# Patient Record
Sex: MALE | Race: BLACK OR AFRICAN AMERICAN | Employment: OTHER | URBAN - METROPOLITAN AREA
[De-identification: names, ages, dates, MRNs, and addresses within clinical notes are randomized per-mention and may not be internally consistent; named-entity substitution may affect disease eponyms.]

---

## 2021-12-02 RX ORDER — GLUCOSAMINE HCL/CHONDROITIN SU 500-400 MG
1 CAPSULE ORAL
COMMUNITY

## 2021-12-02 RX ORDER — ATORVASTATIN CALCIUM 40 MG/1
40 TABLET, FILM COATED ORAL DAILY
COMMUNITY

## 2021-12-02 RX ORDER — ASPIRIN 81 MG/1
81 TABLET ORAL DAILY
COMMUNITY

## 2021-12-02 RX ORDER — AMLODIPINE BESYLATE 10 MG/1
10 TABLET ORAL DAILY
COMMUNITY

## 2021-12-02 RX ORDER — METHADONE HYDROCHLORIDE 10 MG/1
10 TABLET ORAL EVERY 4 HOURS PRN
COMMUNITY

## 2021-12-02 RX ORDER — TROSPIUM CHLORIDE 20 MG/1
20 TABLET, FILM COATED ORAL 2 TIMES DAILY
COMMUNITY
End: 2022-02-16

## 2021-12-02 RX ORDER — TAMSULOSIN HYDROCHLORIDE 0.4 MG/1
0.4 CAPSULE ORAL DAILY
COMMUNITY
End: 2022-02-16

## 2021-12-15 RX ORDER — CALCITRIOL 0.25 UG/1
0.25 CAPSULE, LIQUID FILLED ORAL DAILY
COMMUNITY

## 2021-12-16 ENCOUNTER — OFFICE VISIT (OUTPATIENT)
Dept: SURGERY | Age: 71
End: 2021-12-16
Payer: OTHER GOVERNMENT

## 2021-12-16 VITALS — DIASTOLIC BLOOD PRESSURE: 82 MMHG | SYSTOLIC BLOOD PRESSURE: 138 MMHG | WEIGHT: 221.1 LBS

## 2021-12-16 DIAGNOSIS — N18.6 END STAGE RENAL DISEASE (HCC): Primary | ICD-10-CM

## 2021-12-16 PROCEDURE — 99204 OFFICE O/P NEW MOD 45 MIN: CPT | Performed by: STUDENT IN AN ORGANIZED HEALTH CARE EDUCATION/TRAINING PROGRAM

## 2021-12-16 RX ORDER — PANTOPRAZOLE SODIUM 20 MG/1
TABLET, DELAYED RELEASE ORAL
COMMUNITY
Start: 2021-11-21 | End: 2022-02-16

## 2021-12-16 RX ORDER — NALOXONE HYDROCHLORIDE 4 MG/.1ML
SPRAY NASAL
COMMUNITY
Start: 2021-06-04

## 2021-12-16 ASSESSMENT — ENCOUNTER SYMPTOMS: SHORTNESS OF BREATH: 0

## 2021-12-16 NOTE — PROGRESS NOTES
Mikayla Gupta (:  1950) is a 80 y.o. male,New patient, here for evaluation of the following chief complaint(s):  New Patient (Discuss hemodialysis access, pt referred by [address] [place] [address])         ASSESSMENT/PLAN:  1. End stage renal disease [facility])       This is a 71-year-old male patient presents the office today for evaluation for possible hemodialysis access. His paperwork demonstrates that the patient does not have usable cephalic vein conduit in both upper extremities. He is right-hand dominant. Would recommend creation of a left lower extremity AV fistula. He does stay active. He says he works out quite a bit. For the most part he has nonusable venous conduit in both lower extremities. However his basilic vein at least based on these measurements with the [address] [place] [address] suggested to not only her vein at the antecubital space and a 3.6 mm vein in the mid upper arm. Therefore would recommend for stage creation of a brachiobasilic fistula at the elbow. With this would allow for maturation and eventually transposition and a second staged fashion. Unless the vein was of good diameter at the time of the operation. Patient asked about hemodialysis versus peritoneal dialysis. I do not perform peritoneal dialysis. Offered to have the patient see a new nephrologist here but he is concerned that the [address] [place] [address] will not cover this. No chest pain or shortness of breath with ambulation. He is able to walk up steps without shortness of breath. This suggests greater than 4 METS. Would recommend left upper extremity brachiobasilic for stage procedure. All other questions answered. Patient would like to confer with his wife before deciding. Subjective   SUBJECTIVE/OBJECTIVE:  This is a 71-year-old male patient presents the office today for evaluation for possible hemodialysis access. The patient does not want to have his care done at the [address] [place] [address]. He has a history of IV drug abuse. He is on methadone for this.   He denies any chest pain or shortness of breath. Denies any upper extremity symptoms such as weakness or numbness. He is right-hand dominant. He denies any history of pacemakers or central lines. He is not currently on dialysis. He is stage 5 chronic kidney disease. He states that he had a falling out with his nephrologist at the [facility]. He is asking about the difference during hemodialysis and peritoneal dialysis. He is notably nervous about the process. Etiology for renal failure is diabetes and hypertension. Review of Systems   Constitutional: Negative for chills and fever. Respiratory: Negative for shortness of breath. Cardiovascular: Negative for chest pain. Musculoskeletal: Negative for myalgias. Skin: Negative for wound. Neurological: Negative for weakness and numbness. Hematological: Does not bruise/bleed easily. Psychiatric/Behavioral: Negative for agitation. Objective   Physical Exam  Constitutional:       Appearance: Normal appearance. Cardiovascular:      Rate and Rhythm: Normal rate and regular rhythm. Pulses: Normal pulses. Pulmonary:      Effort: Pulmonary effort is normal. No respiratory distress. Musculoskeletal:         General: Normal range of motion. Skin:     General: Skin is warm and dry. Capillary Refill: Capillary refill takes less than 2 seconds. Comments: Tattoos over both extremities. No visible veins of both extremities. Neurological:      General: No focal deficit present. Mental Status: He is alert. Psychiatric:         Mood and Affect: Mood normal.         Behavior: Behavior normal.         Thought Content:  Thought content normal.         Judgment: Judgment normal.            On this date 12/16/2021 I have spent 45 minutes reviewing previous notes, test results and face to face with the patient discussing the diagnosis and importance of compliance with the treatment plan as well as documenting on the day of the visit. Dionne Montague DO, [facility] Vascular and Endovascular Surgery    This document was dictated using voice recognition software.

## 2021-12-23 ENCOUNTER — TELEPHONE (OUTPATIENT)
Dept: SURGERY | Age: 71
End: 2021-12-23

## 2021-12-23 NOTE — TELEPHONE ENCOUNTER
Pt is wanting 1/18/2022 for sx, he states he will get H&P and COVID test done at the [place] on 1/12.

## 2022-01-12 ENCOUNTER — TELEPHONE (OUTPATIENT)
Dept: SURGERY | Age: 72
End: 2022-01-12

## 2022-01-13 ENCOUNTER — TELEPHONE (OUTPATIENT)
Dept: VASCULAR SURGERY | Age: 72
End: 2022-01-13

## 2022-01-13 NOTE — TELEPHONE ENCOUNTER
Nolvia Julio RN at [facility] called and faxed results of Mr. Shanika Schmidt covid test as it was positive as of 1/12/2022. He will be rescheduled for surgery with Dr. Lonnie Saucedo on 02/15/2022. I've already sent the surgery letter, and faxed the cancellation.

## 2022-02-01 ENCOUNTER — TELEPHONE (OUTPATIENT)
Dept: SURGERY | Age: 72
End: 2022-02-01

## 2022-02-02 ENCOUNTER — TELEPHONE (OUTPATIENT)
Dept: SURGERY | Age: 72
End: 2022-02-02

## 2022-02-07 ENCOUNTER — TELEPHONE (OUTPATIENT)
Dept: SURGERY | Age: 72
End: 2022-02-07

## 2022-02-08 ENCOUNTER — TELEPHONE (OUTPATIENT)
Dept: SURGERY | Age: 72
End: 2022-02-08

## 2022-02-16 NOTE — PROGRESS NOTES
physical and/or concerning medications. Bring any test results/reports from your physicians office. If you are under the care of a heart doctor or specialist doctor, please be aware that you may be asked to them for clearance    You may be asked to stop blood thinners such as Coumadin, Plavix, Fragmin, Lovenox, etc., or any anti-inflammatories such as:  Aspirin, Ibuprofen, Advil, Naproxen prior to your surgery. We also ask that you stop any OTC medications such as fish oil, vitamin E, glucosamine, garlic, Multivitamins, COQ 10, etc.    We ask that you do not smoke 24 hours prior to surgery  We ask that you do not  drink any alcoholic beverages 24 hours prior to surgery     You must make arrangements for a responsible adult to take you home after your surgery. For your safety you will not be allowed to leave alone or drive yourself home. Your surgery will be cancelled if you do not have a ride home. Also for your safety, it is strongly suggested that someone stay with you the first 24 hours after your surgery. A parent or legal guardian must accompany a child scheduled for surgery and plan to stay at the hospital until the child is discharged. Please do not bring other children with you. For your comfort, please wear simple loose fitting clothing to the hospital.  Please do not bring valuables. Do not wear any make-up or nail polish on your fingers or toes      For your safety, please do not wear any jewelry or body piercing's on the day of surgery. All jewelry must be removed. If you have dentures, they will be removed before going to operating room. For your convenience, we will provide you with a container. If you wear contact lenses or glasses, they will be removed, please bring a case for them. If you have a living will and a durable power of  for healthcare, please bring in a copy.      As part of our patient safety program to minimize surgical site infections, we ask you to do the following:    · Please notify your surgeon if you develop any illness between         now and the  day of your surgery. · This includes a cough, cold, fever, sore throat, nausea,         or vomiting, and diarrhea, etc.  ·  Please notify your surgeon if you experience dizziness, shortness         of breath or blurred vision between now and the time of your surgery. Do not shave your operative site 96 hours prior to surgery. For face and neck surgery, men may use an electric razor 48 hours   prior to surgery. You may shower the night before surgery or the morning of   your surgery with an antibacterial soap. You will need to bring a photo ID and insurance card    [facility] has an onsite pharmacy, would you like to utilize our pharmacy     If you will be staying overnight and use a C-pap machine, please bring   your C-pap to hospital     Our goal is to provide you with excellent care, therefore, visitors will be limited to two(2) in the room at a time so that we may focus on providing this care for you. Please contact pre-admission testing if you have any further questions. [facility] phone number:  [phone] PAT fax number:  [phone]  Please note these are generalized instructions for all surgical cases, you may be provided with more specific instructions according to your surgery.     Unfortunately due the rise in covid cases, staffing crisis and hospital capacity, your procedure may need to be rescheduled--if this were to happen your Surgeons office will notify you ASAP

## 2022-02-21 ENCOUNTER — TELEPHONE (OUTPATIENT)
Dept: VASCULAR SURGERY | Age: 72
End: 2022-02-21

## 2022-02-23 ENCOUNTER — TELEPHONE (OUTPATIENT)
Dept: VASCULAR SURGERY | Age: 72
End: 2022-02-23

## 2022-02-23 NOTE — TELEPHONE ENCOUNTER
Spoke with patient regarding scheduled surgery on 03- with Dr. Mark Kraft. Patient is asked to arrive by 6:00 AM @ [facility] after midnight. May take any Heart or Blood Pressure medication with a small sip of water to wash them down. Hold your Aspirin the morning of surgery. Take a 1/2 dose of Insulin the night before surgery. No diabetic medication on the morning of surgery. Patient states he is going to take his Methadone around 3 A. M. Asked that he tell the Nurse what medication he took, as well as the dosage and time he takes it. You will need someone to drive you home following this procedure. Please bring a Photo ID & Insurance card with you, and check-in at the Surgery Desk down the right-hand hallway on the first floor. Patient saw his Psychiatrist 2/18/2022 @ [facility]. Has not gone to see PCP there. Bing Cranker advised and will alert Dr. Mark Kraft. Surgery is scheduled to start at approx. 7:30 AM and should take approx. 180 minutes. If the hospital needs any further information, someone will give you a call. Patient expressed a verbal understanding of these instructions and had no further questions at this time. Call ended.

## 2022-02-28 ENCOUNTER — ANESTHESIA EVENT (OUTPATIENT)
Dept: OPERATING ROOM | Age: 72
End: 2022-02-28
Payer: OTHER GOVERNMENT

## 2022-02-28 ENCOUNTER — TELEPHONE (OUTPATIENT)
Dept: VASCULAR SURGERY | Age: 72
End: 2022-02-28

## 2022-03-01 ENCOUNTER — HOSPITAL ENCOUNTER (OUTPATIENT)
Age: 72
Setting detail: OUTPATIENT SURGERY
Discharge: HOME | End: 2022-03-01
Attending: STUDENT IN AN ORGANIZED HEALTH CARE EDUCATION/TRAINING PROGRAM | Admitting: STUDENT IN AN ORGANIZED HEALTH CARE EDUCATION/TRAINING PROGRAM
Payer: OTHER GOVERNMENT

## 2022-03-01 ENCOUNTER — ANESTHESIA (OUTPATIENT)
Dept: OPERATING ROOM | Age: 72
End: 2022-03-01
Payer: OTHER GOVERNMENT

## 2022-03-01 VITALS
SYSTOLIC BLOOD PRESSURE: 124 MMHG | OXYGEN SATURATION: 93 % | TEMPERATURE: 97.5 F | DIASTOLIC BLOOD PRESSURE: 58 MMHG | RESPIRATION RATE: 8 BRPM

## 2022-03-01 VITALS
BODY MASS INDEX: 27.23 KG/M2 | HEART RATE: 64 BPM | TEMPERATURE: 97.2 F | HEIGHT: 74 IN | SYSTOLIC BLOOD PRESSURE: 140 MMHG | WEIGHT: 212.19 LBS | OXYGEN SATURATION: 97 % | RESPIRATION RATE: 16 BRPM | DIASTOLIC BLOOD PRESSURE: 68 MMHG

## 2022-03-01 DIAGNOSIS — G89.18 POST-OP PAIN: Primary | ICD-10-CM

## 2022-03-01 LAB
ANION GAP SERPL CALCULATED.3IONS-SCNC: 17 MMOL/L (ref 3–16)
BUN BLDV-MCNC: 70 MG/DL (ref 7–20)
CALCIUM SERPL-MCNC: 9 MG/DL (ref 8.3–10.6)
CHLORIDE BLD-SCNC: 100 MMOL/L (ref 99–110)
CO2: 20 MMOL/L (ref 21–32)
CREAT SERPL-MCNC: 5.6 MG/DL (ref 0.8–1.3)
GFR AFRICAN AMERICAN: 12
GFR NON-AFRICAN AMERICAN: 10
GLUCOSE BLD-MCNC: 135 MG/DL (ref 70–99)
GLUCOSE BLD-MCNC: 136 MG/DL (ref 70–99)
GLUCOSE BLD-MCNC: 161 MG/DL (ref 70–99)
HCT VFR BLD CALC: 31.5 % (ref 40.5–52.5)
HEMOGLOBIN: 10.5 G/DL (ref 13.5–17.5)
MCH RBC QN AUTO: 29 PG (ref 26–34)
MCHC RBC AUTO-ENTMCNC: 33.5 G/DL (ref 31–36)
MCV RBC AUTO: 86.5 FL (ref 80–100)
PDW BLD-RTO: 14.6 % (ref 12.4–15.4)
PERFORMED ON: ABNORMAL
PERFORMED ON: ABNORMAL
PLATELET # BLD: 201 K/UL (ref 135–450)
PMV BLD AUTO: 8.4 FL (ref 5–10.5)
POTASSIUM REFLEX MAGNESIUM: 4.8 MMOL/L (ref 3.5–5.1)
POTASSIUM, WHOLE BLOOD: 4.8 MMOL/L (ref 3.5–5.1)
RBC # BLD: 3.64 M/UL (ref 4.2–5.9)
SODIUM BLD-SCNC: 137 MMOL/L (ref 136–145)
WBC # BLD: 6.3 K/UL (ref 4–11)

## 2022-03-01 PROCEDURE — 6360000002 HC RX W HCPCS: Performed by: STUDENT IN AN ORGANIZED HEALTH CARE EDUCATION/TRAINING PROGRAM

## 2022-03-01 PROCEDURE — 6370000000 HC RX 637 (ALT 250 FOR IP): Performed by: ANESTHESIOLOGY

## 2022-03-01 PROCEDURE — 36819 AV FUSE UPPR ARM BASILIC: CPT | Performed by: STUDENT IN AN ORGANIZED HEALTH CARE EDUCATION/TRAINING PROGRAM

## 2022-03-01 PROCEDURE — 7100000010 HC PHASE II RECOVERY - FIRST 15 MIN: Performed by: STUDENT IN AN ORGANIZED HEALTH CARE EDUCATION/TRAINING PROGRAM

## 2022-03-01 PROCEDURE — 80048 BASIC METABOLIC PNL TOTAL CA: CPT

## 2022-03-01 PROCEDURE — 2709999900 HC NON-CHARGEABLE SUPPLY: Performed by: STUDENT IN AN ORGANIZED HEALTH CARE EDUCATION/TRAINING PROGRAM

## 2022-03-01 PROCEDURE — 85027 COMPLETE CBC AUTOMATED: CPT

## 2022-03-01 PROCEDURE — 3700000000 HC ANESTHESIA ATTENDED CARE: Performed by: STUDENT IN AN ORGANIZED HEALTH CARE EDUCATION/TRAINING PROGRAM

## 2022-03-01 PROCEDURE — 64415 NJX AA&/STRD BRCH PLXS IMG: CPT | Performed by: ANESTHESIOLOGY

## 2022-03-01 PROCEDURE — 6360000002 HC RX W HCPCS: Performed by: NURSE ANESTHETIST, CERTIFIED REGISTERED

## 2022-03-01 PROCEDURE — 6360000002 HC RX W HCPCS: Performed by: ANESTHESIOLOGY

## 2022-03-01 PROCEDURE — 2500000003 HC RX 250 WO HCPCS: Performed by: NURSE ANESTHETIST, CERTIFIED REGISTERED

## 2022-03-01 PROCEDURE — 7100000011 HC PHASE II RECOVERY - ADDTL 15 MIN: Performed by: STUDENT IN AN ORGANIZED HEALTH CARE EDUCATION/TRAINING PROGRAM

## 2022-03-01 PROCEDURE — 7100000001 HC PACU RECOVERY - ADDTL 15 MIN: Performed by: STUDENT IN AN ORGANIZED HEALTH CARE EDUCATION/TRAINING PROGRAM

## 2022-03-01 PROCEDURE — 3600000014 HC SURGERY LEVEL 4 ADDTL 15MIN: Performed by: STUDENT IN AN ORGANIZED HEALTH CARE EDUCATION/TRAINING PROGRAM

## 2022-03-01 PROCEDURE — 84132 ASSAY OF SERUM POTASSIUM: CPT

## 2022-03-01 PROCEDURE — A4217 STERILE WATER/SALINE, 500 ML: HCPCS | Performed by: STUDENT IN AN ORGANIZED HEALTH CARE EDUCATION/TRAINING PROGRAM

## 2022-03-01 PROCEDURE — 7100000000 HC PACU RECOVERY - FIRST 15 MIN: Performed by: STUDENT IN AN ORGANIZED HEALTH CARE EDUCATION/TRAINING PROGRAM

## 2022-03-01 PROCEDURE — 2580000003 HC RX 258: Performed by: STUDENT IN AN ORGANIZED HEALTH CARE EDUCATION/TRAINING PROGRAM

## 2022-03-01 PROCEDURE — 3600000004 HC SURGERY LEVEL 4 BASE: Performed by: STUDENT IN AN ORGANIZED HEALTH CARE EDUCATION/TRAINING PROGRAM

## 2022-03-01 PROCEDURE — 3700000001 HC ADD 15 MINUTES (ANESTHESIA): Performed by: STUDENT IN AN ORGANIZED HEALTH CARE EDUCATION/TRAINING PROGRAM

## 2022-03-01 PROCEDURE — 2580000003 HC RX 258: Performed by: ANESTHESIOLOGY

## 2022-03-01 RX ORDER — SODIUM CHLORIDE 9 MG/ML
25 INJECTION, SOLUTION INTRAVENOUS PRN
Status: DISCONTINUED | OUTPATIENT
Start: 2022-03-01 | End: 2022-03-01 | Stop reason: SDUPTHER

## 2022-03-01 RX ORDER — HEPARIN SODIUM 1000 [USP'U]/ML
INJECTION, SOLUTION INTRAVENOUS; SUBCUTANEOUS PRN
Status: DISCONTINUED | OUTPATIENT
Start: 2022-03-01 | End: 2022-03-01 | Stop reason: SDUPTHER

## 2022-03-01 RX ORDER — MAGNESIUM HYDROXIDE 1200 MG/15ML
LIQUID ORAL CONTINUOUS PRN
Status: COMPLETED | OUTPATIENT
Start: 2022-03-01 | End: 2022-03-01

## 2022-03-01 RX ORDER — SODIUM CHLORIDE 0.9 % (FLUSH) 0.9 %
5-40 SYRINGE (ML) INJECTION EVERY 12 HOURS SCHEDULED
Status: DISCONTINUED | OUTPATIENT
Start: 2022-03-01 | End: 2022-03-01 | Stop reason: HOSPADM

## 2022-03-01 RX ORDER — OXYCODONE HYDROCHLORIDE 5 MG/1
5 TABLET ORAL EVERY 4 HOURS PRN
Qty: 28 TABLET | Refills: 0 | Status: SHIPPED | OUTPATIENT
Start: 2022-03-01 | End: 2022-03-08

## 2022-03-01 RX ORDER — SODIUM CHLORIDE 9 MG/ML
INJECTION, SOLUTION INTRAVENOUS CONTINUOUS
Status: DISCONTINUED | OUTPATIENT
Start: 2022-03-01 | End: 2022-03-01 | Stop reason: HOSPADM

## 2022-03-01 RX ORDER — DIPHENHYDRAMINE HYDROCHLORIDE 50 MG/ML
12.5 INJECTION INTRAMUSCULAR; INTRAVENOUS
Status: DISCONTINUED | OUTPATIENT
Start: 2022-03-01 | End: 2022-03-01 | Stop reason: HOSPADM

## 2022-03-01 RX ORDER — ROPIVACAINE HYDROCHLORIDE 5 MG/ML
INJECTION, SOLUTION EPIDURAL; INFILTRATION; PERINEURAL
Status: COMPLETED | OUTPATIENT
Start: 2022-03-01 | End: 2022-03-01

## 2022-03-01 RX ORDER — SODIUM CHLORIDE 0.9 % (FLUSH) 0.9 %
5-40 SYRINGE (ML) INJECTION PRN
Status: DISCONTINUED | OUTPATIENT
Start: 2022-03-01 | End: 2022-03-01 | Stop reason: HOSPADM

## 2022-03-01 RX ORDER — FENTANYL CITRATE 50 UG/ML
INJECTION, SOLUTION INTRAMUSCULAR; INTRAVENOUS PRN
Status: DISCONTINUED | OUTPATIENT
Start: 2022-03-01 | End: 2022-03-01 | Stop reason: SDUPTHER

## 2022-03-01 RX ORDER — SODIUM CHLORIDE 9 MG/ML
25 INJECTION, SOLUTION INTRAVENOUS PRN
Status: DISCONTINUED | OUTPATIENT
Start: 2022-03-01 | End: 2022-03-01 | Stop reason: HOSPADM

## 2022-03-01 RX ORDER — MEPERIDINE HYDROCHLORIDE 25 MG/ML
12.5 INJECTION INTRAMUSCULAR; INTRAVENOUS; SUBCUTANEOUS EVERY 5 MIN PRN
Status: DISCONTINUED | OUTPATIENT
Start: 2022-03-01 | End: 2022-03-01 | Stop reason: HOSPADM

## 2022-03-01 RX ORDER — PROPOFOL 10 MG/ML
INJECTION, EMULSION INTRAVENOUS PRN
Status: DISCONTINUED | OUTPATIENT
Start: 2022-03-01 | End: 2022-03-01 | Stop reason: SDUPTHER

## 2022-03-01 RX ORDER — PROTAMINE SULFATE 10 MG/ML
INJECTION, SOLUTION INTRAVENOUS PRN
Status: DISCONTINUED | OUTPATIENT
Start: 2022-03-01 | End: 2022-03-01 | Stop reason: SDUPTHER

## 2022-03-01 RX ORDER — EPHEDRINE SULFATE/0.9% NACL/PF 50 MG/5 ML
SYRINGE (ML) INTRAVENOUS PRN
Status: DISCONTINUED | OUTPATIENT
Start: 2022-03-01 | End: 2022-03-01 | Stop reason: SDUPTHER

## 2022-03-01 RX ORDER — DEXAMETHASONE SODIUM PHOSPHATE 4 MG/ML
INJECTION, SOLUTION INTRA-ARTICULAR; INTRALESIONAL; INTRAMUSCULAR; INTRAVENOUS; SOFT TISSUE PRN
Status: DISCONTINUED | OUTPATIENT
Start: 2022-03-01 | End: 2022-03-01 | Stop reason: SDUPTHER

## 2022-03-01 RX ORDER — FENTANYL CITRATE 50 UG/ML
25 INJECTION, SOLUTION INTRAMUSCULAR; INTRAVENOUS EVERY 5 MIN PRN
Status: DISCONTINUED | OUTPATIENT
Start: 2022-03-01 | End: 2022-03-01 | Stop reason: HOSPADM

## 2022-03-01 RX ORDER — MIDAZOLAM HYDROCHLORIDE 1 MG/ML
INJECTION INTRAMUSCULAR; INTRAVENOUS PRN
Status: DISCONTINUED | OUTPATIENT
Start: 2022-03-01 | End: 2022-03-01 | Stop reason: SDUPTHER

## 2022-03-01 RX ORDER — LIDOCAINE HYDROCHLORIDE 20 MG/ML
INJECTION, SOLUTION EPIDURAL; INFILTRATION; INTRACAUDAL; PERINEURAL PRN
Status: DISCONTINUED | OUTPATIENT
Start: 2022-03-01 | End: 2022-03-01 | Stop reason: SDUPTHER

## 2022-03-01 RX ORDER — OXYCODONE HYDROCHLORIDE 5 MG/1
5 TABLET ORAL PRN
Status: COMPLETED | OUTPATIENT
Start: 2022-03-01 | End: 2022-03-01

## 2022-03-01 RX ORDER — ONDANSETRON 2 MG/ML
4 INJECTION INTRAMUSCULAR; INTRAVENOUS
Status: DISCONTINUED | OUTPATIENT
Start: 2022-03-01 | End: 2022-03-01 | Stop reason: HOSPADM

## 2022-03-01 RX ORDER — ROCURONIUM BROMIDE 10 MG/ML
INJECTION, SOLUTION INTRAVENOUS PRN
Status: DISCONTINUED | OUTPATIENT
Start: 2022-03-01 | End: 2022-03-01 | Stop reason: SDUPTHER

## 2022-03-01 RX ORDER — SODIUM CHLORIDE 0.9 % (FLUSH) 0.9 %
5-40 SYRINGE (ML) INJECTION EVERY 12 HOURS SCHEDULED
Status: DISCONTINUED | OUTPATIENT
Start: 2022-03-01 | End: 2022-03-01 | Stop reason: SDUPTHER

## 2022-03-01 RX ORDER — SODIUM CHLORIDE 0.9 % (FLUSH) 0.9 %
5-40 SYRINGE (ML) INJECTION PRN
Status: DISCONTINUED | OUTPATIENT
Start: 2022-03-01 | End: 2022-03-01 | Stop reason: SDUPTHER

## 2022-03-01 RX ORDER — OXYCODONE HYDROCHLORIDE 10 MG/1
10 TABLET ORAL PRN
Status: COMPLETED | OUTPATIENT
Start: 2022-03-01 | End: 2022-03-01

## 2022-03-01 RX ORDER — ONDANSETRON 2 MG/ML
INJECTION INTRAMUSCULAR; INTRAVENOUS PRN
Status: DISCONTINUED | OUTPATIENT
Start: 2022-03-01 | End: 2022-03-01 | Stop reason: SDUPTHER

## 2022-03-01 RX ADMIN — SODIUM CHLORIDE: 9 INJECTION, SOLUTION INTRAVENOUS at 07:44

## 2022-03-01 RX ADMIN — LIDOCAINE HYDROCHLORIDE 100 MG: 20 INJECTION, SOLUTION EPIDURAL; INFILTRATION; INTRACAUDAL; PERINEURAL at 07:48

## 2022-03-01 RX ADMIN — HEPARIN SODIUM 3000 UNITS: 1000 INJECTION INTRAVENOUS; SUBCUTANEOUS at 08:30

## 2022-03-01 RX ADMIN — DEXAMETHASONE SODIUM PHOSPHATE 8 MG: 4 INJECTION, SOLUTION INTRAMUSCULAR; INTRAVENOUS at 08:17

## 2022-03-01 RX ADMIN — OXYCODONE HYDROCHLORIDE 10 MG: 10 TABLET ORAL at 10:54

## 2022-03-01 RX ADMIN — PROPOFOL 200 MG: 10 INJECTION, EMULSION INTRAVENOUS at 07:48

## 2022-03-01 RX ADMIN — PROTAMINE SULFATE 10 MG: 10 INJECTION, SOLUTION INTRAVENOUS at 09:03

## 2022-03-01 RX ADMIN — FENTANYL CITRATE 100 MCG: 50 INJECTION INTRAMUSCULAR; INTRAVENOUS at 07:37

## 2022-03-01 RX ADMIN — Medication 10 MG: at 07:58

## 2022-03-01 RX ADMIN — ROCURONIUM BROMIDE 40 MG: 10 INJECTION INTRAVENOUS at 07:48

## 2022-03-01 RX ADMIN — SUGAMMADEX 200 MG: 100 INJECTION, SOLUTION INTRAVENOUS at 09:29

## 2022-03-01 RX ADMIN — MIDAZOLAM 2 MG: 1 INJECTION INTRAMUSCULAR; INTRAVENOUS at 07:37

## 2022-03-01 RX ADMIN — CEFAZOLIN 2000 MG: 10 INJECTION, POWDER, FOR SOLUTION INTRAVENOUS at 07:44

## 2022-03-01 RX ADMIN — ONDANSETRON 4 MG: 2 INJECTION INTRAMUSCULAR; INTRAVENOUS at 09:04

## 2022-03-01 RX ADMIN — Medication 10 MG: at 08:16

## 2022-03-01 RX ADMIN — ROPIVACAINE HYDROCHLORIDE 30 ML: 5 INJECTION, SOLUTION EPIDURAL; INFILTRATION; PERINEURAL at 08:15

## 2022-03-01 ASSESSMENT — PULMONARY FUNCTION TESTS
PIF_VALUE: 15
PIF_VALUE: 14
PIF_VALUE: 13
PIF_VALUE: 1
PIF_VALUE: 15
PIF_VALUE: 15
PIF_VALUE: 14
PIF_VALUE: 15
PIF_VALUE: 14
PIF_VALUE: 13
PIF_VALUE: 14
PIF_VALUE: 13
PIF_VALUE: 14
PIF_VALUE: 2
PIF_VALUE: 14
PIF_VALUE: 4
PIF_VALUE: 9
PIF_VALUE: 14
PIF_VALUE: 12
PIF_VALUE: 14
PIF_VALUE: 1
PIF_VALUE: 13
PIF_VALUE: 13
PIF_VALUE: 15
PIF_VALUE: 11
PIF_VALUE: 15
PIF_VALUE: 14
PIF_VALUE: 1
PIF_VALUE: 1
PIF_VALUE: 14
PIF_VALUE: 14
PIF_VALUE: 13
PIF_VALUE: 14
PIF_VALUE: 0
PIF_VALUE: 12
PIF_VALUE: 10
PIF_VALUE: 15
PIF_VALUE: 0
PIF_VALUE: 4
PIF_VALUE: 15
PIF_VALUE: 14
PIF_VALUE: 14
PIF_VALUE: 18
PIF_VALUE: 14
PIF_VALUE: 14
PIF_VALUE: 19
PIF_VALUE: 19
PIF_VALUE: 14
PIF_VALUE: 15
PIF_VALUE: 13
PIF_VALUE: 15
PIF_VALUE: 14
PIF_VALUE: 1
PIF_VALUE: 14
PIF_VALUE: 15
PIF_VALUE: 4
PIF_VALUE: 14
PIF_VALUE: 14
PIF_VALUE: 1
PIF_VALUE: 4
PIF_VALUE: 15
PIF_VALUE: 14
PIF_VALUE: 15
PIF_VALUE: 14
PIF_VALUE: 12
PIF_VALUE: 4
PIF_VALUE: 14
PIF_VALUE: 11
PIF_VALUE: 14
PIF_VALUE: 14
PIF_VALUE: 1
PIF_VALUE: 11
PIF_VALUE: 14
PIF_VALUE: 12
PIF_VALUE: 9
PIF_VALUE: 15
PIF_VALUE: 14
PIF_VALUE: 4
PIF_VALUE: 0
PIF_VALUE: 15
PIF_VALUE: 14
PIF_VALUE: 15
PIF_VALUE: 14
PIF_VALUE: 13
PIF_VALUE: 14
PIF_VALUE: 14
PIF_VALUE: 13
PIF_VALUE: 15
PIF_VALUE: 15
PIF_VALUE: 14
PIF_VALUE: 14
PIF_VALUE: 4
PIF_VALUE: 13
PIF_VALUE: 1
PIF_VALUE: 15

## 2022-03-01 ASSESSMENT — PAIN DESCRIPTION - FREQUENCY
FREQUENCY: CONTINUOUS
FREQUENCY: INTERMITTENT

## 2022-03-01 ASSESSMENT — PAIN DESCRIPTION - DESCRIPTORS
DESCRIPTORS: DISCOMFORT
DESCRIPTORS: DISCOMFORT;NUMBNESS

## 2022-03-01 ASSESSMENT — PAIN - FUNCTIONAL ASSESSMENT
PAIN_FUNCTIONAL_ASSESSMENT: ACTIVITIES ARE NOT PREVENTED
PAIN_FUNCTIONAL_ASSESSMENT: 0-10

## 2022-03-01 ASSESSMENT — PAIN DESCRIPTION - PROGRESSION
CLINICAL_PROGRESSION: NOT CHANGED
CLINICAL_PROGRESSION: GRADUALLY WORSENING
CLINICAL_PROGRESSION: NOT CHANGED
CLINICAL_PROGRESSION: GRADUALLY IMPROVING

## 2022-03-01 ASSESSMENT — PAIN SCALES - GENERAL
PAINLEVEL_OUTOF10: 6
PAINLEVEL_OUTOF10: 4
PAINLEVEL_OUTOF10: 6
PAINLEVEL_OUTOF10: 5

## 2022-03-01 ASSESSMENT — PAIN DESCRIPTION - ONSET
ONSET: ON-GOING
ONSET: ON-GOING
ONSET: PROGRESSIVE
ONSET: ON-GOING

## 2022-03-01 ASSESSMENT — PAIN DESCRIPTION - LOCATION
LOCATION: ARM

## 2022-03-01 ASSESSMENT — ENCOUNTER SYMPTOMS: SHORTNESS OF BREATH: 0

## 2022-03-01 ASSESSMENT — PAIN DESCRIPTION - PAIN TYPE
TYPE: SURGICAL PAIN

## 2022-03-01 ASSESSMENT — PAIN DESCRIPTION - ORIENTATION
ORIENTATION: LEFT

## 2022-03-01 ASSESSMENT — PAIN DESCRIPTION - DIRECTION: RADIATING_TOWARDS: TO ELBOW

## 2022-03-01 NOTE — PROGRESS NOTES
Vss. Dressing is clean dry intact. Fingers are warm, move well, divya well. Bruit heard. Tolerated sitting up and po fluids and crackers well. Wife at bedside. Verbalized understanding of discharge instructions. C/o of 6/10 surgical pain. analgesic given.

## 2022-03-01 NOTE — ANESTHESIA PROCEDURE NOTES
Peripheral Block    Patient location during procedure: pre-op  Start time: 3/1/2022 7:37 AM  End time: 3/1/2022 7:18 AM  Staffing  Performed: anesthesiologist   Anesthesiologist: Lila Howard MD  Preanesthetic Checklist  Completed: patient identified, IV checked, site marked, risks and benefits discussed, surgical consent, monitors and equipment checked, pre-op evaluation, timeout performed, anesthesia consent given, oxygen available and patient being monitored  Peripheral Block  Patient position: supine  Prep: ChloraPrep  Patient monitoring: continuous pulse ox  Block type: Brachial plexus  Laterality: left  Injection technique: single-shot  Guidance: ultrasound guided  Needle  Needle type: combined needle/nerve stimulator   Needle gauge: 22 G  Needle length: 5 cm  Needle localization: ultrasound guidance  Assessment  Injection assessment: negative aspiration for heme, no paresthesia on injection and local visualized surrounding nerve on ultrasound  Slow fractionated injection: yes  Hemodynamics: stable  Medications Administered  Ropivacaine (NAROPIN) injection 0.5%, 30 mL  Reason for block: procedure for pain, post-op pain management and at surgeon's request

## 2022-03-01 NOTE — ANESTHESIA PRE PROCEDURE
blood glucose monitor strips 1 strip by Other route Use as directed    Historical Provider, MD   Insulin Pen Needle 31G X 8 MM MISC 1 each by Does not apply route daily    Historical Provider, MD       Current medications:    Current Facility-Administered Medications   Medication Dose Route Frequency Provider Last Rate Last Admin    0.9 % sodium chloride infusion   IntraVENous Continuous Candice Botello MD        sodium chloride flush 0.9 % injection 5-40 mL  5-40 mL IntraVENous 2 times per day Candice Botello MD        sodium chloride flush 0.9 % injection 5-40 mL  5-40 mL IntraVENous PRN Candice Botello MD        0.9 % sodium chloride infusion  25 mL IntraVENous PRN Candice Botello MD        ceFAZolin (ANCEF) 2000 mg in dextrose 5 % 100 mL IVPB  2,000 mg IntraVENous On Call to [facility],            Allergies: Allergies   Allergen Reactions    Lisinopril Hives and Shortness Of Breath    Alogliptin      Not sure of reaction    Rosiglitazone      Not sure of reaction       Problem List:  There is no problem list on file for this patient.       Past Medical History:        Diagnosis Date    Alcohol dependence in remission [facility])     information from [address] [place] [address])     over 10 years ago     Chronic back pain     Chronic kidney disease     COVID 01/12/2022    Erectile dysfunction     Full dentures     Hepatitis C     information from [facility]    Hx of non-Hodgkin's lymphoma     Information from [address] [place] [address] Hyperlipidemia     Hypertension     Kidney stone     Lumbago     information from [address] [place] [address] Opioid dependence ([address]     information from [address] [place] [address] Thrombocytopenia ([address]     Tobacco dependency     Type 2 diabetes mellitus without complication ([address]        Past Surgical History:        Procedure Laterality Date    BACK SURGERY      COLONOSCOPY      CYSTOSCOPY      with stone removal    PORT SURGERY         Social History: Social History     Tobacco Use    Smoking status: Former Smoker     Packs/day: 0.50     Years: 40.00     Pack years: 20.00     Types: Cigarettes     Quit date: 2018     Years since quittin.1    Smokeless tobacco: Never Used   Substance Use Topics    Alcohol use: No                                Counseling given: Not Answered      Vital Signs (Current):   Vitals:    22 1213   Weight: 225 lb (102.1 kg)   Height: 6' 2\" (1.88 m)                                              BP Readings from Last 3 Encounters:   21 138/82       NPO Status: Time of last liquid consumption:                         Time of last solid consumption:                         Date of last liquid consumption: 22                        Date of last solid food consumption: 22    BMI:   Wt Readings from Last 3 Encounters:   22 225 lb (102.1 kg)   21 221 lb 1.6 oz (100.3 kg)     Body mass index is 28.89 kg/m². CBC: No results found for: WBC, RBC, HGB, HCT, MCV, RDW, PLT    CMP: No results found for: NA, K, CL, CO2, BUN, CREATININE, GFRAA, AGRATIO, LABGLOM, GLUCOSE, GLU, PROT, CALCIUM, BILITOT, ALKPHOS, AST, ALT    POC Tests: No results for input(s): POCGLU, POCNA, POCK, POCCL, POCBUN, POCHEMO, POCHCT in the last 72 hours.     Coags: No results found for: PROTIME, INR, APTT    HCG (If Applicable): No results found for: PREGTESTUR, PREGSERUM, HCG, HCGQUANT     ABGs: No results found for: PHART, PO2ART, WGS3KVF, TRV2BDD, BEART, L9BFPJXV     Type & Screen (If Applicable):  No results found for: LABABO, LABRH    Drug/Infectious Status (If Applicable):  No results found for: HIV, HEPCAB    COVID-19 Screening (If Applicable): No results found for: COVID19        Anesthesia Evaluation  Patient summary reviewed  Airway: Mallampati: II  TM distance: >3 FB   Neck ROM: full  Mouth opening: > = 3 FB Dental: normal exam   (+) upper dentures      Pulmonary:normal exam  breath sounds clear to auscultation                             Cardiovascular:    (+) hypertension:, hyperlipidemia        Rhythm: regular  Rate: normal                    Neuro/Psych:               GI/Hepatic/Renal:   (+) hepatitis: C,           Endo/Other:    (+) Diabetes, blood dyscrasia: thrombocytopenia:., malignancy/cancer. Abdominal:             Vascular: Other Findings:             Anesthesia Plan      general     ASA 3       Induction: intravenous. MIPS: Postoperative opioids intended and Prophylactic antiemetics administered. Anesthetic plan and risks discussed with patient. Plan discussed with CRNA.     Attending anesthesiologist reviewed and agrees with Hue Tineo MD   3/1/2022

## 2022-03-01 NOTE — H&P
H&P     Chief Complaint / Reason for Consultation  Need for dialysis access    History of Present Illness  Patient is a 80 y.o. male presenting with need for dialysis access. Patient of the [place]. Denies any arm pain. Denies chest pain or shortness of breath. Vein mapping prior to office visit suggested that cephalic vein not usable and basilic vein might be usable conduit. Review of Systems  Review of Systems   Constitutional: Negative for chills and fever. Respiratory: Negative for shortness of breath. Cardiovascular: Negative for chest pain. Musculoskeletal: Negative for gait problem and myalgias. Skin: Negative for wound. Neurological: Negative for weakness and numbness. Hematological: Does not bruise/bleed easily. Psychiatric/Behavioral: Negative for agitation.         Past Medical History:   Diagnosis Date    Alcohol dependence in remission [facility])     information from [address] [place] [address])     over 10 years ago     Chronic back pain     Chronic kidney disease     COVID 01/12/2022    Erectile dysfunction     Full dentures     Hepatitis C     information from [facility]    Hx of non-Hodgkin's lymphoma     Information from [address] [place] [address] Hyperlipidemia     Hypertension     Kidney stone     Lumbago     information from [address] [place] [address] Opioid dependence ([address]     information from [address] [place] [address] Thrombocytopenia ([facility]     Tobacco dependency     Type 2 diabetes mellitus without complication ([facility]        Past Surgical History:   Procedure Laterality Date    BACK SURGERY      COLONOSCOPY      CYSTOSCOPY      with stone removal    PORT SURGERY         Allergies   Allergen Reactions    Lisinopril Hives and Shortness Of Breath    Alogliptin      Not sure of reaction    Rosiglitazone      Not sure of reaction       Social History     Socioeconomic History    Marital status: Single     Spouse name: Not on file    Number of children: Not on file    Years of education: Not on file    Highest education level: Not on file   Occupational History    Not on file   Tobacco Use    Smoking status: Former Smoker     Packs/day: 0.50     Years: 40.00     Pack years: 20.00     Types: Cigarettes     Quit date: 2018     Years since quittin.1    Smokeless tobacco: Never Used   Vaping Use    Vaping Use: Never used   Substance and Sexual Activity    Alcohol use: No    Drug use: Yes     Comment: [facility]- medical card    Sexual activity: Not on file   Other Topics Concern    Not on file   Social History Narrative    Not on file     Social Determinants of Health     Financial Resource Strain:     Difficulty of Paying Living Expenses: Not on file   Food Insecurity:     Worried About Running Out of Food in the Last Year: Not on file    [place] of Food in the Last Year: Not on file   Transportation Needs:     Lack of Transportation (Medical): Not on file    Lack of Transportation (Non-Medical): Not on file   Physical Activity:     Days of Exercise per Week: Not on file    Minutes of Exercise per Session: Not on file   Stress:     Feeling of Stress : Not on file   Social Connections:     Frequency of Communication with Friends and Family: Not on file    Frequency of Social Gatherings with Friends and Family: Not on file    Attends Yazidi Services: Not on file    Active Member of [address] [place] [address] [employer] or Organizations: Not on file    Attends Club or Organization Meetings: Not on file    Marital Status: Not on file   Intimate Partner Violence:     Fear of Current or Ex-Partner: Not on file    Emotionally Abused: Not on file    Physically Abused: Not on file    Sexually Abused: Not on file   Housing Stability:     Unable to Pay for Housing in the Last Year: Not on file    Number of [place] in the Last Year: Not on file    Unstable Housing in the Last Year: Not on file       History reviewed.  No pertinent family history.  - No history of bleeding or clotting disorders    Vital Signs  Vitals:    22 1213 22 0628   BP:  (!) 163/78   Pulse:  69   Resp:  14   Temp:  97.6 °F (36.4 °C)   TempSrc:  Temporal   SpO2:  98%   Weight: 225 lb (102.1 kg) 212 lb 3.1 oz (96.2 kg)   Height: 6' 2\" (1.88 m) 6' 2\" (1.88 m)       Physical Examination  General: No acute distress  Psychiatric: affect appropriate  Head/Eyes/Ears/Nose/Throat:  Atraumatic, vision and hearing intact, face symmetric  Neck:  supple  Chest/Lungs: no tachypnea, retractions or cyanosis noted  Cardiac:  Regular rate and rhythm  Abdomen: soft, nontender  Extremities: warm and well perfused  - bilateral upper extremity motorsensory intact  - bilateral lower extremity motorsensory intact  Vascular exam:  - Radial: palp bilaterally  Skin: no wounds    Labs  Lab Results   Component Value Date    WBC 6.3 2022    HGB 10.5 2022    HCT 31.5 2022    MCV 86.5 2022     2022     Lab Results   Component Value Date    K 4.8 2022      No results found for: GLU      Plan:  Patient is a 80 y.o. male presenting with need for dialysis access. Proceed with left BBF primary versus complete creation today with transposition. All risks/benefits reviewed. Patient understands and willing to proceed. Patricia Villagomez DO, [facility] Vascular and Endovascular Surgery  3/1/2022  7:38 AM      I have reviewed the history and physical and examined the patient and find no relevant changes. I have reviewed with the patient and/or family the risks, benefits, and alternatives to the procedure. I HAVE PRESENTED REASONABLE ALTERNATIVES TO THE PATIENT'S PROPOSED CARE, TREATMENT, AND SERVICES. THE DISCUSSION I HAVE DONE ENCOMPASSED RISKS, BENEFITS, AND SIDE EFFECTS RELATED TO THE ALTERNATIVES AND THE RISKS RELATED TO NOT RECEIVING THE PROPOSED CARE, TREATMENT, SERVICES.      Patient:  Goldie Avitia   :   1950    Intended Procedure: left BBF 1 vs 2 stage    Vitals:    22 0628   BP: (!) 163/78   Pulse: 69   Resp: 14   Temp: 97.6 °F (36.4 °C)   SpO2: 98%       Nursing notes reviewed and agreed. Medications reviewed  Allergies:    Allergies   Allergen Reactions    Lisinopril Hives and Shortness Of Breath    Alogliptin      Not sure of reaction    Rosiglitazone      Not sure of reaction         Post Procedure plan: home    Jaswinder Haque DO, [address] [facility] Vascular and Endovascular Surgery

## 2022-03-01 NOTE — ANESTHESIA POSTPROCEDURE EVALUATION
Department of Anesthesiology  Postprocedure Note    Patient: Juan A Frias  MRN: [id number]  YOB: 1950  Date of evaluation: 3/1/2022  Time:  9:45 AM     Procedure Summary     Date: 03/01/22 Room / Location: [address] [place] [address] / [facility]    Anesthesia Start: 4341 Anesthesia Stop: 0940    Procedure: LEFT ARM STAGED CREATION OF A BRACHIO-BASILIC FISTULA AT [address] (Left Arm Upper) Diagnosis:       End stage renal disease ([address]      (END STAGE RENAL DISEASE)    Surgeons: Kimberly Marquez DO Responsible Provider: Gonsalo Nichole MD    Anesthesia Type: general ASA Status: 3          Anesthesia Type: general    Dawood Phase I: Dawood Score: 3    Dawood Phase II:      Last vitals: Reviewed and per EMR flowsheets.        Anesthesia Post Evaluation    Patient location during evaluation: PACU  Patient participation: complete - patient participated  Level of consciousness: awake  Airway patency: patent  Nausea & Vomiting: no nausea  Complications: no  Cardiovascular status: hemodynamically stable  Respiratory status: acceptable  Hydration status: euvolemic  Multimodal analgesia pain management approach

## 2022-03-01 NOTE — OP NOTE
[facility]           [address] [place] [address]                                OPERATIVE REPORT    PATIENT NAME: Toña Valdez                  :        1950  MED REC NO:   [id number]                          ROOM:  ACCOUNT NO:   [de-identified]                           ADMIT DATE: 2022  PROVIDER:     Itzel Hampton DO    DATE OF PROCEDURE:  2022    PREOPERATIVE DIAGNOSIS:  Chronic kidney disease stage 5, nearing dialysis. POSTOPERATIVE DIAGNOSIS:  Chronic kidney disease stage 5, nearing dialysis. OPERATION PERFORMED:  Left upper extremity basilic vein transposition. SURGEON:  Itzel Hampton DO    ASSISTANT:  Caprice Angel    ANESTHESIA:  Regional with general.    ESTIMATED BLOOD LOSS:  Less than 50 mL. COMPLICATIONS:  None apparent. INDICATIONS:  This is a 81-year-old male patient who presented to my  office for dialysis access planning. He has chronic kidney disease,  stage V. He is unfortunately planned to undergo dialysis within next 6 to 12  months. He underwent preoperative vein mapping. This demonstrated he  had no usable cephalic vein. His basilic vein was of moderate size. I  discussed with him all the risks, benefits, and alternatives to  proceeding with autogenous versus graft replacement. We agreed upon  autogenous creation. We agreed upon a basilic vein transposition. I  told him this would lead to a significant scar of his arm. However,  this should hopefully lead to a reasonably usable fistula. All the  risks, benefits, and alternatives were once again clearly reviewed of  the surgery. They include pain, infection, bleeding, MI, stroke, death,  respiratory failure. The patient gave written informed consent. OPERATIVE PROCEDURE:  The patient was brought into the operating room  and placed supine on the table. He did receive preoperative axillary  block.   The left upper extremity was prepped and draped in the usual  sterile fashion. A time-out was called for indication, patient, and  laterality. Preoperatively, I did manish out under ultrasound the basilic  vein. The cephalic vein was not usable. The basilic vein was soft,  compressible, and between 2.5 and 3.5 mm, dependent on the location. It  became small just at the elbow area. I made a longitudinal incision overlying the basilic vein. I dissected  it out underlying the fascia. I freely mobilized the nerve associated  with it. I ligated all intervening branches using a combination of silk  ties and Hemoclips. I then proceeded to dissect it all the way up into  the axillary space. Once this was completely dissected free, I then  dissected out the brachial artery. I chose a segment in the mid upper  arm. I dissected out the bicipital aponeurosis and freed it up and  freed the fascia. I made special care not to injure the median nerve. I identified around 3 cm of healthy segment of the brachial artery. After this was completed, I then proceeded to give the patient 3000  units of heparin. I then transected the vein distally. I then  proceeded to distend it with heparinized saline. It distended quite  well. However, once again, we lost a little bit of length because the  vein got small just at the elbow. I marked out a placement on the skin  for superficial tunnel. I then proceeded to tunnel the vein graft. I  made sure that it was in appropriate anatomic position and was not  twisted. After this, I clamped the brachial artery and proceeded to perform an  arteriotomy. I then proceeded to spatulate the vein accordingly. I  then performed a running anastomosis using running 7-0 Prolene suture. After this was completed, I then proceeded to appropriately de-air the  graft before tying it down. After this was done, I then proceeded to  tie it down with good hemostasis.   The patient had a good thrill in the  vein graft.  There was a good thrill both proximally and distally. I  then proceeded to irrigate the wound and reapproximate the tissue using  interrupted Vicryl sutures as well as nylons for the skin. The arm was  wrapped in light compression bandage to mitigate the risk of hematoma. The patient was extubated in the operating room and taken to the  recovery area in stable condition. All sponge and needle counts were  correct. I was present and performed all critical aspects of this  procedure.         Radha Montero DO    D: 03/01/2022 9:32:46       T: 03/01/2022 11:19:59     [email or web address]  Job#: [id number]     Doc#: [id number]    CC:

## 2022-03-01 NOTE — PROGRESS NOTES
From PACU. Alert and oriented. C/o 6/10 surgical left arm pain. Dressing is clean dry intact. Fingers are warm, move well, divya well. Bruit heard at surgery site.

## 2022-03-01 NOTE — PROGRESS NOTES
Pt unresponsive on arrival to PACU. No sign of pain. Pt opened eyes at 0950 and OPA removed. Pt moves all extremities. Denies pain at present. Flushed with difficulty right AC IV. Pt complain of sore lip. Noted small blood blister to right side of upper lip. Pt coughed small amount of sanguinous drainage.

## 2022-03-08 ENCOUNTER — TELEPHONE (OUTPATIENT)
Dept: SURGERY | Age: 72
End: 2022-03-08

## 2022-03-08 NOTE — TELEPHONE ENCOUNTER
Other    Fabián Matt, DO  You 34 minutes ago (12:12 PM)     SM    Tomorrow at 7 days post op    Message text

## 2022-03-28 ENCOUNTER — OFFICE VISIT (OUTPATIENT)
Dept: VASCULAR SURGERY | Age: 72
End: 2022-03-28

## 2022-03-28 VITALS
SYSTOLIC BLOOD PRESSURE: 150 MMHG | WEIGHT: 215 LBS | BODY MASS INDEX: 28.49 KG/M2 | DIASTOLIC BLOOD PRESSURE: 80 MMHG | HEIGHT: 73 IN

## 2022-03-28 DIAGNOSIS — Z09 POSTOP CHECK: Primary | ICD-10-CM

## 2022-03-28 PROCEDURE — 99024 POSTOP FOLLOW-UP VISIT: CPT | Performed by: STUDENT IN AN ORGANIZED HEALTH CARE EDUCATION/TRAINING PROGRAM

## 2022-03-28 ASSESSMENT — ENCOUNTER SYMPTOMS
SHORTNESS OF BREATH: 0
COLOR CHANGE: 0

## 2022-03-28 NOTE — PROGRESS NOTES
Goldie Avitia (:  1950) is a 80 y.o. male,Established patient, here for evaluation of the following chief complaint(s):  Post-Op Check         ASSESSMENT/PLAN:  1. Postop check       This is a 73-year-old male patient who presents to the office today for postop evaluation. His fistula is clinically patent with a good strong thrill. He does have a femoral swelling tickly in the medial aspect of his elbow. The swelling he states does get better with elevation. Therefore anticipate this is just a consequence of the fistula placement. I would plan to see him again in 4 weeks to make sure the fistula still is maturing accordingly. He is not on dialysis as of yet. We still time to make sure to maintain patency of his fistula. He was encouraged to feel for the thrill and if should that change into a more pulse that needs to be seen more urgently. All questions answered. We will see in 4 weeks. Subjective   SUBJECTIVE/OBJECTIVE:  This is a 73-year-old male patient presents the office today to discuss his recent left basilic vein transposition. He is doing reasonably well. He does have some intermittent swelling. He says he goes up and down depending on the dependency of his limb. He is not currently on dialysis. He is chronic kidney disease stage V. He denies any fever chills. He is here for suture removal.  He denies any chest pain or shortness of breath. Otherwise he has had no significant ill effects. He did have one episode of what he describes as electric shock on postop day 1. That has since subsided. Review of Systems   Constitutional: Negative for chills and fever. Respiratory: Negative for shortness of breath. Cardiovascular:        Left arm swelling   Musculoskeletal: Negative for myalgias. Skin: Negative for color change. Neurological: Negative for weakness and numbness. Hematological: Does not bruise/bleed easily.           Objective   Physical Exam  Cardiovascular:      Rate and Rhythm: Normal rate and regular rhythm. Pulses: Normal pulses. Comments: Palpable thrill in left basilic vein. Pulmonary:      Effort: Pulmonary effort is normal. No respiratory distress. Musculoskeletal:         General: Normal range of motion. Skin:     General: Skin is warm and dry. Capillary Refill: Capillary refill takes less than 2 seconds. Comments: Incision well-healed. Sutures removed. Patient does have 1+ edema extending into his forearm. Some dilated collaterals over his anterior left shoulder. No dilated collaterals over his chest wall. Good thrill palpable. Neurological:      General: No focal deficit present. Mental Status: He is alert. Psychiatric:         Mood and Affect: Mood normal.         Behavior: Behavior normal.         Thought Content: Thought content normal.         Judgment: Judgment normal.            On this date 3/28/2022 I have spent 15 minutes reviewing previous notes, test results and face to face with the patient discussing the diagnosis and importance of compliance with the treatment plan as well as documenting on the day of the visit. Alyson Hinson DO, [address] [facility]    This document was dictated using voice recognition software.

## 2022-04-25 ENCOUNTER — OFFICE VISIT (OUTPATIENT)
Dept: VASCULAR SURGERY | Age: 72
End: 2022-04-25

## 2022-04-25 VITALS
DIASTOLIC BLOOD PRESSURE: 82 MMHG | SYSTOLIC BLOOD PRESSURE: 122 MMHG | HEIGHT: 73 IN | BODY MASS INDEX: 27.57 KG/M2 | WEIGHT: 208 LBS

## 2022-04-25 DIAGNOSIS — Z98.890 S/P ARTERIOVENOUS (AV) FISTULA CREATION: Primary | ICD-10-CM

## 2022-04-25 PROCEDURE — 99024 POSTOP FOLLOW-UP VISIT: CPT | Performed by: STUDENT IN AN ORGANIZED HEALTH CARE EDUCATION/TRAINING PROGRAM

## 2022-04-25 ASSESSMENT — ENCOUNTER SYMPTOMS: SHORTNESS OF BREATH: 0

## 2022-04-25 NOTE — PROGRESS NOTES
Yamil Murcia (:  1950) is a 80 y.o. male,Established patient, here for evaluation of the following chief complaint(s):  Post-Op Check         ASSESSMENT/PLAN:  1. S/P arteriovenous (AV) fistula creation       This is a 80 year male patient who presents for evaluation of his AV fistula. He is not yet on dialysis. Given the pulsatility of the mid portion of the fistula and his dilated chest wall and upper arm collaterals, he would benefit from fistulagram. However he is not yet on dialysis and this would likely cause him to be. He is not ready to start dialysis. Will therefore at least duplex the fistula to ascertain the flow rates and how blood is moving through the fistula before making final decision. All questions answered. Subjective   SUBJECTIVE/OBJECTIVE:  This is a 80year old male patient who presents for evaluation after left BVT. This was done nearly 7-8 weeks ago. His arm is fine with occasional numbness. He walks on the treadmill with 8 pound weights. He denies any discomfort after these events. No other complaints at this time. Not yet on dialysis. Timeline for this is not well defined as he still makes urine. Review of Systems   Constitutional: Negative for chills and fever. Respiratory: Negative for shortness of breath. Cardiovascular: Negative for chest pain and leg swelling. Musculoskeletal: Negative for myalgias. Neurological: Positive for numbness. Hematological: Does not bruise/bleed easily. Psychiatric/Behavioral: Negative for agitation. Objective   Physical Exam  Constitutional:       Appearance: Normal appearance. Cardiovascular:      Rate and Rhythm: Normal rate and regular rhythm. Comments: Palp thrill distal to fistula, fistula has some pulsatility with thrill palpable with compression  Skin:     General: Skin is warm and dry. Capillary Refill: Capillary refill takes less than 2 seconds.       Comments: Incision well healed Neurological:      General: No focal deficit present. Mental Status: He is alert. Psychiatric:         Mood and Affect: Mood normal.         Behavior: Behavior normal.         Thought Content: Thought content normal.         Judgment: Judgment normal.            On this date 4/25/2022 I have spent 20 minutes reviewing previous notes, test results and face to face with the patient discussing the diagnosis and importance of compliance with the treatment plan as well as documenting on the day of the visit.     Kat Gupta DO, FSVS, [address] [facility]

## 2022-04-28 DIAGNOSIS — N18.5 CHRONIC KIDNEY DISEASE, STAGE 5 (HCC): ICD-10-CM

## 2022-04-28 DIAGNOSIS — Z98.890 S/P ARTERIOVENOUS (AV) FISTULA CREATION: Primary | ICD-10-CM

## 2022-05-19 ENCOUNTER — PROCEDURE VISIT (OUTPATIENT)
Dept: SURGERY | Age: 72
End: 2022-05-19
Payer: OTHER GOVERNMENT

## 2022-05-19 DIAGNOSIS — Z98.890 S/P ARTERIOVENOUS (AV) FISTULA CREATION: ICD-10-CM

## 2022-05-19 DIAGNOSIS — N18.5 CHRONIC KIDNEY DISEASE, STAGE 5 (HCC): ICD-10-CM

## 2022-05-19 PROCEDURE — 93990 DOPPLER FLOW TESTING: CPT | Performed by: SURGERY

## 2022-06-06 ENCOUNTER — OFFICE VISIT (OUTPATIENT)
Dept: VASCULAR SURGERY | Age: 72
End: 2022-06-06
Payer: OTHER GOVERNMENT

## 2022-06-06 VITALS — BODY MASS INDEX: 27.44 KG/M2 | HEIGHT: 73 IN

## 2022-06-06 DIAGNOSIS — Z98.890 S/P ARTERIOVENOUS (AV) FISTULA CREATION: Primary | ICD-10-CM

## 2022-06-06 PROCEDURE — 1123F ACP DISCUSS/DSCN MKR DOCD: CPT | Performed by: STUDENT IN AN ORGANIZED HEALTH CARE EDUCATION/TRAINING PROGRAM

## 2022-06-06 PROCEDURE — 99212 OFFICE O/P EST SF 10 MIN: CPT | Performed by: STUDENT IN AN ORGANIZED HEALTH CARE EDUCATION/TRAINING PROGRAM

## 2022-06-06 ASSESSMENT — ENCOUNTER SYMPTOMS: SHORTNESS OF BREATH: 0

## 2022-06-06 NOTE — PROGRESS NOTES
Lex Cordova (:  1950) is a 80 y.o. male,Established patient, here for evaluation of the following chief complaint(s):  Follow-up         ASSESSMENT/PLAN:  1. S/P arteriovenous (AV) fistula creation       This is a 80year old male patient who presents to discuss his left arm AV fistula. The fistula is patent and maturing but I do have concerns about some pulsatility, dilated collaterals suggesting a venous stenosis distal to the vein itself. Normally would recommend fistulagram however the patient has an issue with not being on dialysis and wants to avoid. While we could consider small dilute contrast cannot guarantee this would not push him into dialysis. He understands the possible benefit of treating this prophylactically to allow for successful maturation and dialysis and by not treating there is a risk of thrombosis. He would prefer not to risk injury to the fistula. Will follow clinically and if he does go on dialysis in the near future anticipate the need for fistulagram sooner rather than later. All questions answered. Subjective   SUBJECTIVE/OBJECTIVE:  This is a 80year old male patient who presents to discuss his left arm AV fistula. He underwent a duplex which demonstrates patency but some increased velocities distally along the graft where it turns down into the native system. He is not yet on dialysis. He is trying to avoid dialysis as long as possible. He denies any arm or hand swelling. No other complaints. Review of Systems   Constitutional: Negative for chills and fever. Respiratory: Negative for shortness of breath. Cardiovascular: Negative for chest pain. Musculoskeletal: Negative for myalgias. Skin: Negative for wound. Neurological: Negative for weakness and numbness. Objective   Physical Exam  Cardiovascular:      Rate and Rhythm: Normal rate and regular rhythm.       Comments: Palp thrill with some pulsatility in left AVF  Pulmonary:      Effort: Pulmonary effort is normal. No respiratory distress. Musculoskeletal:         General: Normal range of motion. Skin:     General: Skin is warm and dry. Capillary Refill: Capillary refill takes less than 2 seconds. Comments: Left chest wall and lateral arm with some small dilated collaterals   Neurological:      General: No focal deficit present. Mental Status: He is alert. Motor: No weakness. On this date 6/6/2022 I have spent 15 minutes reviewing previous notes, test results and face to face with the patient discussing the diagnosis and importance of compliance with the treatment plan as well as documenting on the day of the visit.     Julio C Mcdermott DO, FSVS, [address] [facility]

## 2024-10-02 ENCOUNTER — APPOINTMENT (OUTPATIENT)
Dept: ULTRASOUND IMAGING | Age: 74
DRG: 323 | End: 2024-10-02
Payer: OTHER GOVERNMENT

## 2024-10-02 ENCOUNTER — APPOINTMENT (OUTPATIENT)
Dept: VASCULAR LAB | Age: 74
DRG: 323 | End: 2024-10-02
Attending: SURGERY
Payer: OTHER GOVERNMENT

## 2024-10-02 ENCOUNTER — APPOINTMENT (OUTPATIENT)
Dept: GENERAL RADIOLOGY | Age: 74
DRG: 323 | End: 2024-10-02
Payer: OTHER GOVERNMENT

## 2024-10-02 ENCOUNTER — HOSPITAL ENCOUNTER (INPATIENT)
Age: 74
LOS: 5 days | Discharge: HOME | DRG: 323 | End: 2024-10-08
Attending: STUDENT IN AN ORGANIZED HEALTH CARE EDUCATION/TRAINING PROGRAM | Admitting: STUDENT IN AN ORGANIZED HEALTH CARE EDUCATION/TRAINING PROGRAM
Payer: OTHER GOVERNMENT

## 2024-10-02 ENCOUNTER — APPOINTMENT (OUTPATIENT)
Age: 74
DRG: 323 | End: 2024-10-02
Attending: STUDENT IN AN ORGANIZED HEALTH CARE EDUCATION/TRAINING PROGRAM
Payer: OTHER GOVERNMENT

## 2024-10-02 DIAGNOSIS — F11.93 METHADONE WITHDRAWAL (HCC): ICD-10-CM

## 2024-10-02 DIAGNOSIS — I21.4 NSTEMI (NON-ST ELEVATED MYOCARDIAL INFARCTION) (HCC): ICD-10-CM

## 2024-10-02 DIAGNOSIS — I48.0 PAROXYSMAL ATRIAL FIBRILLATION (HCC): ICD-10-CM

## 2024-10-02 DIAGNOSIS — N18.6 ESRD (END STAGE RENAL DISEASE) (HCC): ICD-10-CM

## 2024-10-02 DIAGNOSIS — I20.9 ANGINA PECTORIS (HCC): ICD-10-CM

## 2024-10-02 DIAGNOSIS — R07.9 CHEST PAIN, UNSPECIFIED TYPE: Primary | ICD-10-CM

## 2024-10-02 LAB
ALBUMIN SERPL-MCNC: 3.9 G/DL (ref 3.4–5)
ALBUMIN SERPL-MCNC: 4.3 G/DL (ref 3.4–5)
ALBUMIN/GLOB SERPL: 1.5 {RATIO} (ref 1.1–2.2)
ALBUMIN/GLOB SERPL: 1.6 {RATIO} (ref 1.1–2.2)
ALP SERPL-CCNC: 60 U/L (ref 40–129)
ALP SERPL-CCNC: 63 U/L (ref 40–129)
ALT SERPL-CCNC: 11 U/L (ref 10–40)
ALT SERPL-CCNC: 9 U/L (ref 10–40)
AMPHETAMINES UR QL SCN>1000 NG/ML: ABNORMAL
ANION GAP SERPL CALCULATED.3IONS-SCNC: 12 MMOL/L (ref 3–16)
ANION GAP SERPL CALCULATED.3IONS-SCNC: 18 MMOL/L (ref 3–16)
APTT BLD: 22.6 SEC (ref 22.1–36.4)
AST SERPL-CCNC: 16 U/L (ref 15–37)
AST SERPL-CCNC: 30 U/L (ref 15–37)
BACTERIA URNS QL MICRO: NORMAL /HPF
BARBITURATES UR QL SCN>200 NG/ML: ABNORMAL
BASOPHILS # BLD: 0 K/UL (ref 0–0.2)
BASOPHILS # BLD: 0 K/UL (ref 0–0.2)
BASOPHILS NFR BLD: 0.2 %
BASOPHILS NFR BLD: 0.5 %
BENZODIAZ UR QL SCN>200 NG/ML: ABNORMAL
BILIRUB SERPL-MCNC: 0.3 MG/DL (ref 0–1)
BILIRUB SERPL-MCNC: 0.3 MG/DL (ref 0–1)
BILIRUB UR QL STRIP.AUTO: NEGATIVE
BUN SERPL-MCNC: 66 MG/DL (ref 7–20)
BUN SERPL-MCNC: 69 MG/DL (ref 7–20)
CALCIUM SERPL-MCNC: 9 MG/DL (ref 8.3–10.6)
CALCIUM SERPL-MCNC: 9.2 MG/DL (ref 8.3–10.6)
CANNABINOIDS UR QL SCN>50 NG/ML: ABNORMAL
CHLORIDE SERPL-SCNC: 102 MMOL/L (ref 99–110)
CHLORIDE SERPL-SCNC: 106 MMOL/L (ref 99–110)
CLARITY UR: CLEAR
CO2 SERPL-SCNC: 17 MMOL/L (ref 21–32)
CO2 SERPL-SCNC: 20 MMOL/L (ref 21–32)
COCAINE UR QL SCN: ABNORMAL
COLOR UR: YELLOW
CREAT SERPL-MCNC: 7.6 MG/DL (ref 0.8–1.3)
CREAT SERPL-MCNC: 7.8 MG/DL (ref 0.8–1.3)
DEPRECATED RDW RBC AUTO: 14 % (ref 12.4–15.4)
DEPRECATED RDW RBC AUTO: 14 % (ref 12.4–15.4)
DRUG SCREEN COMMENT UR-IMP: ABNORMAL
ECHO AO ROOT DIAM: 3.6 CM
ECHO AO ROOT INDEX: 1.78 CM/M2
ECHO AV AREA PEAK VELOCITY: 3.2 CM2
ECHO AV AREA VTI: 3.2 CM2
ECHO AV AREA/BSA PEAK VELOCITY: 1.6 CM2/M2
ECHO AV AREA/BSA VTI: 1.6 CM2/M2
ECHO AV CUSP MM: 1.9 CM
ECHO AV MEAN GRADIENT: 6 MMHG
ECHO AV MEAN VELOCITY: 1.1 M/S
ECHO AV PEAK GRADIENT: 11 MMHG
ECHO AV PEAK VELOCITY: 1.6 M/S
ECHO AV VELOCITY RATIO: 0.75
ECHO AV VTI: 36.1 CM
ECHO BSA: 2 M2
ECHO BSA: 2 M2
ECHO EST RA PRESSURE: 3 MMHG
ECHO LA AREA 4C: 20.6 CM2
ECHO LA DIAMETER INDEX: 1.78 CM/M2
ECHO LA DIAMETER: 3.6 CM
ECHO LA MAJOR AXIS: 6.5 CM
ECHO LA TO AORTIC ROOT RATIO: 1
ECHO LA VOL MOD A4C: 57 ML (ref 18–58)
ECHO LA VOLUME INDEX MOD A4C: 28 ML/M2 (ref 16–34)
ECHO LV E' LATERAL VELOCITY: 12.8 CM/S
ECHO LV E' SEPTAL VELOCITY: 10.7 CM/S
ECHO LV EDV 3D: 216 ML
ECHO LV EDV A4C: 123 ML
ECHO LV EDV INDEX 3D: 107 ML/M2
ECHO LV EDV INDEX A4C: 61 ML/M2
ECHO LV EF PHYSICIAN: 59 %
ECHO LV EJECTION FRACTION 3D: 59 %
ECHO LV EJECTION FRACTION A4C: 60 %
ECHO LV ESV 3D: 88 ML
ECHO LV ESV A4C: 50 ML
ECHO LV ESV INDEX 3D: 44 ML/M2
ECHO LV ESV INDEX A4C: 25 ML/M2
ECHO LV FRACTIONAL SHORTENING: 21 % (ref 28–44)
ECHO LV GLOBAL LONGITUDINAL STRAIN (GLS): -21.3 %
ECHO LV INTERNAL DIMENSION DIASTOLE INDEX: 2.57 CM/M2
ECHO LV INTERNAL DIMENSION DIASTOLIC: 5.2 CM (ref 4.2–5.9)
ECHO LV INTERNAL DIMENSION SYSTOLIC INDEX: 2.03 CM/M2
ECHO LV INTERNAL DIMENSION SYSTOLIC: 4.1 CM
ECHO LV IVSD: 0.9 CM (ref 0.6–1)
ECHO LV MASS 2D: 181.4 G (ref 88–224)
ECHO LV MASS 3D INDEX: 112.4 G/M2
ECHO LV MASS 3D: 227 G
ECHO LV MASS INDEX 2D: 89.8 G/M2 (ref 49–115)
ECHO LV POSTERIOR WALL DIASTOLIC: 1 CM (ref 0.6–1)
ECHO LV RELATIVE WALL THICKNESS RATIO: 0.38
ECHO LVOT AREA: 4.2 CM2
ECHO LVOT AV VTI INDEX: 0.75
ECHO LVOT DIAM: 2.3 CM
ECHO LVOT MEAN GRADIENT: 3 MMHG
ECHO LVOT PEAK GRADIENT: 6 MMHG
ECHO LVOT PEAK VELOCITY: 1.2 M/S
ECHO LVOT STROKE VOLUME INDEX: 55.7 ML/M2
ECHO LVOT SV: 112.5 ML
ECHO LVOT VTI: 27.1 CM
ECHO MV A VELOCITY: 0.95 M/S
ECHO MV AREA VTI: 2.7 CM2
ECHO MV E DECELERATION TIME (DT): 275 MS
ECHO MV E VELOCITY: 0.97 M/S
ECHO MV E/A RATIO: 1.02
ECHO MV E/E' LATERAL: 7.58
ECHO MV E/E' RATIO (AVERAGED): 8.32
ECHO MV E/E' SEPTAL: 9.07
ECHO MV LVOT VTI INDEX: 1.53
ECHO MV MAX VELOCITY: 1 M/S
ECHO MV MEAN GRADIENT: 3 MMHG
ECHO MV MEAN VELOCITY: 0.7 M/S
ECHO MV PEAK GRADIENT: 4 MMHG
ECHO MV REGURGITANT PEAK GRADIENT: 81 MMHG
ECHO MV REGURGITANT PEAK VELOCITY: 4.5 M/S
ECHO MV VTI: 41.5 CM
ECHO PV ACCELERATION TIME (AT): 106 MS
ECHO PV MAX VELOCITY: 1.3 M/S
ECHO PV MEAN GRADIENT: 2 MMHG
ECHO PV MEAN VELOCITY: 0.6 M/S
ECHO PV PEAK GRADIENT: 7 MMHG
ECHO PV VTI: 20.8 CM
ECHO RA AREA 4C: 16.3 CM2
ECHO RA END SYSTOLIC VOLUME APICAL 4 CHAMBER INDEX BSA: 20 ML/M2
ECHO RA VOLUME: 41 ML
ECHO RIGHT VENTRICULAR SYSTOLIC PRESSURE (RVSP): 34 MMHG
ECHO RV FREE WALL PEAK S': 16.8 CM/S
ECHO RV INTERNAL DIMENSION: 3.5 CM
ECHO RV TAPSE: 3.1 CM (ref 1.7–?)
ECHO TV E WAVE: 0.6 M/S
ECHO TV PEAK GRADIENT: 2 MMHG
ECHO TV REGURGITANT MAX VELOCITY: 2.8 M/S
ECHO TV REGURGITANT PEAK GRADIENT: 31 MMHG
EKG ATRIAL RATE: 91 BPM
EKG DIAGNOSIS: NORMAL
EKG DIAGNOSIS: NORMAL
EKG P AXIS: 76 DEGREES
EKG P-R INTERVAL: 228 MS
EKG Q-T INTERVAL: 376 MS
EKG Q-T INTERVAL: 470 MS
EKG QRS DURATION: 136 MS
EKG QRS DURATION: 144 MS
EKG QTC CALCULATION (BAZETT): 415 MS
EKG QTC CALCULATION (BAZETT): 462 MS
EKG R AXIS: -1 DEGREES
EKG R AXIS: -33 DEGREES
EKG T AXIS: 30 DEGREES
EKG T AXIS: 48 DEGREES
EKG VENTRICULAR RATE: 47 BPM
EKG VENTRICULAR RATE: 91 BPM
EOSINOPHIL # BLD: 0.1 K/UL (ref 0–0.6)
EOSINOPHIL # BLD: 0.1 K/UL (ref 0–0.6)
EOSINOPHIL NFR BLD: 0.9 %
EOSINOPHIL NFR BLD: 1.7 %
EPI CELLS #/AREA URNS AUTO: 0 /HPF (ref 0–5)
FENTANYL SCREEN, URINE: ABNORMAL
GFR SERPLBLD CREATININE-BSD FMLA CKD-EPI: 7 ML/MIN/{1.73_M2}
GFR SERPLBLD CREATININE-BSD FMLA CKD-EPI: 7 ML/MIN/{1.73_M2}
GLUCOSE BLD-MCNC: 163 MG/DL (ref 70–99)
GLUCOSE BLD-MCNC: 170 MG/DL (ref 70–99)
GLUCOSE SERPL-MCNC: 159 MG/DL (ref 70–99)
GLUCOSE SERPL-MCNC: 251 MG/DL (ref 70–99)
GLUCOSE UR STRIP.AUTO-MCNC: 100 MG/DL
HCT VFR BLD AUTO: 24.5 % (ref 40.5–52.5)
HCT VFR BLD AUTO: 26 % (ref 40.5–52.5)
HGB BLD-MCNC: 8.2 G/DL (ref 13.5–17.5)
HGB BLD-MCNC: 8.7 G/DL (ref 13.5–17.5)
HGB UR QL STRIP.AUTO: NEGATIVE
HYALINE CASTS #/AREA URNS AUTO: 0 /LPF (ref 0–8)
INR PPP: 0.99 (ref 0.85–1.15)
IRON SATN MFR SERPL: 24 % (ref 20–50)
IRON SERPL-MCNC: 57 UG/DL (ref 59–158)
KETONES UR STRIP.AUTO-MCNC: NEGATIVE MG/DL
LEUKOCYTE ESTERASE UR QL STRIP.AUTO: NEGATIVE
LIPASE SERPL-CCNC: 30 U/L (ref 13–60)
LYMPHOCYTES # BLD: 0.8 K/UL (ref 1–5.1)
LYMPHOCYTES # BLD: 1.3 K/UL (ref 1–5.1)
LYMPHOCYTES NFR BLD: 10.6 %
LYMPHOCYTES NFR BLD: 23.7 %
MAGNESIUM SERPL-MCNC: 1.66 MG/DL (ref 1.8–2.4)
MCH RBC QN AUTO: 30.4 PG (ref 26–34)
MCH RBC QN AUTO: 30.9 PG (ref 26–34)
MCHC RBC AUTO-ENTMCNC: 33.2 G/DL (ref 31–36)
MCHC RBC AUTO-ENTMCNC: 33.3 G/DL (ref 31–36)
MCV RBC AUTO: 91.4 FL (ref 80–100)
MCV RBC AUTO: 92.7 FL (ref 80–100)
METHADONE UR QL SCN>300 NG/ML: POSITIVE
MONOCYTES # BLD: 0.3 K/UL (ref 0–1.3)
MONOCYTES # BLD: 0.4 K/UL (ref 0–1.3)
MONOCYTES NFR BLD: 3.8 %
MONOCYTES NFR BLD: 6.8 %
NEUTROPHILS # BLD: 3.8 K/UL (ref 1.7–7.7)
NEUTROPHILS # BLD: 6.5 K/UL (ref 1.7–7.7)
NEUTROPHILS NFR BLD: 67.3 %
NEUTROPHILS NFR BLD: 84.5 %
NITRITE UR QL STRIP.AUTO: NEGATIVE
OPIATES UR QL SCN>300 NG/ML: ABNORMAL
OXYCODONE UR QL SCN: ABNORMAL
PCP UR QL SCN>25 NG/ML: ABNORMAL
PERFORMED ON: ABNORMAL
PERFORMED ON: ABNORMAL
PH UR STRIP.AUTO: 7 [PH] (ref 5–8)
PH UR STRIP: 6 [PH]
PLATELET # BLD AUTO: 148 K/UL (ref 135–450)
PLATELET # BLD AUTO: 158 K/UL (ref 135–450)
PMV BLD AUTO: 8.6 FL (ref 5–10.5)
PMV BLD AUTO: 9 FL (ref 5–10.5)
POTASSIUM SERPL-SCNC: 4.7 MMOL/L (ref 3.5–5.1)
POTASSIUM SERPL-SCNC: 5.1 MMOL/L (ref 3.5–5.1)
PROT SERPL-MCNC: 6.4 G/DL (ref 6.4–8.2)
PROT SERPL-MCNC: 7.2 G/DL (ref 6.4–8.2)
PROT UR STRIP.AUTO-MCNC: 100 MG/DL
PROT UR-MCNC: 0.09 G/DL
PROT UR-MCNC: 85.5 MG/DL
PROTHROMBIN TIME: 13.3 SEC (ref 11.9–14.9)
RBC # BLD AUTO: 2.68 M/UL (ref 4.2–5.9)
RBC # BLD AUTO: 2.8 M/UL (ref 4.2–5.9)
RBC CLUMPS #/AREA URNS AUTO: 0 /HPF (ref 0–4)
SODIUM SERPL-SCNC: 137 MMOL/L (ref 136–145)
SODIUM SERPL-SCNC: 138 MMOL/L (ref 136–145)
SP GR UR STRIP.AUTO: 1.01 (ref 1–1.03)
TIBC SERPL-MCNC: 242 UG/DL (ref 260–445)
TROPONIN, HIGH SENSITIVITY: 469 NG/L (ref 0–22)
TROPONIN, HIGH SENSITIVITY: 480 NG/L (ref 0–22)
TROPONIN, HIGH SENSITIVITY: 76 NG/L (ref 0–22)
TROPONIN, HIGH SENSITIVITY: 93 NG/L (ref 0–22)
UA DIPSTICK W REFLEX MICRO PNL UR: YES
URN SPEC COLLECT METH UR: ABNORMAL
UROBILINOGEN UR STRIP-ACNC: 0.2 E.U./DL
VAS LEFT ARTERIAL PROX ANASTOMOSIS AVF EDV: 307 CM/S
VAS LEFT ARTERIAL PROX ANASTOMOSIS AVF PSV: 568 CM/S
VAS LEFT AVF AVG DIST ANAST VOL FLOW: 311 ML/MIN
VAS LEFT AVF AVG DIST OUTFLOW VOL FLOW: 204 ML/MIN
VAS LEFT AVF AVG GRAFT NAME: NORMAL
VAS LEFT AVF AVG INFLOW VOL FLOW: 154 ML/MIN
VAS LEFT AVF AVG MID OUTFLOW VOL FLOW: 1065 ML/MIN
VAS LEFT AVF AVG OUTFLOW VESSEL NAME: NORMAL
VAS LEFT AVF AVG PROX ANAST VOL FLOW: 348 ML/MIN
VAS LEFT AVF AVG PROX OUTFLOW VOL FLOW: 1686 ML/MIN
VAS LEFT DIST OUTFLOW AVF EDV: 495 CM/S
VAS LEFT DIST OUTFLOW AVF PSV: 870 CM/S
VAS LEFT INFLOW ARTERY AVF EDV: 0 CM/S
VAS LEFT INFLOW ARTERY AVF PSV: 107 CM/S
VAS LEFT MID OUTFLOW AVF EDV: 502 CM/S
VAS LEFT MID OUTFLOW AVF PSV: 776 CM/S
VAS LEFT OUTFLOW VESSEL AVF EDV: 30 CM/S
VAS LEFT OUTFLOW VESSEL AVF PSV: 120 CM/S
VAS LEFT PROX OUTFLOW AVF EDV: 114 CM/S
VAS LEFT PROX OUTFLOW AVF PSV: 267 CM/S
VAS LEFT VENOUS DIST ANASTOMOSIS AVF EDV: 379 CM/S
VAS LEFT VENOUS DIST ANASTOMOSIS AVF PSV: 870 CM/S
WBC # BLD AUTO: 5.6 K/UL (ref 4–11)
WBC # BLD AUTO: 7.6 K/UL (ref 4–11)
WBC #/AREA URNS AUTO: 3 /HPF (ref 0–5)

## 2024-10-02 PROCEDURE — 83550 IRON BINDING TEST: CPT

## 2024-10-02 PROCEDURE — G0378 HOSPITAL OBSERVATION PER HR: HCPCS

## 2024-10-02 PROCEDURE — 71045 X-RAY EXAM CHEST 1 VIEW: CPT

## 2024-10-02 PROCEDURE — 81001 URINALYSIS AUTO W/SCOPE: CPT

## 2024-10-02 PROCEDURE — 2500000003 HC RX 250 WO HCPCS: Performed by: STUDENT IN AN ORGANIZED HEALTH CARE EDUCATION/TRAINING PROGRAM

## 2024-10-02 PROCEDURE — 94760 N-INVAS EAR/PLS OXIMETRY 1: CPT

## 2024-10-02 PROCEDURE — 84166 PROTEIN E-PHORESIS/URINE/CSF: CPT

## 2024-10-02 PROCEDURE — 93356 MYOCRD STRAIN IMG SPCKL TRCK: CPT | Performed by: INTERNAL MEDICINE

## 2024-10-02 PROCEDURE — 85610 PROTHROMBIN TIME: CPT

## 2024-10-02 PROCEDURE — 84156 ASSAY OF PROTEIN URINE: CPT

## 2024-10-02 PROCEDURE — 93005 ELECTROCARDIOGRAM TRACING: CPT | Performed by: STUDENT IN AN ORGANIZED HEALTH CARE EDUCATION/TRAINING PROGRAM

## 2024-10-02 PROCEDURE — 6370000000 HC RX 637 (ALT 250 FOR IP): Performed by: INTERNAL MEDICINE

## 2024-10-02 PROCEDURE — 85025 COMPLETE CBC W/AUTO DIFF WBC: CPT

## 2024-10-02 PROCEDURE — 83540 ASSAY OF IRON: CPT

## 2024-10-02 PROCEDURE — 93990 DOPPLER FLOW TESTING: CPT | Performed by: SURGERY

## 2024-10-02 PROCEDURE — 6370000000 HC RX 637 (ALT 250 FOR IP): Performed by: FAMILY MEDICINE

## 2024-10-02 PROCEDURE — 6360000002 HC RX W HCPCS: Performed by: FAMILY MEDICINE

## 2024-10-02 PROCEDURE — 51798 US URINE CAPACITY MEASURE: CPT

## 2024-10-02 PROCEDURE — 99285 EMERGENCY DEPT VISIT HI MDM: CPT

## 2024-10-02 PROCEDURE — 83690 ASSAY OF LIPASE: CPT

## 2024-10-02 PROCEDURE — 83735 ASSAY OF MAGNESIUM: CPT

## 2024-10-02 PROCEDURE — 2000000000 HC ICU R&B

## 2024-10-02 PROCEDURE — 76770 US EXAM ABDO BACK WALL COMP: CPT

## 2024-10-02 PROCEDURE — 85730 THROMBOPLASTIN TIME PARTIAL: CPT

## 2024-10-02 PROCEDURE — 6370000000 HC RX 637 (ALT 250 FOR IP): Performed by: STUDENT IN AN ORGANIZED HEALTH CARE EDUCATION/TRAINING PROGRAM

## 2024-10-02 PROCEDURE — 2500000003 HC RX 250 WO HCPCS: Performed by: FAMILY MEDICINE

## 2024-10-02 PROCEDURE — 80307 DRUG TEST PRSMV CHEM ANLYZR: CPT

## 2024-10-02 PROCEDURE — 93306 TTE W/DOPPLER COMPLETE: CPT | Performed by: INTERNAL MEDICINE

## 2024-10-02 PROCEDURE — 36415 COLL VENOUS BLD VENIPUNCTURE: CPT

## 2024-10-02 PROCEDURE — 93990 DOPPLER FLOW TESTING: CPT

## 2024-10-02 PROCEDURE — 80053 COMPREHEN METABOLIC PANEL: CPT

## 2024-10-02 PROCEDURE — 36410 VNPNXR 3YR/> PHY/QHP DX/THER: CPT

## 2024-10-02 PROCEDURE — 76376 3D RENDER W/INTRP POSTPROCES: CPT | Performed by: INTERNAL MEDICINE

## 2024-10-02 PROCEDURE — 83036 HEMOGLOBIN GLYCOSYLATED A1C: CPT

## 2024-10-02 PROCEDURE — 84484 ASSAY OF TROPONIN QUANT: CPT

## 2024-10-02 PROCEDURE — 93306 TTE W/DOPPLER COMPLETE: CPT

## 2024-10-02 PROCEDURE — 86335 IMMUNFIX E-PHORSIS/URINE/CSF: CPT

## 2024-10-02 RX ORDER — ASPIRIN 81 MG/1
81 TABLET ORAL DAILY
Status: DISCONTINUED | OUTPATIENT
Start: 2024-10-02 | End: 2024-10-08 | Stop reason: HOSPADM

## 2024-10-02 RX ORDER — NITROGLYCERIN 20 MG/100ML
5-200 INJECTION INTRAVENOUS CONTINUOUS
Status: DISCONTINUED | OUTPATIENT
Start: 2024-10-02 | End: 2024-10-08 | Stop reason: HOSPADM

## 2024-10-02 RX ORDER — GLUCAGON 1 MG/ML
1 KIT INJECTION PRN
Status: DISCONTINUED | OUTPATIENT
Start: 2024-10-02 | End: 2024-10-08 | Stop reason: HOSPADM

## 2024-10-02 RX ORDER — METHADONE HYDROCHLORIDE 10 MG/1
90 TABLET ORAL DAILY
Status: DISCONTINUED | OUTPATIENT
Start: 2024-10-03 | End: 2024-10-08 | Stop reason: HOSPADM

## 2024-10-02 RX ORDER — AMLODIPINE BESYLATE 10 MG/1
10 TABLET ORAL DAILY
Status: DISCONTINUED | OUTPATIENT
Start: 2024-10-02 | End: 2024-10-08 | Stop reason: HOSPADM

## 2024-10-02 RX ORDER — FERROUS SULFATE 325(65) MG
325 TABLET ORAL
Status: DISCONTINUED | OUTPATIENT
Start: 2024-10-03 | End: 2024-10-03

## 2024-10-02 RX ORDER — SODIUM CHLORIDE 0.9 % (FLUSH) 0.9 %
5-40 SYRINGE (ML) INJECTION PRN
Status: DISCONTINUED | OUTPATIENT
Start: 2024-10-02 | End: 2024-10-02 | Stop reason: SDUPTHER

## 2024-10-02 RX ORDER — DEXTROSE MONOHYDRATE 100 MG/ML
INJECTION, SOLUTION INTRAVENOUS CONTINUOUS PRN
Status: DISCONTINUED | OUTPATIENT
Start: 2024-10-02 | End: 2024-10-08 | Stop reason: HOSPADM

## 2024-10-02 RX ORDER — INSULIN LISPRO 100 [IU]/ML
0-4 INJECTION, SOLUTION INTRAVENOUS; SUBCUTANEOUS
Status: DISCONTINUED | OUTPATIENT
Start: 2024-10-02 | End: 2024-10-08 | Stop reason: HOSPADM

## 2024-10-02 RX ORDER — LIDOCAINE 4 G/G
1 PATCH TOPICAL DAILY
Status: DISCONTINUED | OUTPATIENT
Start: 2024-10-02 | End: 2024-10-03 | Stop reason: SDUPTHER

## 2024-10-02 RX ORDER — ONDANSETRON 2 MG/ML
4 INJECTION INTRAMUSCULAR; INTRAVENOUS
Status: DISCONTINUED | OUTPATIENT
Start: 2024-10-02 | End: 2024-10-02 | Stop reason: HOSPADM

## 2024-10-02 RX ORDER — ATORVASTATIN CALCIUM 20 MG/1
20 TABLET, FILM COATED ORAL DAILY
Status: DISCONTINUED | OUTPATIENT
Start: 2024-10-02 | End: 2024-10-03

## 2024-10-02 RX ORDER — ERGOCALCIFEROL 1.25 MG/1
50000 CAPSULE ORAL WEEKLY
COMMUNITY

## 2024-10-02 RX ORDER — PANTOPRAZOLE SODIUM 20 MG/1
20 TABLET, DELAYED RELEASE ORAL
Status: DISCONTINUED | OUTPATIENT
Start: 2024-10-03 | End: 2024-10-03

## 2024-10-02 RX ORDER — SODIUM CHLORIDE 0.9 % (FLUSH) 0.9 %
5-40 SYRINGE (ML) INJECTION EVERY 12 HOURS SCHEDULED
Status: DISCONTINUED | OUTPATIENT
Start: 2024-10-02 | End: 2024-10-08 | Stop reason: HOSPADM

## 2024-10-02 RX ORDER — SODIUM CHLORIDE 0.9 % (FLUSH) 0.9 %
5-40 SYRINGE (ML) INJECTION EVERY 12 HOURS SCHEDULED
Status: DISCONTINUED | OUTPATIENT
Start: 2024-10-02 | End: 2024-10-02 | Stop reason: SDUPTHER

## 2024-10-02 RX ORDER — SODIUM CHLORIDE 0.9 % (FLUSH) 0.9 %
5-40 SYRINGE (ML) INJECTION PRN
Status: DISCONTINUED | OUTPATIENT
Start: 2024-10-02 | End: 2024-10-08 | Stop reason: HOSPADM

## 2024-10-02 RX ORDER — REGADENOSON 0.08 MG/ML
0.4 INJECTION, SOLUTION INTRAVENOUS
Status: DISCONTINUED | OUTPATIENT
Start: 2024-10-02 | End: 2024-10-03

## 2024-10-02 RX ORDER — HYDROXYZINE HYDROCHLORIDE 25 MG/1
25 TABLET, FILM COATED ORAL
Status: COMPLETED | OUTPATIENT
Start: 2024-10-02 | End: 2024-10-02

## 2024-10-02 RX ORDER — CALCIUM ACETATE 667 MG/1
1 TABLET ORAL
COMMUNITY

## 2024-10-02 RX ORDER — METHADONE HYDROCHLORIDE 10 MG/ML
90 CONCENTRATE ORAL DAILY
COMMUNITY

## 2024-10-02 RX ORDER — CALCIUM ACETATE 667 MG/1
667 CAPSULE ORAL
Status: DISCONTINUED | OUTPATIENT
Start: 2024-10-02 | End: 2024-10-08 | Stop reason: HOSPADM

## 2024-10-02 RX ORDER — PANTOPRAZOLE SODIUM 20 MG/1
20 TABLET, DELAYED RELEASE ORAL DAILY
COMMUNITY

## 2024-10-02 RX ORDER — POLYETHYLENE GLYCOL 3350 17 G/17G
17 POWDER, FOR SOLUTION ORAL DAILY PRN
Status: DISCONTINUED | OUTPATIENT
Start: 2024-10-02 | End: 2024-10-08 | Stop reason: HOSPADM

## 2024-10-02 RX ORDER — ONDANSETRON 4 MG/1
4 TABLET, ORALLY DISINTEGRATING ORAL EVERY 8 HOURS PRN
Status: DISCONTINUED | OUTPATIENT
Start: 2024-10-02 | End: 2024-10-08 | Stop reason: HOSPADM

## 2024-10-02 RX ORDER — SODIUM CHLORIDE 9 MG/ML
INJECTION, SOLUTION INTRAVENOUS PRN
Status: DISCONTINUED | OUTPATIENT
Start: 2024-10-02 | End: 2024-10-02 | Stop reason: SDUPTHER

## 2024-10-02 RX ORDER — SEVELAMER CARBONATE 800 MG/1
800 TABLET, FILM COATED ORAL
COMMUNITY

## 2024-10-02 RX ORDER — SODIUM BICARBONATE 650 MG/1
650 TABLET ORAL 3 TIMES DAILY
Status: DISCONTINUED | OUTPATIENT
Start: 2024-10-02 | End: 2024-10-04

## 2024-10-02 RX ORDER — MAGNESIUM SULFATE IN WATER 40 MG/ML
2000 INJECTION, SOLUTION INTRAVENOUS PRN
Status: DISCONTINUED | OUTPATIENT
Start: 2024-10-02 | End: 2024-10-02 | Stop reason: CLARIF

## 2024-10-02 RX ORDER — LIDOCAINE HYDROCHLORIDE 10 MG/ML
50 INJECTION, SOLUTION EPIDURAL; INFILTRATION; INTRACAUDAL; PERINEURAL ONCE
Status: DISCONTINUED | OUTPATIENT
Start: 2024-10-02 | End: 2024-10-08 | Stop reason: HOSPADM

## 2024-10-02 RX ORDER — HEPARIN SODIUM 5000 [USP'U]/ML
5000 INJECTION, SOLUTION INTRAVENOUS; SUBCUTANEOUS EVERY 8 HOURS SCHEDULED
Status: DISCONTINUED | OUTPATIENT
Start: 2024-10-02 | End: 2024-10-02

## 2024-10-02 RX ORDER — SEVELAMER CARBONATE 800 MG/1
800 TABLET, FILM COATED ORAL
Status: DISCONTINUED | OUTPATIENT
Start: 2024-10-02 | End: 2024-10-06

## 2024-10-02 RX ORDER — HEPARIN SODIUM 10000 [USP'U]/100ML
0-4000 INJECTION, SOLUTION INTRAVENOUS CONTINUOUS
Status: DISCONTINUED | OUTPATIENT
Start: 2024-10-02 | End: 2024-10-03

## 2024-10-02 RX ORDER — METHADONE HYDROCHLORIDE 10 MG/1
60 TABLET ORAL ONCE
Status: COMPLETED | OUTPATIENT
Start: 2024-10-02 | End: 2024-10-02

## 2024-10-02 RX ORDER — NICOTINE 21 MG/24HR
1 PATCH, TRANSDERMAL 24 HOURS TRANSDERMAL EVERY 24 HOURS
COMMUNITY
Start: 2023-11-08

## 2024-10-02 RX ORDER — METHADONE HYDROCHLORIDE 10 MG/1
20 TABLET ORAL ONCE
Status: DISCONTINUED | OUTPATIENT
Start: 2024-10-02 | End: 2024-10-02

## 2024-10-02 RX ORDER — ACETAMINOPHEN 650 MG/1
650 SUPPOSITORY RECTAL EVERY 6 HOURS PRN
Status: DISCONTINUED | OUTPATIENT
Start: 2024-10-02 | End: 2024-10-03 | Stop reason: ALTCHOICE

## 2024-10-02 RX ORDER — ACETAMINOPHEN 325 MG/1
650 TABLET ORAL EVERY 6 HOURS PRN
Status: DISCONTINUED | OUTPATIENT
Start: 2024-10-02 | End: 2024-10-03 | Stop reason: ALTCHOICE

## 2024-10-02 RX ORDER — ACETAMINOPHEN 500 MG
500 TABLET ORAL
Status: COMPLETED | OUTPATIENT
Start: 2024-10-02 | End: 2024-10-02

## 2024-10-02 RX ORDER — LIDOCAINE 4 G/G
1 PATCH TOPICAL EVERY EVENING
Status: DISCONTINUED | OUTPATIENT
Start: 2024-10-02 | End: 2024-10-08 | Stop reason: HOSPADM

## 2024-10-02 RX ORDER — FERROUS SULFATE 324(65)MG
325 TABLET, DELAYED RELEASE (ENTERIC COATED) ORAL DAILY
COMMUNITY
Start: 2023-11-08

## 2024-10-02 RX ORDER — ONDANSETRON 2 MG/ML
4 INJECTION INTRAMUSCULAR; INTRAVENOUS EVERY 6 HOURS PRN
Status: DISCONTINUED | OUTPATIENT
Start: 2024-10-02 | End: 2024-10-08 | Stop reason: HOSPADM

## 2024-10-02 RX ORDER — CLONIDINE HYDROCHLORIDE 0.1 MG/1
0.1 TABLET ORAL
Status: COMPLETED | OUTPATIENT
Start: 2024-10-02 | End: 2024-10-02

## 2024-10-02 RX ORDER — INSULIN LISPRO 100 [IU]/ML
0-4 INJECTION, SOLUTION INTRAVENOUS; SUBCUTANEOUS NIGHTLY
Status: DISCONTINUED | OUTPATIENT
Start: 2024-10-02 | End: 2024-10-08 | Stop reason: HOSPADM

## 2024-10-02 RX ORDER — LIDOCAINE 50 MG/G
1 PATCH TOPICAL DAILY PRN
COMMUNITY

## 2024-10-02 RX ORDER — ASPIRIN 81 MG/1
324 TABLET, CHEWABLE ORAL ONCE
Status: COMPLETED | OUTPATIENT
Start: 2024-10-02 | End: 2024-10-02

## 2024-10-02 RX ORDER — SODIUM CHLORIDE 9 MG/ML
INJECTION, SOLUTION INTRAVENOUS PRN
Status: DISCONTINUED | OUTPATIENT
Start: 2024-10-02 | End: 2024-10-08 | Stop reason: HOSPADM

## 2024-10-02 RX ORDER — HEPARIN SODIUM 1000 [USP'U]/ML
4000 INJECTION, SOLUTION INTRAVENOUS; SUBCUTANEOUS ONCE
Status: COMPLETED | OUTPATIENT
Start: 2024-10-02 | End: 2024-10-02

## 2024-10-02 RX ADMIN — SODIUM BICARBONATE 650 MG: 650 TABLET ORAL at 17:23

## 2024-10-02 RX ADMIN — ACETAMINOPHEN 500 MG: 500 TABLET ORAL at 03:51

## 2024-10-02 RX ADMIN — ASPIRIN 81 MG: 81 TABLET, COATED ORAL at 17:19

## 2024-10-02 RX ADMIN — ATORVASTATIN CALCIUM 20 MG: 20 TABLET, FILM COATED ORAL at 17:18

## 2024-10-02 RX ADMIN — HYDROXYZINE HYDROCHLORIDE 25 MG: 25 TABLET ORAL at 03:51

## 2024-10-02 RX ADMIN — CALCIUM ACETATE 667 MG: 667 CAPSULE ORAL at 17:18

## 2024-10-02 RX ADMIN — METHADONE HYDROCHLORIDE 60 MG: 10 TABLET ORAL at 20:32

## 2024-10-02 RX ADMIN — ASPIRIN 324 MG: 81 TABLET, CHEWABLE ORAL at 03:52

## 2024-10-02 RX ADMIN — HEPARIN SODIUM 940 UNITS/HR: 10000 INJECTION, SOLUTION INTRAVENOUS at 18:46

## 2024-10-02 RX ADMIN — NITROGLYCERIN 0.5 INCH: 20 OINTMENT TOPICAL at 18:38

## 2024-10-02 RX ADMIN — NITROGLYCERIN 5 MCG/MIN: 20 INJECTION INTRAVENOUS at 19:10

## 2024-10-02 RX ADMIN — AMLODIPINE BESYLATE 10 MG: 10 TABLET ORAL at 17:18

## 2024-10-02 RX ADMIN — CLONIDINE HYDROCHLORIDE 0.1 MG: 0.1 TABLET ORAL at 03:50

## 2024-10-02 RX ADMIN — SODIUM BICARBONATE 650 MG: 650 TABLET ORAL at 20:32

## 2024-10-02 RX ADMIN — HEPARIN SODIUM 4000 UNITS: 1000 INJECTION INTRAVENOUS; SUBCUTANEOUS at 18:47

## 2024-10-02 ASSESSMENT — PAIN DESCRIPTION - LOCATION
LOCATION: GENERALIZED
LOCATION: BACK

## 2024-10-02 ASSESSMENT — HEART SCORE: ECG: NORMAL

## 2024-10-02 ASSESSMENT — LIFESTYLE VARIABLES
HOW OFTEN DO YOU HAVE A DRINK CONTAINING ALCOHOL: NEVER
HOW MANY STANDARD DRINKS CONTAINING ALCOHOL DO YOU HAVE ON A TYPICAL DAY: PATIENT DOES NOT DRINK
HOW OFTEN DO YOU HAVE A DRINK CONTAINING ALCOHOL: NEVER

## 2024-10-02 ASSESSMENT — PAIN DESCRIPTION - DESCRIPTORS: DESCRIPTORS: ACHING

## 2024-10-02 ASSESSMENT — PAIN SCALES - GENERAL
PAINLEVEL_OUTOF10: 6
PAINLEVEL_OUTOF10: 4

## 2024-10-02 ASSESSMENT — PAIN DESCRIPTION - ORIENTATION: ORIENTATION: LOWER

## 2024-10-02 NOTE — ED PROVIDER NOTES
[facility] EMERGENCY DEPARTMENT      EMERGENCY MEDICINE     Pt Name: Colby Ovalle  MRN: [id number]  Birthdate 1950  Date of evaluation: 10/2/2024  Provider: Kurt Pires MD    CHIEF COMPLAINT       Chief Complaint   Patient presents with    Heartburn     Comes from home sts has he burning across his chest for three to four days believes its from methadone withdrawl.     HISTORY OF PRESENT ILLNESS   Colby Ovalle is a 74 y.o. male who presents to the emergency department for burning-like sensation across his chest with last 2 to 4 days.  He is also been waiting for his methadone dose to [facility].  He has had tremors he has had diarrhea he has some nausea feels extremely anxious      PASTMEDICAL HISTORY     Past Medical History:   Diagnosis Date    Alcohol dependence in remission (HCC)     information from [facility]    Cancer ([facility])     over 10 years ago     Chronic back pain     Chronic kidney disease     COVID 01/12/2022    Erectile dysfunction     Full dentures     Hepatitis C     information from [facility]    Hx of non-Hodgkin's lymphoma     Information from [facility]    Hyperlipidemia     Hypertension     Kidney stone     Lumbago     information from [facility]    Opioid dependence ([facility])     information from [facility]    Thrombocytopenia ([facility])     Tobacco dependency     Type 2 diabetes mellitus without complication ([facility])        Patient Active Problem List   Diagnosis Code    Post-op pain G89.18     SURGICAL HISTORY       Past Surgical History:   Procedure Laterality Date    BACK SURGERY      COLONOSCOPY      CYSTOSCOPY      with stone removal    DIALYSIS FISTULA CREATION Left 3/1/2022    LEFT ARM STAGED CREATION OF A BRACHIO-BASILIC FISTULA AT THE ELBOW performed by Fabián Matt DO at [facility] OR    PORT SURGERY         CURRENT MEDICATIONS       Previous Medications    AMLODIPINE (NORVASC) 10

## 2024-10-02 NOTE — CODE DOCUMENTATION
Patient takes methadone at home. Order verified by pharmacy to match his home dose. RN grabbing dose. Patient to also be placed on nitro gtt in CVU

## 2024-10-02 NOTE — H&P
History:   Diagnosis Date    Alcohol dependence in remission (HCC)     information from [facility]    Cancer (HCC)     over 10 years ago     Chronic back pain     Chronic kidney disease     COVID 2022    Erectile dysfunction     Full dentures     Hepatitis C     information from [facility]    Hx of non-Hodgkin's lymphoma     Information from [facility]    Hyperlipidemia     Hypertension     Kidney stone     Lumbago     information from [facility]    Opioid dependence (HCC)     information from [facility]    Thrombocytopenia (HCC)     Tobacco dependency     Type 2 diabetes mellitus without complication (HCC)      PSHX:  has a past surgical history that includes Port Surgery; Cystoscopy; back surgery; Colonoscopy; and Dialysis fistula creation (Left, 3/1/2022).  Allergies:   Allergies   Allergen Reactions    Lisinopril Hives and Shortness Of Breath    Alogliptin      Not sure of reaction    Rosiglitazone      Not sure of reaction     Fam HX:  family history is not on file.  Soc HX:   Social History     Socioeconomic History    Marital status: Single     Spouse name: None    Number of children: None    Years of education: None    Highest education level: None   Tobacco Use    Smoking status: Former     Current packs/day: 0.00     Average packs/day: 0.5 packs/day for 40.0 years (20.0 ttl pk-yrs)     Types: Cigarettes     Start date:      Quit date: 2018     Years since quittin.7    Smokeless tobacco: Never   Vaping Use    Vaping status: Never Used   Substance and Sexual Activity    Alcohol use: No    Drug use: Yes     Types: Marijuana (Weed)     Comment: THC- medical card    Sexual activity: Not Currently       Medications:   Medications:    amLODIPine  10 mg Oral Daily    aspirin  81 mg Oral Daily    atorvastatin  20 mg Oral Daily    calcium acetate  667 mg Oral TID WC    [START ON 10/3/2024] ferrous sulfate  325 mg Oral Daily with breakfast    lidocaine  1 patch TransDERmal Daily    [START ON

## 2024-10-02 NOTE — CARE COORDINATION
Stress test will be done tomorrow, 10/3/2024     NPO after midnight tonight    No caffeine, decaf, tea, or chocolate after 9pm tonight

## 2024-10-02 NOTE — ED TRIAGE NOTES
Pt brought in by [facility] with complaint of burning in chest. Pt sts it has been going on for about three of four days and feels like real bad heartburn. Pt believes he is in withdrawal from methadone. Sts he has been trying not to take it.

## 2024-10-02 NOTE — CODE DOCUMENTATION
Hep gtt started. 1307 is new room assignment. Transferred.     Electronically signed by Kelsey George RN on 10/2/2024 at 6:46 PM

## 2024-10-02 NOTE — SIGNIFICANT EVENT
Rapid response called for patient with elevated troponin and burning chest pain.  States he may think this is a symptom of withdrawal from methadone.  Patient per [facility] had bradycardia in the 30's but patient put on defibrillator monitor and HR normal.  ECG performed shows sinus rhythm with 1st block.  Also showed widening of QRS complex in V1 and T wave inversions in V3-V5 but this is similar to ECG from this morning.    Troponin raised from 93 to 469.  Blood pressure not raised to 233/89 but is checked on the ankle due to fistula and Midline in upper extremities.  Nitropaste ordered.  Full dose ASA given this morning.  Heparin drip ordered.  Discussed with oncall cardiologist -- no STEMI present.  Recommends transfer to cardiac ICU for heparin and nitroglycerin drips.    Patient and finacee updated at bedside.

## 2024-10-02 NOTE — CODE DOCUMENTATION
Nitro on, waiting for call back from cardiologist for further interventions. Strips sent from MD de [facility]re to oncall cardiologist. Orders now for transfer to CVU and cont. Hep gtt.

## 2024-10-02 NOTE — CODE DOCUMENTATION
Code stemi canceled, will transfer and monitor in CVU.     Electronically signed by Kelsey George RN on 10/2/2024 at 6:48 PM

## 2024-10-02 NOTE — CONSULTS
[facility]  Consultation Note    Chief Complaint / Reason for Consultation  New ESRD, assess fistula for use    History of Present Illness  Patient is a 74 y.o. male presenting with with new onset ESRD.  Patient had left brachial vein transposition completed by Dr. Matt approximately 2 years ago.  Patient has been doing well and has not needed dialysis since then.  His fistula was initially slow to mature.  He was evaluated at the access center and did have a balloon angioplasty.  Since then fistula has matured.  He is now in need of dialysis.  I was requested to evaluate the patient to assess the fistula for readiness for use.    Review of Systems  Review of Systems   All other systems reviewed and are negative.       Past Medical History:   Diagnosis Date    Alcohol dependence in remission (HCC)     information from [facility]    Cancer (HCC)     over 10 years ago     Chronic back pain     Chronic kidney disease     COVID 01/12/2022    Erectile dysfunction     Full dentures     Hepatitis C     information from [facility]    Hx of non-Hodgkin's lymphoma     Information from [facility]    Hyperlipidemia     Hypertension     Kidney stone     Lumbago     information from [facility]    Opioid dependence (HCC)     information from [facility]    Thrombocytopenia ([facility])     Tobacco dependency     Type 2 diabetes mellitus without complication ([facility])        Past Surgical History:   Procedure Laterality Date    BACK SURGERY      COLONOSCOPY      CYSTOSCOPY      with stone removal    DIALYSIS FISTULA CREATION Left 3/1/2022    LEFT ARM STAGED CREATION OF A BRACHIO-BASILIC FISTULA AT THE ELBOW performed by Fabián Matt DO at [facility] OR    PORT SURGERY         Allergies   Allergen Reactions    Lisinopril Hives and Shortness Of Breath    Alogliptin      Not sure of reaction    Rosiglitazone      Not sure of reaction       Social History     Socioeconomic History    Marital status: Single

## 2024-10-03 PROBLEM — N18.6 ESRD (END STAGE RENAL DISEASE) (HCC): Status: ACTIVE | Noted: 2024-10-03

## 2024-10-03 PROBLEM — I21.4 NSTEMI (NON-ST ELEVATED MYOCARDIAL INFARCTION) (HCC): Status: ACTIVE | Noted: 2024-10-02

## 2024-10-03 PROBLEM — I24.9 ACS (ACUTE CORONARY SYNDROME) (HCC): Status: ACTIVE | Noted: 2024-10-03

## 2024-10-03 LAB
ALBUMIN SERPL-MCNC: 3.6 G/DL (ref 3.4–5)
ALP SERPL-CCNC: 49 U/L (ref 40–129)
ALT SERPL-CCNC: 9 U/L (ref 10–40)
ANION GAP SERPL CALCULATED.3IONS-SCNC: 11 MMOL/L (ref 3–16)
ANTI-XA UNFRAC HEPARIN: 0.2 IU/ML (ref 0.3–0.7)
AST SERPL-CCNC: 24 U/L (ref 15–37)
BASOPHILS # BLD: 0 K/UL (ref 0–0.2)
BASOPHILS NFR BLD: 0.3 %
BILIRUB DIRECT SERPL-MCNC: <0.1 MG/DL (ref 0–0.3)
BILIRUB INDIRECT SERPL-MCNC: ABNORMAL MG/DL (ref 0–1)
BILIRUB SERPL-MCNC: <0.2 MG/DL (ref 0–1)
BUN SERPL-MCNC: 69 MG/DL (ref 7–20)
CALCIUM SERPL-MCNC: 8.4 MG/DL (ref 8.3–10.6)
CHLORIDE SERPL-SCNC: 107 MMOL/L (ref 99–110)
CHOLEST SERPL-MCNC: 89 MG/DL (ref 0–199)
CO2 SERPL-SCNC: 21 MMOL/L (ref 21–32)
CREAT SERPL-MCNC: 8.1 MG/DL (ref 0.8–1.3)
DEPRECATED RDW RBC AUTO: 14.3 % (ref 12.4–15.4)
ECHO BSA: 2 M2
EKG ATRIAL RATE: 69 BPM
EKG ATRIAL RATE: 71 BPM
EKG DIAGNOSIS: NORMAL
EKG DIAGNOSIS: NORMAL
EKG P AXIS: 63 DEGREES
EKG P AXIS: 72 DEGREES
EKG P-R INTERVAL: 216 MS
EKG P-R INTERVAL: 264 MS
EKG Q-T INTERVAL: 388 MS
EKG Q-T INTERVAL: 420 MS
EKG QRS DURATION: 132 MS
EKG QRS DURATION: 136 MS
EKG QTC CALCULATION (BAZETT): 415 MS
EKG QTC CALCULATION (BAZETT): 456 MS
EKG R AXIS: -22 DEGREES
EKG R AXIS: -35 DEGREES
EKG T AXIS: 49 DEGREES
EKG T AXIS: 61 DEGREES
EKG VENTRICULAR RATE: 69 BPM
EKG VENTRICULAR RATE: 71 BPM
EOSINOPHIL # BLD: 0.1 K/UL (ref 0–0.6)
EOSINOPHIL NFR BLD: 2.8 %
EST. AVERAGE GLUCOSE BLD GHB EST-MCNC: 111.2 MG/DL
FERRITIN SERPL IA-MCNC: 95.5 NG/ML (ref 30–400)
GFR SERPLBLD CREATININE-BSD FMLA CKD-EPI: 6 ML/MIN/{1.73_M2}
GLUCOSE BLD-MCNC: 150 MG/DL (ref 70–99)
GLUCOSE BLD-MCNC: 151 MG/DL (ref 70–99)
GLUCOSE BLD-MCNC: 173 MG/DL (ref 70–99)
GLUCOSE BLD-MCNC: 182 MG/DL (ref 70–99)
GLUCOSE BLD-MCNC: 204 MG/DL (ref 70–99)
GLUCOSE BLD-MCNC: 98 MG/DL (ref 70–99)
GLUCOSE SERPL-MCNC: 120 MG/DL (ref 70–99)
HBA1C MFR BLD: 5.5 %
HBV SURFACE AB SERPL IA-ACNC: 17.6 MIU/ML
HBV SURFACE AG SERPL QL IA: NORMAL
HCT VFR BLD AUTO: 21.3 % (ref 40.5–52.5)
HCT VFR BLD AUTO: 24.8 % (ref 40.5–52.5)
HDLC SERPL-MCNC: 42 MG/DL (ref 40–60)
HGB BLD-MCNC: 7.1 G/DL (ref 13.5–17.5)
HGB BLD-MCNC: 8.5 G/DL (ref 13.5–17.5)
LDLC SERPL CALC-MCNC: 40 MG/DL
LYMPHOCYTES # BLD: 1.3 K/UL (ref 1–5.1)
LYMPHOCYTES NFR BLD: 27.4 %
MAGNESIUM SERPL-MCNC: 1.66 MG/DL (ref 1.8–2.4)
MCH RBC QN AUTO: 30.1 PG (ref 26–34)
MCHC RBC AUTO-ENTMCNC: 33.3 G/DL (ref 31–36)
MCV RBC AUTO: 90.5 FL (ref 80–100)
MONOCYTES # BLD: 0.3 K/UL (ref 0–1.3)
MONOCYTES NFR BLD: 7.3 %
NEUTROPHILS # BLD: 2.9 K/UL (ref 1.7–7.7)
NEUTROPHILS NFR BLD: 62.2 %
PERFORMED ON: ABNORMAL
PERFORMED ON: NORMAL
PHOSPHATE SERPL-MCNC: 5.2 MG/DL (ref 2.5–4.9)
PLATELET # BLD AUTO: 148 K/UL (ref 135–450)
PMV BLD AUTO: 9 FL (ref 5–10.5)
POC ACT LR: 232 SEC
POC ACT LR: 301 SEC
POC ACT LR: 379 SEC
POTASSIUM SERPL-SCNC: 4.6 MMOL/L (ref 3.5–5.1)
PROT SERPL-MCNC: 5.8 G/DL (ref 6.4–8.2)
PTH-INTACT SERPL-MCNC: 823 PG/ML (ref 14–72)
RBC # BLD AUTO: 2.35 M/UL (ref 4.2–5.9)
SODIUM SERPL-SCNC: 139 MMOL/L (ref 136–145)
TRIGL SERPL-MCNC: 35 MG/DL (ref 0–150)
VLDLC SERPL CALC-MCNC: 7 MG/DL
WBC # BLD AUTO: 4.6 K/UL (ref 4–11)

## 2024-10-03 PROCEDURE — 2500000003 HC RX 250 WO HCPCS: Performed by: STUDENT IN AN ORGANIZED HEALTH CARE EDUCATION/TRAINING PROGRAM

## 2024-10-03 PROCEDURE — C1769 GUIDE WIRE: HCPCS | Performed by: INTERNAL MEDICINE

## 2024-10-03 PROCEDURE — 80076 HEPATIC FUNCTION PANEL: CPT

## 2024-10-03 PROCEDURE — 99152 MOD SED SAME PHYS/QHP 5/>YRS: CPT | Performed by: INTERNAL MEDICINE

## 2024-10-03 PROCEDURE — 85520 HEPARIN ASSAY: CPT

## 2024-10-03 PROCEDURE — 4A023N7 MEASUREMENT OF CARDIAC SAMPLING AND PRESSURE, LEFT HEART, PERCUTANEOUS APPROACH: ICD-10-PCS | Performed by: INTERNAL MEDICINE

## 2024-10-03 PROCEDURE — 2500000003 HC RX 250 WO HCPCS: Performed by: INTERNAL MEDICINE

## 2024-10-03 PROCEDURE — 2580000003 HC RX 258: Performed by: INTERNAL MEDICINE

## 2024-10-03 PROCEDURE — 02C03ZZ EXTIRPATION OF MATTER FROM CORONARY ARTERY, ONE ARTERY, PERCUTANEOUS APPROACH: ICD-10-PCS | Performed by: INTERNAL MEDICINE

## 2024-10-03 PROCEDURE — B2151ZZ FLUOROSCOPY OF LEFT HEART USING LOW OSMOLAR CONTRAST: ICD-10-PCS | Performed by: INTERNAL MEDICINE

## 2024-10-03 PROCEDURE — 83516 IMMUNOASSAY NONANTIBODY: CPT

## 2024-10-03 PROCEDURE — C1760 CLOSURE DEV, VASC: HCPCS | Performed by: INTERNAL MEDICINE

## 2024-10-03 PROCEDURE — 92933 PRQ TRLML C ATHRC ST ANGIOP1: CPT | Performed by: INTERNAL MEDICINE

## 2024-10-03 PROCEDURE — 85014 HEMATOCRIT: CPT

## 2024-10-03 PROCEDURE — 92978 ENDOLUMINL IVUS OCT C 1ST: CPT | Performed by: INTERNAL MEDICINE

## 2024-10-03 PROCEDURE — 6360000002 HC RX W HCPCS: Performed by: INTERNAL MEDICINE

## 2024-10-03 PROCEDURE — 2580000003 HC RX 258: Performed by: STUDENT IN AN ORGANIZED HEALTH CARE EDUCATION/TRAINING PROGRAM

## 2024-10-03 PROCEDURE — 6370000000 HC RX 637 (ALT 250 FOR IP): Performed by: INTERNAL MEDICINE

## 2024-10-03 PROCEDURE — C1874 STENT, COATED/COV W/DEL SYS: HCPCS | Performed by: INTERNAL MEDICINE

## 2024-10-03 PROCEDURE — C1724 CATH, TRANS ATHEREC,ROTATION: HCPCS | Performed by: INTERNAL MEDICINE

## 2024-10-03 PROCEDURE — 92972 PERQ TRLUML CORONRY LITHOTRP: CPT | Performed by: INTERNAL MEDICINE

## 2024-10-03 PROCEDURE — 82728 ASSAY OF FERRITIN: CPT

## 2024-10-03 PROCEDURE — 86706 HEP B SURFACE ANTIBODY: CPT

## 2024-10-03 PROCEDURE — 80069 RENAL FUNCTION PANEL: CPT

## 2024-10-03 PROCEDURE — 85018 HEMOGLOBIN: CPT

## 2024-10-03 PROCEDURE — 94760 N-INVAS EAR/PLS OXIMETRY 1: CPT

## 2024-10-03 PROCEDURE — C1725 CATH, TRANSLUMIN NON-LASER: HCPCS | Performed by: INTERNAL MEDICINE

## 2024-10-03 PROCEDURE — 2709999900 HC NON-CHARGEABLE SUPPLY: Performed by: INTERNAL MEDICINE

## 2024-10-03 PROCEDURE — 027035Z DILATION OF CORONARY ARTERY, ONE ARTERY WITH TWO DRUG-ELUTING INTRALUMINAL DEVICES, PERCUTANEOUS APPROACH: ICD-10-PCS | Performed by: INTERNAL MEDICINE

## 2024-10-03 PROCEDURE — 87340 HEPATITIS B SURFACE AG IA: CPT

## 2024-10-03 PROCEDURE — 99153 MOD SED SAME PHYS/QHP EA: CPT | Performed by: INTERNAL MEDICINE

## 2024-10-03 PROCEDURE — 93005 ELECTROCARDIOGRAM TRACING: CPT | Performed by: INTERNAL MEDICINE

## 2024-10-03 PROCEDURE — 90935 HEMODIALYSIS ONE EVALUATION: CPT

## 2024-10-03 PROCEDURE — 85347 COAGULATION TIME ACTIVATED: CPT

## 2024-10-03 PROCEDURE — 80061 LIPID PANEL: CPT

## 2024-10-03 PROCEDURE — 6360000002 HC RX W HCPCS: Performed by: STUDENT IN AN ORGANIZED HEALTH CARE EDUCATION/TRAINING PROGRAM

## 2024-10-03 PROCEDURE — 6370000000 HC RX 637 (ALT 250 FOR IP): Performed by: STUDENT IN AN ORGANIZED HEALTH CARE EDUCATION/TRAINING PROGRAM

## 2024-10-03 PROCEDURE — C1887 CATHETER, GUIDING: HCPCS | Performed by: INTERNAL MEDICINE

## 2024-10-03 PROCEDURE — 85025 COMPLETE CBC W/AUTO DIFF WBC: CPT

## 2024-10-03 PROCEDURE — 6360000004 HC RX CONTRAST MEDICATION: Performed by: INTERNAL MEDICINE

## 2024-10-03 PROCEDURE — 36415 COLL VENOUS BLD VENIPUNCTURE: CPT

## 2024-10-03 PROCEDURE — 6360000002 HC RX W HCPCS: Performed by: FAMILY MEDICINE

## 2024-10-03 PROCEDURE — 2000000000 HC ICU R&B

## 2024-10-03 PROCEDURE — 02F03ZZ FRAGMENTATION IN CORONARY ARTERY, ONE ARTERY, PERCUTANEOUS APPROACH: ICD-10-PCS | Performed by: INTERNAL MEDICINE

## 2024-10-03 PROCEDURE — 93458 L HRT ARTERY/VENTRICLE ANGIO: CPT | Performed by: INTERNAL MEDICINE

## 2024-10-03 PROCEDURE — C1761 HC CATH TRANSLUM INTRAVASCULAR LITHOTRIPSY CORONARY: HCPCS | Performed by: INTERNAL MEDICINE

## 2024-10-03 PROCEDURE — 30233N1 TRANSFUSION OF NONAUTOLOGOUS RED BLOOD CELLS INTO PERIPHERAL VEIN, PERCUTANEOUS APPROACH: ICD-10-PCS | Performed by: INTERNAL MEDICINE

## 2024-10-03 PROCEDURE — 83970 ASSAY OF PARATHORMONE: CPT

## 2024-10-03 PROCEDURE — 83735 ASSAY OF MAGNESIUM: CPT

## 2024-10-03 PROCEDURE — C1753 CATH, INTRAVAS ULTRASOUND: HCPCS | Performed by: INTERNAL MEDICINE

## 2024-10-03 PROCEDURE — B2111ZZ FLUOROSCOPY OF MULTIPLE CORONARY ARTERIES USING LOW OSMOLAR CONTRAST: ICD-10-PCS | Performed by: INTERNAL MEDICINE

## 2024-10-03 PROCEDURE — C1894 INTRO/SHEATH, NON-LASER: HCPCS | Performed by: INTERNAL MEDICINE

## 2024-10-03 DEVICE — EVEROLIMUS-ELUTING PLATINUM CHROMIUM CORONARY STENT SYSTEM
Type: IMPLANTABLE DEVICE | Status: FUNCTIONAL
Brand: SYNERGY™ XD

## 2024-10-03 DEVICE — XIENCE SIERRA™ EVEROLIMUS ELUTING CORONARY STENT SYSTEM 4.00 MM X 15 MM / RAPID-EXCHANGE
Type: IMPLANTABLE DEVICE | Status: FUNCTIONAL
Brand: XIENCE SIERRA™

## 2024-10-03 RX ORDER — HEPARIN SODIUM 1000 [USP'U]/ML
2000 INJECTION, SOLUTION INTRAVENOUS; SUBCUTANEOUS ONCE
Status: COMPLETED | OUTPATIENT
Start: 2024-10-03 | End: 2024-10-03

## 2024-10-03 RX ORDER — LORAZEPAM 0.5 MG/1
0.5 TABLET ORAL EVERY 6 HOURS PRN
Status: DISCONTINUED | OUTPATIENT
Start: 2024-10-03 | End: 2024-10-08 | Stop reason: HOSPADM

## 2024-10-03 RX ORDER — CLOPIDOGREL BISULFATE 75 MG/1
75 TABLET ORAL DAILY
Status: DISCONTINUED | OUTPATIENT
Start: 2024-10-04 | End: 2024-10-08 | Stop reason: HOSPADM

## 2024-10-03 RX ORDER — SODIUM CHLORIDE 9 MG/ML
INJECTION, SOLUTION INTRAVENOUS PRN
Status: DISCONTINUED | OUTPATIENT
Start: 2024-10-03 | End: 2024-10-08 | Stop reason: HOSPADM

## 2024-10-03 RX ORDER — SODIUM CHLORIDE 0.9 % (FLUSH) 0.9 %
5-40 SYRINGE (ML) INJECTION PRN
Status: DISCONTINUED | OUTPATIENT
Start: 2024-10-03 | End: 2024-10-03 | Stop reason: HOSPADM

## 2024-10-03 RX ORDER — SODIUM CHLORIDE 0.9 % (FLUSH) 0.9 %
5-40 SYRINGE (ML) INJECTION EVERY 12 HOURS SCHEDULED
Status: DISCONTINUED | OUTPATIENT
Start: 2024-10-03 | End: 2024-10-08 | Stop reason: HOSPADM

## 2024-10-03 RX ORDER — ACETAMINOPHEN 325 MG/1
650 TABLET ORAL EVERY 4 HOURS PRN
Status: DISCONTINUED | OUTPATIENT
Start: 2024-10-03 | End: 2024-10-08 | Stop reason: HOSPADM

## 2024-10-03 RX ORDER — PANTOPRAZOLE SODIUM 40 MG/1
40 TABLET, DELAYED RELEASE ORAL
Status: DISCONTINUED | OUTPATIENT
Start: 2024-10-04 | End: 2024-10-08 | Stop reason: HOSPADM

## 2024-10-03 RX ORDER — FENTANYL CITRATE 50 UG/ML
INJECTION, SOLUTION INTRAMUSCULAR; INTRAVENOUS PRN
Status: DISCONTINUED | OUTPATIENT
Start: 2024-10-03 | End: 2024-10-03 | Stop reason: HOSPADM

## 2024-10-03 RX ORDER — SODIUM CHLORIDE 9 MG/ML
INJECTION, SOLUTION INTRAVENOUS PRN
Status: DISCONTINUED | OUTPATIENT
Start: 2024-10-03 | End: 2024-10-03 | Stop reason: HOSPADM

## 2024-10-03 RX ORDER — PHENYLEPHRINE HYDROCHLORIDE 10 MG/ML
INJECTION INTRAVENOUS PRN
Status: DISCONTINUED | OUTPATIENT
Start: 2024-10-03 | End: 2024-10-03 | Stop reason: HOSPADM

## 2024-10-03 RX ORDER — MIDAZOLAM HYDROCHLORIDE 1 MG/ML
INJECTION INTRAMUSCULAR; INTRAVENOUS PRN
Status: DISCONTINUED | OUTPATIENT
Start: 2024-10-03 | End: 2024-10-03 | Stop reason: HOSPADM

## 2024-10-03 RX ORDER — LIDOCAINE HYDROCHLORIDE 10 MG/ML
INJECTION, SOLUTION INFILTRATION; PERINEURAL PRN
Status: DISCONTINUED | OUTPATIENT
Start: 2024-10-03 | End: 2024-10-03 | Stop reason: HOSPADM

## 2024-10-03 RX ORDER — NITROGLYCERIN 20 MG/100ML
INJECTION INTRAVENOUS CONTINUOUS PRN
Status: COMPLETED | OUTPATIENT
Start: 2024-10-03 | End: 2024-10-03

## 2024-10-03 RX ORDER — SODIUM CHLORIDE 0.9 % (FLUSH) 0.9 %
5-40 SYRINGE (ML) INJECTION PRN
Status: DISCONTINUED | OUTPATIENT
Start: 2024-10-03 | End: 2024-10-08 | Stop reason: HOSPADM

## 2024-10-03 RX ORDER — ATORVASTATIN CALCIUM 40 MG/1
40 TABLET, FILM COATED ORAL DAILY
Status: DISCONTINUED | OUTPATIENT
Start: 2024-10-04 | End: 2024-10-08 | Stop reason: HOSPADM

## 2024-10-03 RX ORDER — CLOPIDOGREL BISULFATE 75 MG/1
TABLET ORAL PRN
Status: DISCONTINUED | OUTPATIENT
Start: 2024-10-03 | End: 2024-10-03 | Stop reason: HOSPADM

## 2024-10-03 RX ORDER — OXYCODONE HYDROCHLORIDE 5 MG/1
5 TABLET ORAL EVERY 4 HOURS PRN
Status: DISCONTINUED | OUTPATIENT
Start: 2024-10-03 | End: 2024-10-08 | Stop reason: HOSPADM

## 2024-10-03 RX ORDER — MAGNESIUM SULFATE IN WATER 40 MG/ML
2000 INJECTION, SOLUTION INTRAVENOUS ONCE
Status: COMPLETED | OUTPATIENT
Start: 2024-10-03 | End: 2024-10-03

## 2024-10-03 RX ORDER — EPTIFIBATIDE 2 MG/ML
INJECTION, SOLUTION INTRAVENOUS PRN
Status: DISCONTINUED | OUTPATIENT
Start: 2024-10-03 | End: 2024-10-03 | Stop reason: HOSPADM

## 2024-10-03 RX ORDER — SODIUM CHLORIDE 9 MG/ML
INJECTION, SOLUTION INTRAVENOUS CONTINUOUS PRN
Status: COMPLETED | OUTPATIENT
Start: 2024-10-03 | End: 2024-10-03

## 2024-10-03 RX ORDER — HEPARIN SODIUM 1000 [USP'U]/ML
INJECTION, SOLUTION INTRAVENOUS; SUBCUTANEOUS PRN
Status: DISCONTINUED | OUTPATIENT
Start: 2024-10-03 | End: 2024-10-03 | Stop reason: HOSPADM

## 2024-10-03 RX ORDER — IOPAMIDOL 755 MG/ML
INJECTION, SOLUTION INTRAVASCULAR PRN
Status: DISCONTINUED | OUTPATIENT
Start: 2024-10-03 | End: 2024-10-03 | Stop reason: HOSPADM

## 2024-10-03 RX ORDER — SODIUM CHLORIDE 0.9 % (FLUSH) 0.9 %
5-40 SYRINGE (ML) INJECTION EVERY 12 HOURS SCHEDULED
Status: DISCONTINUED | OUTPATIENT
Start: 2024-10-03 | End: 2024-10-03 | Stop reason: HOSPADM

## 2024-10-03 RX ADMIN — ATORVASTATIN CALCIUM 20 MG: 20 TABLET, FILM COATED ORAL at 08:30

## 2024-10-03 RX ADMIN — NITROGLYCERIN 40 MCG/MIN: 20 INJECTION INTRAVENOUS at 16:58

## 2024-10-03 RX ADMIN — FERROUS SULFATE TAB 325 MG (65 MG ELEMENTAL FE) 325 MG: 325 (65 FE) TAB at 08:30

## 2024-10-03 RX ADMIN — HEPARIN SODIUM 2000 UNITS: 1000 INJECTION INTRAVENOUS; SUBCUTANEOUS at 04:20

## 2024-10-03 RX ADMIN — AMLODIPINE BESYLATE 10 MG: 10 TABLET ORAL at 08:30

## 2024-10-03 RX ADMIN — SODIUM CHLORIDE, PRESERVATIVE FREE 10 ML: 5 INJECTION INTRAVENOUS at 20:28

## 2024-10-03 RX ADMIN — SODIUM CHLORIDE, PRESERVATIVE FREE 10 ML: 5 INJECTION INTRAVENOUS at 20:29

## 2024-10-03 RX ADMIN — SODIUM CHLORIDE, PRESERVATIVE FREE 10 ML: 5 INJECTION INTRAVENOUS at 08:31

## 2024-10-03 RX ADMIN — SODIUM BICARBONATE 650 MG: 650 TABLET ORAL at 08:30

## 2024-10-03 RX ADMIN — ASPIRIN 81 MG: 81 TABLET, COATED ORAL at 08:30

## 2024-10-03 RX ADMIN — Medication 16 G: at 09:24

## 2024-10-03 RX ADMIN — PANTOPRAZOLE SODIUM 20 MG: 20 TABLET, DELAYED RELEASE ORAL at 06:24

## 2024-10-03 RX ADMIN — METHADONE HYDROCHLORIDE 90 MG: 10 TABLET ORAL at 08:33

## 2024-10-03 RX ADMIN — CALCIUM ACETATE 667 MG: 667 CAPSULE ORAL at 13:55

## 2024-10-03 RX ADMIN — OXYCODONE 5 MG: 5 TABLET ORAL at 13:55

## 2024-10-03 RX ADMIN — EPOETIN ALFA-EPBX 10000 UNITS: 10000 INJECTION, SOLUTION INTRAVENOUS; SUBCUTANEOUS at 17:17

## 2024-10-03 RX ADMIN — LORAZEPAM 0.5 MG: 0.5 TABLET ORAL at 17:22

## 2024-10-03 RX ADMIN — SODIUM BICARBONATE 650 MG: 650 TABLET ORAL at 20:31

## 2024-10-03 RX ADMIN — IRON SUCROSE 200 MG: 20 INJECTION, SOLUTION INTRAVENOUS at 17:18

## 2024-10-03 RX ADMIN — SODIUM BICARBONATE 650 MG: 650 TABLET ORAL at 13:55

## 2024-10-03 RX ADMIN — CALCIUM ACETATE 667 MG: 667 CAPSULE ORAL at 18:52

## 2024-10-03 RX ADMIN — MAGNESIUM SULFATE HEPTAHYDRATE 2000 MG: 40 INJECTION, SOLUTION INTRAVENOUS at 08:58

## 2024-10-03 ASSESSMENT — PAIN DESCRIPTION - FREQUENCY: FREQUENCY: INTERMITTENT

## 2024-10-03 ASSESSMENT — PAIN DESCRIPTION - ONSET: ONSET: GRADUAL

## 2024-10-03 ASSESSMENT — PAIN SCALES - GENERAL
PAINLEVEL_OUTOF10: 6
PAINLEVEL_OUTOF10: 3
PAINLEVEL_OUTOF10: 0
PAINLEVEL_OUTOF10: 7
PAINLEVEL_OUTOF10: 0

## 2024-10-03 ASSESSMENT — PAIN DESCRIPTION - DESCRIPTORS
DESCRIPTORS: ACHING;DISCOMFORT
DESCRIPTORS: ACHING

## 2024-10-03 ASSESSMENT — PAIN DESCRIPTION - ORIENTATION
ORIENTATION: RIGHT;MID
ORIENTATION: RIGHT;MID

## 2024-10-03 ASSESSMENT — PAIN DESCRIPTION - LOCATION
LOCATION: BACK;GROIN
LOCATION: CHEST;GROIN

## 2024-10-03 ASSESSMENT — PAIN - FUNCTIONAL ASSESSMENT
PAIN_FUNCTIONAL_ASSESSMENT: PREVENTS OR INTERFERES SOME ACTIVE ACTIVITIES AND ADLS
PAIN_FUNCTIONAL_ASSESSMENT: PREVENTS OR INTERFERES SOME ACTIVE ACTIVITIES AND ADLS

## 2024-10-03 ASSESSMENT — PAIN DESCRIPTION - PAIN TYPE: TYPE: ACUTE PAIN

## 2024-10-03 NOTE — CONSULTS
[facility]          [address] [place] [address]                              CONSULTATION      PATIENT NAME: EMILY DRAKE            : 1950  MED REC NO: [id number]                      ROOM: 017  ACCOUNT NO: [id number]                       ADMIT DATE: 10/02/2024  PROVIDER: Kristian Sharpe MD      CONSULT DATE: 10/02/2024    REASON FOR CONSULTATION:  Acute on chronic kidney disease.    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  Routine labs show worsening of the kidney function.  The patient is scheduled to follow up with [facility] for possible initiation of renal replacement therapy.  He had a fistula placed a couple of years ago by [facility] Surgery at [facility].    At the time of consultation, feeling weak, tired, decreased level of energy, chest pain, shortness of breath.  The dyspnea on exertion resolved.  Admits good appetite.    PAST SURGICAL HISTORY:    1. Status post AV fistula placement.  2. Status post colonoscopy.  3. Status post cystoscopy.  4. Status post back surgery.    MEDICATIONS:  Include naloxone, Renvela, Nephrocaps, Norvasc, aspirin, Lipitor, Rocaltrol, insulin.    SOCIAL HISTORY:  Denies any alcohol abuse.  Smokes half a pack a day.  Smokes weed.    FAMILY HISTORY:  Significant for diabetes and hypertension.    ALLERGIES:  LISINOPRIL, ROSIGLITAZONE.      REVIEW OF SYSTEMS:  Denies any headache.  Feeling weak, tired, decreased level of energy.  Denies any nausea, vomiting, diarrhea.  Had some chest pain with some shortness of breath and dyspnea on

## 2024-10-03 NOTE — CONSULTS
Cardiovascular Consultation     Attending Physician: Juan Blanchard MD    PATIENT: Colby Ovalle  : 1950  MRN: [id number]    Reason for Consultation:   Chief Complaint   Patient presents with    Heartburn     Comes from home sts has he burning across his chest for three to four days believes its from methadone withdrawl.     History of present illness:   Mr. Colby Ovalle is a 74 y.o. male patient with no known cardiac history who presented to ER yesterday with worsening chest burning. Mr. Ovalle's care has largely been at [facility].  Medical abstract as below but detailed records are unavailable at the time of initial consultation.  He does corroborate diagnoses of hypertension, hyperlipidemia, non-insulin-dependent diabetes mellitus, chronic kidney disease with fistula placed 2 years ago in anticipation of eventual dialysis, opioid dependence on methadone under the care of pain management.  He reports compliance with medications and follow-ups.  He had been concerned about his current methadone dose and possible withdrawal symptoms, requesting to go up on the dose.  He had initially felt his anxiousness and pain contributed to elevated blood pressures and chest symptoms.  He reports that chest burning had started to appear with his treadmill routine several weeks ago.  It had been consistently reproducible on the treadmill and he has been \"unable to exercise because of it\".  Denies shortness of breath.  No palpitations lightheadedness syncope.  No edema.     Medical History:      Diagnosis Date    Alcohol dependence in remission (HCC)     information from [facility]    Cancer (HCC)     over 10 years ago     Chronic back pain     Chronic kidney disease     COVID 2022    Erectile dysfunction     Full dentures     Hepatitis C     information from [facility]    Hx of non-Hodgkin's lymphoma     Information from [facility]    Hyperlipidemia     Hypertension

## 2024-10-03 NOTE — CARE COORDINATION
Initiated New  spot for pedro at [address]  [id number] [phone] fax    JESSICA Osorio     Case Management   [phone]    10/3/2024  4:35 PM

## 2024-10-03 NOTE — CARE COORDINATION
[facility] Does NOT have a TTS opening for HD.  JESSICA Osorio     Case Management   [phone]    10/3/2024  5:05 PM

## 2024-10-03 NOTE — CARE COORDINATION
JESSICA rec'd order for New HD with [facility].    Met with patient and wife to assess.  [facility].  Referral placed in [facility].    Case Management Assessment  Initial Evaluation    Date/Time of Evaluation: 10/3/2024 4:38 PM  Assessment Completed by: JESSICA Diaz    If patient is discharged prior to next notation, then this note serves as note for discharge by case management.    Patient Name: Colby Ovalle                   YOB: 1950  Diagnosis: Paroxysmal atrial fibrillation (HCC) [I48.0]  Angina pectoris (HCC) [I20.9]  Chest pain [R07.9]  Methadone withdrawal (HCC) [F11.93]  Chest pain, unspecified type [R07.9]                   Date / Time: 10/2/2024  2:58 AM    Patient Admission Status: Observation   Readmission Risk (Low < 19, Mod (19-27), High > 27): Readmission Risk Score: 16.2    Current PCP: No primary care provider on file.  PCP verified by CM? Yes ([facility]es [facility])    Chart Reviewed: Yes      History Provided by: Patient, Spouse  Patient Orientation: Alert and Oriented    Patient Cognition: Alert    Hospitalization in the last 30 days (Readmission):  No    If yes, Readmission Assessment in CM Navigator will be completed.    Advance Directives:      Code Status: Full Code   Patient's Primary Decision Maker is: Legal Next of Kin      Discharge Planning:    Patient lives with: Spouse/Significant Other Type of Home: House  Primary Care Giver: Self  Patient Support Systems include: Spouse/Significant Other   Current Financial resources: [place] ([facility])  Current community resources: Other (Comment) (New HD Center)  Current services prior to admission: None            Current DME:              Type of Home Care services:  None    ADLS  Prior functional level: Independent in ADLs/IADLs  Current functional level: Independent in ADLs/IADLs    PT AM-PAC:   /24  OT AM-PAC:   /24    Family can provide assistance at DC: Yes  Would you like Case

## 2024-10-03 NOTE — CARE COORDINATION
10/03/24 1636   Service Assessment   Patient Orientation Alert and Oriented   Cognition Alert   History Provided By Patient;Spouse   Primary Caregiver Self   Accompanied By/Relationship Wife   Support Systems Spouse/Significant Other   Patient's Healthcare Decision Maker is: Legal Next of Kin   PCP Verified by CM Yes  ([facility]es [facility] multiple [facility])   Last Visit to PCP Within last 6 months   Prior Functional Level Independent in ADLs/IADLs   Current Functional Level Independent in ADLs/IADLs   Can patient return to prior living arrangement Yes   Ability to make needs known: Good   Family able to assist with home care needs: Yes   Would you like for me to discuss the discharge plan with any other family members/significant others, and if so, who? Yes  (Sign other)   Financial Resources [place] ([facility])   Community Resources Other (Comment)  ([facility])   CM/SW Referral Other (see comment)  (New HD)   Discharge Planning   Type of Residence House   Living Arrangements Spouse/Significant Other   Current Services Prior To Admission None   Potential Assistance Needed N/A   DME Ordered? No   Potential Assistance Purchasing Medications No   Type of Home Care Services None   Patient expects to be discharged to: House   One/Two Story Residence One story   History of falls? 0   Services At/After Discharge   Transition of Care Consult (CM Consult) Other  (New HD)   Services At/After Discharge Community Resources  ([facility])   [place] Resource Information Provided? No   Mode of Transport at Discharge Other (see comment)  (family)   Confirm Follow Up Transport Family

## 2024-10-04 LAB
GLUCOSE BLD-MCNC: 119 MG/DL (ref 70–99)
GLUCOSE BLD-MCNC: 175 MG/DL (ref 70–99)
GLUCOSE BLD-MCNC: 210 MG/DL (ref 70–99)
GLUCOSE BLD-MCNC: 233 MG/DL (ref 70–99)
HBV CORE IGM SERPL QL IA: NORMAL
PERFORMED ON: ABNORMAL

## 2024-10-04 PROCEDURE — 2580000003 HC RX 258: Performed by: STUDENT IN AN ORGANIZED HEALTH CARE EDUCATION/TRAINING PROGRAM

## 2024-10-04 PROCEDURE — 2500000003 HC RX 250 WO HCPCS: Performed by: STUDENT IN AN ORGANIZED HEALTH CARE EDUCATION/TRAINING PROGRAM

## 2024-10-04 PROCEDURE — 6370000000 HC RX 637 (ALT 250 FOR IP): Performed by: INTERNAL MEDICINE

## 2024-10-04 PROCEDURE — 90935 HEMODIALYSIS ONE EVALUATION: CPT

## 2024-10-04 PROCEDURE — 94760 N-INVAS EAR/PLS OXIMETRY 1: CPT

## 2024-10-04 PROCEDURE — 2580000003 HC RX 258: Performed by: INTERNAL MEDICINE

## 2024-10-04 PROCEDURE — 6360000002 HC RX W HCPCS: Performed by: INTERNAL MEDICINE

## 2024-10-04 PROCEDURE — 6370000000 HC RX 637 (ALT 250 FOR IP): Performed by: STUDENT IN AN ORGANIZED HEALTH CARE EDUCATION/TRAINING PROGRAM

## 2024-10-04 PROCEDURE — 86705 HEP B CORE ANTIBODY IGM: CPT

## 2024-10-04 PROCEDURE — 2000000000 HC ICU R&B

## 2024-10-04 PROCEDURE — 5A1D70Z PERFORMANCE OF URINARY FILTRATION, INTERMITTENT, LESS THAN 6 HOURS PER DAY: ICD-10-PCS | Performed by: INTERNAL MEDICINE

## 2024-10-04 RX ORDER — ASPIRIN 81 MG/1
81 TABLET ORAL DAILY
Qty: 30 TABLET | Refills: 3 | Status: SHIPPED | OUTPATIENT
Start: 2024-10-05 | End: 2024-10-08

## 2024-10-04 RX ORDER — CARVEDILOL 6.25 MG/1
6.25 TABLET ORAL 2 TIMES DAILY WITH MEALS
Status: DISCONTINUED | OUTPATIENT
Start: 2024-10-04 | End: 2024-10-08 | Stop reason: HOSPADM

## 2024-10-04 RX ORDER — CARVEDILOL 6.25 MG/1
6.25 TABLET ORAL 2 TIMES DAILY WITH MEALS
Qty: 60 TABLET | Refills: 3 | Status: SHIPPED | OUTPATIENT
Start: 2024-10-04 | End: 2024-10-08

## 2024-10-04 RX ORDER — ATORVASTATIN CALCIUM 40 MG/1
40 TABLET, FILM COATED ORAL DAILY
Qty: 30 TABLET | Refills: 3 | Status: SHIPPED | OUTPATIENT
Start: 2024-10-04

## 2024-10-04 RX ORDER — CLOPIDOGREL BISULFATE 75 MG/1
75 TABLET ORAL DAILY
Qty: 30 TABLET | Refills: 3 | Status: SHIPPED | OUTPATIENT
Start: 2024-10-05 | End: 2024-10-08

## 2024-10-04 RX ADMIN — SODIUM CHLORIDE, PRESERVATIVE FREE 10 ML: 5 INJECTION INTRAVENOUS at 21:06

## 2024-10-04 RX ADMIN — SODIUM BICARBONATE 650 MG: 650 TABLET ORAL at 08:07

## 2024-10-04 RX ADMIN — CARVEDILOL 6.25 MG: 6.25 TABLET, FILM COATED ORAL at 16:25

## 2024-10-04 RX ADMIN — OXYCODONE 5 MG: 5 TABLET ORAL at 16:30

## 2024-10-04 RX ADMIN — SODIUM CHLORIDE, PRESERVATIVE FREE 10 ML: 5 INJECTION INTRAVENOUS at 08:07

## 2024-10-04 RX ADMIN — PANTOPRAZOLE SODIUM 40 MG: 40 TABLET, DELAYED RELEASE ORAL at 08:07

## 2024-10-04 RX ADMIN — EPOETIN ALFA-EPBX 10000 UNITS: 10000 INJECTION, SOLUTION INTRAVENOUS; SUBCUTANEOUS at 10:05

## 2024-10-04 RX ADMIN — ATORVASTATIN CALCIUM 40 MG: 40 TABLET, FILM COATED ORAL at 08:06

## 2024-10-04 RX ADMIN — CARVEDILOL 6.25 MG: 6.25 TABLET, FILM COATED ORAL at 08:05

## 2024-10-04 RX ADMIN — OXYCODONE 5 MG: 5 TABLET ORAL at 03:42

## 2024-10-04 RX ADMIN — CLOPIDOGREL BISULFATE 75 MG: 75 TABLET ORAL at 08:07

## 2024-10-04 RX ADMIN — SODIUM CHLORIDE, PRESERVATIVE FREE 10 ML: 5 INJECTION INTRAVENOUS at 08:20

## 2024-10-04 RX ADMIN — CALCIUM ACETATE 667 MG: 667 CAPSULE ORAL at 16:25

## 2024-10-04 RX ADMIN — INSULIN LISPRO 1 UNITS: 100 INJECTION, SOLUTION INTRAVENOUS; SUBCUTANEOUS at 08:04

## 2024-10-04 RX ADMIN — ASPIRIN 81 MG: 81 TABLET, COATED ORAL at 08:05

## 2024-10-04 RX ADMIN — IRON SUCROSE 100 MG: 20 INJECTION, SOLUTION INTRAVENOUS at 13:32

## 2024-10-04 RX ADMIN — OXYCODONE 5 MG: 5 TABLET ORAL at 08:05

## 2024-10-04 RX ADMIN — METHADONE HYDROCHLORIDE 90 MG: 10 TABLET ORAL at 08:10

## 2024-10-04 RX ADMIN — CALCIUM ACETATE 667 MG: 667 CAPSULE ORAL at 13:32

## 2024-10-04 RX ADMIN — CALCIUM ACETATE 667 MG: 667 CAPSULE ORAL at 08:05

## 2024-10-04 ASSESSMENT — PAIN DESCRIPTION - LOCATION
LOCATION: BACK;CHEST
LOCATION: BACK
LOCATION: BACK

## 2024-10-04 ASSESSMENT — PAIN SCALES - GENERAL
PAINLEVEL_OUTOF10: 7
PAINLEVEL_OUTOF10: 0
PAINLEVEL_OUTOF10: 0
PAINLEVEL_OUTOF10: 5
PAINLEVEL_OUTOF10: 0
PAINLEVEL_OUTOF10: 6

## 2024-10-04 ASSESSMENT — PAIN DESCRIPTION - DESCRIPTORS
DESCRIPTORS: ACHING;GNAWING
DESCRIPTORS: ACHING;DULL

## 2024-10-04 ASSESSMENT — PAIN DESCRIPTION - ORIENTATION: ORIENTATION: MID;LOWER

## 2024-10-04 NOTE — CARE COORDINATION
Working with [facility] for NEw HD spot.    The requested Center, [facility], is looking to see if they have a MWF sessions available.  They do not have TTS openings.    Tito is requesting a Hep B Antibody.  JESSICA Osorio     Case Management   [phone]    10/4/2024  9:59 AM

## 2024-10-04 NOTE — DISCHARGE SUMMARY
V2.0  Discharge Summary    Name:  Colby Ovalle /Age/Sex: 1950 (74 y.o. male)   Admit Date: 10/2/2024  Discharge Date: 10/4/24    MRN & CSN:  [id number] & [id number] Encounter Date and Time 10/4/24 11:55 AM EDT    Attending:  Juan Blanchard MD Discharging Provider: Juan Blanchard MD       Hospital Course:     Brief HPI: Colby Ovalle is a 74 y.o. male who presented with chest pain diagnosed with NSTEMI    Brief Problem Based Course:   NSTEMI: patient presented to the hospital with substernal burning chest pain associated with stress/anxiety and exertion.  Troponin was elevated and then sharply increased. EKG did not show definitive signs of ischemia. Cardiology was consulted nad patient underwent LHC on 10/3 with PCI to LAD w/ rotational atherectomy and 2 drug-eluting stents placed. Patient initiated on dual-antiplatelet therapy, statin, and beta-blocker.   CKD V, ESRD: Patient with known history of CKD 5. Had AV fistula placed 2 years ago in anticipation of needing dialysis. Nephrology was consulted. Patient indicated that he would like to switch his nephrology care to the nephrologist here. Fistula was evaluated and appeared to be functioning fine. Patient was initiated on dialysis while admitted here.  Chronic pain syndrome with opioid dependence: patient was continued on methadone throughout his admission. [facility]  Delay in discharge: patient will remain in house pending set up of outpatient dialysis chair      The patient expressed appropriate understanding of, and agreement with the discharge recommendations, medications, and plan.     Consults this admission:  IP CONSULT TO NEPHROLOGY  IP CONSULT TO VASCULAR ACCESS TEAM  IP CONSULT TO VASCULAR SURGERY  IP CONSULT TO SPIRITUAL SERVICES  IP CONSULT TO CARDIOLOGY  IP CONSULT TO SOCIAL WORK  IP CONSULT TO CARDIAC REHAB  IP CONSULT TO CARDIAC REHAB    Discharge Diagnosis:   Chest

## 2024-10-04 NOTE — CARE COORDINATION
Met with Dr Sharpe and Wife to review.  They are willing to take any HD center available as they want to leave today.  JESSICA Osorio     Case Management   [phone]    10/4/2024  1:40 PM    Provided wife with resources on how to apply for Medicare per her request.  JESSICA Osorio     Case Management   [phone]    10/4/2024  1:40 PM

## 2024-10-04 NOTE — CARE COORDINATION
Spoke with Belen@[phone].  She reports she had not [facility].  She will process this referral request for authorization but he will need to have one more day of HD at [place] and start on Tuesday.  She states she is comfortable in having him discharged on Saturday after his discharge and states he will be approved for TTS.    Updated Bedside RN.  JESSICA Osorio     Case Management   [phone]    10/4/2024  2:08 PM

## 2024-10-04 NOTE — CARE COORDINATION
DC order noted.    WE DO NOT HAVE AN HD spot solidified as yet.    JESSICA Osorio     Case Management   [phone]    10/4/2024  11:03 AM

## 2024-10-05 LAB
ALBUMIN SERPL-MCNC: 3.8 G/DL (ref 3.4–5)
ANION GAP SERPL CALCULATED.3IONS-SCNC: 11 MMOL/L (ref 3–16)
BUN SERPL-MCNC: 39 MG/DL (ref 7–20)
CALCIUM SERPL-MCNC: 9.2 MG/DL (ref 8.3–10.6)
CHLORIDE SERPL-SCNC: 97 MMOL/L (ref 99–110)
CO2 SERPL-SCNC: 25 MMOL/L (ref 21–32)
CREAT SERPL-MCNC: 5.3 MG/DL (ref 0.8–1.3)
DEPRECATED RDW RBC AUTO: 14.1 % (ref 12.4–15.4)
GFR SERPLBLD CREATININE-BSD FMLA CKD-EPI: 11 ML/MIN/{1.73_M2}
GLUCOSE BLD-MCNC: 128 MG/DL (ref 70–99)
GLUCOSE BLD-MCNC: 154 MG/DL (ref 70–99)
GLUCOSE BLD-MCNC: 174 MG/DL (ref 70–99)
GLUCOSE BLD-MCNC: 241 MG/DL (ref 70–99)
GLUCOSE SERPL-MCNC: 156 MG/DL (ref 70–99)
HCT VFR BLD AUTO: 22.5 % (ref 40.5–52.5)
HGB BLD-MCNC: 7.7 G/DL (ref 13.5–17.5)
MAGNESIUM SERPL-MCNC: 1.89 MG/DL (ref 1.8–2.4)
MCH RBC QN AUTO: 31.3 PG (ref 26–34)
MCHC RBC AUTO-ENTMCNC: 34.1 G/DL (ref 31–36)
MCV RBC AUTO: 91.6 FL (ref 80–100)
PERFORMED ON: ABNORMAL
PHOSPHATE SERPL-MCNC: 4.3 MG/DL (ref 2.5–4.9)
PLATELET # BLD AUTO: 112 K/UL (ref 135–450)
PMV BLD AUTO: 9.2 FL (ref 5–10.5)
POTASSIUM SERPL-SCNC: 5.1 MMOL/L (ref 3.5–5.1)
RBC # BLD AUTO: 2.45 M/UL (ref 4.2–5.9)
SODIUM SERPL-SCNC: 133 MMOL/L (ref 136–145)
WBC # BLD AUTO: 6.5 K/UL (ref 4–11)

## 2024-10-05 PROCEDURE — 83735 ASSAY OF MAGNESIUM: CPT

## 2024-10-05 PROCEDURE — 85027 COMPLETE CBC AUTOMATED: CPT

## 2024-10-05 PROCEDURE — 6370000000 HC RX 637 (ALT 250 FOR IP): Performed by: STUDENT IN AN ORGANIZED HEALTH CARE EDUCATION/TRAINING PROGRAM

## 2024-10-05 PROCEDURE — 36415 COLL VENOUS BLD VENIPUNCTURE: CPT

## 2024-10-05 PROCEDURE — 2500000003 HC RX 250 WO HCPCS: Performed by: STUDENT IN AN ORGANIZED HEALTH CARE EDUCATION/TRAINING PROGRAM

## 2024-10-05 PROCEDURE — 6370000000 HC RX 637 (ALT 250 FOR IP): Performed by: INTERNAL MEDICINE

## 2024-10-05 PROCEDURE — 2000000000 HC ICU R&B

## 2024-10-05 PROCEDURE — 80069 RENAL FUNCTION PANEL: CPT

## 2024-10-05 PROCEDURE — 2580000003 HC RX 258: Performed by: INTERNAL MEDICINE

## 2024-10-05 PROCEDURE — 2580000003 HC RX 258: Performed by: STUDENT IN AN ORGANIZED HEALTH CARE EDUCATION/TRAINING PROGRAM

## 2024-10-05 RX ADMIN — PANTOPRAZOLE SODIUM 40 MG: 40 TABLET, DELAYED RELEASE ORAL at 06:58

## 2024-10-05 RX ADMIN — INSULIN LISPRO 1 UNITS: 100 INJECTION, SOLUTION INTRAVENOUS; SUBCUTANEOUS at 12:36

## 2024-10-05 RX ADMIN — POLYETHYLENE GLYCOL 3350 17 G: 17 POWDER, FOR SOLUTION ORAL at 11:34

## 2024-10-05 RX ADMIN — SODIUM CHLORIDE, PRESERVATIVE FREE 10 ML: 5 INJECTION INTRAVENOUS at 08:12

## 2024-10-05 RX ADMIN — SODIUM CHLORIDE, PRESERVATIVE FREE 10 ML: 5 INJECTION INTRAVENOUS at 08:13

## 2024-10-05 RX ADMIN — CLOPIDOGREL BISULFATE 75 MG: 75 TABLET ORAL at 08:10

## 2024-10-05 RX ADMIN — SODIUM CHLORIDE, PRESERVATIVE FREE 10 ML: 5 INJECTION INTRAVENOUS at 20:11

## 2024-10-05 RX ADMIN — AMLODIPINE BESYLATE 10 MG: 10 TABLET ORAL at 08:10

## 2024-10-05 RX ADMIN — ATORVASTATIN CALCIUM 40 MG: 40 TABLET, FILM COATED ORAL at 08:10

## 2024-10-05 RX ADMIN — ASPIRIN 81 MG: 81 TABLET, COATED ORAL at 08:10

## 2024-10-05 RX ADMIN — CALCIUM ACETATE 667 MG: 667 CAPSULE ORAL at 16:33

## 2024-10-05 RX ADMIN — CALCIUM ACETATE 667 MG: 667 CAPSULE ORAL at 12:36

## 2024-10-05 RX ADMIN — OXYCODONE 5 MG: 5 TABLET ORAL at 20:10

## 2024-10-05 RX ADMIN — CARVEDILOL 6.25 MG: 6.25 TABLET, FILM COATED ORAL at 08:10

## 2024-10-05 RX ADMIN — METHADONE HYDROCHLORIDE 90 MG: 10 TABLET ORAL at 08:12

## 2024-10-05 RX ADMIN — OXYCODONE 5 MG: 5 TABLET ORAL at 08:14

## 2024-10-05 RX ADMIN — CALCIUM ACETATE 667 MG: 667 CAPSULE ORAL at 08:10

## 2024-10-05 ASSESSMENT — PAIN SCALES - GENERAL
PAINLEVEL_OUTOF10: 0
PAINLEVEL_OUTOF10: 6
PAINLEVEL_OUTOF10: 0
PAINLEVEL_OUTOF10: 5
PAINLEVEL_OUTOF10: 4
PAINLEVEL_OUTOF10: 0

## 2024-10-05 ASSESSMENT — PAIN DESCRIPTION - ORIENTATION: ORIENTATION: MID

## 2024-10-05 ASSESSMENT — PAIN - FUNCTIONAL ASSESSMENT: PAIN_FUNCTIONAL_ASSESSMENT: PREVENTS OR INTERFERES SOME ACTIVE ACTIVITIES AND ADLS

## 2024-10-05 ASSESSMENT — PAIN DESCRIPTION - DESCRIPTORS
DESCRIPTORS: ACHING
DESCRIPTORS: ACHING;DISCOMFORT

## 2024-10-05 ASSESSMENT — PAIN DESCRIPTION - LOCATION
LOCATION: CHEST;BACK
LOCATION: CHEST

## 2024-10-05 NOTE — CARE COORDINATION
Patient does not have a secured outpatient HD spot and therefore does not have a safe discharge plan. [facility] and [facility] on Monday 10/7/24 to secure outpatient HD spot, patient can not be discharged until spot is secured.   Electronically signed by JESSICA Walker on 10/5/2024 at 10:39 AM  [phone]

## 2024-10-06 LAB
ABO + RH BLD: NORMAL
ALBUMIN SERPL-MCNC: 3.5 G/DL (ref 3.4–5)
ALP SERPL-CCNC: 51 U/L (ref 40–129)
ALT SERPL-CCNC: 9 U/L (ref 10–40)
ANION GAP SERPL CALCULATED.3IONS-SCNC: 11 MMOL/L (ref 3–16)
AST SERPL-CCNC: 21 U/L (ref 15–37)
BASOPHILS # BLD: 0 K/UL (ref 0–0.2)
BASOPHILS NFR BLD: 0.3 %
BILIRUB DIRECT SERPL-MCNC: <0.1 MG/DL (ref 0–0.3)
BILIRUB INDIRECT SERPL-MCNC: 0.2 MG/DL (ref 0–1)
BILIRUB SERPL-MCNC: 0.3 MG/DL (ref 0–1)
BLD GP AB SCN SERPL QL: NORMAL
BUN SERPL-MCNC: 55 MG/DL (ref 7–20)
CALCIUM SERPL-MCNC: 8.5 MG/DL (ref 8.3–10.6)
CHLORIDE SERPL-SCNC: 99 MMOL/L (ref 99–110)
CO2 SERPL-SCNC: 23 MMOL/L (ref 21–32)
CREAT SERPL-MCNC: 6.9 MG/DL (ref 0.8–1.3)
DEPRECATED RDW RBC AUTO: 14.1 % (ref 12.4–15.4)
DEPRECATED RDW RBC AUTO: 14.3 % (ref 12.4–15.4)
EOSINOPHIL # BLD: 0.1 K/UL (ref 0–0.6)
EOSINOPHIL NFR BLD: 1.9 %
GFR SERPLBLD CREATININE-BSD FMLA CKD-EPI: 8 ML/MIN/{1.73_M2}
GLUCOSE BLD-MCNC: 165 MG/DL (ref 70–99)
GLUCOSE BLD-MCNC: 188 MG/DL (ref 70–99)
GLUCOSE BLD-MCNC: 203 MG/DL (ref 70–99)
GLUCOSE BLD-MCNC: 214 MG/DL (ref 70–99)
GLUCOSE SERPL-MCNC: 184 MG/DL (ref 70–99)
HCT VFR BLD AUTO: 20.5 % (ref 40.5–52.5)
HCT VFR BLD AUTO: 22.9 % (ref 40.5–52.5)
HGB BLD-MCNC: 6.9 G/DL (ref 13.5–17.5)
HGB BLD-MCNC: 7.7 G/DL (ref 13.5–17.5)
LYMPHOCYTES # BLD: 1.1 K/UL (ref 1–5.1)
LYMPHOCYTES NFR BLD: 16 %
MCH RBC QN AUTO: 30.5 PG (ref 26–34)
MCH RBC QN AUTO: 30.6 PG (ref 26–34)
MCHC RBC AUTO-ENTMCNC: 33.5 G/DL (ref 31–36)
MCHC RBC AUTO-ENTMCNC: 33.5 G/DL (ref 31–36)
MCV RBC AUTO: 91 FL (ref 80–100)
MCV RBC AUTO: 91.3 FL (ref 80–100)
MONOCYTES # BLD: 0.7 K/UL (ref 0–1.3)
MONOCYTES NFR BLD: 10.3 %
MYELOPEROXIDASE AB SER-ACNC: 0 AU/ML (ref 0–19)
NEUTROPHILS # BLD: 5 K/UL (ref 1.7–7.7)
NEUTROPHILS NFR BLD: 71.5 %
PERFORMED ON: ABNORMAL
PHOSPHATE SERPL-MCNC: 4.2 MG/DL (ref 2.5–4.9)
PLATELET # BLD AUTO: 109 K/UL (ref 135–450)
PLATELET # BLD AUTO: 121 K/UL (ref 135–450)
PMV BLD AUTO: 9.2 FL (ref 5–10.5)
PMV BLD AUTO: 9.4 FL (ref 5–10.5)
POTASSIUM SERPL-SCNC: 5.2 MMOL/L (ref 3.5–5.1)
PROT SERPL-MCNC: 5.8 G/DL (ref 6.4–8.2)
PROTEINASE3 AB SER-ACNC: 0 AU/ML (ref 0–19)
RBC # BLD AUTO: 2.24 M/UL (ref 4.2–5.9)
RBC # BLD AUTO: 2.51 M/UL (ref 4.2–5.9)
RHEUMATOID FACT SER IA-ACNC: <10 IU/ML
SODIUM SERPL-SCNC: 133 MMOL/L (ref 136–145)
WBC # BLD AUTO: 6.1 K/UL (ref 4–11)
WBC # BLD AUTO: 6.9 K/UL (ref 4–11)

## 2024-10-06 PROCEDURE — 6370000000 HC RX 637 (ALT 250 FOR IP): Performed by: INTERNAL MEDICINE

## 2024-10-06 PROCEDURE — 84165 PROTEIN E-PHORESIS SERUM: CPT

## 2024-10-06 PROCEDURE — 86902 BLOOD TYPE ANTIGEN DONOR EA: CPT

## 2024-10-06 PROCEDURE — 2500000003 HC RX 250 WO HCPCS: Performed by: STUDENT IN AN ORGANIZED HEALTH CARE EDUCATION/TRAINING PROGRAM

## 2024-10-06 PROCEDURE — 86900 BLOOD TYPING SEROLOGIC ABO: CPT

## 2024-10-06 PROCEDURE — 2000000000 HC ICU R&B

## 2024-10-06 PROCEDURE — 36430 TRANSFUSION BLD/BLD COMPNT: CPT

## 2024-10-06 PROCEDURE — 94760 N-INVAS EAR/PLS OXIMETRY 1: CPT

## 2024-10-06 PROCEDURE — 36415 COLL VENOUS BLD VENIPUNCTURE: CPT

## 2024-10-06 PROCEDURE — 86431 RHEUMATOID FACTOR QUANT: CPT

## 2024-10-06 PROCEDURE — 6370000000 HC RX 637 (ALT 250 FOR IP): Performed by: STUDENT IN AN ORGANIZED HEALTH CARE EDUCATION/TRAINING PROGRAM

## 2024-10-06 PROCEDURE — 85025 COMPLETE CBC W/AUTO DIFF WBC: CPT

## 2024-10-06 PROCEDURE — 86922 COMPATIBILITY TEST ANTIGLOB: CPT

## 2024-10-06 PROCEDURE — P9016 RBC LEUKOCYTES REDUCED: HCPCS

## 2024-10-06 PROCEDURE — 80076 HEPATIC FUNCTION PANEL: CPT

## 2024-10-06 PROCEDURE — 2580000003 HC RX 258: Performed by: STUDENT IN AN ORGANIZED HEALTH CARE EDUCATION/TRAINING PROGRAM

## 2024-10-06 PROCEDURE — 84155 ASSAY OF PROTEIN SERUM: CPT

## 2024-10-06 PROCEDURE — 85027 COMPLETE CBC AUTOMATED: CPT

## 2024-10-06 PROCEDURE — 2580000003 HC RX 258: Performed by: INTERNAL MEDICINE

## 2024-10-06 PROCEDURE — 86901 BLOOD TYPING SEROLOGIC RH(D): CPT

## 2024-10-06 PROCEDURE — 86038 ANTINUCLEAR ANTIBODIES: CPT

## 2024-10-06 PROCEDURE — 80069 RENAL FUNCTION PANEL: CPT

## 2024-10-06 PROCEDURE — 86850 RBC ANTIBODY SCREEN: CPT

## 2024-10-06 RX ORDER — SODIUM CHLORIDE 9 MG/ML
INJECTION, SOLUTION INTRAVENOUS PRN
Status: DISCONTINUED | OUTPATIENT
Start: 2024-10-06 | End: 2024-10-08 | Stop reason: HOSPADM

## 2024-10-06 RX ORDER — SEVELAMER CARBONATE FOR ORAL SUSPENSION 2400 MG/1
0.8 POWDER, FOR SUSPENSION ORAL
Status: DISCONTINUED | OUTPATIENT
Start: 2024-10-06 | End: 2024-10-08 | Stop reason: HOSPADM

## 2024-10-06 RX ADMIN — PANTOPRAZOLE SODIUM 40 MG: 40 TABLET, DELAYED RELEASE ORAL at 09:01

## 2024-10-06 RX ADMIN — SODIUM CHLORIDE, PRESERVATIVE FREE 10 ML: 5 INJECTION INTRAVENOUS at 09:17

## 2024-10-06 RX ADMIN — CALCIUM ACETATE 667 MG: 667 CAPSULE ORAL at 09:02

## 2024-10-06 RX ADMIN — METHADONE HYDROCHLORIDE 90 MG: 10 TABLET ORAL at 09:00

## 2024-10-06 RX ADMIN — CARVEDILOL 6.25 MG: 6.25 TABLET, FILM COATED ORAL at 09:01

## 2024-10-06 RX ADMIN — CALCIUM ACETATE 667 MG: 667 CAPSULE ORAL at 17:02

## 2024-10-06 RX ADMIN — OXYCODONE 5 MG: 5 TABLET ORAL at 17:09

## 2024-10-06 RX ADMIN — OXYCODONE 5 MG: 5 TABLET ORAL at 02:50

## 2024-10-06 RX ADMIN — CLOPIDOGREL BISULFATE 75 MG: 75 TABLET ORAL at 09:01

## 2024-10-06 RX ADMIN — SODIUM CHLORIDE, PRESERVATIVE FREE 10 ML: 5 INJECTION INTRAVENOUS at 09:15

## 2024-10-06 RX ADMIN — SODIUM CHLORIDE, PRESERVATIVE FREE 10 ML: 5 INJECTION INTRAVENOUS at 20:39

## 2024-10-06 RX ADMIN — ASPIRIN 81 MG: 81 TABLET, COATED ORAL at 09:01

## 2024-10-06 RX ADMIN — OXYCODONE 5 MG: 5 TABLET ORAL at 20:51

## 2024-10-06 RX ADMIN — CALCIUM ACETATE 667 MG: 667 CAPSULE ORAL at 12:18

## 2024-10-06 RX ADMIN — INSULIN LISPRO 1 UNITS: 100 INJECTION, SOLUTION INTRAVENOUS; SUBCUTANEOUS at 17:09

## 2024-10-06 RX ADMIN — OXYCODONE 5 MG: 5 TABLET ORAL at 09:12

## 2024-10-06 RX ADMIN — ATORVASTATIN CALCIUM 40 MG: 40 TABLET, FILM COATED ORAL at 09:01

## 2024-10-06 RX ADMIN — AMLODIPINE BESYLATE 10 MG: 10 TABLET ORAL at 09:01

## 2024-10-06 ASSESSMENT — PAIN SCALES - GENERAL
PAINLEVEL_OUTOF10: 7
PAINLEVEL_OUTOF10: 5
PAINLEVEL_OUTOF10: 2
PAINLEVEL_OUTOF10: 2
PAINLEVEL_OUTOF10: 8
PAINLEVEL_OUTOF10: 2
PAINLEVEL_OUTOF10: 7
PAINLEVEL_OUTOF10: 4

## 2024-10-06 ASSESSMENT — PAIN DESCRIPTION - DESCRIPTORS
DESCRIPTORS: ACHING;DISCOMFORT;SORE
DESCRIPTORS: ACHING

## 2024-10-06 ASSESSMENT — PAIN SCALES - WONG BAKER: WONGBAKER_NUMERICALRESPONSE: NO HURT

## 2024-10-06 ASSESSMENT — PAIN DESCRIPTION - LOCATION
LOCATION: BACK
LOCATION: CHEST;BACK
LOCATION: BACK

## 2024-10-06 ASSESSMENT — PAIN DESCRIPTION - ONSET: ONSET: GRADUAL

## 2024-10-06 ASSESSMENT — PAIN DESCRIPTION - ORIENTATION
ORIENTATION: LOWER
ORIENTATION: LOWER

## 2024-10-06 ASSESSMENT — PAIN DESCRIPTION - PAIN TYPE: TYPE: CHRONIC PAIN

## 2024-10-06 ASSESSMENT — PAIN DESCRIPTION - FREQUENCY: FREQUENCY: INTERMITTENT

## 2024-10-06 NOTE — CONSENT
Informed Consent for Blood Component Transfusion Note    I have discussed with the patient the rationale for blood component transfusion; its benefits in treating or preventing fatigue, organ damage, or death; and its risk which includes mild transfusion reactions, rare risk of blood borne infection, or more serious but rare reactions. I have discussed the alternatives to transfusion, including the risk and consequences of not receiving transfusion. The patient had an opportunity to ask questions and had agreed to proceed with transfusion of blood components.    Electronically signed by PATO BENAVIDES MD on 10/6/24 at 7:27 AM EDT

## 2024-10-07 LAB
ALBUMIN SERPL ELPH-MCNC: 2.9 G/DL (ref 3.1–4.9)
ALBUMIN SERPL-MCNC: 3.5 G/DL (ref 3.4–5)
ALPHA1 GLOB SERPL ELPH-MCNC: 0.3 G/DL (ref 0.2–0.4)
ALPHA2 GLOB SERPL ELPH-MCNC: 0.7 G/DL (ref 0.4–1.1)
ANION GAP SERPL CALCULATED.3IONS-SCNC: 13 MMOL/L (ref 3–16)
B-GLOBULIN SERPL ELPH-MCNC: 0.9 G/DL (ref 0.9–1.6)
BUN SERPL-MCNC: 70 MG/DL (ref 7–20)
CALCIUM SERPL-MCNC: 8.9 MG/DL (ref 8.3–10.6)
CHLORIDE SERPL-SCNC: 99 MMOL/L (ref 99–110)
CO2 SERPL-SCNC: 22 MMOL/L (ref 21–32)
CREAT SERPL-MCNC: 6.6 MG/DL (ref 0.8–1.3)
DEPRECATED RDW RBC AUTO: 13.9 % (ref 12.4–15.4)
GAMMA GLOB SERPL ELPH-MCNC: 1 G/DL (ref 0.6–1.8)
GFR SERPLBLD CREATININE-BSD FMLA CKD-EPI: 8 ML/MIN/{1.73_M2}
GLUCOSE BLD-MCNC: 107 MG/DL (ref 70–99)
GLUCOSE BLD-MCNC: 141 MG/DL (ref 70–99)
GLUCOSE BLD-MCNC: 244 MG/DL (ref 70–99)
GLUCOSE SERPL-MCNC: 150 MG/DL (ref 70–99)
HCT VFR BLD AUTO: 22.6 % (ref 40.5–52.5)
HGB BLD-MCNC: 8.1 G/DL (ref 13.5–17.5)
MCH RBC QN AUTO: 33.2 PG (ref 26–34)
MCHC RBC AUTO-ENTMCNC: 35.8 G/DL (ref 31–36)
MCV RBC AUTO: 92.7 FL (ref 80–100)
PERFORMED ON: ABNORMAL
PHOSPHATE SERPL-MCNC: 4.2 MG/DL (ref 2.5–4.9)
PLATELET # BLD AUTO: 121 K/UL (ref 135–450)
PMV BLD AUTO: 9.4 FL (ref 5–10.5)
POTASSIUM SERPL-SCNC: 5.4 MMOL/L (ref 3.5–5.1)
PROT PATTERN UR ELPH-IMP: NORMAL
PROT SERPL-MCNC: 5.7 G/DL (ref 6.4–8.2)
RBC # BLD AUTO: 2.44 M/UL (ref 4.2–5.9)
SODIUM SERPL-SCNC: 134 MMOL/L (ref 136–145)
SPE/IFE INTERPRETATION: NORMAL
TROPONIN, HIGH SENSITIVITY: 974 NG/L (ref 0–22)
WBC # BLD AUTO: 6.2 K/UL (ref 4–11)

## 2024-10-07 PROCEDURE — 36415 COLL VENOUS BLD VENIPUNCTURE: CPT

## 2024-10-07 PROCEDURE — 6370000000 HC RX 637 (ALT 250 FOR IP): Performed by: INTERNAL MEDICINE

## 2024-10-07 PROCEDURE — 85027 COMPLETE CBC AUTOMATED: CPT

## 2024-10-07 PROCEDURE — 80069 RENAL FUNCTION PANEL: CPT

## 2024-10-07 PROCEDURE — 6370000000 HC RX 637 (ALT 250 FOR IP): Performed by: STUDENT IN AN ORGANIZED HEALTH CARE EDUCATION/TRAINING PROGRAM

## 2024-10-07 PROCEDURE — 90935 HEMODIALYSIS ONE EVALUATION: CPT

## 2024-10-07 PROCEDURE — 2580000003 HC RX 258: Performed by: STUDENT IN AN ORGANIZED HEALTH CARE EDUCATION/TRAINING PROGRAM

## 2024-10-07 PROCEDURE — 2000000000 HC ICU R&B

## 2024-10-07 PROCEDURE — 2580000003 HC RX 258: Performed by: INTERNAL MEDICINE

## 2024-10-07 PROCEDURE — 2500000003 HC RX 250 WO HCPCS: Performed by: STUDENT IN AN ORGANIZED HEALTH CARE EDUCATION/TRAINING PROGRAM

## 2024-10-07 PROCEDURE — 84484 ASSAY OF TROPONIN QUANT: CPT

## 2024-10-07 PROCEDURE — 94760 N-INVAS EAR/PLS OXIMETRY 1: CPT

## 2024-10-07 PROCEDURE — 6360000002 HC RX W HCPCS

## 2024-10-07 RX ADMIN — CLOPIDOGREL BISULFATE 75 MG: 75 TABLET ORAL at 12:14

## 2024-10-07 RX ADMIN — ATORVASTATIN CALCIUM 40 MG: 40 TABLET, FILM COATED ORAL at 12:15

## 2024-10-07 RX ADMIN — CALCIUM ACETATE 667 MG: 667 CAPSULE ORAL at 17:25

## 2024-10-07 RX ADMIN — SEVELAMER CARBONATE FOR ORAL SUSPENSION 0.8 G: 2400 POWDER, FOR SUSPENSION ORAL at 17:43

## 2024-10-07 RX ADMIN — SODIUM CHLORIDE, PRESERVATIVE FREE 10 ML: 5 INJECTION INTRAVENOUS at 21:32

## 2024-10-07 RX ADMIN — ACETAMINOPHEN 325MG 650 MG: 325 TABLET ORAL at 23:32

## 2024-10-07 RX ADMIN — SODIUM CHLORIDE, PRESERVATIVE FREE 10 ML: 5 INJECTION INTRAVENOUS at 21:31

## 2024-10-07 RX ADMIN — ASPIRIN 81 MG: 81 TABLET, COATED ORAL at 12:15

## 2024-10-07 RX ADMIN — INSULIN LISPRO 1 UNITS: 100 INJECTION, SOLUTION INTRAVENOUS; SUBCUTANEOUS at 13:16

## 2024-10-07 RX ADMIN — OXYCODONE 5 MG: 5 TABLET ORAL at 12:15

## 2024-10-07 RX ADMIN — METHADONE HYDROCHLORIDE 90 MG: 10 TABLET ORAL at 08:29

## 2024-10-07 RX ADMIN — CALCIUM ACETATE 667 MG: 667 CAPSULE ORAL at 12:15

## 2024-10-07 RX ADMIN — EPOETIN ALFA-EPBX 10000 UNITS: 10000 INJECTION, SOLUTION INTRAVENOUS; SUBCUTANEOUS at 10:56

## 2024-10-07 RX ADMIN — SEVELAMER CARBONATE FOR ORAL SUSPENSION 0.8 G: 2400 POWDER, FOR SUSPENSION ORAL at 12:14

## 2024-10-07 RX ADMIN — OXYCODONE 5 MG: 5 TABLET ORAL at 21:26

## 2024-10-07 RX ADMIN — AMLODIPINE BESYLATE 10 MG: 10 TABLET ORAL at 12:14

## 2024-10-07 RX ADMIN — PANTOPRAZOLE SODIUM 40 MG: 40 TABLET, DELAYED RELEASE ORAL at 12:14

## 2024-10-07 RX ADMIN — CARVEDILOL 6.25 MG: 6.25 TABLET, FILM COATED ORAL at 17:25

## 2024-10-07 RX ADMIN — CARVEDILOL 6.25 MG: 6.25 TABLET, FILM COATED ORAL at 12:15

## 2024-10-07 RX ADMIN — OXYCODONE 5 MG: 5 TABLET ORAL at 17:25

## 2024-10-07 ASSESSMENT — PAIN SCALES - GENERAL
PAINLEVEL_OUTOF10: 0
PAINLEVEL_OUTOF10: 7
PAINLEVEL_OUTOF10: 6
PAINLEVEL_OUTOF10: 3
PAINLEVEL_OUTOF10: 4
PAINLEVEL_OUTOF10: 4
PAINLEVEL_OUTOF10: 3
PAINLEVEL_OUTOF10: 5
PAINLEVEL_OUTOF10: 4
PAINLEVEL_OUTOF10: 0
PAINLEVEL_OUTOF10: 0
PAINLEVEL_OUTOF10: 3
PAINLEVEL_OUTOF10: 0
PAINLEVEL_OUTOF10: 5

## 2024-10-07 ASSESSMENT — PAIN DESCRIPTION - ORIENTATION
ORIENTATION: MID;LOWER
ORIENTATION: MID;LOWER
ORIENTATION: LOWER
ORIENTATION: LOWER
ORIENTATION: LOWER;MID
ORIENTATION: MID;UPPER
ORIENTATION: MID;LOWER
ORIENTATION: LOWER;MID

## 2024-10-07 ASSESSMENT — PAIN DESCRIPTION - LOCATION
LOCATION: BACK

## 2024-10-07 ASSESSMENT — PAIN DESCRIPTION - FREQUENCY
FREQUENCY: CONTINUOUS

## 2024-10-07 ASSESSMENT — PAIN DESCRIPTION - DESCRIPTORS
DESCRIPTORS: ACHING

## 2024-10-07 ASSESSMENT — PAIN DESCRIPTION - PAIN TYPE
TYPE: CHRONIC PAIN

## 2024-10-07 ASSESSMENT — PAIN - FUNCTIONAL ASSESSMENT: PAIN_FUNCTIONAL_ASSESSMENT: ACTIVITIES ARE NOT PREVENTED

## 2024-10-07 NOTE — CARE COORDINATION
Pt has a secured HD spot.    [facility]  [address] [place]  [address]  [phone] fax.    TTS  :   11:00 am chair time.  First Encounter:   Thursday, 10- at 11:00 am.    JESSICA Osorio     Case Management   [phone]    10/7/2024  2:36 PM

## 2024-10-07 NOTE — CARE COORDINATION
Completed Avoidable days for Friday, 10/4 - Sunday, 10/6/2024 due to New HD and [facility] must approve[facility]  JESSICA Osorio     Case Management   [phone]    10/7/2024  9:36 AM

## 2024-10-07 NOTE — PLAN OF CARE
Problem: Safety - Adult  Goal: Free from fall injury  10/5/2024 0526 by Michael Wolf RN  Outcome: Progressing  10/4/2024 1632 by Modesto Garcia RN  Outcome: Progressing     Problem: Chronic Conditions and Co-morbidities  Goal: Patient's chronic conditions and co-morbidity symptoms are monitored and maintained or improved  10/5/2024 0526 by Michael Wolf RN  Outcome: Progressing  10/4/2024 1632 by Modesto Garcia RN  Outcome: Progressing     Problem: Discharge Planning  Goal: Discharge to home or other facility with appropriate resources  10/5/2024 0526 by Michael Wolf RN  Outcome: Progressing  10/4/2024 1632 by Modesto Garcia RN  Outcome: Progressing     Problem: Pain  Goal: Verbalizes/displays adequate comfort level or baseline comfort level  10/5/2024 0526 by Michael Wolf RN  Outcome: Progressing  10/4/2024 1632 by Modesto Garcia RN  Outcome: Progressing     Problem: ABCDS Injury Assessment  Goal: Absence of physical injury  10/5/2024 0526 by Michael Wolf RN  Outcome: Progressing  10/4/2024 1632 by Modesto Garcia RN  Outcome: Progressing     Problem: Respiratory - Adult  Goal: Achieves optimal ventilation and oxygenation  10/5/2024 0526 by Michael Wolf RN  Outcome: Progressing  10/4/2024 1632 by Modesto Garcia RN  Outcome: Progressing     Problem: Cardiovascular - Adult  Goal: Maintains optimal cardiac output and hemodynamic stability  10/5/2024 0526 by Michael Wolf RN  Outcome: Progressing  10/4/2024 1632 by Modesto Garcia RN  Outcome: Progressing  Goal: Absence of cardiac dysrhythmias or at baseline  10/5/2024 0526 by Michael Wolf RN  Outcome: Progressing  10/4/2024 1632 by Modesto Garcia RN  Outcome: Progressing     Problem: Skin/Tissue Integrity - Adult  Goal: Skin integrity remains intact  10/5/2024 0526 by Michael Wolf RN  Outcome: Progressing  10/4/2024 1632 by Modesto Garcia RN  Outcome: Progressing  Goal: Incisions, wounds, or drain sites healing without S/S of infection  10/5/2024 0526 
  Problem: Safety - Adult  Goal: Free from fall injury  10/5/2024 0928 by Dalila Montoya RN  Outcome: Progressing  Flowsheets (Taken 10/5/2024 0800)  Free From Fall Injury: Instruct family/caregiver on patient safety     Problem: Pain  Goal: Verbalizes/displays adequate comfort level or baseline comfort level  10/5/2024 0928 by Dalila Montoya RN  Outcome: Progressing  Flowsheets (Taken 10/5/2024 0800)  Verbalizes/displays adequate comfort level or baseline comfort level:   Encourage patient to monitor pain and request assistance   Assess pain using appropriate pain scale   Administer analgesics based on type and severity of pain and evaluate response   Implement non-pharmacological measures as appropriate and evaluate response     Problem: ABCDS Injury Assessment  Goal: Absence of physical injury  10/5/2024 0928 by Dalila Montoya RN  Outcome: Progressing  Flowsheets (Taken 10/5/2024 0800)  Absence of Physical Injury: Implement safety measures based on patient assessment     Problem: Respiratory - Adult  Goal: Achieves optimal ventilation and oxygenation  10/5/2024 0928 by Dalila Montoya RN  Outcome: Progressing  Flowsheets (Taken 10/5/2024 0800)  Achieves optimal ventilation and oxygenation:   Assess for changes in respiratory status   Assess for changes in mentation and behavior   Position to facilitate oxygenation and minimize respiratory effort   Encourage broncho-pulmonary hygiene including cough, deep breathe, incentive spirometry   Assess and instruct to report shortness of breath or any respiratory difficulty     Problem: Cardiovascular - Adult  Goal: Maintains optimal cardiac output and hemodynamic stability  10/5/2024 0928 by Dalila Montoya RN  Outcome: Progressing  Flowsheets (Taken 10/5/2024 0800)  Maintains optimal cardiac output and hemodynamic stability:   Monitor blood pressure and heart rate   Monitor urine output and notify Licensed Independent Practitioner for values outside of 
  Problem: Safety - Adult  Goal: Free from fall injury  10/6/2024 1811 by Naina Mcdonald RN  Outcome: Progressing  Flowsheets (Taken 10/6/2024 1811)  Free From Fall Injury: Based on caregiver fall risk screen, instruct family/caregiver to ask for assistance with transferring infant if caregiver noted to have fall risk factors  10/6/2024 0710 by Pari Cortes RN  Outcome: Progressing     Problem: Chronic Conditions and Co-morbidities  Goal: Patient's chronic conditions and co-morbidity symptoms are monitored and maintained or improved  10/6/2024 1811 by Naina Mcdonald RN  Outcome: Progressing  Flowsheets (Taken 10/6/2024 1811)  Care Plan - Patient's Chronic Conditions and Co-Morbidity Symptoms are Monitored and Maintained or Improved:   Monitor and assess patient's chronic conditions and comorbid symptoms for stability, deterioration, or improvement   Collaborate with multidisciplinary team to address chronic and comorbid conditions and prevent exacerbation or deterioration   Update acute care plan with appropriate goals if chronic or comorbid symptoms are exacerbated and prevent overall improvement and discharge  10/6/2024 0710 by Pari Cortes RN  Outcome: Progressing  Flowsheets (Taken 10/5/2024 2000 by Raman Buckley RN)  Care Plan - Patient's Chronic Conditions and Co-Morbidity Symptoms are Monitored and Maintained or Improved: Monitor and assess patient's chronic conditions and comorbid symptoms for stability, deterioration, or improvement     Problem: Discharge Planning  Goal: Discharge to home or other facility with appropriate resources  10/6/2024 1811 by Naina Mcdonald RN  Outcome: Progressing  Flowsheets (Taken 10/6/2024 1811)  Discharge to home or other facility with appropriate resources:   Identify barriers to discharge with patient and caregiver   Arrange for needed discharge resources and transportation as appropriate   Identify discharge learning needs (meds, wound care, 
  Problem: Safety - Adult  Goal: Free from fall injury  Outcome: Progressing     Problem: Chronic Conditions and Co-morbidities  Goal: Patient's chronic conditions and co-morbidity symptoms are monitored and maintained or improved  Outcome: Progressing     Problem: Discharge Planning  Goal: Discharge to home or other facility with appropriate resources  Outcome: Progressing     Problem: Pain  Goal: Verbalizes/displays adequate comfort level or baseline comfort level  Outcome: Progressing     Problem: ABCDS Injury Assessment  Goal: Absence of physical injury  Outcome: Progressing     
  Problem: Safety - Adult  Goal: Free from fall injury  Outcome: Progressing     Problem: Chronic Conditions and Co-morbidities  Goal: Patient's chronic conditions and co-morbidity symptoms are monitored and maintained or improved  Outcome: Progressing  Flowsheets (Taken 10/5/2024 2000 by Raman Buckley, RN)  Care Plan - Patient's Chronic Conditions and Co-Morbidity Symptoms are Monitored and Maintained or Improved: Monitor and assess patient's chronic conditions and comorbid symptoms for stability, deterioration, or improvement     Problem: Discharge Planning  Goal: Discharge to home or other facility with appropriate resources  Outcome: Progressing  Flowsheets (Taken 10/5/2024 2000 by Raman Buckley, RN)  Discharge to home or other facility with appropriate resources: Identify barriers to discharge with patient and caregiver     Problem: Pain  Goal: Verbalizes/displays adequate comfort level or baseline comfort level  Outcome: Progressing     Problem: ABCDS Injury Assessment  Goal: Absence of physical injury  Outcome: Progressing     Problem: Respiratory - Adult  Goal: Achieves optimal ventilation and oxygenation  Outcome: Progressing     Problem: Cardiovascular - Adult  Goal: Maintains optimal cardiac output and hemodynamic stability  Outcome: Progressing  Flowsheets (Taken 10/5/2024 2000 by Raman Buckley, RN)  Maintains optimal cardiac output and hemodynamic stability:   Monitor blood pressure and heart rate   Monitor urine output and notify Licensed Independent Practitioner for values outside of normal range   Assess for signs of decreased cardiac output  Goal: Absence of cardiac dysrhythmias or at baseline  Outcome: Progressing  Flowsheets (Taken 10/5/2024 2000 by Raman Buckley, RN)  Absence of cardiac dysrhythmias or at baseline:   Monitor cardiac rate and rhythm   Assess for signs of decreased cardiac output     Problem: Skin/Tissue Integrity - Adult  Goal: Skin integrity remains 
[facility] OF HEALTH NOTIFICATION     Prohibited procedures performed during an emergency     Facility and Address:   [place] [address]    Facility Contact:  Sobia Christina   Cath lab manager  (O) [phone]  (F) [phone]    Provider:  MD Micki Darling DO    Med Rec Number:  [id number]    Date and time of the incident:  October 3, 2024    Describe the nature of the emergency:  74 y.o.male    Who presented with     Acute myocardial infarction    Labs  BMP:    Lab Results   Component Value Date/Time     10/03/2024 02:55 AM    K 4.6 10/03/2024 02:55 AM    K 5.1 10/02/2024 06:35 PM     10/03/2024 02:55 AM    CO2 21 10/03/2024 02:55 AM    BUN 69 10/03/2024 02:55 AM     CBC:    Lab Results   Component Value Date/Time    WBC 4.6 10/03/2024 02:55 AM    RBC 2.35 10/03/2024 02:55 AM    HGB 8.5 10/03/2024 10:11 AM    HCT 24.8 10/03/2024 10:11 AM    MCV 90.5 10/03/2024 02:55 AM    RDW 14.3 10/03/2024 02:55 AM     10/03/2024 02:55 AM     Describe what actions were taken:    Atherectomy     The outcome of the emergency:    No procedural complications     
Cardiovascular - Adult  Goal: Absence of cardiac dysrhythmias or at baseline  Outcome: Progressing  Flowsheets (Taken 10/3/2024 0800)  Absence of cardiac dysrhythmias or at baseline:   Monitor cardiac rate and rhythm   Assess for signs of decreased cardiac output     Problem: Skin/Tissue Integrity - Adult  Goal: Skin integrity remains intact  Outcome: Progressing  Flowsheets (Taken 10/3/2024 0800)  Skin Integrity Remains Intact:   Monitor for areas of redness and/or skin breakdown   Every 4-6 hours minimum: Change oxygen saturation probe site     Problem: Skin/Tissue Integrity - Adult  Goal: Incisions, wounds, or drain sites healing without S/S of infection  Outcome: Progressing  Flowsheets (Taken 10/3/2024 0800)  Incisions, Wounds, or Drain Sites Healing Without Sign and Symptoms of Infection:   ADMISSION and DAILY: Assess and document risk factors for pressure ulcer development   TWICE DAILY: Assess and document skin integrity     Problem: Skin/Tissue Integrity - Adult  Goal: Oral mucous membranes remain intact  Outcome: Progressing     Problem: Genitourinary - Adult  Goal: Absence of urinary retention  Outcome: Progressing  Flowsheets (Taken 10/3/2024 0800)  Absence of urinary retention:   Assess patient’s ability to void and empty bladder   Monitor intake/output and perform bladder scan as needed     Problem: Metabolic/Fluid and Electrolytes - Adult  Goal: Electrolytes maintained within normal limits  Outcome: Progressing  Flowsheets (Taken 10/3/2024 0800)  Electrolytes maintained within normal limits:   Monitor labs and assess patient for signs and symptoms of electrolyte imbalances   Administer electrolyte replacement as ordered   Monitor response to electrolyte replacements, including repeat lab results as appropriate     Problem: Metabolic/Fluid and Electrolytes - Adult  Goal: Hemodynamic stability and optimal renal function maintained  Outcome: Progressing  Flowsheets (Taken 10/3/2024 0800)  Hemodynamic 
limits  Outcome: Progressing  Flowsheets (Taken 10/3/2024 2000)  Electrolytes maintained within normal limits:   Monitor labs and assess patient for signs and symptoms of electrolyte imbalances   Administer electrolyte replacement as ordered   Monitor response to electrolyte replacements, including repeat lab results as appropriate   Fluid restriction as ordered   Instruct patient on fluid and nutrition restrictions as appropriate  Goal: Hemodynamic stability and optimal renal function maintained  Outcome: Progressing  Flowsheets (Taken 10/3/2024 2000)  Hemodynamic stability and optimal renal function maintained:   Monitor labs and assess for signs and symptoms of volume excess or deficit   Monitor intake, output and patient weight   Monitor urine specific gravity, serum osmolarity and serum sodium as indicated or ordered   Monitor response to interventions for patient's volume status, including labs, urine output, blood pressure (other measures as available)   Encourage oral intake as appropriate   Instruct patient on fluid and nutrition restrictions as appropriate  Goal: Glucose maintained within prescribed range  Outcome: Progressing  Flowsheets (Taken 10/3/2024 2000)  Glucose maintained within prescribed range:   Monitor blood glucose as ordered   Assess for signs and symptoms of hyperglycemia and hypoglycemia   Administer ordered medications to maintain glucose within target range   Assess barriers to adequate nutritional intake and initiate nutrition consult as needed   Instruct patient on self management of diabetes and initiate consult as needed     Problem: Hematologic - Adult  Goal: Maintains hematologic stability  Outcome: Progressing  Flowsheets (Taken 10/3/2024 2000)  Maintains hematologic stability:   Monitor labs for bleeding or clotting disorders   Assess for signs and symptoms of bleeding or hemorrhage   Administer blood products/factors as ordered     
2000)  Hemodynamic stability and optimal renal function maintained:   Monitor labs and assess for signs and symptoms of volume excess or deficit   Monitor intake, output and patient weight   Monitor urine specific gravity, serum osmolarity and serum sodium as indicated or ordered   Monitor response to interventions for patient's volume status, including labs, urine output, blood pressure (other measures as available)   Encourage oral intake as appropriate   Instruct patient on fluid and nutrition restrictions as appropriate  Goal: Glucose maintained within prescribed range  10/4/2024 1632 by Modesto Garcia RN  Outcome: Progressing  10/4/2024 0517 by Melody Suero RN  Outcome: Progressing  Flowsheets (Taken 10/3/2024 2000)  Glucose maintained within prescribed range:   Monitor blood glucose as ordered   Assess for signs and symptoms of hyperglycemia and hypoglycemia   Administer ordered medications to maintain glucose within target range   Assess barriers to adequate nutritional intake and initiate nutrition consult as needed   Instruct patient on self management of diabetes and initiate consult as needed     Problem: Hematologic - Adult  Goal: Maintains hematologic stability  10/4/2024 1632 by Modesto Garcia RN  Outcome: Progressing  10/4/2024 0517 by Melody Suero RN  Outcome: Progressing  Flowsheets (Taken 10/3/2024 2000)  Maintains hematologic stability:   Monitor labs for bleeding or clotting disorders   Assess for signs and symptoms of bleeding or hemorrhage   Administer blood products/factors as ordered     
appropriate  Goal: Hemodynamic stability and optimal renal function maintained  10/7/2024 1626 by Claudia Mas, RN  Outcome: Progressing  Flowsheets (Taken 10/7/2024 0720)  Hemodynamic stability and optimal renal function maintained:   Monitor labs and assess for signs and symptoms of volume excess or deficit   Monitor intake, output and patient weight   Monitor urine specific gravity, serum osmolarity and serum sodium as indicated or ordered   Monitor response to interventions for patient's volume status, including labs, urine output, blood pressure (other measures as available)   Encourage oral intake as appropriate   Instruct patient on fluid and nutrition restrictions as appropriate  10/7/2024 0340 by Karoline Kessler, RN  Outcome: Progressing  Flowsheets (Taken 10/6/2024 2000)  Hemodynamic stability and optimal renal function maintained:   Monitor labs and assess for signs and symptoms of volume excess or deficit   Monitor urine specific gravity, serum osmolarity and serum sodium as indicated or ordered   Monitor response to interventions for patient's volume status, including labs, urine output, blood pressure (other measures as available)   Instruct patient on fluid and nutrition restrictions as appropriate   Encourage oral intake as appropriate   Monitor intake, output and patient weight  Goal: Glucose maintained within prescribed range  10/7/2024 1626 by Claudia Mas, RN  Outcome: Progressing  Flowsheets (Taken 10/7/2024 0720)  Glucose maintained within prescribed range:   Monitor blood glucose as ordered   Assess for signs and symptoms of hyperglycemia and hypoglycemia   Administer ordered medications to maintain glucose within target range   Assess barriers to adequate nutritional intake and initiate nutrition consult as needed   Instruct patient on self management of diabetes and initiate consult as needed  10/7/2024 0340 by Karoline Kessler, RN  Outcome: Progressing  Flowsheets (Taken

## 2024-10-07 NOTE — CARE COORDINATION
Checked [facility] Portal; still pending.  JESSICA Osorio     Case Management   [phone]    10/7/2024  9:03 AM

## 2024-10-07 NOTE — DISCHARGE INSTR - COC
Continuity of Care Form    Patient Name: Colby Ovalle   :  1950  MRN:  [id number]    Admit date:  10/2/2024  Discharge date:  10/08/2024    Code Status Order: Full Code   Advance Directives:   Advance Care Flowsheet Documentation             Admitting Physician:  Juan Blanchard MD  PCP: No primary care provider on file.    Discharging Nurse: SRIDEVI escobedo  Discharging Hospital Unit/Room#: F8S-9501/1307-01  Discharging Unit Phone Number: [phone]    Emergency Contact:   Extended Emergency Contact Information  Primary Emergency Contact: Jazmyn Bernabe  Home Phone: [phone]  Work Phone: [phone]  Relation: Other  Secondary Emergency Contact: santosh bernabe  Home Phone: [phone]  Relation: Child    Past Surgical History:  Past Surgical History:   Procedure Laterality Date    BACK SURGERY      CARDIAC PROCEDURE N/A 10/3/2024    Left heart cath / coronary angiography performed by Bindu Barboza DO at [facility] CARDIAC CATH LAB    CARDIAC PROCEDURE N/A 10/3/2024    Intravascular ultrasound performed by Bindu Barboza DO at [facility] CARDIAC CATH LAB    CARDIAC PROCEDURE N/A 10/3/2024    Atherectomy coronary performed by Bindu Barboza DO at [facility] CARDIAC CATH LAB    COLONOSCOPY      CYSTOSCOPY      with stone removal    DIALYSIS FISTULA CREATION Left 3/1/2022    LEFT ARM STAGED CREATION OF A BRACHIO-BASILIC FISTULA AT THE ELBOW performed by Fabián Matt DO at [facility] OR    PORT SURGERY         Immunization History:     There is no immunization history on file for this patient.    Active Problems:  Patient Active Problem List   Diagnosis Code    Post-op pain G89.18    Chest pain R07.9    NSTEMI (non-ST elevated myocardial infarction) ([facility]) I21.4    ACS (acute coronary syndrome) ([facility]) I24.9    ESRD (end stage renal disease) ([facility]) N18.6       Isolation/Infection:   Isolation            No Isolation          Patient Infection Status       None to display            Nurse Assessment:  Last Vital

## 2024-10-07 NOTE — CARE COORDINATION
DC order removed.  Pt to have Fistulagram on Tuesday, 10-8-2024.  Still waiting to hear confirmation of [facility] spot for new HD.  JESSICA Osorio     Case Management   [phone]    10/7/2024  12:49 PM

## 2024-10-07 NOTE — CARE COORDINATION
Call placed to Mariajose Villatoro [phone] of [employer], to request assistance in securing an HD spot.  Belen from [facility] called to report they have approved coverage and sent this over to [employer] this morning.  The Portal does not show approval as yet.  Mariajose Villatoro will look into this and call me back.  JESSICA Osorio     Case Management   [phone]    10/7/2024  1:28 PM

## 2024-10-07 NOTE — DIALYSIS
Treatment time: 3 hours and 5 minutes  Net UF: 1848 ml     Pre weight: 77.1 kg  Post weight:75.6 kg  EDW: TBD kg    Access used: L UAF    Access function:  ml/min venous high pressures     Medications or blood products given: epogen     Regular outpatient schedule: MWF     Summary of response to treatment: Patient tolerated treatment well. Venous pressures high and venous chamber clotted with 26 minutes remaining. Surgeon at bedside prior to pt exiting the AR to discuss procedure for high venous fistula pressures.  Report given to Claudia Mas RN.  Copy of dialysis treatment record placed in chart, to be scanned into EMR.

## 2024-10-07 NOTE — ACP (ADVANCE CARE PLANNING)
Advance Care Planning     Advance Care Planning Inpatient Note  Spiritual Care Department    Today's Date: 10/4/2024  Unit: [facility] CV[facility]    Received request from IDT Member.  Upon review of chart and communication with care team, patient's decision making abilities are not in question.. Patient and Spouse was/were present in the room during visit.    Goals of ACP Conversation:  Discuss advance care planning documents  Facilitate a discussion related to patient's goals of care as they align with the patient's values and beliefs.    Health Care Decision Makers:     No healthcare decision makers have been documented.    Summary:  No Decision Maker named by patient at this time    Advance Care Planning Documents (Patient Wishes):  None     Assessment:   met with patient to discuss ACP. Patient expressed desire to review documents. Patient will follow up with  when ready. All patient's questioned answered. Patient encouraged to call Spiritual Care Services at [facility] if questions arise.     Interventions:  Provided education on documents for clarity and greater understanding  Discussed and provided education on state decision maker hierarchy  Encouraged ongoing ACP conversation with future decision makers and loved ones    Care Preferences Communicated:   No    Outcomes/Plan:  ACP Discussion: Completed    Electronically signed by Chaplain JORGE on 10/4/2024 at 1:37 PM            
record.    Electronically signed by Chaplain JORGE on 10/7/2024 at 3:00 PM

## 2024-10-08 VITALS
OXYGEN SATURATION: 100 % | TEMPERATURE: 98.4 F | DIASTOLIC BLOOD PRESSURE: 56 MMHG | BODY MASS INDEX: 22.91 KG/M2 | HEIGHT: 73 IN | RESPIRATION RATE: 13 BRPM | SYSTOLIC BLOOD PRESSURE: 135 MMHG | WEIGHT: 172.84 LBS | HEART RATE: 51 BPM

## 2024-10-08 LAB
ANA SER QL IA: NEGATIVE
ANION GAP SERPL CALCULATED.3IONS-SCNC: 12 MMOL/L (ref 3–16)
BUN SERPL-MCNC: 53 MG/DL (ref 7–20)
CALCIUM SERPL-MCNC: 8.9 MG/DL (ref 8.3–10.6)
CHLORIDE SERPL-SCNC: 97 MMOL/L (ref 99–110)
CO2 SERPL-SCNC: 24 MMOL/L (ref 21–32)
CREAT SERPL-MCNC: 5.7 MG/DL (ref 0.8–1.3)
DEPRECATED RDW RBC AUTO: 14 % (ref 12.4–15.4)
GFR SERPLBLD CREATININE-BSD FMLA CKD-EPI: 10 ML/MIN/{1.73_M2}
GLUCOSE BLD-MCNC: 154 MG/DL (ref 70–99)
GLUCOSE BLD-MCNC: 163 MG/DL (ref 70–99)
GLUCOSE BLD-MCNC: 189 MG/DL (ref 70–99)
GLUCOSE SERPL-MCNC: 130 MG/DL (ref 70–99)
HCT VFR BLD AUTO: 24.1 % (ref 40.5–52.5)
HGB BLD-MCNC: 8.1 G/DL (ref 13.5–17.5)
MAGNESIUM SERPL-MCNC: 1.9 MG/DL (ref 1.8–2.4)
MCH RBC QN AUTO: 30.5 PG (ref 26–34)
MCHC RBC AUTO-ENTMCNC: 33.6 G/DL (ref 31–36)
MCV RBC AUTO: 91 FL (ref 80–100)
PERFORMED ON: ABNORMAL
PHOSPHATE SERPL-MCNC: 3.7 MG/DL (ref 2.5–4.9)
PLATELET # BLD AUTO: 134 K/UL (ref 135–450)
PMV BLD AUTO: 9.2 FL (ref 5–10.5)
POTASSIUM SERPL-SCNC: 4.5 MMOL/L (ref 3.5–5.1)
RBC # BLD AUTO: 2.64 M/UL (ref 4.2–5.9)
SODIUM SERPL-SCNC: 133 MMOL/L (ref 136–145)
WBC # BLD AUTO: 6.8 K/UL (ref 4–11)

## 2024-10-08 PROCEDURE — 6360000002 HC RX W HCPCS: Performed by: SURGERY

## 2024-10-08 PROCEDURE — 6370000000 HC RX 637 (ALT 250 FOR IP): Performed by: INTERNAL MEDICINE

## 2024-10-08 PROCEDURE — 2709999900 HC NON-CHARGEABLE SUPPLY: Performed by: SURGERY

## 2024-10-08 PROCEDURE — 36415 COLL VENOUS BLD VENIPUNCTURE: CPT

## 2024-10-08 PROCEDURE — 99152 MOD SED SAME PHYS/QHP 5/>YRS: CPT | Performed by: SURGERY

## 2024-10-08 PROCEDURE — C1725 CATH, TRANSLUMIN NON-LASER: HCPCS | Performed by: SURGERY

## 2024-10-08 PROCEDURE — 94760 N-INVAS EAR/PLS OXIMETRY 1: CPT

## 2024-10-08 PROCEDURE — 85027 COMPLETE CBC AUTOMATED: CPT

## 2024-10-08 PROCEDURE — 2500000003 HC RX 250 WO HCPCS: Performed by: SURGERY

## 2024-10-08 PROCEDURE — 6360000004 HC RX CONTRAST MEDICATION: Performed by: SURGERY

## 2024-10-08 PROCEDURE — 2580000003 HC RX 258: Performed by: INTERNAL MEDICINE

## 2024-10-08 PROCEDURE — 2580000003 HC RX 258: Performed by: SURGERY

## 2024-10-08 PROCEDURE — 6370000000 HC RX 637 (ALT 250 FOR IP): Performed by: STUDENT IN AN ORGANIZED HEALTH CARE EDUCATION/TRAINING PROGRAM

## 2024-10-08 PROCEDURE — 2500000003 HC RX 250 WO HCPCS: Performed by: INTERNAL MEDICINE

## 2024-10-08 PROCEDURE — 83735 ASSAY OF MAGNESIUM: CPT

## 2024-10-08 PROCEDURE — C1769 GUIDE WIRE: HCPCS | Performed by: SURGERY

## 2024-10-08 PROCEDURE — C1894 INTRO/SHEATH, NON-LASER: HCPCS | Performed by: SURGERY

## 2024-10-08 PROCEDURE — 36902 INTRO CATH DIALYSIS CIRCUIT: CPT | Performed by: SURGERY

## 2024-10-08 PROCEDURE — 84100 ASSAY OF PHOSPHORUS: CPT

## 2024-10-08 PROCEDURE — 80048 BASIC METABOLIC PNL TOTAL CA: CPT

## 2024-10-08 PROCEDURE — 99153 MOD SED SAME PHYS/QHP EA: CPT | Performed by: SURGERY

## 2024-10-08 RX ORDER — ASPIRIN 81 MG/1
81 TABLET ORAL DAILY
Qty: 30 TABLET | Refills: 0 | Status: SHIPPED | OUTPATIENT
Start: 2024-10-08 | End: 2024-10-08

## 2024-10-08 RX ORDER — IOPAMIDOL 755 MG/ML
INJECTION, SOLUTION INTRAVASCULAR PRN
Status: DISCONTINUED | OUTPATIENT
Start: 2024-10-08 | End: 2024-10-08 | Stop reason: HOSPADM

## 2024-10-08 RX ORDER — FENTANYL CITRATE 50 UG/ML
INJECTION, SOLUTION INTRAMUSCULAR; INTRAVENOUS PRN
Status: DISCONTINUED | OUTPATIENT
Start: 2024-10-08 | End: 2024-10-08 | Stop reason: HOSPADM

## 2024-10-08 RX ORDER — SODIUM CHLORIDE 9 MG/ML
INJECTION, SOLUTION INTRAVENOUS CONTINUOUS PRN
Status: COMPLETED | OUTPATIENT
Start: 2024-10-08 | End: 2024-10-08

## 2024-10-08 RX ORDER — CARVEDILOL 6.25 MG/1
6.25 TABLET ORAL 2 TIMES DAILY WITH MEALS
Qty: 60 TABLET | Refills: 0 | Status: SHIPPED | OUTPATIENT
Start: 2024-10-08

## 2024-10-08 RX ORDER — MIDAZOLAM HYDROCHLORIDE 1 MG/ML
INJECTION INTRAMUSCULAR; INTRAVENOUS PRN
Status: DISCONTINUED | OUTPATIENT
Start: 2024-10-08 | End: 2024-10-08 | Stop reason: HOSPADM

## 2024-10-08 RX ORDER — ASPIRIN 81 MG/1
81 TABLET ORAL DAILY
Qty: 30 TABLET | Refills: 0 | Status: SHIPPED | OUTPATIENT
Start: 2024-10-08

## 2024-10-08 RX ORDER — CLOPIDOGREL BISULFATE 75 MG/1
75 TABLET ORAL DAILY
Qty: 30 TABLET | Refills: 0 | Status: SHIPPED | OUTPATIENT
Start: 2024-10-08 | End: 2024-10-08

## 2024-10-08 RX ORDER — HEPARIN SODIUM 1000 [USP'U]/ML
INJECTION, SOLUTION INTRAVENOUS; SUBCUTANEOUS PRN
Status: DISCONTINUED | OUTPATIENT
Start: 2024-10-08 | End: 2024-10-08 | Stop reason: HOSPADM

## 2024-10-08 RX ORDER — CLOPIDOGREL BISULFATE 75 MG/1
75 TABLET ORAL DAILY
Qty: 30 TABLET | Refills: 0 | Status: SHIPPED | OUTPATIENT
Start: 2024-10-08

## 2024-10-08 RX ORDER — CARVEDILOL 6.25 MG/1
6.25 TABLET ORAL 2 TIMES DAILY WITH MEALS
Qty: 60 TABLET | Refills: 0 | Status: SHIPPED | OUTPATIENT
Start: 2024-10-08 | End: 2024-10-08

## 2024-10-08 RX ADMIN — OXYCODONE 5 MG: 5 TABLET ORAL at 01:35

## 2024-10-08 RX ADMIN — CARVEDILOL 6.25 MG: 6.25 TABLET, FILM COATED ORAL at 09:18

## 2024-10-08 RX ADMIN — OXYCODONE 5 MG: 5 TABLET ORAL at 13:56

## 2024-10-08 RX ADMIN — SODIUM CHLORIDE, PRESERVATIVE FREE 10 ML: 5 INJECTION INTRAVENOUS at 10:17

## 2024-10-08 RX ADMIN — OXYCODONE 5 MG: 5 TABLET ORAL at 09:35

## 2024-10-08 RX ADMIN — CLOPIDOGREL BISULFATE 75 MG: 75 TABLET ORAL at 09:18

## 2024-10-08 RX ADMIN — ASPIRIN 81 MG: 81 TABLET, COATED ORAL at 09:21

## 2024-10-08 RX ADMIN — ATORVASTATIN CALCIUM 40 MG: 40 TABLET, FILM COATED ORAL at 09:17

## 2024-10-08 RX ADMIN — METHADONE HYDROCHLORIDE 90 MG: 10 TABLET ORAL at 09:18

## 2024-10-08 RX ADMIN — ALTEPLASE 1 MG: 2.2 INJECTION, POWDER, LYOPHILIZED, FOR SOLUTION INTRAVENOUS at 09:37

## 2024-10-08 RX ADMIN — CALCIUM ACETATE 667 MG: 667 CAPSULE ORAL at 09:17

## 2024-10-08 RX ADMIN — AMLODIPINE BESYLATE 10 MG: 10 TABLET ORAL at 09:17

## 2024-10-08 ASSESSMENT — PAIN SCALES - GENERAL
PAINLEVEL_OUTOF10: 7
PAINLEVEL_OUTOF10: 0
PAINLEVEL_OUTOF10: 4
PAINLEVEL_OUTOF10: 4
PAINLEVEL_OUTOF10: 7
PAINLEVEL_OUTOF10: 4
PAINLEVEL_OUTOF10: 4
PAINLEVEL_OUTOF10: 5
PAINLEVEL_OUTOF10: 6

## 2024-10-08 ASSESSMENT — PAIN DESCRIPTION - DESCRIPTORS
DESCRIPTORS: ACHING

## 2024-10-08 ASSESSMENT — PAIN DESCRIPTION - FREQUENCY: FREQUENCY: CONTINUOUS

## 2024-10-08 ASSESSMENT — PAIN DESCRIPTION - LOCATION
LOCATION: BACK

## 2024-10-08 ASSESSMENT — PAIN DESCRIPTION - PAIN TYPE
TYPE: CHRONIC PAIN

## 2024-10-08 ASSESSMENT — PAIN DESCRIPTION - ORIENTATION
ORIENTATION: MID;LOWER
ORIENTATION: LOWER
ORIENTATION: MID;LOWER
ORIENTATION: MID;LOWER

## 2024-10-08 NOTE — CARE COORDINATION
Chart Reviewed.  Pt has a secured HD spot with appt on Thursday, 10- with [facility].  No home care is warranted.  Provided wife with information on how to apply for Medicare.  Wife to transport home.  JESSICA Osorio     Case Management   [phone]    10/8/2024  2:01 PM

## 2024-10-08 NOTE — CARE COORDINATION
Faxed run sheets to [facility].  JESSICA Osorio     Case Management   [phone]    10/8/2024  5:06 PM

## 2024-10-08 NOTE — FLOWSHEET NOTE
Patient provided with discharge instructions, discussed in detail, new medications reviewed including use and side effects.  Patient verbalized understanding.  Prescriptions filled per outpatient pharmacy on Friday and were delivered to bedside Friday.  All questions answered, wife at the bedside to transport home.  Pt brought to discharge bridge and assissted into car. Patient discharged home on 10/08/2024 at 1710

## 2024-10-08 NOTE — PROGRESS NOTES
Cardiology Progress      Attending Physician: Juan Blanchard MD    PATIENT: Colby Ovalle  : 1950  MRN: [id number]    Reason for Consultation:   Chief Complaint   Patient presents with    Heartburn     Comes from home sts has he burning across his chest for three to four days believes its from methadone withdrawl.       Interval history:  S/p PCI mid/prox LAD X 2 BETSY  Doing well today   Tolerating HD  No complaints     History of present illness:   Mr. Colby Ovalle is a 74 y.o. male patient with no known cardiac history who presented to ER yesterday with worsening chest burning. Mr. Ovalle's care has largely been at [facility].  Medical abstract as below but detailed records are unavailable at the time of initial consultation.  He does corroborate diagnoses of hypertension, hyperlipidemia, non-insulin-dependent diabetes mellitus, chronic kidney disease with fistula placed 2 years ago in anticipation of eventual dialysis, opioid dependence on methadone under the care of pain management.  He reports compliance with medications and follow-ups.  He had been concerned about his current methadone dose and possible withdrawal symptoms, requesting to go up on the dose.  He had initially felt his anxiousness and pain contributed to elevated blood pressures and chest symptoms.  He reports that chest burning had started to appear with his treadmill routine several weeks ago.  It had been consistently reproducible on the treadmill and he has been \"unable to exercise because of it\".  Denies shortness of breath.  No palpitations lightheadedness syncope.  No edema.     Medical History:      Diagnosis Date    Alcohol dependence in remission (HCC)     information from [facility]    Cancer (HCC)     over 10 years ago     Chronic back pain     Chronic kidney disease     COVID 2022    Erectile dysfunction     Full dentures     Hepatitis C     information from [facility]    Hx of 
   10/07/24 0924   Encounter Summary   Encounter Overview/Reason Attempted Encounter   Service Provided For Patient not available   Last Encounter  10/07/24  (f/up visit for advanced directive; pt out on dialysis per RN. PN)       
  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 Routine labs show worsening of the kidney function. The patient is scheduled [facility] for possible [facility] of renal replacement therapy. He had a fistula placed a couple of years ago by Vascular Surgery at [facility]    Subjective:    In bed; NAD; No shortness of breath    Scheduled Meds:   sevelamer  0.8 g Oral TID WC    carvedilol  6.25 mg Oral BID WC    epoetin lonnie-epbx  10,000 Units SubCUTAneous Once per day on Monday Wednesday Friday    atorvastatin  40 mg Oral Daily    pantoprazole  40 mg Oral QAM AC    sodium chloride flush  5-40 mL IntraVENous 2 times per day    clopidogrel  75 mg Oral Daily    amLODIPine  10 mg Oral Daily    aspirin  81 mg Oral Daily    calcium acetate  667 mg Oral TID WC    methadone  90 mg Oral Daily    sodium chloride flush  5-40 mL IntraVENous 2 times per day    lidocaine 1 % injection  50 mg IntraDERmal Once    insulin lispro  0-4 Units SubCUTAneous TID WC    insulin lispro  0-4 Units SubCUTAneous Nightly    lidocaine  1 patch TransDERmal QPM        sodium chloride      sodium chloride      sodium chloride      dextrose      nitroGLYCERIN Stopped (10/04/24 1028)       PRN Meds:.sodium chloride, sodium chloride flush, sodium chloride, acetaminophen, oxyCODONE, LORazepam, diclofenac sodium, sodium chloride, ondansetron **OR** ondansetron, polyethylene glycol, perflutren lipid microspheres, sodium chloride flush, glucose, dextrose bolus **OR** dextrose bolus, glucagon (rDNA), dextrose    Physical Exam:    TEMPERATURE:  
  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 Routine labs show worsening of the kidney function. The patient is scheduled to [facility] for possible initiation of renal replacement therapy. He had a fistula placed a couple of years ago by Vascular Surgery at [facility]    Subjective:    In bed; NAD; No shortness of breath    Scheduled Meds:   sevelamer  0.8 g Oral TID WC    carvedilol  6.25 mg Oral BID WC    epoetin lonnie-epbx  10,000 Units SubCUTAneous Once per day on Monday Wednesday Friday    atorvastatin  40 mg Oral Daily    pantoprazole  40 mg Oral QAM AC    sodium chloride flush  5-40 mL IntraVENous 2 times per day    clopidogrel  75 mg Oral Daily    amLODIPine  10 mg Oral Daily    aspirin  81 mg Oral Daily    calcium acetate  667 mg Oral TID WC    methadone  90 mg Oral Daily    sodium chloride flush  5-40 mL IntraVENous 2 times per day    lidocaine 1 % injection  50 mg IntraDERmal Once    insulin lispro  0-4 Units SubCUTAneous TID WC    insulin lispro  0-4 Units SubCUTAneous Nightly    lidocaine  1 patch TransDERmal QPM        sodium chloride      sodium chloride      sodium chloride      dextrose      nitroGLYCERIN Stopped (10/04/24 1028)       PRN Meds:.sodium chloride, sodium chloride flush, sodium chloride, acetaminophen, oxyCODONE, LORazepam, diclofenac sodium, sodium chloride, ondansetron **OR** ondansetron, polyethylene glycol, perflutren lipid microspheres, sodium chloride flush, glucose, dextrose bolus **OR** dextrose bolus, glucagon (rDNA), dextrose    Physical Exam:    TEMPERATURE:  
  Physician Progress Note      PATIENT:               EMILY DRAKE  CSN #:                  [id number]  :                       1950  ADMIT DATE:       10/2/2024 2:58 AM  DISCH DATE:  RESPONDING  PROVIDER #:        Kristian Sharpe MD          QUERY TEXT:    Patient admitted with NSTEMI and noted to have CKD.  \"Acute on chronic kidney   disease, most likely end-stage\" documented in nephrology consult note on 10/2   and \"CKD 4/5 vs ESRD\" documented in 10/3 progress note.  If possible, please   document in progress notes and discharge summary if you are evaluating and/or   treating any of the following:    The medical record reflects the following:  Risk Factors: HTN, DM  Clinical Indicators: On admission, BUN 69, creatinine 7.6, GFR 7; repeat labs   on 10/3 show BUN 68, creatinine 8.1, GFR 6  Treatment: nephrology consult, monitoring renal labs, HD  Options provided:  -- JUAN on CKD Stage 4 GFR 15-29  -- JUAN on CKD Stage 5 GFR<15  -- ESRD  -- Other - I will add my own diagnosis  -- Disagree - Not applicable / Not valid  -- Disagree - Clinically unable to determine / Unknown  -- Refer to Clinical Documentation Reviewer    PROVIDER RESPONSE TEXT:    This patient has ESRD.    Query created by: Blanche Hardin on 10/4/2024 4:31 AM      Electronically signed by:  Kristian Sharpe MD 10/7/2024 12:12 PM          
4 Eyes Skin Assessment     NAME:  Colby Ovalle  YOB: 1950  MEDICAL RECORD NUMBER:  [id number]    The patient is being assessed for  Admission    I agree that at least one RN has performed a thorough Head to Toe Skin Assessment on the patient. ALL assessment sites listed below have been assessed.      Areas assessed by both nurses:    Head, Face, Ears, Shoulders, Back, Chest, Arms, Elbows, Hands, Sacrum. Buttock, Coccyx, Ischium, Legs. Feet and Heels, and Under Medical Devices         Does the Patient have a Wound? No noted wound(s)       Nicolás Prevention initiated by RN: No  Wound Care Orders initiated by RN: No    Pressure Injury (Stage 3,4, Unstageable, DTI, NWPT, and Complex wounds) if present, place Wound referral order by RN under : No    New Ostomies, if present place, Ostomy referral order under : No     Nurse 1 eSignature: Electronically signed by Scott Reyes RN on 10/2/24 at 5:40 PM EDT    **SHARE this note so that the co-signing nurse can place an eSignature**    Nurse 2 eSignature: Electronically signed by ZACHARY RODRIGUEZ RN on 10/2/24 at 5:41 PM EDT    
Arrived to place Midline with bedside RN Wolf. Pre-procedure and timeout done with RN, discussed limitations of placement and allergies. Cleaned with chloraprep, catheter trimmed and  guidewire inserted and advanced smoothly blood was free flowing and non-pulsatile from introducer.  Please use new IV tubing when connecting to the newly placed line.  Post procedure - reorganized pt table, placed pt in lowest position, with call light and educated on line care. Reported off to bedside RN.      Please call if you have any questions about the PICC or ML. The  will direct you to the PICC RN that is on call.      [phone]        
Bedside handoff completed with ANNALISA Abebe. All questions answered. Patient denies pain or discomfort, call light within reach.   
CLINICAL PHARMACY NOTE: MEDS TO BEDS    Total # of Prescriptions Filled: 0   The following medications were delivered to the patient:  Current Discharge Medication List        START taking these medications    Details   carvedilol (COREG) 6.25 MG tablet Take 1 tablet by mouth 2 times daily (with meals)  Qty: 60 tablet, Refills: 0      clopidogrel (PLAVIX) 75 MG tablet Take 1 tablet by mouth daily  Qty: 30 tablet, Refills: 0               Additional Documentation:  Pt had same medications with same strength and directions delivered at bedside 10/4 under the impression he was leaving so pt will not need new meds sent down 10/8 as they are the exact same medications   
CLINICAL PHARMACY NOTE: MEDS TO BEDS    Total # of Prescriptions Filled: 4   The following medications were delivered to the patient:  Current Discharge Medication List        START taking these medications    Details   clopidogrel (PLAVIX) 75 MG tablet Take 1 tablet by mouth daily  Qty: 30 tablet, Refills: 3      carvedilol (COREG) 6.25 MG tablet Take 1 tablet by mouth 2 times daily (with meals)  Qty: 60 tablet, Refills: 3           Aspirin 81mg  Atorvastatin 40mg    Additional Documentation:    
Clinical Pharmacy Note  Heparin Dosing       Lab Results   Component Value Date/Time    ANTIXAUHEP 0.20 10/03/2024 02:55 AM      Lab Results   Component Value Date/Time    HGB 7.1 10/03/2024 02:55 AM    HCT 21.3 10/03/2024 02:55 AM     10/03/2024 02:55 AM    INR 0.99 10/02/2024 06:35 PM       Current Infusion Rate: 940 units/hr    Plan:  Bolus: 2000 units  Rate: increase to 1100 units/hr  Next anti-Xa level: 1100 10/3/24    Pharmacy will continue to monitor and adjust based on anti-Xa results.  Tee García, PharmD  
Clinical Pharmacy Note  Heparin Dosing Consult    Colby Ovalle is a 74 y.o. male ordered heparin per CAD/STEMI/NSTEMI/UA/AFIB nomogram by Dr. Goss.     No results found for: \"ANTIXAUHEP\", \"LABHEPA\"   Lab Results   Component Value Date/Time    HGB 8.2 10/02/2024 06:35 PM    HCT 24.5 10/02/2024 06:35 PM     10/02/2024 06:35 PM    INR 0.99 10/02/2024 06:35 PM       Ht Readings from Last 1 Encounters:   10/02/24 1.854 m (6' 1\")        Wt Readings from Last 1 Encounters:   10/02/24 78 kg (172 lb)        Assessment/Plan:  Initial bolus: 4000 units  Initial infusion rate: 940 units/hr  Next anti-Xa:: 0100 10/3    Pharmacy will continue to monitor adjust heparin based on anti-Xa results using nomogram below:     CAD/STEMI/NSTEMI/UA/AFIB Heparin Nomogram     Initial Bolus: 60 units/kg Max Bolus: 4,000 units       Initial Rate: 12 units/kg/hr Max Initial Rate: 1,000 units/hr     anti-Xa Bolus Titration   < 0.1 Heparin 60 units/kg bolus Increase drip by 4 units/kg/hr   0.1 - 0.29 Heparin 30 units/kg bolus Increase drip by 2 units/kg/hr   0.3 - 0.7 No Bolus No Change   0.71 - 0.8 No Bolus Decrease drip by 1 units/kg/hr   0.81 - 0.99 No Bolus Decrease drip by 2 units/kg/hr   > 1 Hold Heparin for 1 hour Decrease drip by 3 units/kg/hr     Obtain anti-Xa 6 hours after initial bolus and 6 hours after any dose change until two consecutive therapeutic anti-Xa levels are achieved - then daily.   
Consent signed for blood transfusion, lab collected type and screen.   
Critical lab value relayed to MD, transfuse orders placed.  
Department of Internal Medicine  Nephrology Progress Note        REASON FOR CONSULTATION:  Acute on chronic kidney disease.     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  Routine labs show worsening of the kidney function.  The patient is scheduled to [facility] with his [facility] for possible initiation of renal replacement therapy.  He had a fistula placed a couple of years ago by Vascular Surgery at [facility]    Labs reviewed.    Events noted . Over all doing well   REVIEW OF SYSTEMS:  No CP/SOB .     Family at bed side     Physical Exam:    VITALS:  BP (!) 112/44   Pulse 54   Temp 98.4 °F (36.9 °C) (Oral)   Resp 17   Ht 1.854 m (6' 1\")   Wt 76.9 kg (169 lb 6.8 oz)   SpO2 100%   BMI 22.35 kg/m²   24HR INTAKE/OUTPUT:    Intake/Output Summary (Last 24 hours) at 10/6/2024 1256  Last data filed at 10/6/2024 1022  Gross per 24 hour   Intake 700 ml   Output 2050 ml   Net -1350 ml       Constitutional:  Doing well  Respiratory:  CTA  Gastrointestinal:  No tenderness.  Normal Bowel Sounds  Cardiovascular:  S1, S2 RRR   Edema:  + edema  Acc Rt UE AV fistula pos bruit/thrill     DATA:    CBC:  Lab Results   Component Value Date/Time    WBC 6.1 10/06/2024 04:17 AM    RBC 2.24 10/06/2024 04:17 AM    HGB 6.9 10/06/2024 04:17 AM    HCT 20.5 10/06/2024 04:17 AM    MCV 91.3 10/06/2024 04:17 AM    MCH 30.6 10/06/2024 04:17 AM    MCHC 33.5 10/06/2024 04:17 AM    RDW 14.3 10/06/2024 04:17 AM     10/06/2024 04:17 AM    MPV 9.4 10/06/2024 04:17 AM     CMP:  Lab Results   Component Value Date/Time     10/06/2024 04:17 AM    K 5.2 
Department of Internal Medicine  Nephrology Progress Note        REASON FOR CONSULTATION:  Acute on chronic kidney disease.     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  Routine labs show worsening of the kidney function.  The patient is scheduled to [facility] for possible initiation of renal replacement therapy.  He had a fistula placed a couple of years ago by Vascular Surgery at [facility]       Events noted . Over all doing well   REVIEW OF SYSTEMS:  No CP/SOB .     Family at bed side     Physical Exam:    VITALS:  BP (!) 102/49   Pulse 64   Temp 99 °F (37.2 °C) (Oral)   Resp 16   Ht 1.854 m (6' 1\")   Wt 76.8 kg (169 lb 5 oz)   SpO2 100%   BMI 22.34 kg/m²   24HR INTAKE/OUTPUT:    Intake/Output Summary (Last 24 hours) at 10/5/2024 1153  Last data filed at 10/5/2024 1100  Gross per 24 hour   Intake 720 ml   Output 1575 ml   Net -855 ml       Constitutional:  Doing well  Respiratory:  CTA  Gastrointestinal:  No tenderness.  Normal Bowel Sounds  Cardiovascular:  S1, S2 RRR   Edema:  + edema  Acc Rt UE AV fistula pos bruit/thrill     DATA:    CBC:  Lab Results   Component Value Date/Time    WBC 6.5 10/05/2024 04:21 AM    RBC 2.45 10/05/2024 04:21 AM    HGB 7.7 10/05/2024 04:21 AM    HCT 22.5 10/05/2024 04:21 AM    MCV 91.6 10/05/2024 04:21 AM    MCH 31.3 10/05/2024 04:21 AM    MCHC 34.1 10/05/2024 04:21 AM    RDW 14.1 10/05/2024 04:21 AM     10/05/2024 04:21 AM    MPV 9.2 10/05/2024 04:21 AM     CMP:  Lab Results   Component Value Date/Time     10/05/2024 04:21 AM    K 5.1 10/05/2024 04:21 AM    K 5.1 
Department of Internal Medicine  Nephrology Progress Note        REASON FOR CONSULTATION:  Acute on chronic kidney disease.     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  Routine labs show worsening of the kidney function.  The patient is scheduled to [facility] his [facility] for possible initiation of renal replacement therapy.  He had a fistula placed a couple of years ago by Vascular Surgery at [facility]       Events noted , tx to ICU for CP/ uncontrol BP  On Ntg gtt .     REVIEW OF SYSTEMS:  No CP/SOB .     Family at bed side     Physical Exam:    VITALS:  BP (!) 176/65   Pulse 93   Temp 97.8 °F (36.6 °C) (Oral)   Resp 20   Ht 1.854 m (6' 1\")   Wt 77.6 kg (171 lb 1.2 oz)   SpO2 97%   BMI 22.57 kg/m²   24HR INTAKE/OUTPUT:    Intake/Output Summary (Last 24 hours) at 10/3/2024 1031  Last data filed at 10/3/2024 1028  Gross per 24 hour   Intake 258.69 ml   Output 1325 ml   Net -1066.31 ml       Constitutional:  Doing well  Respiratory:  CTA  Gastrointestinal:  No tenderness.  Normal Bowel Sounds  Cardiovascular:  S1, S2 RRR   Edema:  + edema  Acc Rt UE AV fistula pos bruit/thrill     DATA:    CBC:  Lab Results   Component Value Date/Time    WBC 4.6 10/03/2024 02:55 AM    RBC 2.35 10/03/2024 02:55 AM    HGB 8.5 10/03/2024 10:11 AM    HCT 24.8 10/03/2024 10:11 AM    MCV 90.5 10/03/2024 02:55 AM    MCH 30.1 10/03/2024 02:55 AM    MCHC 33.3 10/03/2024 02:55 AM    RDW 14.3 10/03/2024 02:55 AM     10/03/2024 02:55 AM    MPV 9.0 10/03/2024 02:55 AM     CMP:  Lab Results   Component Value Date/Time     10/03/2024 02:55 AM 
Department of Internal Medicine  Nephrology Progress Note        REASON FOR CONSULTATION:  Acute on chronic kidney disease.     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  Routine labs show worsening of the kidney function.  The patient is scheduled to [facility] possible initiation of renal replacement therapy.  He had a fistula placed a couple of years ago by Vascular Surgery at [facility]       Events noted , seen post HD. Tx went well.    S/p cardiac cath .   REVIEW OF SYSTEMS:  No CP/SOB .     Family at bed side     Physical Exam:    VITALS:  BP (!) 142/67   Pulse 63   Temp 97.8 °F (36.6 °C)   Resp 13   Ht 1.854 m (6' 1\")   Wt 75.6 kg (166 lb 10.7 oz)   SpO2 100%   BMI 21.99 kg/m²   24HR INTAKE/OUTPUT:    Intake/Output Summary (Last 24 hours) at 10/4/2024 1307  Last data filed at 10/4/2024 1032  Gross per 24 hour   Intake 929.83 ml   Output 1625 ml   Net -695.17 ml       Constitutional:  Doing well  Respiratory:  CTA  Gastrointestinal:  No tenderness.  Normal Bowel Sounds  Cardiovascular:  S1, S2 RRR   Edema:  + edema  Acc Rt UE AV fistula pos bruit/thrill     DATA:    CBC:  Lab Results   Component Value Date/Time    WBC 4.6 10/03/2024 02:55 AM    RBC 2.35 10/03/2024 02:55 AM    HGB 8.5 10/03/2024 10:11 AM    HCT 24.8 10/03/2024 10:11 AM    MCV 90.5 10/03/2024 02:55 AM    MCH 30.1 10/03/2024 02:55 AM    MCHC 33.3 10/03/2024 02:55 AM    RDW 14.3 10/03/2024 02:55 AM     10/03/2024 02:55 AM    MPV 9.0 10/03/2024 02:55 AM     CMP:  Lab Results   Component Value Date/Time     10/03/2024 02:55 AM    K 
Department of Internal Medicine  Nephrology Progress Note    SUBJECTIVE:  We are following this patient for ERSD. Patient progress reviewed. The patient feels ok .     REVIEW OF SYSTEMS:  No CP/SOB .     Physical Exam:    VITALS:  BP (!) 161/50   Pulse 57   Temp 98.5 °F (36.9 °C) (Oral)   Resp 16   Ht 1.88 m (6' 2\")   Wt 78.2 kg (172 lb 6.4 oz)   SpO2 100%   BMI 22.13 kg/m²   24HR INTAKE/OUTPUT:    Intake/Output Summary (Last 24 hours) at 10/2/2024 1511  Last data filed at 10/2/2024 0319  Gross per 24 hour   Intake --   Output 250 ml   Net -250 ml       Constitutional:  Doing well  Respiratory:  CTA  Gastrointestinal:  No tenderness.  Normal Bowel Sounds  Cardiovascular:  S1, S2 RRR   Edema:  + edema  Acc Rt UE AV fistula pos bruit/thrill     DATA:    CBC:  Lab Results   Component Value Date/Time    WBC 7.6 10/02/2024 04:13 AM    RBC 2.80 10/02/2024 04:13 AM    HGB 8.7 10/02/2024 04:13 AM    HCT 26.0 10/02/2024 04:13 AM    MCV 92.7 10/02/2024 04:13 AM    MCH 30.9 10/02/2024 04:13 AM    MCHC 33.3 10/02/2024 04:13 AM    RDW 14.0 10/02/2024 04:13 AM     10/02/2024 04:13 AM    MPV 9.0 10/02/2024 04:13 AM     CMP:  Lab Results   Component Value Date/Time     10/02/2024 04:13 AM    K 4.7 10/02/2024 04:13 AM     10/02/2024 04:13 AM    CO2 17 10/02/2024 04:13 AM    BUN 69 10/02/2024 04:13 AM    CREATININE 7.6 10/02/2024 04:13 AM    GFRAA 12 03/01/2022 07:15 AM    AGRATIO 1.5 10/02/2024 04:13 AM    LABGLOM 7 10/02/2024 04:13 AM    GLUCOSE 251 10/02/2024 04:13 AM    CALCIUM 9.2 10/02/2024 04:13 AM    BILITOT 0.3 10/02/2024 04:13 AM    ALKPHOS 63 10/02/2024 04:13 AM    AST 16 10/02/2024 04:13 AM    ALT 9 10/02/2024 04:13 AM      Hepatic Function Panel:   Lab Results   Component Value Date/Time    ALKPHOS 63 10/02/2024 04:13 AM    ALT 9 10/02/2024 04:13 AM    AST 16 10/02/2024 04:13 AM    BILITOT 0.3 10/02/2024 04:13 AM      Phosphorus: No results found for: \"PHOS\"    ASSESSMENT:  Principal Problem:    
Hospitalist Progress Note  10/5/2024 9:23 AM  Subjective:   Admit Date: 10/2/2024  PCP: No primary care provider on file. Status: Inpatient   Interval History: Hospital Day: 4, admitted with NSTEMI, CKD progressing to ESRD, and chronic pain syndrome.  [facility] on 10/3 with PCI to LAD w/ rotational atherectomy and 2 drug-eluting stents placed.  Initiated on dialysis.  Planned discharge (10/4) delayed for post-acute to set up outpatient dialysis.      Diet: controlled carbohydrate (60 gm/meal)  Right basilic midline (10/2, day #3)  AV fistula (POA)    Medications:     carvedilol  6.25 mg Oral BID WC   epoetin lonnie-epbx  10,000 Units SubCUTAneous Mon-Wed-Fri   atorvastatin  40 mg Oral Daily   pantoprazole  40 mg Oral QAM AC   clopidogrel  75 mg Oral Daily   amlodipine  10 mg Oral Daily   aspirin  81 mg Oral Daily   calcium acetate  667 mg Oral TID WC   methadone  90 mg Oral Daily   sevelamer  800 mg Oral TID WC   insulin lispro SSI  0-4 Units SubCUTAneous TID WC       Labs:       10/02/24  1835 10/03/24  0255 10/03/24  1011 10/05/24  0421   WBC 5.6 4.6  --  6.5   HGB 8.2* 7.1* 8.5* 7.7*    148  --  112*   MCV 91.4 90.5  --  91.6           139  --  133*   K 5.1 4.6  --  5.1    107  --  97*   CO2 20* 21  --  25   BUN 66* 69*  --  39*   CREATININE 7.8* 8.1*  --  5.3*   GLUCOSE 159* 120*  --  156*          MG 1.66* 1.66*  --   --    PHOS  --  5.2*  --  4.3   CALCIUM 9.0 8.4  --  9.2   ALBUMIN 3.9 3.6  --  3.8   INR 0.99  --   --   --    AST 30 24  --   --    ALT 11 9*  --   --    ALKPHOS 60 49  --   --    BILIDIR  --  <0.1  --   --        iPTH (10/3) 823 pg/mL (14 - 72)  Hepatitis B Core IgM (10/4) non-reactive  Hepatitis B surface antibody (10/3) 17.6 mIU/mL  Hepatitis B surface antigen (10/3) non-reactive    POC Glucose:   Recent Labs     10/04/24  1124 10/04/24  1622 10/04/24  2232 10/05/24  0759   POCGLU 119* 175* 210* 154*     Left vascular access duplex (10/2) Stenosis noted at the arterial anastomosis. 
Hospitalist Progress Note  10/6/2024 9:28 AM  Subjective:   Admit Date: 10/2/2024  PCP: No primary care provider on file. Status: Inpatient   Interval History: Hospital Day: 5, transfused 1 unit PRBC for Hgb 6.9 gm/dL.     History of present illness: Admitted with NSTEMI, CKD progressing to ESRD, and chronic pain syndrome.  LHC on 10/3 with PCI to LAD w/ rotational atherectomy and 2 drug-eluting stents placed.  Initiated on dialysis.  Planned discharge (10/4) delayed for post-acute to set up outpatient dialysis, will likely be discharged on Monday after dialysis.      Diet: controlled carbohydrate (60 gm/meal)  Right basilic midline (10/2, day #4)  AV fistula (POA)    Medications:     carvedilol  6.25 mg Oral BID WC   epoetin lonnie-epbx  10,000 Units SubCUTAneous Mon-Wed-Fri   atorvastatin  40 mg Oral Daily   pantoprazole  40 mg Oral QAM AC   clopidogrel  75 mg Oral Daily   amlodipine  10 mg Oral Daily   aspirin  81 mg Oral Daily   calcium acetate  667 mg Oral TID WC   methadone  90 mg Oral Daily   sevelamer  800 mg Oral TID WC   insulin lispro SSI  0-4 Units SubCUTAneous TID WC   lidocaine  21 mg patch TransDERmal QPM       Labs:       10/03/24  1011 10/05/24  0421 10/06/24  0417   WBC  --  6.5 6.1   HGB 8.5* 7.7* 6.9*   PLT  --  112* 109*   MCV  --  91.6 91.3         NA  --  133* 133*   K  --  5.1 5.2*   CL  --  97* 99   CO2  --  25 23   BUN  --  39* 55*   CREATININE  --  5.3* 6.9*   GLUCOSE  --  156* 184*         MG  --  1.89  --    PHOS  --  4.3 4.2   CALCIUM  --  9.2 8.5   ALBUMIN  --  3.8 3.5   AST  --   --  21   ALT  --   --  9*   ALKPHOS  --   --  51   BILIDIR  --   --  <0.1     iPTH (10/3) 823 pg/mL (14 - 72)  Hepatitis B Core IgM (10/4) non-reactive  Hepatitis B surface antibody (10/3) 17.6 mIU/mL  Hepatitis B surface antigen (10/3) non-reactive    POC Glucose:   Recent Labs     10/05/24  1117 10/05/24  1624 10/05/24  2009 10/06/24  0812   POCGLU 241* 128* 174* 165*     Left vascular access duplex (10/2) 
Hospitalist Progress Note  10/7/2024 12:25 PM  Subjective:   Admit Date: 10/2/2024  PCP: No primary care provider on file. Status: Inpatient   Interval History: Hospital Day: 6, transfused 1 unit PRBC for Hgb 6.9 gm/dL.     History of present illness: Admitted with NSTEMI, CKD progressing to ESRD, and chronic pain syndrome.  LHC on 10/3 with PCI to LAD w/ rotational atherectomy and 2 drug-eluting stents placed.  Initiated on dialysis.  Planned discharge (10/4) delayed for post-acute to set up outpatient dialysis. Vascular Surgery planning fistulagram on Tuesday, 10/7/2024      Diet: controlled carbohydrate (60 gm/meal)  Right basilic midline (10/2, day #4)  AV fistula (POA)    Medications:     carvedilol  6.25 mg Oral BID WC   epoetin lonnie-epbx  10,000 Units SubCUTAneous Mon-Wed-Fri   atorvastatin  40 mg Oral Daily   pantoprazole  40 mg Oral QAM AC   clopidogrel  75 mg Oral Daily   amlodipine  10 mg Oral Daily   aspirin  81 mg Oral Daily   calcium acetate  667 mg Oral TID WC   methadone  90 mg Oral Daily   sevelamer  800 mg Oral TID WC   insulin lispro SSI  0-4 Units SubCUTAneous TID WC   lidocaine  21 mg patch TransDERmal QPM       Labs:       10/03/24  1011 10/05/24  0421 10/06/24  0417   WBC  --  6.5 6.1   HGB 8.5* 7.7* 6.9*   PLT  --  112* 109*   MCV  --  91.6 91.3         NA  --  133* 133*   K  --  5.1 5.2*   CL  --  97* 99   CO2  --  25 23   BUN  --  39* 55*   CREATININE  --  5.3* 6.9*   GLUCOSE  --  156* 184*         MG  --  1.89  --    PHOS  --  4.3 4.2   CALCIUM  --  9.2 8.5   ALBUMIN  --  3.8 3.5   AST  --   --  21   ALT  --   --  9*   ALKPHOS  --   --  51   BILIDIR  --   --  <0.1     iPTH (10/3) 823 pg/mL (14 - 72)  Hepatitis B Core IgM (10/4) non-reactive  Hepatitis B surface antibody (10/3) 17.6 mIU/mL  Hepatitis B surface antigen (10/3) non-reactive    POC Glucose:   Recent Labs     10/06/24  0812 10/06/24  1213 10/06/24  1704 10/06/24  2031   POCGLU 165* 188* 203* 214*     Left vascular access duplex 
MD Matrinez placed new order for STAT EKG, this RN started to obtain the EKG - [facility] called the charge nurse and stated that patient appears to be in [place] 1 with a heart rate of 38 bpm. Charge RN and this RN at bedside - rapid response was called and MD Goss responded (see code documentation) - EKG was obtained and showed to MD Goss. Patient was transferred to CVICU with all belongings. Family at bedside and were updated. Urine labs were also sent.    Electronically signed by Scott Reyes RN on 10/2/2024 at 7:02 PM    Spoke to pharmacy - labs were already draw during code - PTT was added on, heparin bolus given and heparin gtt started per order (see MAR).    Electronically signed by Scott Reyes RN on 10/2/2024 at 7:04 PM    
Medication Reconciliation    List of medications patient is currently taking is complete.     Source of information: 1. Conversation with patient at bedside                                      2. EPIC records         Notes regarding home medications:   1. [facility]. Current dose is 90mg daily and next clinic date is scheduled to be 10/4. Patient receives 28 day supply. Dr. Pires found patient drinking today's dose earlier in the ER.    Murali Reilly [facility]   10/2/2024  10:40 AM    
[facility]  Progress Note    10/8/2024 8:39 AM    Chief Complaint/Reason for Consult: Evaluation of Fistula for Use    Subjective: Pt seen resting in bed, aware of plan for LUE Fistulagram today around 12:30    Vital Signs:  Vitals:    10/08/24 0205 10/08/24 0400 10/08/24 0600 10/08/24 0814   BP:  (!) 145/70     Pulse:  65  63   Resp: 16 16     Temp:  98.8 °F (37.1 °C)     TempSrc:  Oral     SpO2:  95%  99%   Weight:   78.4 kg (172 lb 13.5 oz)    Height:           I/O:    Intake/Output Summary (Last 24 hours) at 10/8/2024 0839  Last data filed at 10/8/2024 0400  Gross per 24 hour   Intake 1200 ml   Output 1025 ml   Net 175 ml       Physical Exam:   General: no apparent distress, alert and oriented, afebrile  Chest/Lungs: non labored breathing no tachypnea, retractions or cyanosis  Cardiac: Heart regular rate and rhythm  Extremities:   -LUE - AVF with thrill    Labs:   Lab Results   Component Value Date/Time     10/08/2024 06:03 AM    K 4.5 10/08/2024 06:03 AM    K 5.1 10/02/2024 06:35 PM    CL 97 10/08/2024 06:03 AM    CO2 24 10/08/2024 06:03 AM    BUN 53 10/08/2024 06:03 AM    CREATININE 5.7 10/08/2024 06:03 AM    GFRAA 12 03/01/2022 07:15 AM    LABGLOM 10 10/08/2024 06:03 AM    GLUCOSE 130 10/08/2024 06:03 AM    PHOS 3.7 10/08/2024 06:03 AM    MG 1.90 10/08/2024 06:03 AM    CALCIUM 8.9 10/08/2024 06:03 AM     Lab Results   Component Value Date/Time    WBC 6.8 10/08/2024 06:03 AM    RBC 2.64 10/08/2024 06:03 AM    HGB 8.1 10/08/2024 06:03 AM    HCT 24.1 10/08/2024 06:03 AM    MCV 91.0 10/08/2024 06:03 AM    RDW 14.0 10/08/2024 06:03 AM     10/08/2024 06:03 AM     Lab Results   Component Value Date    INR 0.99 10/02/2024    PROTIME 13.3 10/02/2024        Scheduled Meds:    sevelamer  0.8 g Oral TID WC    carvedilol  6.25 mg Oral BID WC    epoetin lonnie-epbx  10,000 Units SubCUTAneous Once per day on Monday Wednesday Friday    atorvastatin  40 mg Oral Daily    pantoprazole  40 
NAME:  Colby Ovalle  YOB: 1950  MEDICAL RECORD NUMBER:  [id number]    Shift Summary: HD this a.m. Discharge order placed. Need to wait for dialysis chair prior to leaving hospital.     Family updated: Yes:       Rhythm: Normal Sinus Rhythm     Most recent vitals:   Visit Vitals  BP (!) 122/45   Pulse 66   Temp 98 °F (36.7 °C) (Oral)   Resp 16   Ht 1.854 m (6' 1\")   Wt 75.6 kg (166 lb 10.7 oz)   SpO2 100%   BMI 21.99 kg/m²           No data found.    No data found.      Respiratory support needed (if any):  - RA    Admission weight Weight - Scale: 78.2 kg (172 lb 6.4 oz) (10/02/24 0315)    Today's weight    Wt Readings from Last 1 Encounters:   10/04/24 75.6 kg (166 lb 10.7 oz)        Rucker need assessed each shift: N/A - no rucker present  UOP >30ml/hr: NO - dialysis patient  Last documented BM (in last 48 hrs):  Patient Vitals for the past 48 hrs:   Last BM (including prior to admit)   10/03/24 0800 10/01/24                Restraints (in use currently or dc'd in last 12 hrs): No    Order current and documentation up to date? No    Lines/Drains reviewed @ bedside.  Midline 10/02/24 Right Basilic (Active)   Number of days: 1       Hemodialysis Fistula/Graft Arteriovenous fistula Superior Arm (Active)   Number of days: 948         Drip rates at handoff:    sodium chloride      sodium chloride      dextrose      nitroGLYCERIN Stopped (10/04/24 1028)       Lab Data:   CBC:   Recent Labs     10/02/24  1835 10/03/24  0255 10/03/24  1011   WBC 5.6 4.6  --    HGB 8.2* 7.1* 8.5*   HCT 24.5* 21.3* 24.8*   MCV 91.4 90.5  --     148  --      BMP:    Recent Labs     10/02/24  1835 10/03/24  0255    139   K 5.1 4.6   CO2 20* 21   BUN 66* 69*   CREATININE 7.8* 8.1*     LIVR:   Recent Labs     10/02/24  1835 10/03/24  0255   AST 30 24   ALT 11 9*     PT/INR:   Recent Labs     10/02/24  1835   INR 0.99     APTT:   Recent Labs     10/02/24  1835   APTT 22.6     ABG: No results for input(s): \"PHART\", 
NAME:  Colby Ovalle  YOB: 1950  MEDICAL RECORD NUMBER:  [id number]    Shift Summary: Patient denies pain or discomfort. Rested well overnight.     Family updated: No    Rhythm: Normal Sinus Rhythm     Most recent vitals:   Visit Vitals  BP (!) 145/70   Pulse 65   Temp 98.8 °F (37.1 °C) (Oral)   Resp 16   Ht 1.854 m (6' 1\")   Wt 78.4 kg (172 lb 13.5 oz)   SpO2 95%   BMI 22.80 kg/m²           No data found.    No data found.      Respiratory support needed (if any):  - RA    Admission weight Weight - Scale: 78.2 kg (172 lb 6.4 oz) (10/02/24 0315)    Today's weight    Wt Readings from Last 1 Encounters:   10/08/24 78.4 kg (172 lb 13.5 oz)        Rucker need assessed each shift: N/A - no rucker present  UOP >30ml/hr: YES  Last documented BM (in last 48 hrs):  Patient Vitals for the past 48 hrs:   Last BM (including prior to admit)   10/06/24 2000 10/01/24   10/07/24 0720 10/01/24   10/07/24 0730 10/01/24                  Lines/Drains reviewed @ bedside.  Midline 10/02/24 Right Basilic (Active)   Number of days: 5       Hemodialysis Fistula/Graft Arteriovenous fistula Superior Arm (Active)   Number of days: 952         Drip rates at handoff:    sodium chloride      sodium chloride      sodium chloride      dextrose      nitroGLYCERIN Stopped (10/04/24 1028)       Lab Data:   CBC:   Recent Labs     10/07/24  0347 10/08/24  0603   WBC 6.2 6.8   HGB 8.1* 8.1*   HCT 22.6* 24.1*   MCV 92.7 91.0   * 134*     BMP:    Recent Labs     10/06/24  0417 10/07/24  0347   * 134*   K 5.2* 5.4*   CO2 23 22   BUN 55* 70*   CREATININE 6.9* 6.6*     LIVR:   Recent Labs     10/06/24  0417   AST 21   ALT 9*     PT/INR: No results for input(s): \"INR\" in the last 72 hours.    Invalid input(s): \"PROT\"  APTT: No results for input(s): \"APTT\" in the last 72 hours.  ABG: No results for input(s): \"PHART\", \"[id number]\", \"PO2ART\" in the last 72 hours.    Any consults during the shift? No    Any signed and held orders to be 
NAME:  Colby Ovalle  YOB: 1950  MEDICAL RECORD NUMBER:  [id number]    Shift Summary: no significant events, oxycodone given x1     Family updated: Yes:       Rhythm: Normal Sinus Rhythm     Most recent vitals:   Visit Vitals  BP (!) 170/65   Pulse 65   Temp 98.2 °F (36.8 °C) (Oral)   Resp 16   Ht 1.854 m (6' 1\")   Wt 80.4 kg (177 lb 4 oz)   SpO2 100%   BMI 23.39 kg/m²           No data found.    No data found.      Respiratory support needed (if any):  - O2 - NC - 2 lpm    Admission weight Weight - Scale: 78.2 kg (172 lb 6.4 oz) (10/02/24 0315)    Today's weight    Wt Readings from Last 1 Encounters:   10/07/24 80.4 kg (177 lb 4 oz)        Rucker need assessed each shift: N/A - no rucker present  UOP >30ml/hr: YES  Last documented BM (in last 48 hrs):  Patient Vitals for the past 48 hrs:   Last BM (including prior to admit)   10/05/24 0800 10/01/24   10/05/24 2000 10/01/24   10/06/24 2000 10/01/24                Restraints (in use currently or dc'd in last 12 hrs): No    Order current and documentation up to date? Yes    Lines/Drains reviewed @ bedside.  Midline 10/02/24 Right Basilic (Active)   Number of days: 4       Hemodialysis Fistula/Graft Arteriovenous fistula Superior Arm (Active)   Number of days: 951         Drip rates at handoff:    sodium chloride      sodium chloride      sodium chloride      dextrose      nitroGLYCERIN Stopped (10/04/24 1028)       Lab Data:   CBC:   Recent Labs     10/06/24  1649 10/07/24  0347   WBC 6.9 6.2   HGB 7.7* 8.1*   HCT 22.9* 22.6*   MCV 91.0 92.7   * 121*     BMP:    Recent Labs     10/06/24  0417 10/07/24  0347   * 134*   K 5.2* 5.4*   CO2 23 22   BUN 55* 70*   CREATININE 6.9* 6.6*     LIVR:   Recent Labs     10/06/24  0417   AST 21   ALT 9*     PT/INR: No results for input(s): \"INR\" in the last 72 hours.    Invalid input(s): \"PROT\"  APTT: No results for input(s): \"APTT\" in the last 72 hours.  ABG: No results for input(s): \"PHART\", 
NAME:  Colby Ovalle  YOB: 1950  MEDICAL RECORD NUMBER:  [id number]    Shift Summary: uneventful shift. Possible HD today? Possible d/c? Waitng for dialysis chair so he can go home    Family updated: No    Rhythm: Normal Sinus Rhythm   1st degree block  Most recent vitals:   Visit Vitals  /62   Pulse 63   Temp 98.9 °F (37.2 °C) (Oral)   Resp 15   Ht 1.854 m (6' 1\")   Wt 75.6 kg (166 lb 10.7 oz)   SpO2 100%   BMI 21.99 kg/m²           No data found.    No data found.      Respiratory support needed (if any):  - RA    Admission weight Weight - Scale: 78.2 kg (172 lb 6.4 oz) (10/02/24 0315)    Today's weight    Wt Readings from Last 1 Encounters:   10/04/24 75.6 kg (166 lb 10.7 oz)        Rucker need assessed each shift: N/A - no rucker present  UOP >30ml/hr: YES  Last documented BM (in last 48 hrs):  Patient Vitals for the past 48 hrs:   Last BM (including prior to admit)   10/03/24 0800 10/01/24                Restraints (in use currently or dc'd in last 12 hrs): No    Order current and documentation up to date? No    Lines/Drains reviewed @ bedside.  Midline 10/02/24 Right Basilic (Active)   Number of days: 2       Hemodialysis Fistula/Graft Arteriovenous fistula Superior Arm (Active)   Number of days: 949         Drip rates at handoff:    sodium chloride      sodium chloride      dextrose      nitroGLYCERIN Stopped (10/04/24 1028)       Lab Data:   CBC:   Recent Labs     10/03/24  0255 10/03/24  1011 10/05/24  0421   WBC 4.6  --  6.5   HGB 7.1* 8.5* 7.7*   HCT 21.3* 24.8* 22.5*   MCV 90.5  --  91.6     --  112*     BMP:    Recent Labs     10/03/24  0255 10/05/24  0421    133*   K 4.6 5.1   CO2 21 25   BUN 69* 39*   CREATININE 8.1* 5.3*     LIVR:   Recent Labs     10/02/24  1835 10/03/24  0255   AST 30 24   ALT 11 9*     PT/INR:   Recent Labs     10/02/24  1835   INR 0.99     APTT:   Recent Labs     10/02/24  1835   APTT 22.6     ABG: No results for input(s): \"PHART\", \"[id number]\", 
NAME:  Colby Ovalle  YOB: 1950  MEDICAL RECORD NUMBER:  [id number]    Shift Summary: pt denies CP. Nitro gtt weaning. Awaiting cards and nephro plans today. Has stress test scheduled for today but nuclear med called and said they are defering to cards d/t trop >400.     Family updated: Yes:  wife at bedside    Rhythm:  sinus billie/ 1st degree av block    Most recent vitals:   Visit Vitals  BP (!) 172/66   Pulse 61   Temp 98 °F (36.7 °C) (Oral)   Resp 13   Ht 1.854 m (6' 1\")   Wt 77.6 kg (171 lb 1.2 oz)   SpO2 100%   BMI 22.57 kg/m²           No data found.    No data found.      Respiratory support needed (if any):  - RA    Admission weight Weight - Scale: 78.2 kg (172 lb 6.4 oz) (10/02/24 0315)    Today's weight    Wt Readings from Last 1 Encounters:   10/03/24 77.6 kg (171 lb 1.2 oz)        Rucker need assessed each shift: N/A - no rucker present  UOP >30ml/hr: YES  Last documented BM (in last 48 hrs):  No data found.             Restraints (in use currently or dc'd in last 12 hrs): No    Order current and documentation up to date? No    Lines/Drains reviewed @ bedside.  Midline 10/02/24 Right Basilic (Active)   Number of days: 0         Drip rates at handoff:    sodium chloride      dextrose      heparin (Porcine) 1,100 Units/hr (10/03/24 6460)    nitroGLYCERIN 20 mcg/min (10/03/24 0512)       Lab Data:   CBC:   Recent Labs     10/02/24  1835 10/03/24  0255   WBC 5.6 4.6   HGB 8.2* 7.1*   HCT 24.5* 21.3*   MCV 91.4 90.5    148     BMP:    Recent Labs     10/02/24  1835 10/03/24  0255    139   K 5.1 4.6   CO2 20* 21   BUN 66* 69*   CREATININE 7.8* 8.1*     LIVR:   Recent Labs     10/02/24  1835 10/03/24  0255   AST 30 24   ALT 11 9*     PT/INR:   Recent Labs     10/02/24  1835   INR 0.99     APTT:   Recent Labs     10/02/24  1835   APTT 22.6     ABG: No results for input(s): \"PHART\", \"[id number]\", \"PO2ART\" in the last 72 hours.    Any consults during the shift? No    Any signed and 
NAME:  Colby Ovalle  YOB: 1950  MEDICAL RECORD NUMBER:  [id number]    Shift Summary: Dialysis today 1.8 L taken off, has dialysis again tomorrow, has a chair at [facility] first session will be Thursday.Has procedure in morning with vascular for fistula graft.    Family updated: Yes:  Yes wife came in    Rhythm: Normal Sinus Rhythm     Most recent vitals:   Visit Vitals  BP (!) 101/51   Pulse 60   Temp 97.5 °F (36.4 °C) (Oral)   Resp 22   Ht 1.854 m (6' 1\")   Wt 77.1 kg (169 lb 15.6 oz)   SpO2 97%   BMI 22.43 kg/m²           No data found.    No data found.      Respiratory support needed (if any):  - RA    Admission weight Weight - Scale: 78.2 kg (172 lb 6.4 oz) (10/02/24 0315)    Today's weight    Wt Readings from Last 1 Encounters:   10/07/24 77.1 kg (169 lb 15.6 oz)        Rucker need assessed each shift: N/A - no rucker present  UOP >30ml/hr: YES  Last documented BM (in last 48 hrs):  Patient Vitals for the past 48 hrs:   Last BM (including prior to admit)   10/05/24 2000 10/01/24   10/06/24 2000 10/01/24   10/07/24 0720 10/01/24   10/07/24 0730 10/01/24                Restraints (in use currently or dc'd in last 12 hrs): No    Order current and documentation up to date? No    Lines/Drains reviewed @ bedside.  Midline 10/02/24 Right Basilic (Active)   Number of days: 5       Hemodialysis Fistula/Graft Arteriovenous fistula Superior Arm (Active)   Number of days: 951         Drip rates at handoff:    sodium chloride      sodium chloride      sodium chloride      dextrose      nitroGLYCERIN Stopped (10/04/24 1028)       Lab Data:   CBC:   Recent Labs     10/06/24  1649 10/07/24  0347   WBC 6.9 6.2   HGB 7.7* 8.1*   HCT 22.9* 22.6*   MCV 91.0 92.7   * 121*     BMP:    Recent Labs     10/06/24  0417 10/07/24  0347   * 134*   K 5.2* 5.4*   CO2 23 22   BUN 55* 70*   CREATININE 6.9* 6.6*     LIVR:   Recent Labs     10/06/24  0417   AST 21   ALT 9*     PT/INR: No results for input(s): \"INR\" 
NAME:  Colby Ovalle  YOB: 1950  MEDICAL RECORD NUMBER:  [id number]    Shift Summary: [facility] today with Dr Barboza, 2 stents to LAD. Small hematoma on the right femoral access, remains soft, no change in size since he came back from cath lab, no bleeding, swelling or drainage. Heparin discontinued. Nitro gtt continues, titrated to maintain SBP <180. Dialysis in the afternoon, initially had issues with the access, successful with the third try. Oxycodone given once for chest pain and right femoral pain with good effect. Ativan given once for anxiety.     Family updated: Yes:  Wife was updated by the doctors    Rhythm: Normal Sinus Rhythm  with 1 degree AV block    Most recent vitals:   Visit Vitals  BP (!) 152/86   Pulse 96   Temp 97.9 °F (36.6 °C)   Resp 15   Ht 1.854 m (6' 1\")   Wt 77.6 kg (171 lb 1.2 oz)   SpO2 99%   BMI 22.57 kg/m²           No data found.    No data found.      Respiratory support needed (if any):  - RA    Admission weight Weight - Scale: 78.2 kg (172 lb 6.4 oz) (10/02/24 0315)    Today's weight    Wt Readings from Last 1 Encounters:   10/03/24 77.6 kg (171 lb 1.2 oz)        Rucker need assessed each shift: N/A - no rucker present  UOP >30ml/hr: YES  Last documented BM (in last 48 hrs):  Patient Vitals for the past 48 hrs:   Last BM (including prior to admit)   10/03/24 0800 10/01/24                Restraints (in use currently or dc'd in last 12 hrs): No    Order current and documentation up to date? No    Lines/Drains reviewed @ bedside.  Midline 10/02/24 Right Basilic (Active)   Number of days: 1       Hemodialysis Fistula/Graft Arteriovenous fistula Superior Arm (Active)   Number of days: 947         Drip rates at handoff:    sodium chloride      sodium chloride      dextrose      nitroGLYCERIN 40 mcg/min (10/03/24 1658)       Lab Data:   CBC:   Recent Labs     10/02/24  1835 10/03/24  0255 10/03/24  1011   WBC 5.6 4.6  --    HGB 8.2* 7.1* 8.5*   HCT 24.5* 21.3* 24.8*   MCV 91.4 
NAME:  Colby Ovalle  YOB: 1950  MEDICAL RECORD NUMBER:  [id number]    Shift Summary: Soft BP in the afternoon so Coreg was held. Sat out on the chair for few hours. Oxycodone given once for chest pain. Patient did not have a secured outpatient HD spot today so he can not be discharged until Monday. [facility] and [facility] on Monday to secure outpatient HD spot.     Family updated: Yes:  wife visited    Rhythm:  Sinus Bradycardia to Sinus Rhythm with 1st degree AV block    Most recent vitals:   Visit Vitals  BP (!) 116/49   Pulse 62   Temp 99.1 °F (37.3 °C) (Axillary)   Resp 21   Ht 1.854 m (6' 1\")   Wt 76.8 kg (169 lb 5 oz)   SpO2 100%   BMI 22.34 kg/m²           No data found.    No data found.      Respiratory support needed (if any):  - RA    Admission weight Weight - Scale: 78.2 kg (172 lb 6.4 oz) (10/02/24 0315)    Today's weight    Wt Readings from Last 1 Encounters:   10/05/24 76.8 kg (169 lb 5 oz)        Rucker need assessed each shift: N/A - no rucker present  UOP >30ml/hr: YES  Last documented BM (in last 48 hrs):  Patient Vitals for the past 48 hrs:   Last BM (including prior to admit)   10/05/24 0800 10/01/24                Restraints (in use currently or dc'd in last 12 hrs): No    Order current and documentation up to date? No    Lines/Drains reviewed @ bedside.  Midline 10/02/24 Right Basilic (Active)   Number of days: 3       Hemodialysis Fistula/Graft Arteriovenous fistula Superior Arm (Active)   Number of days: 949         Drip rates at handoff:    sodium chloride      sodium chloride      dextrose      nitroGLYCERIN Stopped (10/04/24 1028)       Lab Data:   CBC:   Recent Labs     10/03/24  0255 10/03/24  1011 10/05/24  0421   WBC 4.6  --  6.5   HGB 7.1* 8.5* 7.7*   HCT 21.3* 24.8* 22.5*   MCV 90.5  --  91.6     --  112*     BMP:    Recent Labs     10/03/24  0255 10/05/24  0421    133*   K 4.6 5.1   CO2 21 25   BUN 69* 39*   CREATININE 8.1* 
NAME:  Colby Ovalle  YOB: 1950  MEDICAL RECORD NUMBER:  [id number]    Shift Summary: Hemaglobin 6.9, tyoe and screen sent, consent signed. Otherwise uneventful night.    Family updated: No    Rhythm: Normal Sinus Rhythm     Most recent vitals:   Visit Vitals  BP (!) 133/46   Pulse 58   Temp 98.9 °F (37.2 °C) (Axillary)   Resp 18   Ht 1.854 m (6' 1\")   Wt 76.9 kg (169 lb 6.8 oz)   SpO2 99%   BMI 22.35 kg/m²           No data found.    No data found.      Respiratory support needed (if any):  - RA    Admission weight Weight - Scale: 78.2 kg (172 lb 6.4 oz) (10/02/24 0315)    Today's weight    Wt Readings from Last 1 Encounters:   10/06/24 76.9 kg (169 lb 6.8 oz)        Rucker need assessed each shift: N/A - no rucker present  UOP >30ml/hr: YES  Last documented BM (in last 48 hrs):  Patient Vitals for the past 48 hrs:   Last BM (including prior to admit)   10/05/24 0800 10/01/24   10/05/24 2000 10/01/24                Restraints (in use currently or dc'd in last 12 hrs): No    Order current and documentation up to date? No    Lines/Drains reviewed @ bedside.  Midline 10/02/24 Right Basilic (Active)   Number of days: 3       Hemodialysis Fistula/Graft Arteriovenous fistula Superior Arm (Active)   Number of days: 950         Drip rates at handoff:    sodium chloride      sodium chloride      sodium chloride      dextrose      nitroGLYCERIN Stopped (10/04/24 1028)       Lab Data:   CBC:   Recent Labs     10/05/24  0421 10/06/24  0417   WBC 6.5 6.1   HGB 7.7* 6.9*   HCT 22.5* 20.5*   MCV 91.6 91.3   * 109*     BMP:    Recent Labs     10/05/24  0421 10/06/24  0417   * 133*   K 5.1 5.2*   CO2 25 23   BUN 39* 55*   CREATININE 5.3* 6.9*     LIVR:   Recent Labs     10/06/24  0417   AST 21   ALT 9*     PT/INR: No results for input(s): \"INR\" in the last 72 hours.    Invalid input(s): \"PROT\"  APTT: No results for input(s): \"APTT\" in the last 72 hours.  ABG: No results for input(s): \"PHART\", 
NAME:  Colby Ovalle  YOB: 1950  MEDICAL RECORD NUMBER:  [id number]    Shift Summary: uneventful shift. Right groin site in tact, no hematoma or bruising. Nitro gtt weaned down. Plan for dialysis again today. Oxycodone given once for back pain.    Family updated: Yes:  wife    Rhythm: Normal Sinus Rhythm     Most recent vitals:   Visit Vitals  BP (!) 142/56   Pulse 80   Temp 97.5 °F (36.4 °C) (Oral)   Resp 18   Ht 1.854 m (6' 1\")   Wt 76.6 kg (168 lb 14 oz)   SpO2 97%   BMI 22.28 kg/m²           No data found.    No data found.      Respiratory support needed (if any):  - RA    Admission weight Weight - Scale: 78.2 kg (172 lb 6.4 oz) (10/02/24 0315)    Today's weight    Wt Readings from Last 1 Encounters:   10/03/24 76.6 kg (168 lb 14 oz)        Rucker need assessed each shift: N/A - no rucker present  UOP >30ml/hr: YES  Last documented BM (in last 48 hrs):  Patient Vitals for the past 48 hrs:   Last BM (including prior to admit)   10/03/24 0800 10/01/24                Restraints (in use currently or dc'd in last 12 hrs): No    Order current and documentation up to date? No    Lines/Drains reviewed @ bedside.  Midline 10/02/24 Right Basilic (Active)   Number of days: 1       Hemodialysis Fistula/Graft Arteriovenous fistula Superior Arm (Active)   Number of days: 948         Drip rates at handoff:    sodium chloride      sodium chloride      dextrose      nitroGLYCERIN 40 mcg/min (10/04/24 0338)       Lab Data:   CBC:   Recent Labs     10/02/24  1835 10/03/24  0255 10/03/24  1011   WBC 5.6 4.6  --    HGB 8.2* 7.1* 8.5*   HCT 24.5* 21.3* 24.8*   MCV 91.4 90.5  --     148  --      BMP:    Recent Labs     10/02/24  1835 10/03/24  0255    139   K 5.1 4.6   CO2 20* 21   BUN 66* 69*   CREATININE 7.8* 8.1*     LIVR:   Recent Labs     10/02/24  1835 10/03/24  0255   AST 30 24   ALT 11 9*     PT/INR:   Recent Labs     10/02/24  1835   INR 0.99     APTT:   Recent Labs     10/02/24  1835   APTT 
Patient is now in room 3111, VSS on RA. Patient A+Ox4. Patients wife is at bedside. Orientation to the room was provided and all questions answered. All fall precautions are in place, including bed alarm. Midline has been placed on patient ad ready to use per PICC team. Tele applied to patient and confirmed with [facility]. Patient states all needs meet at this time and all questions answered. Will continue with POC.     Electronically signed by Scott Reyes RN on 10/2/2024 at 5:39 PM     
Patient midline bleeding at site, this RN currently holding pressure. Patient states that he feels like he starting to withdraw from methadone - patient states that he took 30mg this morning and usually takes 90mg/day. MD Martinez was notified. Patient also states he is a diabetic - MD Martinez notified and asked for BG order set. Patient urinated, bladder scan showed 18ml PVR.    Electronically signed by Scott Reyes RN on 10/2/2024 at 6:00 PM    MD Martinez notified of up trending troponin 76-    Electronically signed by Scott Reyes RN on 10/2/2024 at 6:05 PM    
Patient returned from cath lab and was placed on monitor. Patient presents with sinus rythm. Patient was saturating well >95% on room air. Patient's kofi score is 10 at the time of handoff. Handoff performed with cathlab RN. Right groin dressing was clean, dry and intact, no swelling or redness, soft hematoma.     Electronically signed by Dalila Montoya RN on 10/3/2024 at 1:10PM   
Pt  returned from Dialysis 1.8L taken off  
Pt admitted to CVU from 3111. Pt was the rapid response on the floor. Pt arrived on heparin gtt. Nitro gtt was started upon arrival.     Electronically signed by Verónica Cheng RN on 10/2/2024 at 7:24 PM    
Pt back from cath lab, report received, per Dr. Paris Pt can be discharged to home. Perfect serve sent to Attending Dr. Arambula awaiting response.   
Pt left for dialysis @ 9134  
Report taken from Cristino RN, pt was moved to bed, restraints applied, assessed, and transferred to CVU monitor.   
Transferred to cath lab for Left Heart Catheterization with possible intervention with Dr Barboza.    Electronically signed by Dalila Montoya RN on 10/3/2024 at 11:00 AM   
Treatment time: 2 hrs    Net UF: 1000 ml    Pre weight: 77.6 kg  Post weight: 76.6 kg  EDW: TBD    Access used: Lt UE AVF  Access function: Venous needle did infiltrate. Able to re-cannulate and complete treatment with 17 gauge needles at 200 BFR per orders. Pt will still need 17 gauge needles and 200 BFR for next treatment.     Medications or blood products given: Retacrit 10,000 units, Venofer 200 mg    Regular outpatient schedule: TBD    Summary of response to treatment: Pt tolerated first treatment well. Pt remained stable throughout entire treatment and upon this RN exiting the CVICU suite. Report given to Dalila Montoya RN    
Treatment time: 3.5 hours  Net UF: 1000 ml     Pre weight: 76.6 kg  Post weight:75.6 kg      Access used: LAVF    Access function: needs to check blockage in access or access center evaluation, fistula ran with positional adjustments.  ml/min     Medications or blood products given: Retacrit 10,000 units     Regular outpatient schedule: MWF     Summary of response to treatment: Patient tolerated treatment well and without any complications. Patient remained stable throughout entire treatment.     Report given to Sanjeev Garcia RN and copy of dialysis treatment record placed in chart, to be scanned into EMR.  
Vascular surgery    No acute vents overnight  Patient tolerated dialysis with only initial infiltration yesterday  Being dialyzed again this morning, some issues with venous access.  Patient otherwise well  Strong thrill and palpable radial pulse on left    No vascular intervention planned at this time.  Should there be continued difficulty with access or for dialysis can proceed with fistulogram next week versus outpatient management at access center.  DAPT per cardiology  Will follow peripherally through the weekend.  Please call with questions or concerns  Will revisit Monday if still inpatient    Ab Paris MD, [facility] Vascular and Endovascular Surgery  10/4/2024  2:50 PM          
Vascular surgery    Patient taken to cath lab today for PCI, drug eluting stent x2  Dialysis attempted this afternoon, initially running well though second needle infiltrated shortly after initiation of treatment  Patient with no acute complaints or concerns  Strong thrill in fistula    Fistula remains clear to use.  Based on US there are some areas that are boarderline size but should be adequate for dialysis.   If there continue to be issues with access patient may need temp cath vs tunneled cath for dialysis while awaiting intervention on AVF  Vascular will follow along    Ab Paris MD, RPVI  [facility] Vascular and Endovascular Surgery  10/3/2024  4:02 PM    
Planning conversation    Patient Plan of Care: No spiritual needs identified for follow-up and Spiritual Care available upon further referral  Family/Friends Plan of Care: No spiritual needs identified for follow-up and Spiritual Care available upon further referral    Electronically signed by Chaplain JORGE on 10/4/2024 at 1:35 PM    
SubCUTAneous Once per day on Monday Wednesday Friday    atorvastatin  40 mg Oral Daily    pantoprazole  40 mg Oral QAM AC    sodium chloride flush  5-40 mL IntraVENous 2 times per day    clopidogrel  75 mg Oral Daily    amLODIPine  10 mg Oral Daily    aspirin  81 mg Oral Daily    calcium acetate  667 mg Oral TID WC    methadone  90 mg Oral Daily    sodium chloride flush  5-40 mL IntraVENous 2 times per day    lidocaine 1 % injection  50 mg IntraDERmal Once    insulin lispro  0-4 Units SubCUTAneous TID WC    insulin lispro  0-4 Units SubCUTAneous Nightly    lidocaine  1 patch TransDERmal QPM     Continuous Infusions:    sodium chloride      sodium chloride      sodium chloride      dextrose      nitroGLYCERIN Stopped (10/04/24 1028)       Assessment:   -LUE AVF - accessed at present and with elevated venous pressures  -S/P Coronary PCI this Admission with BETSY to LAD    Plan:  -Plan for LUE Fistulagram with Dr. Paris tomorrow  -NPO after midnight  -Awaiting HD chair placement before D/C    All pertinent information and plan of care discussed with Dr. Ab Paris.    All questions and concerns were addressed with the patient. I have discussed the above stated plan with the patient and the nurse. The patient verbalized understanding and agreed with the plan.    Thank you for allowing to us to participate in the care of Colby Ovalle      Electronically signed by JINA Mcneal CNP on 10/7/2024 at 10:36 AM       Vascular Staff    I independently performed an evaluation on Colby Ovalle.  I have reviewed the above documentation completed by GIN Mcneal.  Please see my additional contributions to the HPI, physical exam, assessment, and medical decision making.    No issues over the weekend  Continued intermittent issues with dialysis, likely due to undersized access  Patient otherwise well, awaiting outpatient chair time    Cath lab tomorrow for fistulagram  Npo midnight  Further plans 
[facility]  -Likely chronic anemia renal disease  -Continue iron supplementation    Ppx: heparin SubQ  Dispo: home, pending [facility] results, set up of outpatient dialysis.    Subjective:     Chief Complaint: Heartburn (Comes from home sts has he burning across his chest for three to four days believes its from methadone withdrawl.)     Interval Hx:  Yesterday evening patient had return of chest pain and troponin significantly increased from 93-4 69.  Stat EKG did not show evidence of ischemia.  Patient was transferred to ICU for nitro and heparin drip. Cardiology consulted.  Patient denies chest pain this morning.  He continues to believe that his symptoms are related to \"withdrawals from his methadone\", but also acknowledges symptoms with exertion.  Patient requesting a glucose tablet as he believes he is getting hypoglycemic.  Blood sugar 15 minutes prior to this was 98.      Review of Systems:    Review of Systems    10 point ROS negative except as stated above in \"subjective\" section    Objective:     Intake/Output Summary (Last 24 hours) at 10/3/2024 0807  Last data filed at 10/3/2024 0600  Gross per 24 hour   Intake 3.57 ml   Output 1025 ml   Net -1021.43 ml        Vitals:   Vitals:    10/03/24 0630   BP: (!) 172/66   Pulse: 61   Resp: 13   Temp:    SpO2: 100%       Physical Exam:     General: NAD  Eyes: EOMI  ENT: neck supple  Cardiovascular: Regular rate.  Respiratory: Clear to auscultation  Gastrointestinal: Soft, non tender  Genitourinary: no suprapubic tenderness  Musculoskeletal: No edema; LUE palpable thrill  Skin: warm, dry; right arm midline  Neuro: Alert.  Psych: Mood appropriate.     Medications:   Medications:    amLODIPine  10 mg Oral Daily    aspirin  81 mg Oral Daily    atorvastatin  20 mg Oral Daily    calcium acetate  667 mg Oral TID WC    ferrous sulfate  325 mg Oral Daily with breakfast    lidocaine  1 patch TransDERmal Daily    methadone  90 mg Oral Daily    pantoprazole  20 mg Oral QAM AC    sevelamer 
results for input(s): \"APTT\" in the last 72 hours.  ABG: No results for input(s): \"PHART\", \"VOS8GMM\", \"PO2ART\" in the last 72 hours.    Any consults during the shift? No    Any signed and held orders to be released?  No        4 Eyes Skin Assessment       The patient is being assessed for  Shift Handoff    I agree that at least one RN has performed a thorough Head to Toe Skin Assessment on the patient. ALL assessment sites listed below have been assessed.      Areas assessed by both nurses: Head, Face, Ears, Shoulders, Back, Chest, Arms, Elbows, Hands, Sacrum. Buttock, Coccyx, Ischium, Legs. Feet and Heels, and Under Medical Devices         Does the Patient have a Wound? No noted wound(s)    Wound Care Orders initiated by RN: No       Nicolás Prevention initiated by RN: No    Pressure Injury (Stage 3,4, Unstageable, DTI, NWPT, and Complex wounds) if present, place Wound referral order by RN under : No    New Ostomies, if present place, Ostomy referral order under : No     Nurse 1 eSignature: Electronically signed by Naina Mcdonald RN on 10/6/24 at 6:43 PM EDT    **SHARE this note so that the co-signing nurse can place an eSignature**    Nurse 2 eSignature: Electronically signed by Karoline Kessler RN on 10/6/24 at 7:28 PM EDT

## 2024-10-09 LAB
BLOOD BANK DISPENSE STATUS: NORMAL
BLOOD BANK DISPENSE STATUS: NORMAL
BLOOD BANK PRODUCT CODE: NORMAL
BLOOD BANK PRODUCT CODE: NORMAL
BPU ID: NORMAL
BPU ID: NORMAL
DESCRIPTION BLOOD BANK: NORMAL
DESCRIPTION BLOOD BANK: NORMAL

## 2024-10-10 LAB — ECHO BSA: 2 M2

## 2024-10-11 ENCOUNTER — TELEPHONE (OUTPATIENT)
Dept: VASCULAR SURGERY | Age: 74
End: 2024-10-11

## 2024-10-11 NOTE — TELEPHONE ENCOUNTER
Please call Clarisse (patients wife) back regarding his appointment time. He recently switched to dialysis MWF from 11:30 to approx 3pm. Please call her at [phone]. thanks

## 2024-10-23 ENCOUNTER — OFFICE VISIT (OUTPATIENT)
Dept: CARDIOLOGY CLINIC | Age: 74
End: 2024-10-23

## 2024-10-23 VITALS
HEIGHT: 73 IN | DIASTOLIC BLOOD PRESSURE: 52 MMHG | BODY MASS INDEX: 22.13 KG/M2 | SYSTOLIC BLOOD PRESSURE: 112 MMHG | WEIGHT: 167 LBS | HEART RATE: 68 BPM | OXYGEN SATURATION: 97 %

## 2024-10-23 DIAGNOSIS — I24.9 ACS (ACUTE CORONARY SYNDROME) (HCC): Primary | ICD-10-CM

## 2024-10-23 DIAGNOSIS — E78.5 HYPERLIPIDEMIA, UNSPECIFIED HYPERLIPIDEMIA TYPE: ICD-10-CM

## 2024-10-23 DIAGNOSIS — I10 HYPERTENSION, UNSPECIFIED TYPE: ICD-10-CM

## 2024-10-23 DIAGNOSIS — I25.10 CORONARY ARTERY DISEASE INVOLVING NATIVE CORONARY ARTERY OF NATIVE HEART WITHOUT ANGINA PECTORIS: ICD-10-CM

## 2024-10-23 RX ORDER — ATORVASTATIN CALCIUM 40 MG/1
40 TABLET, FILM COATED ORAL DAILY
Qty: 90 TABLET | Refills: 3 | Status: SHIPPED | OUTPATIENT
Start: 2024-10-23

## 2024-10-23 RX ORDER — ASPIRIN 81 MG/1
81 TABLET ORAL DAILY
Qty: 90 TABLET | Refills: 3 | Status: SHIPPED | OUTPATIENT
Start: 2024-10-23

## 2024-10-23 RX ORDER — CLOPIDOGREL BISULFATE 75 MG/1
75 TABLET ORAL DAILY
Qty: 90 TABLET | Refills: 3 | Status: SHIPPED | OUTPATIENT
Start: 2024-10-23

## 2024-10-23 NOTE — PATIENT INSTRUCTIONS
If we need to bring a medication back for your blood pressure, Carvedilol would be the preference    Monitor your blood pressure and heart rate twice daily at this time     Continue Plavix, Aspirin, Atorvastatin  Refills sent for a full year for all three medications     Plavix and Aspirin are crucial to keeping your stents open and CANNOT be stopped at this time  Goal is to be on both for 1 full year without stopping    Referral placed for cardiac rehab, but you can do your own exercise/rehab at home

## 2024-10-28 ENCOUNTER — OFFICE VISIT (OUTPATIENT)
Dept: VASCULAR SURGERY | Age: 74
End: 2024-10-28
Payer: OTHER GOVERNMENT

## 2024-10-28 DIAGNOSIS — N18.6 ESRD (END STAGE RENAL DISEASE) (HCC): Primary | ICD-10-CM

## 2024-10-28 PROCEDURE — 1123F ACP DISCUSS/DSCN MKR DOCD: CPT | Performed by: SURGERY

## 2024-10-28 PROCEDURE — 99213 OFFICE O/P EST LOW 20 MIN: CPT | Performed by: SURGERY

## 2024-10-28 NOTE — PROGRESS NOTES
[facility] Vascular and Endovascular Surgery  Ab Paris MD, RPVI      Chief Complaint:   Chief Complaint   Patient presents with    Follow-up     Angiogram follow up/ fistula check        follow up of a pre-existing problem    HPI  Colby Ovalle is a 74 y.o. male who is being seen for follow-up after fistulogram left arm AV fistula with balloon angioplasty.  Patient is doing well and has no acute complaints or concerns.  He continues to dialyze from his left arm fistula and notes that his dialysis is improving.  He notes that they are increasing the size of the needles at his access center and he is having no issues with dialysis.  Overall patient feels well and is happy with how his access is doing.     PMH  Past Medical History:   Diagnosis Date    Alcohol dependence in remission (HCC)     information from [facility]    Cancer (Former[facility])     over 10 years ago     Chronic back pain     Chronic kidney disease     COVID 01/12/2022    Erectile dysfunction     Full dentures     Hepatitis C     information from [facility]    Hx of non-Hodgkin's lymphoma     Information from [facility]    Hyperlipidemia     Hypertension     Kidney stone     Lumbago     information from [facility]    Opioid dependence (HCC)     information from [facility]    Thrombocytopenia (Former[facility])     Tobacco dependency     Type 2 diabetes mellitus without complication (Formerly [facility])        PSH    Past Surgical History:   Procedure Laterality Date    BACK SURGERY      CARDIAC PROCEDURE N/A 10/3/2024    Left heart cath / coronary angiography performed by Bindu Barboza DO at [facility] CARDIAC CATH LAB    CARDIAC PROCEDURE N/A 10/3/2024    Intravascular ultrasound performed by Bindu Barboza DO at [facility] CARDIAC CATH LAB    CARDIAC PROCEDURE N/A 10/3/2024    Atherectomy coronary performed by Bindu Barboza DO at [facility] CARDIAC CATH LAB    COLONOSCOPY      CYSTOSCOPY      with stone removal    DIALYSIS FISTULA CREATION Left 3/1/2022    LEFT ARM

## 2025-02-21 ENCOUNTER — TELEPHONE (OUTPATIENT)
Dept: CARDIOLOGY CLINIC | Age: 75
End: 2025-02-21

## 2025-02-21 RX ORDER — CLOPIDOGREL BISULFATE 75 MG/1
75 TABLET ORAL DAILY
Qty: 90 TABLET | Refills: 3 | Status: SHIPPED | OUTPATIENT
Start: 2025-02-21

## 2025-02-21 NOTE — TELEPHONE ENCOUNTER
Last OV: 10/23/24  Next OV: 4/23/25  Last refill: 10/23/24 #90 3 R/F  [facility]  Most recent Labs: 10/8/24  Last EKG (if needed): 10/3/24    [facility]

## 2025-02-21 NOTE — TELEPHONE ENCOUNTER
Medication Question/Concern    What is the name of the medication you need to speak with someone about?  clopidogrel (PLAVIX)   Was this prescribed by your cardiologist?   yes  Dosage of the medication:  75 MG tablet   How are you taking this medication (QD, BID, TID, QID, PRN):  Take 1 tablet by mouth daily   What issues/concerns are you having with this medication?  [facility] called to see if [facility] could [facility] [address] [place] [address] - P [phone], F [phone]

## 2025-04-22 NOTE — PROGRESS NOTES
2025    PATIENT: Colby Ovalle  : 1950    Reason for evaluation:   Chief Complaint   Patient presents with    Follow-up     CAD  No complaints     History of present illness:  Mr. Colby Ovalle is a 74 y.o. male patient established at [facility] for primary care, here today in routine cardiovascular follow-up.  Colby presented to [facility] ER 10/2/24 with worsening chest burning that had started to appear with his treadmill routine several weeks prior. He ultimately had LHC with rotational atherectomy, Shockwave lithotripsy and two drug eluting stents to LAD.   He was with known late stage chronic kidney disease with fistula placed in anticipation of eventual dialysis. [facility] and given urgent need for LHC, hemodialysis was initiated during admission. He also underwent fistulogram due to infiltration and thrombosis of his dialysis cannulas.   Colby has reported \"the skillet on chest\" burning sensation was \"completely gone\" postprocedure. He prefers to exercise alone at home on treadmill and stationary bike as well as enjoying riding his electric bike outdoors. He has maintained healthy BMI.  Today, he states that he \"still feels human again\".  He is doing yard work as well and has had no concerns with exertion or in daily routine.  Tolerating HD. No shortness of breath or edema.     Medical History:      Diagnosis Date    Alcohol dependence in remission (HCC)     information from [facility]    Cancer (HCC)     over 10 years ago     Chronic back pain     Chronic kidney disease     COVID 2022    Erectile dysfunction     Full dentures     Hepatitis C     information from [facility]    Hx of non-Hodgkin's lymphoma     Information from [facility]    Hyperlipidemia     Hypertension     Kidney stone     Lumbago     information from [facility]    Opioid dependence (HCC)     information from

## 2025-04-22 NOTE — PATIENT INSTRUCTIONS
No medication changes. We will discuss stopping clopidogrel (Plavix) at the office visit in October.    Continue regular exercise.    Call the office for any new, worsening, or concerning symptoms.    Normal heart rate   but we like to see it 60-70's when at rest. 90-low 100's when working.

## 2025-04-23 ENCOUNTER — OFFICE VISIT (OUTPATIENT)
Dept: CARDIOLOGY CLINIC | Age: 75
End: 2025-04-23
Payer: OTHER GOVERNMENT

## 2025-04-23 VITALS
WEIGHT: 171 LBS | SYSTOLIC BLOOD PRESSURE: 120 MMHG | HEIGHT: 73 IN | OXYGEN SATURATION: 97 % | DIASTOLIC BLOOD PRESSURE: 58 MMHG | HEART RATE: 69 BPM | BODY MASS INDEX: 22.66 KG/M2

## 2025-04-23 DIAGNOSIS — E78.2 MIXED HYPERLIPIDEMIA: ICD-10-CM

## 2025-04-23 DIAGNOSIS — I25.10 CORONARY ARTERY DISEASE INVOLVING NATIVE CORONARY ARTERY OF NATIVE HEART WITHOUT ANGINA PECTORIS: Primary | ICD-10-CM

## 2025-04-23 PROCEDURE — 1123F ACP DISCUSS/DSCN MKR DOCD: CPT | Performed by: INTERNAL MEDICINE

## 2025-04-23 PROCEDURE — 3078F DIAST BP <80 MM HG: CPT | Performed by: INTERNAL MEDICINE

## 2025-04-23 PROCEDURE — 3074F SYST BP LT 130 MM HG: CPT | Performed by: INTERNAL MEDICINE

## 2025-04-23 PROCEDURE — 99214 OFFICE O/P EST MOD 30 MIN: CPT | Performed by: INTERNAL MEDICINE

## 2025-04-23 PROCEDURE — G2211 COMPLEX E/M VISIT ADD ON: HCPCS | Performed by: INTERNAL MEDICINE

## 2025-06-01 ENCOUNTER — HOSPITAL ENCOUNTER (INPATIENT)
Age: 75
LOS: 3 days | Discharge: ANOTHER HOSPITAL | DRG: 270 | End: 2025-06-05
Attending: STUDENT IN AN ORGANIZED HEALTH CARE EDUCATION/TRAINING PROGRAM | Admitting: INTERNAL MEDICINE
Payer: OTHER GOVERNMENT

## 2025-06-01 ENCOUNTER — APPOINTMENT (OUTPATIENT)
Dept: GENERAL RADIOLOGY | Age: 75
DRG: 270 | End: 2025-06-01
Payer: OTHER GOVERNMENT

## 2025-06-01 DIAGNOSIS — I24.9 ACUTE CORONARY SYNDROME (HCC): Primary | ICD-10-CM

## 2025-06-01 DIAGNOSIS — F11.93 WITHDRAWAL FROM OPIOIDS (HCC): ICD-10-CM

## 2025-06-01 DIAGNOSIS — I21.4 NSTEMI (NON-ST ELEVATED MYOCARDIAL INFARCTION) (HCC): ICD-10-CM

## 2025-06-01 DIAGNOSIS — F19.10 SUBSTANCE ABUSE (HCC): ICD-10-CM

## 2025-06-01 PROCEDURE — 99285 EMERGENCY DEPT VISIT HI MDM: CPT

## 2025-06-01 PROCEDURE — 85025 COMPLETE CBC W/AUTO DIFF WBC: CPT

## 2025-06-01 PROCEDURE — 84484 ASSAY OF TROPONIN QUANT: CPT

## 2025-06-01 PROCEDURE — 83735 ASSAY OF MAGNESIUM: CPT

## 2025-06-01 PROCEDURE — 93005 ELECTROCARDIOGRAM TRACING: CPT | Performed by: STUDENT IN AN ORGANIZED HEALTH CARE EDUCATION/TRAINING PROGRAM

## 2025-06-01 PROCEDURE — 83880 ASSAY OF NATRIURETIC PEPTIDE: CPT

## 2025-06-01 PROCEDURE — 71045 X-RAY EXAM CHEST 1 VIEW: CPT

## 2025-06-01 PROCEDURE — 80048 BASIC METABOLIC PNL TOTAL CA: CPT

## 2025-06-01 ASSESSMENT — PAIN DESCRIPTION - LOCATION: LOCATION: CHEST

## 2025-06-01 ASSESSMENT — PAIN DESCRIPTION - DESCRIPTORS: DESCRIPTORS: SHOOTING

## 2025-06-01 ASSESSMENT — LIFESTYLE VARIABLES
HOW OFTEN DO YOU HAVE A DRINK CONTAINING ALCOHOL: NEVER
HOW OFTEN DO YOU HAVE A DRINK CONTAINING ALCOHOL: NEVER
HOW MANY STANDARD DRINKS CONTAINING ALCOHOL DO YOU HAVE ON A TYPICAL DAY: PATIENT DOES NOT DRINK
HOW MANY STANDARD DRINKS CONTAINING ALCOHOL DO YOU HAVE ON A TYPICAL DAY: PATIENT DOES NOT DRINK

## 2025-06-01 ASSESSMENT — PAIN - FUNCTIONAL ASSESSMENT: PAIN_FUNCTIONAL_ASSESSMENT: 0-10

## 2025-06-01 ASSESSMENT — PAIN SCALES - GENERAL: PAINLEVEL_OUTOF10: 2

## 2025-06-01 ASSESSMENT — PAIN DESCRIPTION - ORIENTATION: ORIENTATION: MID

## 2025-06-02 PROBLEM — Z99.2 ESRD ON HEMODIALYSIS (HCC): Status: ACTIVE | Noted: 2024-10-03

## 2025-06-02 PROBLEM — E11.65 UNCONTROLLED TYPE 2 DIABETES MELLITUS WITH HYPERGLYCEMIA (HCC): Status: ACTIVE | Noted: 2025-06-02

## 2025-06-02 LAB
ANION GAP SERPL CALCULATED.3IONS-SCNC: 16 MMOL/L (ref 3–16)
ANTI-XA UNFRAC HEPARIN: 0.23 IU/ML (ref 0.3–0.7)
ANTI-XA UNFRAC HEPARIN: 0.25 IU/ML (ref 0.3–0.7)
APTT BLD: 28.3 SEC (ref 22.1–36.4)
BASOPHILS # BLD: 0 K/UL (ref 0–0.2)
BASOPHILS NFR BLD: 0.5 %
BUN SERPL-MCNC: 50 MG/DL (ref 7–20)
CALCIUM SERPL-MCNC: 8.9 MG/DL (ref 8.3–10.6)
CHLORIDE SERPL-SCNC: 94 MMOL/L (ref 99–110)
CO2 SERPL-SCNC: 25 MMOL/L (ref 21–32)
CREAT SERPL-MCNC: 7.6 MG/DL (ref 0.8–1.3)
DEPRECATED RDW RBC AUTO: 14.4 % (ref 12.4–15.4)
ECHO BSA: 2.02 M2
EKG ATRIAL RATE: 63 BPM
EKG ATRIAL RATE: 78 BPM
EKG DIAGNOSIS: NORMAL
EKG DIAGNOSIS: NORMAL
EKG P AXIS: 49 DEGREES
EKG P AXIS: 60 DEGREES
EKG P-R INTERVAL: 210 MS
EKG P-R INTERVAL: 220 MS
EKG Q-T INTERVAL: 434 MS
EKG Q-T INTERVAL: 460 MS
EKG QRS DURATION: 140 MS
EKG QRS DURATION: 140 MS
EKG QTC CALCULATION (BAZETT): 470 MS
EKG QTC CALCULATION (BAZETT): 494 MS
EKG R AXIS: -49 DEGREES
EKG R AXIS: -53 DEGREES
EKG T AXIS: 60 DEGREES
EKG T AXIS: 62 DEGREES
EKG VENTRICULAR RATE: 63 BPM
EKG VENTRICULAR RATE: 78 BPM
EOSINOPHIL # BLD: 0.1 K/UL (ref 0–0.6)
EOSINOPHIL NFR BLD: 1.4 %
GFR SERPLBLD CREATININE-BSD FMLA CKD-EPI: 7 ML/MIN/{1.73_M2}
GLUCOSE BLD-MCNC: 126 MG/DL (ref 70–99)
GLUCOSE BLD-MCNC: 167 MG/DL (ref 70–99)
GLUCOSE BLD-MCNC: 197 MG/DL (ref 70–99)
GLUCOSE SERPL-MCNC: 260 MG/DL (ref 70–99)
HCT VFR BLD AUTO: 39.6 % (ref 40.5–52.5)
HGB BLD-MCNC: 13.3 G/DL (ref 13.5–17.5)
LYMPHOCYTES # BLD: 1.2 K/UL (ref 1–5.1)
LYMPHOCYTES NFR BLD: 22.5 %
MAGNESIUM SERPL-MCNC: 1.79 MG/DL (ref 1.8–2.4)
MCH RBC QN AUTO: 30.7 PG (ref 26–34)
MCHC RBC AUTO-ENTMCNC: 33.7 G/DL (ref 31–36)
MCV RBC AUTO: 91 FL (ref 80–100)
MONOCYTES # BLD: 0.3 K/UL (ref 0–1.3)
MONOCYTES NFR BLD: 5.6 %
NEUTROPHILS # BLD: 3.7 K/UL (ref 1.7–7.7)
NEUTROPHILS NFR BLD: 70 %
NT-PROBNP SERPL-MCNC: 3556 PG/ML (ref 0–124)
PERFORMED ON: ABNORMAL
PLATELET # BLD AUTO: 146 K/UL (ref 135–450)
PMV BLD AUTO: 9.2 FL (ref 5–10.5)
POTASSIUM SERPL-SCNC: 4.2 MMOL/L (ref 3.5–5.1)
RBC # BLD AUTO: 4.35 M/UL (ref 4.2–5.9)
SODIUM SERPL-SCNC: 135 MMOL/L (ref 136–145)
TROPONIN, HIGH SENSITIVITY: 156 NG/L (ref 0–22)
TROPONIN, HIGH SENSITIVITY: 61 NG/L (ref 0–22)
TROPONIN, HIGH SENSITIVITY: 638 NG/L (ref 0–22)
WBC # BLD AUTO: 5.2 K/UL (ref 4–11)

## 2025-06-02 PROCEDURE — B2151ZZ FLUOROSCOPY OF LEFT HEART USING LOW OSMOLAR CONTRAST: ICD-10-PCS | Performed by: INTERNAL MEDICINE

## 2025-06-02 PROCEDURE — 33967 INSERT I-AORT PERCUT DEVICE: CPT | Performed by: INTERNAL MEDICINE

## 2025-06-02 PROCEDURE — C1769 GUIDE WIRE: HCPCS | Performed by: INTERNAL MEDICINE

## 2025-06-02 PROCEDURE — 93458 L HRT ARTERY/VENTRICLE ANGIO: CPT | Performed by: INTERNAL MEDICINE

## 2025-06-02 PROCEDURE — 99153 MOD SED SAME PHYS/QHP EA: CPT | Performed by: INTERNAL MEDICINE

## 2025-06-02 PROCEDURE — 94760 N-INVAS EAR/PLS OXIMETRY 1: CPT

## 2025-06-02 PROCEDURE — 85730 THROMBOPLASTIN TIME PARTIAL: CPT

## 2025-06-02 PROCEDURE — 4A023N7 MEASUREMENT OF CARDIAC SAMPLING AND PRESSURE, LEFT HEART, PERCUTANEOUS APPROACH: ICD-10-PCS | Performed by: INTERNAL MEDICINE

## 2025-06-02 PROCEDURE — 2500000003 HC RX 250 WO HCPCS: Performed by: INTERNAL MEDICINE

## 2025-06-02 PROCEDURE — 6360000002 HC RX W HCPCS: Performed by: INTERNAL MEDICINE

## 2025-06-02 PROCEDURE — 6360000002 HC RX W HCPCS: Performed by: STUDENT IN AN ORGANIZED HEALTH CARE EDUCATION/TRAINING PROGRAM

## 2025-06-02 PROCEDURE — 2580000003 HC RX 258: Performed by: INTERNAL MEDICINE

## 2025-06-02 PROCEDURE — C1889 IMPLANT/INSERT DEVICE, NOC: HCPCS | Performed by: INTERNAL MEDICINE

## 2025-06-02 PROCEDURE — 6370000000 HC RX 637 (ALT 250 FOR IP): Performed by: INTERNAL MEDICINE

## 2025-06-02 PROCEDURE — 99152 MOD SED SAME PHYS/QHP 5/>YRS: CPT | Performed by: INTERNAL MEDICINE

## 2025-06-02 PROCEDURE — C1894 INTRO/SHEATH, NON-LASER: HCPCS | Performed by: INTERNAL MEDICINE

## 2025-06-02 PROCEDURE — 5A02210 ASSISTANCE WITH CARDIAC OUTPUT USING BALLOON PUMP, CONTINUOUS: ICD-10-PCS | Performed by: INTERNAL MEDICINE

## 2025-06-02 PROCEDURE — 36415 COLL VENOUS BLD VENIPUNCTURE: CPT

## 2025-06-02 PROCEDURE — 85520 HEPARIN ASSAY: CPT

## 2025-06-02 PROCEDURE — 93010 ELECTROCARDIOGRAM REPORT: CPT | Performed by: INTERNAL MEDICINE

## 2025-06-02 PROCEDURE — 2709999900 HC NON-CHARGEABLE SUPPLY: Performed by: INTERNAL MEDICINE

## 2025-06-02 PROCEDURE — 6360000004 HC RX CONTRAST MEDICATION: Performed by: INTERNAL MEDICINE

## 2025-06-02 PROCEDURE — 2100000000 HC CCU R&B

## 2025-06-02 PROCEDURE — 94761 N-INVAS EAR/PLS OXIMETRY MLT: CPT

## 2025-06-02 PROCEDURE — 84484 ASSAY OF TROPONIN QUANT: CPT

## 2025-06-02 PROCEDURE — 6370000000 HC RX 637 (ALT 250 FOR IP): Performed by: HOSPITALIST

## 2025-06-02 PROCEDURE — B2111ZZ FLUOROSCOPY OF MULTIPLE CORONARY ARTERIES USING LOW OSMOLAR CONTRAST: ICD-10-PCS | Performed by: INTERNAL MEDICINE

## 2025-06-02 PROCEDURE — 93005 ELECTROCARDIOGRAM TRACING: CPT | Performed by: STUDENT IN AN ORGANIZED HEALTH CARE EDUCATION/TRAINING PROGRAM

## 2025-06-02 PROCEDURE — 99223 1ST HOSP IP/OBS HIGH 75: CPT | Performed by: INTERNAL MEDICINE

## 2025-06-02 PROCEDURE — 6360000002 HC RX W HCPCS: Performed by: HOSPITALIST

## 2025-06-02 RX ORDER — ACETAMINOPHEN 650 MG/1
650 SUPPOSITORY RECTAL EVERY 6 HOURS PRN
Status: DISCONTINUED | OUTPATIENT
Start: 2025-06-02 | End: 2025-06-02 | Stop reason: SDUPTHER

## 2025-06-02 RX ORDER — SODIUM CHLORIDE 9 MG/ML
INJECTION, SOLUTION INTRAVENOUS PRN
Status: DISCONTINUED | OUTPATIENT
Start: 2025-06-02 | End: 2025-06-05 | Stop reason: HOSPADM

## 2025-06-02 RX ORDER — POLYETHYLENE GLYCOL 3350 17 G/17G
17 POWDER, FOR SOLUTION ORAL DAILY PRN
Status: DISCONTINUED | OUTPATIENT
Start: 2025-06-02 | End: 2025-06-05 | Stop reason: HOSPADM

## 2025-06-02 RX ORDER — HEPARIN SODIUM 1000 [USP'U]/ML
2000 INJECTION, SOLUTION INTRAVENOUS; SUBCUTANEOUS ONCE
Status: DISCONTINUED | OUTPATIENT
Start: 2025-06-02 | End: 2025-06-02

## 2025-06-02 RX ORDER — MAGNESIUM SULFATE 1 G/100ML
1000 INJECTION INTRAVENOUS ONCE
Status: COMPLETED | OUTPATIENT
Start: 2025-06-02 | End: 2025-06-02

## 2025-06-02 RX ORDER — ASPIRIN 81 MG/1
81 TABLET, CHEWABLE ORAL DAILY
Status: DISCONTINUED | OUTPATIENT
Start: 2025-06-03 | End: 2025-06-02

## 2025-06-02 RX ORDER — METOPROLOL TARTRATE 25 MG/1
25 TABLET, FILM COATED ORAL 2 TIMES DAILY
Status: DISCONTINUED | OUTPATIENT
Start: 2025-06-02 | End: 2025-06-02

## 2025-06-02 RX ORDER — GLUCAGON 1 MG/ML
1 KIT INJECTION PRN
Status: DISCONTINUED | OUTPATIENT
Start: 2025-06-02 | End: 2025-06-05 | Stop reason: HOSPADM

## 2025-06-02 RX ORDER — NITROGLYCERIN 0.4 MG/1
0.4 TABLET SUBLINGUAL EVERY 5 MIN PRN
Status: DISCONTINUED | OUTPATIENT
Start: 2025-06-02 | End: 2025-06-05 | Stop reason: HOSPADM

## 2025-06-02 RX ORDER — ATORVASTATIN CALCIUM 40 MG/1
80 TABLET, FILM COATED ORAL NIGHTLY
Status: DISCONTINUED | OUTPATIENT
Start: 2025-06-02 | End: 2025-06-02

## 2025-06-02 RX ORDER — LIDOCAINE HYDROCHLORIDE 10 MG/ML
INJECTION, SOLUTION INFILTRATION; PERINEURAL PRN
Status: DISCONTINUED | OUTPATIENT
Start: 2025-06-02 | End: 2025-06-02 | Stop reason: HOSPADM

## 2025-06-02 RX ORDER — SODIUM CHLORIDE 0.9 % (FLUSH) 0.9 %
5-40 SYRINGE (ML) INJECTION PRN
Status: DISCONTINUED | OUTPATIENT
Start: 2025-06-02 | End: 2025-06-05 | Stop reason: HOSPADM

## 2025-06-02 RX ORDER — ASPIRIN 81 MG/1
243 TABLET, CHEWABLE ORAL ONCE
Status: COMPLETED | OUTPATIENT
Start: 2025-06-02 | End: 2025-06-02

## 2025-06-02 RX ORDER — ASPIRIN 81 MG/1
81 TABLET ORAL DAILY
Status: DISCONTINUED | OUTPATIENT
Start: 2025-06-02 | End: 2025-06-05 | Stop reason: HOSPADM

## 2025-06-02 RX ORDER — DEXTROSE MONOHYDRATE 100 MG/ML
INJECTION, SOLUTION INTRAVENOUS CONTINUOUS PRN
Status: DISCONTINUED | OUTPATIENT
Start: 2025-06-02 | End: 2025-06-05 | Stop reason: HOSPADM

## 2025-06-02 RX ORDER — ATORVASTATIN CALCIUM 40 MG/1
20 TABLET, FILM COATED ORAL NIGHTLY
Status: ON HOLD | COMMUNITY

## 2025-06-02 RX ORDER — MIDAZOLAM HYDROCHLORIDE 1 MG/ML
INJECTION, SOLUTION INTRAMUSCULAR; INTRAVENOUS PRN
Status: DISCONTINUED | OUTPATIENT
Start: 2025-06-02 | End: 2025-06-02 | Stop reason: HOSPADM

## 2025-06-02 RX ORDER — ACETAMINOPHEN 325 MG/1
650 TABLET ORAL EVERY 6 HOURS PRN
Status: DISCONTINUED | OUTPATIENT
Start: 2025-06-02 | End: 2025-06-02 | Stop reason: SDUPTHER

## 2025-06-02 RX ORDER — SODIUM CHLORIDE 0.9 % (FLUSH) 0.9 %
5-40 SYRINGE (ML) INJECTION PRN
Status: DISCONTINUED | OUTPATIENT
Start: 2025-06-02 | End: 2025-06-04

## 2025-06-02 RX ORDER — ALOGLIPTIN 6.25 MG/1
6.25 TABLET, FILM COATED ORAL DAILY
Status: DISCONTINUED | OUTPATIENT
Start: 2025-06-02 | End: 2025-06-02

## 2025-06-02 RX ORDER — ACETAMINOPHEN 325 MG/1
650 TABLET ORAL EVERY 4 HOURS PRN
Status: DISCONTINUED | OUTPATIENT
Start: 2025-06-02 | End: 2025-06-05 | Stop reason: HOSPADM

## 2025-06-02 RX ORDER — ATORVASTATIN CALCIUM 40 MG/1
40 TABLET, FILM COATED ORAL DAILY
Status: DISCONTINUED | OUTPATIENT
Start: 2025-06-02 | End: 2025-06-05 | Stop reason: HOSPADM

## 2025-06-02 RX ORDER — ERGOCALCIFEROL 1.25 MG/1
50000 CAPSULE, LIQUID FILLED ORAL WEEKLY
Status: DISCONTINUED | OUTPATIENT
Start: 2025-06-07 | End: 2025-06-05 | Stop reason: HOSPADM

## 2025-06-02 RX ORDER — HEPARIN SODIUM 1000 [USP'U]/ML
2000 INJECTION, SOLUTION INTRAVENOUS; SUBCUTANEOUS ONCE
Status: COMPLETED | OUTPATIENT
Start: 2025-06-02 | End: 2025-06-02

## 2025-06-02 RX ORDER — IOPAMIDOL 755 MG/ML
INJECTION, SOLUTION INTRAVASCULAR PRN
Status: DISCONTINUED | OUTPATIENT
Start: 2025-06-02 | End: 2025-06-02 | Stop reason: HOSPADM

## 2025-06-02 RX ORDER — METHADONE HYDROCHLORIDE 10 MG/5ML
95 SOLUTION ORAL DAILY
Status: ON HOLD | COMMUNITY
End: 2025-06-04 | Stop reason: HOSPADM

## 2025-06-02 RX ORDER — METHADONE HYDROCHLORIDE 10 MG/5ML
100 SOLUTION ORAL DAILY
Refills: 0 | Status: DISCONTINUED | OUTPATIENT
Start: 2025-06-02 | End: 2025-06-02 | Stop reason: DRUGHIGH

## 2025-06-02 RX ORDER — PANTOPRAZOLE SODIUM 20 MG/1
20 TABLET, DELAYED RELEASE ORAL
Status: DISCONTINUED | OUTPATIENT
Start: 2025-06-02 | End: 2025-06-05 | Stop reason: HOSPADM

## 2025-06-02 RX ORDER — INSULIN LISPRO 100 [IU]/ML
0.05 INJECTION, SOLUTION INTRAVENOUS; SUBCUTANEOUS
Status: DISCONTINUED | OUTPATIENT
Start: 2025-06-02 | End: 2025-06-05 | Stop reason: HOSPADM

## 2025-06-02 RX ORDER — SODIUM CHLORIDE 0.9 % (FLUSH) 0.9 %
5-40 SYRINGE (ML) INJECTION EVERY 12 HOURS SCHEDULED
Status: DISCONTINUED | OUTPATIENT
Start: 2025-06-02 | End: 2025-06-04

## 2025-06-02 RX ORDER — HEPARIN SODIUM 1000 [USP'U]/ML
4000 INJECTION, SOLUTION INTRAVENOUS; SUBCUTANEOUS ONCE
Status: COMPLETED | OUTPATIENT
Start: 2025-06-02 | End: 2025-06-02

## 2025-06-02 RX ORDER — HEPARIN SODIUM 10000 [USP'U]/100ML
0-4000 INJECTION, SOLUTION INTRAVENOUS CONTINUOUS
Status: DISCONTINUED | OUTPATIENT
Start: 2025-06-02 | End: 2025-06-05 | Stop reason: HOSPADM

## 2025-06-02 RX ORDER — ONDANSETRON 2 MG/ML
4 INJECTION INTRAMUSCULAR; INTRAVENOUS EVERY 6 HOURS PRN
Status: DISCONTINUED | OUTPATIENT
Start: 2025-06-02 | End: 2025-06-05 | Stop reason: HOSPADM

## 2025-06-02 RX ORDER — METHADONE HYDROCHLORIDE 10 MG/1
95 TABLET ORAL DAILY
Refills: 0 | Status: DISCONTINUED | OUTPATIENT
Start: 2025-06-02 | End: 2025-06-03

## 2025-06-02 RX ORDER — SODIUM CHLORIDE 0.9 % (FLUSH) 0.9 %
5-40 SYRINGE (ML) INJECTION EVERY 12 HOURS SCHEDULED
Status: DISCONTINUED | OUTPATIENT
Start: 2025-06-02 | End: 2025-06-05 | Stop reason: HOSPADM

## 2025-06-02 RX ORDER — ATORVASTATIN CALCIUM 20 MG/1
20 TABLET, FILM COATED ORAL NIGHTLY
Status: DISCONTINUED | OUTPATIENT
Start: 2025-06-03 | End: 2025-06-02 | Stop reason: SDUPTHER

## 2025-06-02 RX ORDER — CARVEDILOL 6.25 MG/1
6.25 TABLET ORAL 2 TIMES DAILY WITH MEALS
Status: DISCONTINUED | OUTPATIENT
Start: 2025-06-02 | End: 2025-06-03

## 2025-06-02 RX ORDER — LIDOCAINE 4 G/G
1 PATCH TOPICAL DAILY
Status: DISCONTINUED | OUTPATIENT
Start: 2025-06-02 | End: 2025-06-05 | Stop reason: HOSPADM

## 2025-06-02 RX ORDER — CLOPIDOGREL BISULFATE 75 MG/1
75 TABLET ORAL DAILY
Status: DISCONTINUED | OUTPATIENT
Start: 2025-06-02 | End: 2025-06-03

## 2025-06-02 RX ORDER — LIDOCAINE 50 MG/G
1 PATCH TOPICAL DAILY
Status: ON HOLD | COMMUNITY

## 2025-06-02 RX ORDER — ONDANSETRON 4 MG/1
4 TABLET, ORALLY DISINTEGRATING ORAL EVERY 8 HOURS PRN
Status: DISCONTINUED | OUTPATIENT
Start: 2025-06-02 | End: 2025-06-05 | Stop reason: HOSPADM

## 2025-06-02 RX ADMIN — INSULIN LISPRO 4 UNITS: 100 INJECTION, SOLUTION INTRAVENOUS; SUBCUTANEOUS at 16:53

## 2025-06-02 RX ADMIN — METHADONE HYDROCHLORIDE 95 MG: 10 TABLET ORAL at 08:09

## 2025-06-02 RX ADMIN — PANTOPRAZOLE SODIUM 20 MG: 20 TABLET, DELAYED RELEASE ORAL at 06:12

## 2025-06-02 RX ADMIN — ASPIRIN 81 MG: 81 TABLET, COATED ORAL at 08:09

## 2025-06-02 RX ADMIN — SODIUM CHLORIDE, PRESERVATIVE FREE 10 ML: 5 INJECTION INTRAVENOUS at 20:12

## 2025-06-02 RX ADMIN — HEPARIN SODIUM 2000 UNITS: 1000 INJECTION INTRAVENOUS; SUBCUTANEOUS at 21:01

## 2025-06-02 RX ADMIN — HEPARIN SODIUM 4000 UNITS: 1000 INJECTION INTRAVENOUS; SUBCUTANEOUS at 01:48

## 2025-06-02 RX ADMIN — Medication 10 ML: at 11:06

## 2025-06-02 RX ADMIN — SODIUM CHLORIDE: 0.9 INJECTION, SOLUTION INTRAVENOUS at 11:06

## 2025-06-02 RX ADMIN — ATORVASTATIN CALCIUM 40 MG: 40 TABLET, FILM COATED ORAL at 08:09

## 2025-06-02 RX ADMIN — CLOPIDOGREL BISULFATE 75 MG: 75 TABLET, FILM COATED ORAL at 08:09

## 2025-06-02 RX ADMIN — HEPARIN SODIUM 1000 UNITS/HR: 10000 INJECTION, SOLUTION INTRAVENOUS at 01:50

## 2025-06-02 RX ADMIN — MAGNESIUM SULFATE HEPTAHYDRATE 1000 MG: 1 INJECTION, SOLUTION INTRAVENOUS at 04:42

## 2025-06-02 RX ADMIN — ASPIRIN 243 MG: 81 TABLET, CHEWABLE ORAL at 11:08

## 2025-06-02 RX ADMIN — CARVEDILOL 6.25 MG: 6.25 TABLET, FILM COATED ORAL at 08:10

## 2025-06-02 ASSESSMENT — PAIN SCALES - GENERAL
PAINLEVEL_OUTOF10: 0
PAINLEVEL_OUTOF10: 0

## 2025-06-02 ASSESSMENT — LIFESTYLE VARIABLES: HOW OFTEN DO YOU HAVE A DRINK CONTAINING ALCOHOL: NEVER

## 2025-06-02 NOTE — CONSULTS
Nephrology Consult Note   [facility] [place]      Chief Complaint: Chest pain    History of Present Illness:Mr Ovalle is a Ms Vasquez is a 75 yo male with past medical history significant for HTN; CAD with prior PCI/atherectomy/Shockwave to ostial/mid LAD ; hyperlipidemia; DM II; non-Hodgkin's lymphoma; chronic pain syndrome; history of chronic alcohol dependence, in remission; ESRD HD MWF that presented to the ED with Chest pain. This pain did remind him of when he had his stents placed last October 2024     Patient was taken to the Cath Lab earlier today and per CVICU RN angiogram showed multivessel disease. All previous stents are occluded and plans are underway to transfer patient to [facility] for CT surgery evaluation.     At the time of my evaluation patient was resting comfortably in bed; on balloon pump. O2 saturations 100 %; K and HCO normal. Nephrology consulted to address patients dialysis needs during current hospitalization    Subjective:    In bed; NAD    ROS: + chest pain which has improved. No shortness of breath. No LE edema. All other ROS negative    Scheduled Meds:   sodium chloride flush  5-40 mL IntraVENous 2 times per day    insulin lispro  0.05 Units/kg SubCUTAneous TID WC    aspirin  81 mg Oral Daily    atorvastatin  40 mg Oral Daily    clopidogrel  75 mg Oral Daily    [START ON 6/7/2025] vitamin D  50,000 Units Oral Weekly    pantoprazole  20 mg Oral QAM AC    carvedilol  6.25 mg Oral BID WC    methadone  95 mg Oral Daily    lidocaine  1 patch TransDERmal Daily    sodium chloride flush  5-40 mL IntraVENous 2 times per day        heparin (PORCINE) Infusion 1,000 Units/hr (06/02/25 1315)    sodium chloride Stopped (06/02/25 1227)    dextrose      sodium chloride         PRN Meds:.sodium chloride flush, sodium chloride, ondansetron **OR** ondansetron, polyethylene glycol, nitroGLYCERIN, glucose, dextrose bolus **OR** dextrose bolus, glucagon (rDNA), dextrose, sodium chloride flush, sodium chloride,

## 2025-06-02 NOTE — ED PROVIDER NOTES
EMERGENCY DEPARTMENT ENCOUNTER      CHIEF COMPLAINT    Chest Pain (Patient to the ER for chest pain, took 324 of ASA pain went from a 7 to a 2 after , has a HX of stents, DM, and ESRD on dialysis)    HPI    Colby Ovalle is a 74 y.o. male with past medical history significant for alcohol depndence, ESRD on HD who presents with chest pain   Patient today chest pain began about 1 hour ago, patient took 324 of aspirin, pain from a 7-10 is now pain-free reports history of stents in the heart, reports symptoms today similar to previous episodes of angina and ACS  Otherwise now symptom-free no other ill symptoms  Described the pain as burning in the middle of his chest    PAST MEDICAL HISTORY    Past Medical History:   Diagnosis Date    Alcohol dependence in remission (HCC)     information from [facility]    Cancer (Former[facility])     over 10 years ago     Chronic back pain     COVID 01/12/2022    End-stage renal disease on hemodialysis (Former[facility])     Erectile dysfunction     Full dentures     Hepatitis C     information from [facility]    Hx of non-Hodgkin's lymphoma     Information from [facility]    Hyperlipidemia     Hypertension     Kidney stone     Lumbago     information from [facility]    Opioid dependence (HCC)     information from [facility]    Thrombocytopenia     Tobacco dependency     Type 2 diabetes mellitus without complication ([facility])        SURGICAL HISTORY    Past Surgical History:   Procedure Laterality Date    BACK SURGERY      CARDIAC PROCEDURE N/A 10/3/2024    Left heart cath / coronary angiography performed by Bindu Barboza DO at [facility] CARDIAC CATH LAB    CARDIAC PROCEDURE N/A 10/3/2024    Intravascular ultrasound performed by Bindu Barboza DO at [facility] CARDIAC CATH LAB    CARDIAC PROCEDURE N/A 10/3/2024    Atherectomy coronary performed by Bindu Barboza DO at [facility] CARDIAC CATH LAB    COLONOSCOPY      CYSTOSCOPY      with stone removal    DIALYSIS FISTULA CREATION Left 3/1/2022    LEFT

## 2025-06-02 NOTE — CATH APPROPRIATE USE CRITERIA
[facility]-NCDR CathPCI V5 Collection Form    Pre-Procedure Information  - Electrocardiac assessment method: ECG     - The assessment result was abnormal.      - No new antiarrhythmic therapy was received prior to evaluation within the cath lab.      - Abnormal assessment findings include: other electrocardiac abnormality    Indications and Presentation  - Indication(s) for cath lab visit: ACS less than or equal to 24 hours    - Angina type: typical.did not take anti-anginal meds within the past 2 weeks  - Ventricular support was not required.

## 2025-06-02 NOTE — CONSULTS
[facility]    Colby Ovalle  1950 June 2, 2025    Reason for Consult: Chest Pain    CC: Chest Pain    HPI:  The patient is 74 y.o. male with a past medical history significant for h/o alcohol dependence, ESRD and CAD with prior PCI/atherectomy/Shockwave to ostial/mid LAD who presented to [facility] ED with chets pain. I was asked to see him for this and elevated troponin assays. He follows with Dr. Barboza and saw her in April at which time he states he was doing fine.     Colby states that he took 2 Tums like he normally does (told by Tito to take them). After, he stated that he felt like he had heartburn or indigestion in the center of his chest. He was supposed to have dialysis today. The indigestion lasted for about 1.5 hours before it went away.     This pain did remind him of when he had his stents placed last October 2024. He has not had a recurrence of the discomfort since then.     Review of Systems:  Constitutional: No fatigue, weakness, night sweats or fever.   HEENT: No new vision difficulties or ringing in the ears.  Respiratory: No new SOB, PND, orthopnea or cough.   Cardiovascular: See HPI   GI: No n/v, diarrhea, constipation, abdominal pain or changes in bowel habits.  No melena, no hematochezia  : On dialysis, He does still make urine.   Skin: No cyanosis or skin lesions.  Musculoskeletal: No new muscle or joint pain.  Neurological: No syncope or TIA-like symptoms.  Psychiatric: No anxiety, insomnia or depression     Past Medical History:   Diagnosis Date    Alcohol dependence in remission (HCC)     information from [facility]    Cancer (HCC)     over 10 years ago     Chronic back pain     COVID 01/12/2022    End-stage renal disease on hemodialysis (HCC)     Erectile dysfunction     Full dentures     Hepatitis C     information from [facility]    Hx of non-Hodgkin's lymphoma     Information from [facility]    Hyperlipidemia     Hypertension

## 2025-06-02 NOTE — ED NOTES
ED TO INPATIENT SBAR HANDOFF    Patient Name: Colby Ovalle   Preferred Name: Colby  : 1950  74 y.o.   Family/Caregiver Present: no   Code Status Order: Full Code  PO Status: NPO:No  Telemetry Order: Yes  C-SSRS: Risk of Suicide: No Risk  Sitter no     Restraints:     Sepsis Risk Score      Situation  Chief Complaint   Patient presents with    Chest Pain     Patient to the ER for chest pain, took 324 of ASA pain went from a 7 to a 2 after , has a HX of stents, DM, and ESRD on dialysis     Brief Description of Patient's Condition: stable, on dialysis but produces urine, continent of urine and stool, ambulates indepandantly  Mental Status: oriented, alert, coherent, logical, thought processes intact, and able to concentrate and follow conversation  Arrived from:Home  Imaging:   XR CHEST 1 VIEW   Final Result   No acute cardiopulmonary process.           Abnormal labs:   Abnormal Labs Reviewed   BASIC METABOLIC PANEL - Abnormal; Notable for the following components:       Result Value    Sodium 135 (*)     Chloride 94 (*)     Glucose 260 (*)     BUN 50 (*)     Creatinine 7.6 (*)     Est, Glom Filt Rate 7 (*)     All other components within normal limits    Narrative:     CALL  [employer] tel. [phone],  Chemistry results called to and read back by Salina Salas RN, 2025  00:44, [facility]   CBC WITH AUTO DIFFERENTIAL - Abnormal; Notable for the following components:    Hemoglobin 13.3 (*)     Hematocrit 39.6 (*)     All other components within normal limits   TROPONIN - Abnormal; Notable for the following components:    Troponin, High Sensitivity 61 (*)     All other components within normal limits    Narrative:     CALL  [employer] tel. [phone],  Chemistry results called to and read back by Salina Salas RN, 2025  00:44, [facility]   TROPONIN - Abnormal; Notable for the following components:    Troponin, High Sensitivity 156 (*)     All other components within normal limits   BRAIN

## 2025-06-02 NOTE — ANESTHESIA PRE-OP
methadone (DOLOPHINE) tablet 95 mg  95 mg Oral Daily Cortez Sierra MD   95 mg at 06/02/25 0809    lidocaine 4 % external patch 1 patch  1 patch TransDERmal Daily Cortez Sierra MD           Past Medical History:    Past Medical History:   Diagnosis Date    Alcohol dependence in remission (HCC)     information from [facility]    Cancer (HCC)     over 10 years ago     Chronic back pain     COVID 01/12/2022    End-stage renal disease on hemodialysis ([facility])     Erectile dysfunction     Full dentures     Hepatitis C     information from [facility]    Hx of non-Hodgkin's lymphoma     Information from [facility]    Hyperlipidemia     Hypertension     Kidney stone     Lumbago     information from [facility]    Opioid dependence (HCC)     information from [facility]    Thrombocytopenia     Tobacco dependency     Type 2 diabetes mellitus without complication ([facility])        Surgical History:    Past Surgical History:   Procedure Laterality Date    BACK SURGERY      CARDIAC PROCEDURE N/A 10/3/2024    Left heart cath / coronary angiography performed by Bindu Barboza DO at [facility] CARDIAC CATH LAB    CARDIAC PROCEDURE N/A 10/3/2024    Intravascular ultrasound performed by Bindu Barboza DO at [facility] CARDIAC CATH LAB    CARDIAC PROCEDURE N/A 10/3/2024    Atherectomy coronary performed by Bindu Barboza DO at [facility] CARDIAC CATH LAB    COLONOSCOPY      CYSTOSCOPY      with stone removal    DIALYSIS FISTULA CREATION Left 3/1/2022    LEFT ARM STAGED CREATION OF A BRACHIO-BASILIC FISTULA AT THE ELBOW performed by Fabián Matt DO at [facility] OR    INVASIVE VASCULAR N/A 10/8/2024    Angioplasty fistula/dialysis circuit performed by Ab Paris MD at [facility] CARDIAC CATH LAB    PORT SURGERY               Pre-Sedation:  Pre-Sedation Documentation and Exam:  I have personally completed a history, physical exam & review of systems for this patient (see notes).    Prior History of Anesthesia Complications:   none    Modified

## 2025-06-02 NOTE — PLAN OF CARE
Problem: Chronic Conditions and Co-morbidities  Goal: Patient's chronic conditions and co-morbidity symptoms are monitored and maintained or improved  6/2/2025 1505 by Mike Garcia RN  Outcome: Progressing  Flowsheets (Taken 6/2/2025 1400)  Care Plan - Patient's Chronic Conditions and Co-Morbidity Symptoms are Monitored and Maintained or Improved:   Monitor and assess patient's chronic conditions and comorbid symptoms for stability, deterioration, or improvement   Collaborate with multidisciplinary team to address chronic and comorbid conditions and prevent exacerbation or deterioration   Update acute care plan with appropriate goals if chronic or comorbid symptoms are exacerbated and prevent overall improvement and discharge  6/2/2025 0740 by Marija Kwan RN  Outcome: Progressing     Problem: Discharge Planning  Goal: Discharge to home or other facility with appropriate resources  6/2/2025 1505 by Mike Garcia RN  Outcome: Progressing  Flowsheets (Taken 6/2/2025 1400)  Discharge to home or other facility with appropriate resources:   Identify barriers to discharge with patient and caregiver   Arrange for needed discharge resources and transportation as appropriate   Identify discharge learning needs (meds, wound care, etc)   Arrange for interpreters to assist at discharge as needed   Refer to discharge planning if patient needs post-hospital services based on physician order or complex needs related to functional status, cognitive ability or social support system  6/2/2025 0740 by Marija Kwan RN  Outcome: Progressing     Problem: Pain  Goal: Verbalizes/displays adequate comfort level or baseline comfort level  6/2/2025 1505 by Mike Garcia RN  Outcome: Progressing  Flowsheets (Taken 6/2/2025 1400)  Verbalizes/displays adequate comfort level or baseline comfort level:   Encourage patient to monitor pain and request assistance   Assess pain using appropriate pain scale   Administer analgesics based

## 2025-06-02 NOTE — H&P
V2.0  History and Physical      Name:  Colby Ovalle /Age/Sex: 1950  (74 y.o. male)   MRN & CSN:  [id number] & [id number] Encounter Date/Time: 2025 1:48 AM EDT   Location:   PCP: No primary care provider on file.       Hospital Day: 2    Assessment and Plan:   Colby Ovalle is a 74 y.o. male smoker and alcoholic in remission, current marijuana smoker with a pmh of CAD, hypertension, dyslipidemia, diabetes type 2, chronic low back pain with opiate dependence, incisional he denies hemodialysis, nephrolithiasis who presents with ACS (acute coronary syndrome) ([facility]).    Hospital Problems           Last Modified POA    * (Principal) ACS (acute coronary syndrome) (HCC) 2025 Yes    Uncontrolled type 2 diabetes mellitus with hyperglycemia ([facility]) 2025 Yes    ESRD on hemodialysis ([facility]) 2025 Yes    Essential hypertension 2025 Yes       Plan:  Continue aspirin, statin, and beta-blockers, as needed nitroglycerin, trend troponin, monitor on telemetry and cardiology consult  Renal dose insulin sliding scale before every meal and at bedtime as needed, check glycosylated hemoglobin  Nephrology consult for dialysis inpatient  Resume home regimen for chronic stable conditions    Disposition:   Current Living situation: Home  Expected Disposition: Home  Estimated D/C: 3 to 4 days    Diet ADULT DIET; Clear Liquid; No Added Salt (3-4 gm); Low Potassium (Less than 3000 mg/day); No Caffeine   DVT Prophylaxis [] Lovenox, [x]  Heparin, [] SCDs, [] Ambulation,  [] Eliquis, [] Xarelto, [] Coumadin   Code Status Full Code   Surrogate Decision Maker/ POA Asya Soto     Personally reviewed Lab Studies and Imaging     Discussed management of the case with no one who recommended n/a.    EKG interpreted personally and results normal sinus rhythm with ventricular rate of 63, QTc 470, first-degree AV block block with a bifascicular block.    Imaging that was interpreted personally includes chest x-ray

## 2025-06-03 ENCOUNTER — APPOINTMENT (OUTPATIENT)
Age: 75
DRG: 270 | End: 2025-06-03
Attending: INTERNAL MEDICINE
Payer: OTHER GOVERNMENT

## 2025-06-03 LAB
ANION GAP SERPL CALCULATED.3IONS-SCNC: 14 MMOL/L (ref 3–16)
ANTI-XA UNFRAC HEPARIN: 0.34 IU/ML (ref 0.3–0.7)
ANTI-XA UNFRAC HEPARIN: 0.61 IU/ML (ref 0.3–0.7)
ANTI-XA UNFRAC HEPARIN: 0.75 IU/ML (ref 0.3–0.7)
BUN SERPL-MCNC: 55 MG/DL (ref 7–20)
CALCIUM SERPL-MCNC: 8.3 MG/DL (ref 8.3–10.6)
CHLORIDE SERPL-SCNC: 95 MMOL/L (ref 99–110)
CHOLEST SERPL-MCNC: 128 MG/DL (ref 0–199)
CO2 SERPL-SCNC: 25 MMOL/L (ref 21–32)
CREAT SERPL-MCNC: 8.2 MG/DL (ref 0.8–1.3)
DEPRECATED RDW RBC AUTO: 14.4 % (ref 12.4–15.4)
ECHO BSA: 1.97 M2
ECHO LV EF PHYSICIAN: 40 %
EST. AVERAGE GLUCOSE BLD GHB EST-MCNC: 157.1 MG/DL
GFR SERPLBLD CREATININE-BSD FMLA CKD-EPI: 6 ML/MIN/{1.73_M2}
GLUCOSE BLD-MCNC: 111 MG/DL (ref 70–99)
GLUCOSE BLD-MCNC: 166 MG/DL (ref 70–99)
GLUCOSE BLD-MCNC: 75 MG/DL (ref 70–99)
GLUCOSE SERPL-MCNC: 240 MG/DL (ref 70–99)
HBA1C MFR BLD: 7.1 %
HCT VFR BLD AUTO: 39.5 % (ref 40.5–52.5)
HDLC SERPL-MCNC: 38 MG/DL (ref 40–60)
HGB BLD-MCNC: 13.3 G/DL (ref 13.5–17.5)
LDLC SERPL CALC-MCNC: 77 MG/DL
MAGNESIUM SERPL-MCNC: 1.97 MG/DL (ref 1.8–2.4)
MCH RBC QN AUTO: 30.6 PG (ref 26–34)
MCHC RBC AUTO-ENTMCNC: 33.6 G/DL (ref 31–36)
MCV RBC AUTO: 90.9 FL (ref 80–100)
PERFORMED ON: ABNORMAL
PERFORMED ON: ABNORMAL
PERFORMED ON: NORMAL
PLATELET # BLD AUTO: 152 K/UL (ref 135–450)
PMV BLD AUTO: 9.2 FL (ref 5–10.5)
POTASSIUM SERPL-SCNC: 4.9 MMOL/L (ref 3.5–5.1)
RBC # BLD AUTO: 4.35 M/UL (ref 4.2–5.9)
SODIUM SERPL-SCNC: 134 MMOL/L (ref 136–145)
TRIGL SERPL-MCNC: 64 MG/DL (ref 0–150)
VLDLC SERPL CALC-MCNC: 13 MG/DL
WBC # BLD AUTO: 7.1 K/UL (ref 4–11)

## 2025-06-03 PROCEDURE — 2500000003 HC RX 250 WO HCPCS: Performed by: INTERNAL MEDICINE

## 2025-06-03 PROCEDURE — 6360000002 HC RX W HCPCS: Performed by: HOSPITALIST

## 2025-06-03 PROCEDURE — 83735 ASSAY OF MAGNESIUM: CPT

## 2025-06-03 PROCEDURE — 6360000002 HC RX W HCPCS: Performed by: INTERNAL MEDICINE

## 2025-06-03 PROCEDURE — 80048 BASIC METABOLIC PNL TOTAL CA: CPT

## 2025-06-03 PROCEDURE — 5A1D70Z PERFORMANCE OF URINARY FILTRATION, INTERMITTENT, LESS THAN 6 HOURS PER DAY: ICD-10-PCS | Performed by: INTERNAL MEDICINE

## 2025-06-03 PROCEDURE — 93005 ELECTROCARDIOGRAM TRACING: CPT | Performed by: HOSPITALIST

## 2025-06-03 PROCEDURE — 85520 HEPARIN ASSAY: CPT

## 2025-06-03 PROCEDURE — 93308 TTE F-UP OR LMTD: CPT

## 2025-06-03 PROCEDURE — 6370000000 HC RX 637 (ALT 250 FOR IP): Performed by: INTERNAL MEDICINE

## 2025-06-03 PROCEDURE — 80061 LIPID PANEL: CPT

## 2025-06-03 PROCEDURE — 94761 N-INVAS EAR/PLS OXIMETRY MLT: CPT

## 2025-06-03 PROCEDURE — 83036 HEMOGLOBIN GLYCOSYLATED A1C: CPT

## 2025-06-03 PROCEDURE — 85027 COMPLETE CBC AUTOMATED: CPT

## 2025-06-03 PROCEDURE — 36415 COLL VENOUS BLD VENIPUNCTURE: CPT

## 2025-06-03 PROCEDURE — 99233 SBSQ HOSP IP/OBS HIGH 50: CPT | Performed by: INTERNAL MEDICINE

## 2025-06-03 PROCEDURE — 90935 HEMODIALYSIS ONE EVALUATION: CPT

## 2025-06-03 PROCEDURE — 2100000000 HC CCU R&B

## 2025-06-03 RX ORDER — LORAZEPAM 2 MG/ML
2 INJECTION INTRAMUSCULAR EVERY 4 HOURS PRN
Status: DISCONTINUED | OUTPATIENT
Start: 2025-06-03 | End: 2025-06-03

## 2025-06-03 RX ORDER — MIDAZOLAM HYDROCHLORIDE 1 MG/ML
1 INJECTION, SOLUTION INTRAMUSCULAR; INTRAVENOUS EVERY 4 HOURS PRN
Status: DISCONTINUED | OUTPATIENT
Start: 2025-06-03 | End: 2025-06-05 | Stop reason: HOSPADM

## 2025-06-03 RX ORDER — MAGNESIUM SULFATE 1 G/100ML
1000 INJECTION INTRAVENOUS ONCE
Status: COMPLETED | OUTPATIENT
Start: 2025-06-03 | End: 2025-06-03

## 2025-06-03 RX ORDER — METHADONE HYDROCHLORIDE 10 MG/1
60 TABLET ORAL DAILY
Refills: 0 | Status: DISCONTINUED | OUTPATIENT
Start: 2025-06-04 | End: 2025-06-05 | Stop reason: HOSPADM

## 2025-06-03 RX ADMIN — METHADONE HYDROCHLORIDE 95 MG: 10 TABLET ORAL at 04:43

## 2025-06-03 RX ADMIN — HEPARIN SODIUM 1100 UNITS/HR: 10000 INJECTION, SOLUTION INTRAVENOUS at 05:50

## 2025-06-03 RX ADMIN — ATORVASTATIN CALCIUM 40 MG: 40 TABLET, FILM COATED ORAL at 08:42

## 2025-06-03 RX ADMIN — SODIUM CHLORIDE, PRESERVATIVE FREE 10 ML: 5 INJECTION INTRAVENOUS at 08:47

## 2025-06-03 RX ADMIN — MAGNESIUM SULFATE HEPTAHYDRATE 1000 MG: 1 INJECTION, SOLUTION INTRAVENOUS at 11:27

## 2025-06-03 RX ADMIN — PANTOPRAZOLE SODIUM 20 MG: 20 TABLET, DELAYED RELEASE ORAL at 08:42

## 2025-06-03 RX ADMIN — HYDROMORPHONE HYDROCHLORIDE 0.5 MG: 1 INJECTION, SOLUTION INTRAMUSCULAR; INTRAVENOUS; SUBCUTANEOUS at 17:09

## 2025-06-03 RX ADMIN — MIDAZOLAM HYDROCHLORIDE 1 MG: 1 INJECTION, SOLUTION INTRAMUSCULAR; INTRAVENOUS at 23:58

## 2025-06-03 RX ADMIN — INSULIN LISPRO 4 UNITS: 100 INJECTION, SOLUTION INTRAVENOUS; SUBCUTANEOUS at 12:20

## 2025-06-03 RX ADMIN — INSULIN LISPRO 4 UNITS: 100 INJECTION, SOLUTION INTRAVENOUS; SUBCUTANEOUS at 09:22

## 2025-06-03 RX ADMIN — ASPIRIN 81 MG: 81 TABLET, COATED ORAL at 08:42

## 2025-06-03 ASSESSMENT — PAIN DESCRIPTION - DESCRIPTORS: DESCRIPTORS: ACHING;THROBBING;BURNING

## 2025-06-03 ASSESSMENT — PAIN DESCRIPTION - FREQUENCY: FREQUENCY: CONTINUOUS

## 2025-06-03 ASSESSMENT — PAIN DESCRIPTION - PAIN TYPE: TYPE: CHRONIC PAIN;ACUTE PAIN

## 2025-06-03 ASSESSMENT — PAIN SCALES - GENERAL
PAINLEVEL_OUTOF10: 5
PAINLEVEL_OUTOF10: 10

## 2025-06-03 ASSESSMENT — PAIN - FUNCTIONAL ASSESSMENT: PAIN_FUNCTIONAL_ASSESSMENT: ACTIVITIES ARE NOT PREVENTED

## 2025-06-03 ASSESSMENT — PAIN DESCRIPTION - ORIENTATION: ORIENTATION: RIGHT;LEFT;LOWER;MID

## 2025-06-03 ASSESSMENT — PAIN DESCRIPTION - LOCATION: LOCATION: BACK

## 2025-06-03 ASSESSMENT — PAIN DESCRIPTION - ONSET: ONSET: SUDDEN

## 2025-06-03 NOTE — ACP (ADVANCE CARE PLANNING)
Advance Care Planning   Healthcare Decision Maker:    Primary Decision Maker: Jazmyn Soto - Other - [phone]    Secondary Decision Maker: srinivasasantosh - Child - [phone]    Supplemental (Other) Decision Maker: SotoAnthony - Child - [phone]      Pt reports he has a DPOAHC naming Asya as the primary agent.  Copy is in the chart.    JESSICA Osorio     Case Management   [phone]    6/3/2025  3:51 PM

## 2025-06-03 NOTE — DISCHARGE INSTR - COC
history on file for this patient.    Active Problems:  Patient Active Problem List   Diagnosis Code    Post-op pain G89.18    Chest pain R07.9    NSTEMI (non-ST elevated myocardial infarction) ([facility]) I21.4    ACS (acute coronary syndrome) ([facility]) I24.9    ESRD (end stage renal disease) ([facility]) N18.6    Coronary artery disease involving native coronary artery of native heart without angina pectoris I25.10    Essential hypertension I10    Hyperlipidemia LDL goal <70 E78.5    Uncontrolled type 2 diabetes mellitus with hyperglycemia ([facility]) E11.65       Isolation/Infection:   Isolation            No Isolation          Patient Infection Status    None to display         Nurse Assessment:  Last Vital Signs: /87   Pulse 64   Temp 98 °F (36.7 °C) (Oral)   Resp 16   Ht 1.829 m (6')   Wt 76.2 kg (168 lb)   SpO2 97%   BMI 22.78 kg/m²     Last documented pain score (0-10 scale): Pain Level: 0  Last Weight:   Wt Readings from Last 1 Encounters:   06/03/25 76.2 kg (168 lb)     Mental Status:  {IP PT MENTAL STATUS:20030}    IV Access:  { EMANUEL IV ACCESS:814505455}    Nursing Mobility/ADLs:  Walking   {CHP DME ADLs:792696226}  Transfer  {CHP DME ADLs:352115590}  Bathing  {CHP DME ADLs:481147264}  Dressing  {CHP DME ADLs:312709643}  Toileting  {CHP DME ADLs:362108860}  Feeding  {P DME ADLs:899547663}  Med Admin  {P DME ADLs:207968149}  Med Delivery   { EMANUEL MED Delivery:563954165}    Wound Care Documentation and Therapy:  Incision 03/01/22 Brachial Anterior;Left (Active)   Number of days: 1190        Elimination:  Continence:   Bowel: {YES / NO:19727}  Bladder: {YES / NO:19727}  Urinary Catheter: {Urinary Catheter:917245696}   Colostomy/Ileostomy/Ileal Conduit: {YES / NO:19727}       Date of Last BM: ***    Intake/Output Summary (Last 24 hours) at 6/3/2025 1503  Last data filed at 6/3/2025 1228  Gross per 24 hour   Intake 1758.13 ml   Output 3525 ml   Net -1766.87 ml     I/O last 3 completed shifts:  In: 835.1 [P.O.:480;

## 2025-06-03 NOTE — PLAN OF CARE
Problem: Chronic Conditions and Co-morbidities  Goal: Patient's chronic conditions and co-morbidity symptoms are monitored and maintained or improved  Outcome: Progressing  Flowsheets (Taken 6/3/2025 0800)  Care Plan - Patient's Chronic Conditions and Co-Morbidity Symptoms are Monitored and Maintained or Improved:   Monitor and assess patient's chronic conditions and comorbid symptoms for stability, deterioration, or improvement   Collaborate with multidisciplinary team to address chronic and comorbid conditions and prevent exacerbation or deterioration   Update acute care plan with appropriate goals if chronic or comorbid symptoms are exacerbated and prevent overall improvement and discharge     Problem: Discharge Planning  Goal: Discharge to home or other facility with appropriate resources  Outcome: Progressing  Flowsheets (Taken 6/3/2025 0800)  Discharge to home or other facility with appropriate resources:   Identify barriers to discharge with patient and caregiver   Identify discharge learning needs (meds, wound care, etc)     Problem: Pain  Goal: Verbalizes/displays adequate comfort level or baseline comfort level  Outcome: Progressing  Flowsheets (Taken 6/3/2025 0800)  Verbalizes/displays adequate comfort level or baseline comfort level:   Encourage patient to monitor pain and request assistance   Administer analgesics based on type and severity of pain and evaluate response   Assess pain using appropriate pain scale   Implement non-pharmacological measures as appropriate and evaluate response   Consider cultural and social influences on pain and pain management   Notify Licensed Independent Practitioner if interventions unsuccessful or patient reports new pain     Problem: Safety - Adult  Goal: Free from fall injury  Outcome: Progressing     Problem: ABCDS Injury Assessment  Goal: Absence of physical injury  Outcome: Progressing     Problem: Skin/Tissue Integrity  Goal: Skin integrity remains

## 2025-06-04 LAB
ALBUMIN SERPL-MCNC: 3.9 G/DL (ref 3.4–5)
ANION GAP SERPL CALCULATED.3IONS-SCNC: 16 MMOL/L (ref 3–16)
ANTI-XA UNFRAC HEPARIN: 0.14 IU/ML (ref 0.3–0.7)
ANTI-XA UNFRAC HEPARIN: 0.2 IU/ML (ref 0.3–0.7)
ANTI-XA UNFRAC HEPARIN: 0.59 IU/ML (ref 0.3–0.7)
BUN SERPL-MCNC: 39 MG/DL (ref 7–20)
CALCIUM SERPL-MCNC: 9.3 MG/DL (ref 8.3–10.6)
CHLORIDE SERPL-SCNC: 94 MMOL/L (ref 99–110)
CO2 SERPL-SCNC: 23 MMOL/L (ref 21–32)
CREAT SERPL-MCNC: 6.8 MG/DL (ref 0.8–1.3)
DEPRECATED RDW RBC AUTO: 14.6 % (ref 12.4–15.4)
EKG ATRIAL RATE: 59 BPM
EKG ATRIAL RATE: 65 BPM
EKG DIAGNOSIS: NORMAL
EKG DIAGNOSIS: NORMAL
EKG P AXIS: 68 DEGREES
EKG P AXIS: 74 DEGREES
EKG P-R INTERVAL: 204 MS
EKG P-R INTERVAL: 224 MS
EKG Q-T INTERVAL: 520 MS
EKG Q-T INTERVAL: 636 MS
EKG QRS DURATION: 136 MS
EKG QRS DURATION: 142 MS
EKG QTC CALCULATION (BAZETT): 540 MS
EKG QTC CALCULATION (BAZETT): 629 MS
EKG R AXIS: -36 DEGREES
EKG R AXIS: -77 DEGREES
EKG T AXIS: 170 DEGREES
EKG T AXIS: 244 DEGREES
EKG VENTRICULAR RATE: 59 BPM
EKG VENTRICULAR RATE: 65 BPM
GFR SERPLBLD CREATININE-BSD FMLA CKD-EPI: 8 ML/MIN/{1.73_M2}
GLUCOSE BLD-MCNC: 183 MG/DL (ref 70–99)
GLUCOSE BLD-MCNC: 197 MG/DL (ref 70–99)
GLUCOSE BLD-MCNC: 77 MG/DL (ref 70–99)
GLUCOSE SERPL-MCNC: 186 MG/DL (ref 70–99)
HCT VFR BLD AUTO: 43.7 % (ref 40.5–52.5)
HGB BLD-MCNC: 14.9 G/DL (ref 13.5–17.5)
MAGNESIUM SERPL-MCNC: 2.13 MG/DL (ref 1.8–2.4)
MCH RBC QN AUTO: 30.9 PG (ref 26–34)
MCHC RBC AUTO-ENTMCNC: 34.1 G/DL (ref 31–36)
MCV RBC AUTO: 90.7 FL (ref 80–100)
PERFORMED ON: ABNORMAL
PERFORMED ON: ABNORMAL
PERFORMED ON: NORMAL
PHOSPHATE SERPL-MCNC: 5.5 MG/DL (ref 2.5–4.9)
PLATELET # BLD AUTO: 150 K/UL (ref 135–450)
PMV BLD AUTO: 9.1 FL (ref 5–10.5)
POTASSIUM SERPL-SCNC: 4.3 MMOL/L (ref 3.5–5.1)
RBC # BLD AUTO: 4.83 M/UL (ref 4.2–5.9)
SODIUM SERPL-SCNC: 133 MMOL/L (ref 136–145)
WBC # BLD AUTO: 7.4 K/UL (ref 4–11)

## 2025-06-04 PROCEDURE — 93010 ELECTROCARDIOGRAM REPORT: CPT | Performed by: INTERNAL MEDICINE

## 2025-06-04 PROCEDURE — 6360000002 HC RX W HCPCS: Performed by: INTERNAL MEDICINE

## 2025-06-04 PROCEDURE — 94760 N-INVAS EAR/PLS OXIMETRY 1: CPT

## 2025-06-04 PROCEDURE — 6370000000 HC RX 637 (ALT 250 FOR IP): Performed by: HOSPITALIST

## 2025-06-04 PROCEDURE — 2100000000 HC CCU R&B

## 2025-06-04 PROCEDURE — 85027 COMPLETE CBC AUTOMATED: CPT

## 2025-06-04 PROCEDURE — 85520 HEPARIN ASSAY: CPT

## 2025-06-04 PROCEDURE — 93005 ELECTROCARDIOGRAM TRACING: CPT | Performed by: HOSPITALIST

## 2025-06-04 PROCEDURE — 6360000002 HC RX W HCPCS: Performed by: HOSPITALIST

## 2025-06-04 PROCEDURE — 6370000000 HC RX 637 (ALT 250 FOR IP): Performed by: INTERNAL MEDICINE

## 2025-06-04 PROCEDURE — 80069 RENAL FUNCTION PANEL: CPT

## 2025-06-04 PROCEDURE — 6360000002 HC RX W HCPCS: Performed by: NURSE PRACTITIONER

## 2025-06-04 PROCEDURE — 94761 N-INVAS EAR/PLS OXIMETRY MLT: CPT

## 2025-06-04 PROCEDURE — 2500000003 HC RX 250 WO HCPCS: Performed by: INTERNAL MEDICINE

## 2025-06-04 PROCEDURE — 36415 COLL VENOUS BLD VENIPUNCTURE: CPT

## 2025-06-04 PROCEDURE — 83735 ASSAY OF MAGNESIUM: CPT

## 2025-06-04 PROCEDURE — 90935 HEMODIALYSIS ONE EVALUATION: CPT

## 2025-06-04 PROCEDURE — 99233 SBSQ HOSP IP/OBS HIGH 50: CPT | Performed by: INTERNAL MEDICINE

## 2025-06-04 RX ORDER — HEPARIN SODIUM 10000 [USP'U]/100ML
0-4000 INJECTION, SOLUTION INTRAVENOUS CONTINUOUS
Qty: 250 ML | Refills: 0 | Status: ON HOLD
Start: 2025-06-04

## 2025-06-04 RX ORDER — HEPARIN SODIUM 1000 [USP'U]/ML
2000 INJECTION, SOLUTION INTRAVENOUS; SUBCUTANEOUS ONCE
Status: COMPLETED | OUTPATIENT
Start: 2025-06-04 | End: 2025-06-04

## 2025-06-04 RX ORDER — METHADONE HYDROCHLORIDE 10 MG/1
60 TABLET ORAL DAILY
Qty: 180 TABLET | Refills: 0 | Status: ON HOLD
Start: 2025-06-05 | End: 2025-07-05

## 2025-06-04 RX ORDER — HEPARIN SODIUM 1000 [USP'U]/ML
2000 INJECTION, SOLUTION INTRAVENOUS; SUBCUTANEOUS ONCE
Qty: 2 ML | Refills: 0
Start: 2025-06-04 | End: 2025-06-04

## 2025-06-04 RX ADMIN — HEPARIN SODIUM 1100 UNITS/HR: 10000 INJECTION, SOLUTION INTRAVENOUS at 04:19

## 2025-06-04 RX ADMIN — HEPARIN SODIUM 2000 UNITS: 1000 INJECTION INTRAVENOUS; SUBCUTANEOUS at 15:44

## 2025-06-04 RX ADMIN — INSULIN LISPRO 4 UNITS: 100 INJECTION, SOLUTION INTRAVENOUS; SUBCUTANEOUS at 12:40

## 2025-06-04 RX ADMIN — SODIUM CHLORIDE, PRESERVATIVE FREE 10 ML: 5 INJECTION INTRAVENOUS at 20:15

## 2025-06-04 RX ADMIN — HYDROMORPHONE HYDROCHLORIDE 0.5 MG: 1 INJECTION, SOLUTION INTRAMUSCULAR; INTRAVENOUS; SUBCUTANEOUS at 12:41

## 2025-06-04 RX ADMIN — PANTOPRAZOLE SODIUM 20 MG: 20 TABLET, DELAYED RELEASE ORAL at 08:13

## 2025-06-04 RX ADMIN — METHADONE HYDROCHLORIDE 60 MG: 10 TABLET ORAL at 05:46

## 2025-06-04 RX ADMIN — ASPIRIN 81 MG: 81 TABLET, COATED ORAL at 08:13

## 2025-06-04 RX ADMIN — HYDROMORPHONE HYDROCHLORIDE 0.5 MG: 1 INJECTION, SOLUTION INTRAMUSCULAR; INTRAVENOUS; SUBCUTANEOUS at 20:38

## 2025-06-04 RX ADMIN — SODIUM CHLORIDE, PRESERVATIVE FREE 10 ML: 5 INJECTION INTRAVENOUS at 08:16

## 2025-06-04 RX ADMIN — HYDROMORPHONE HYDROCHLORIDE 0.5 MG: 1 INJECTION, SOLUTION INTRAMUSCULAR; INTRAVENOUS; SUBCUTANEOUS at 04:16

## 2025-06-04 RX ADMIN — INSULIN LISPRO 4 UNITS: 100 INJECTION, SOLUTION INTRAVENOUS; SUBCUTANEOUS at 08:20

## 2025-06-04 RX ADMIN — MIDAZOLAM HYDROCHLORIDE 1 MG: 1 INJECTION, SOLUTION INTRAMUSCULAR; INTRAVENOUS at 15:44

## 2025-06-04 RX ADMIN — HEPARIN SODIUM 2000 UNITS: 1000 INJECTION INTRAVENOUS; SUBCUTANEOUS at 07:29

## 2025-06-04 RX ADMIN — HYDROMORPHONE HYDROCHLORIDE 0.5 MG: 1 INJECTION, SOLUTION INTRAMUSCULAR; INTRAVENOUS; SUBCUTANEOUS at 18:43

## 2025-06-04 RX ADMIN — MIDAZOLAM HYDROCHLORIDE 1 MG: 1 INJECTION, SOLUTION INTRAMUSCULAR; INTRAVENOUS at 23:26

## 2025-06-04 RX ADMIN — ATORVASTATIN CALCIUM 40 MG: 40 TABLET, FILM COATED ORAL at 08:14

## 2025-06-04 ASSESSMENT — PAIN DESCRIPTION - LOCATION
LOCATION: BACK
LOCATION: BACK;ABDOMEN
LOCATION: BACK;ABDOMEN
LOCATION: BACK
LOCATION: BACK;ABDOMEN

## 2025-06-04 ASSESSMENT — PAIN DESCRIPTION - ORIENTATION
ORIENTATION: RIGHT;LEFT;LOWER
ORIENTATION: LOWER;RIGHT;LEFT
ORIENTATION: RIGHT;LEFT;LOWER
ORIENTATION: RIGHT;LEFT;LOWER

## 2025-06-04 ASSESSMENT — PAIN SCALES - GENERAL
PAINLEVEL_OUTOF10: 10
PAINLEVEL_OUTOF10: 3
PAINLEVEL_OUTOF10: 10
PAINLEVEL_OUTOF10: 8
PAINLEVEL_OUTOF10: 6
PAINLEVEL_OUTOF10: 5
PAINLEVEL_OUTOF10: 5
PAINLEVEL_OUTOF10: 7

## 2025-06-04 ASSESSMENT — PAIN DESCRIPTION - PAIN TYPE: TYPE: CHRONIC PAIN;ACUTE PAIN

## 2025-06-04 ASSESSMENT — PAIN DESCRIPTION - DESCRIPTORS
DESCRIPTORS: ACHING;THROBBING
DESCRIPTORS: THROBBING

## 2025-06-04 ASSESSMENT — PAIN - FUNCTIONAL ASSESSMENT: PAIN_FUNCTIONAL_ASSESSMENT: ACTIVITIES ARE NOT PREVENTED

## 2025-06-04 ASSESSMENT — PAIN DESCRIPTION - FREQUENCY: FREQUENCY: CONTINUOUS

## 2025-06-04 ASSESSMENT — PAIN DESCRIPTION - ONSET: ONSET: ON-GOING

## 2025-06-04 NOTE — CARE COORDINATION
06/03/25 1551   Service Assessment   Patient Orientation Alert and Oriented   Cognition Alert   History Provided By Patient   Primary Caregiver Self   Accompanied By/Relationship alone   Support Systems Spouse/Significant Other;Family Members   Patient's Healthcare Decision Maker is: Named in Scanned ACP Document  (Asya Soto)   PCP Verified by CM Yes  ([facility], Dr Miller      He reports he does not know the last time he saw this MD)   Last Visit to PCP Within last year   Prior Functional Level Independent in ADLs/IADLs   Current Functional Level Independent in ADLs/IADLs   Can patient return to prior living arrangement Yes   Ability to make needs known: Good   Family able to assist with home care needs: Yes   Would you like for me to discuss the discharge plan with any other family members/significant others, and if so, who? Yes  (Guerrero Sky)   Financial Resources [employer] ([facility])   Community Resources None   Discharge Planning   Type of Residence House   Living Arrangements Spouse/Significant Other;Children   Current Services Prior To Admission Durable Medical Equipment   Current DME Prior to Arrival Cane;Crutches   Potential Assistance Needed Home Care   DME Ordered? No   Potential Assistance Purchasing Medications No   Type of Home Care Services PT;OT;Nursing Services   Patient expects to be discharged to: House   One/Two Story Residence One story   History of falls? 0   Services At/After Discharge   Transition of Care Consult (CM Consult) N/A   Services At/After Discharge None   Energy Resource Information Provided? Yes   Mode of Transport at Discharge Other (see comment)  (Family transport)   Confirm Follow Up Transport Family       
Chart Reviewed.  Call placed to [facility] to confirm spot.    Dialysis Facility (if applicable)   Name: [facility]  Address:  [address] [place], [place] [id number]  Dialysis Schedule: [facility]  11:15 am   Phone: [phone]  Fax:  [phone]    JESSICA Osorio     Case Management   [phone]    6/3/2025  9:24 AM    
Discharge Order noted.  Spoke with bedside RN, Laurie who reports he is being transferred to [facility].    JESSICA Osorio     Case Management   [phone]    6/4/2025  4:09 PM    
     Type of Home Care services:  PT, OT, Nursing Services    ADLS  Prior functional level: Independent in ADLs/IADLs  Current functional level: Independent in ADLs/IADLs    PT AM-PAC:   /24  OT AM-PAC:   /24    Family can provide assistance at DC: Yes  Would you like Case Management to discuss the discharge plan with any other family members/significant others, and if so, who? Yes (Guerrero Sky)  Plans to Return to Present Housing: Yes  Other Identified Issues/Barriers to RETURNING to current housing: none  Potential Assistance needed at discharge: Home Care            Potential DME:    Patient expects to discharge to: House  Plan for transportation at discharge: Family    Financial    Payor: [employer] / Plan: [employer] / Product Type: *No Product type* /     Does insurance require precert for SNF: Yes    Potential assistance Purchasing Medications: No  Meds-to-Beds request: Yes      [id number] - [place], [place] - [address] - P [phone] - F [phone]  [address]  [facility] [address]  Phone: [phone] Fax: [phone]    [facility] [address] [facility] [phone] - F [phone]  [address] [place] [id number]  Phone: [phone] Fax: [phone]      Notes:    Factors facilitating achievement of predicted outcomes: Family support, Motivated, Cooperative, and Sense of humor    Barriers to discharge: none    Additional Case Management Notes: Pt lives with wife and daughter in a one story home with one step entry.  He drives himself to [facility] at 11am.     He does not use any DME.      The Plan for Transition of Care is related to the following treatment goals of ACS (acute coronary syndrome) (HCC) [I24.9]    IF APPLICABLE: The Patient and/or patient representative Colby and his family were provided with a choice of provider and agrees with the discharge plan. Freedom of choice list with basic dialogue that

## 2025-06-04 NOTE — ADT AUTH CERT
Utilization Reviews       6/3    Last updated by Fariba Kwong RN on 6/4/2025 1054     Review Status Created By   In Primary Fariba Kwong RN       Review Type Associated Date   -- 6/4/2025      Criteria Review   DATE: 6/3  TYPE OF BED: ICU        RELEVANT BASELINES: (lab values, vitals, o2 amount/delivery, etc.)  ESRD on HD  RA     PERTINENT UPDATES:  cardiac catheterization yesterday, revealed multivessel disease, IABP was placed, EF was noted to be significantly low, noted to be bradycardic   Continue the heparin drip and the balloon pump at one-to-one counterpulsation   If he continues to improve we may transfer him to [facility] for consideration of coronary artery bypass grafting for the severe two-vessel disease.      VITALS:  97.1  19  47  151/79  97% RA     ABNL/PERTINENT LABS/RADIOLOGY/DIAGNOSTIC STUDIES:  Hgb 13.3  HDL 38  Na 134  Cl 95  Bun 55  Cr 8.2  Egfr 6     Echo    Left Ventricle: Mildly reduced left ventricular systolic function with a visually estimated EF of 35 - 40%. Akinesis of the apex with preserved function of the base and mid ventricular wall segments. Indeterminate diastolic function.    Right Ventricle: Not assessed. Right ventricle size is normal. Normal systolic function. Normal wall motion.    There are no structural valve abnormalities appreciated in this study        EKG  Sinus bradycardia with sinus arrhythmiaLeft axis deviationRight bundle branch blockT wave abnormality, consider inferolateral ischemiaAbnormal ECGWhen compared with ECG of 02-JUN-2025 01:35,Criteria for Septal infarct are no longer PresentT wave inversion now evident in Inferior leadsT wave inversion now evident in Anterolateral leadsQT has lengthened            MD CONSULTS/ASSESSMENT AND PLAN:  IM pn  Assessment and Plan:      Colby Ovalle is a 74 y.o. male      Conditions under treatment:     # Acute coronary syndrome  # Multivessel coronary artery disease  # Heart failure with reduced EF  #

## 2025-06-04 NOTE — DISCHARGE SUMMARY
V2.0  Discharge Summary    Name:  Colby Ovalle /Age/Sex: 1950 (74 y.o. male)   Admit Date: 2025  Discharge Date: 25    MRN & CSN:  [id number] & [id number] Encounter Date and Time 25 2:54 PM EDT    Attending:  Cortez Sierra MD Discharging Provider: Cortez Sierra MD     Discharge Diagnosis:     # Acute coronary syndrome  # Multivessel coronary artery disease  # Heart failure with reduced EF  # Sinus bradycardia  # End-stage renal disease  # Prolonged QTc  # Chronic pain  # Diabetes mellitus  # Essential hypertension    Consultants:  IP CONSULT TO CARDIOLOGY  IP CONSULT TO HOSPITALIST  IP CONSULT TO CARDIOLOGY  IP CONSULT TO NEPHROLOGY  PHARMACY TO DOSE MEDICATION    Brief HPI:    Per admitting H and P...\" Colby Ovalle is a 74 y.o. male smoker and alcoholic in remission, current marijuana smoker with pmh of CAD, hypertension, dyslipidemia, diabetes type 2, chronic low back pain with opiate dependence, incisional he denies hemodialysis, nephrolithiasis who presents with chest pain.  Chest pain occurred between 9 and 10 PM 2025, pressure-like mid substernal nonradiating, not associated with any shortness of breath nausea or diaphoresis.  Pain lasted about half an hour after taking aspirin. patient is currently chest pain-free.  He denies any fevers, chills, cough, orthopnea or PND, lightheadedness or palpitations, changes in bowel habits, melena or hematochezia, dysuria or hematuria, focal tingling, weakness or numbness, visual changes or headaches.     In the emergency room his temperature was 36.8, blood pressure 141/71, pulse 71, respirations 16, sat 95% on room air, BMI 25.03, pain 2/10.\"      Brief hospital course:     Please refer to the admitting H and P for details surrounding the initial presentation.     Patient presented with chest pain, noted to have elevated troponin, started on heparin drip, seen by cardiology, underwent left heart catheterization, noted to have in-stent

## 2025-06-04 NOTE — PLAN OF CARE
Problem: Chronic Conditions and Co-morbidities  Goal: Patient's chronic conditions and co-morbidity symptoms are monitored and maintained or improved  6/3/2025 2110 by Silvia Massey, RN  Outcome: Progressing  Flowsheets (Taken 6/3/2025 2110)  Care Plan - Patient's Chronic Conditions and Co-Morbidity Symptoms are Monitored and Maintained or Improved:   Monitor and assess patient's chronic conditions and comorbid symptoms for stability, deterioration, or improvement   Update acute care plan with appropriate goals if chronic or comorbid symptoms are exacerbated and prevent overall improvement and discharge   Collaborate with multidisciplinary team to address chronic and comorbid conditions and prevent exacerbation or deterioration     Problem: Discharge Planning  Goal: Discharge to home or other facility with appropriate resources  6/3/2025 2110 by Silvia Massey, RN  Outcome: Progressing  Flowsheets (Taken 6/3/2025 2110)  Discharge to home or other facility with appropriate resources:   Identify barriers to discharge with patient and caregiver   Arrange for needed discharge resources and transportation as appropriate   Identify discharge learning needs (meds, wound care, etc)   Refer to discharge planning if patient needs post-hospital services based on physician order or complex needs related to functional status, cognitive ability or social support system     Problem: Pain  Goal: Verbalizes/displays adequate comfort level or baseline comfort level  6/3/2025 2110 by Silvia Massey, RN  Outcome: Progressing  Flowsheets (Taken 6/3/2025 2110)  Verbalizes/displays adequate comfort level or baseline comfort level:   Encourage patient to monitor pain and request assistance   Assess pain using appropriate pain scale   Administer analgesics based on type and severity of pain and evaluate response   Implement non-pharmacological measures as appropriate and evaluate response   Consider cultural and social influences on

## 2025-06-04 NOTE — PLAN OF CARE
Problem: Chronic Conditions and Co-morbidities  Goal: Patient's chronic conditions and co-morbidity symptoms are monitored and maintained or improved  Outcome: Progressing  Flowsheets (Taken 6/4/2025 0800)  Care Plan - Patient's Chronic Conditions and Co-Morbidity Symptoms are Monitored and Maintained or Improved:   Monitor and assess patient's chronic conditions and comorbid symptoms for stability, deterioration, or improvement   Collaborate with multidisciplinary team to address chronic and comorbid conditions and prevent exacerbation or deterioration   Update acute care plan with appropriate goals if chronic or comorbid symptoms are exacerbated and prevent overall improvement and discharge     Problem: Discharge Planning  Goal: Discharge to home or other facility with appropriate resources  Outcome: Progressing  Flowsheets (Taken 6/4/2025 0800)  Discharge to home or other facility with appropriate resources:   Identify barriers to discharge with patient and caregiver   Identify discharge learning needs (meds, wound care, etc)     Problem: Pain  Goal: Verbalizes/displays adequate comfort level or baseline comfort level  Outcome: Progressing     Problem: Safety - Adult  Goal: Free from fall injury  Outcome: Progressing     Problem: ABCDS Injury Assessment  Goal: Absence of physical injury  Outcome: Progressing     Problem: Skin/Tissue Integrity  Goal: Skin integrity remains intact  Description: 1.  Monitor for areas of redness and/or skin breakdown2.  Assess vascular access sites hourly3.  Every 4-6 hours minimum:  Change oxygen saturation probe site4.  Every 4-6 hours:  If on nasal continuous positive airway pressure, respiratory therapy assess nares and determine need for appliance change or resting period  Outcome: Progressing  Flowsheets (Taken 6/4/2025 0800)  Skin Integrity Remains Intact:   Monitor for areas of redness and/or skin breakdown   Assess vascular access sites hourly   Turn and reposition as

## 2025-06-04 NOTE — FLOWSHEET NOTE
CVTS @ [facility] aware of transfer per RN; pt arriving from [facility].    Patient with severe two vessel disease in-stent restenosis of the LAD 99% / Ostial Cx. 95%. Patient with IABP.    Full consult note and plan to follow.      Sierra Bruno PA-C

## 2025-06-05 ENCOUNTER — APPOINTMENT (OUTPATIENT)
Dept: VASCULAR LAB | Age: 75
DRG: 235 | End: 2025-06-05
Attending: INTERNAL MEDICINE
Payer: OTHER GOVERNMENT

## 2025-06-05 ENCOUNTER — HOSPITAL ENCOUNTER (INPATIENT)
Age: 75
LOS: 11 days | Discharge: NURSING, REHAB OR LONG-TERM CARE | DRG: 235 | End: 2025-06-16
Attending: INTERNAL MEDICINE | Admitting: INTERNAL MEDICINE
Payer: OTHER GOVERNMENT

## 2025-06-05 VITALS
SYSTOLIC BLOOD PRESSURE: 137 MMHG | BODY MASS INDEX: 22.99 KG/M2 | OXYGEN SATURATION: 96 % | RESPIRATION RATE: 13 BRPM | DIASTOLIC BLOOD PRESSURE: 83 MMHG | HEIGHT: 72 IN | TEMPERATURE: 98.4 F | HEART RATE: 66 BPM | WEIGHT: 169.75 LBS

## 2025-06-05 DIAGNOSIS — I25.10 CORONARY ARTERY DISEASE INVOLVING NATIVE CORONARY ARTERY OF NATIVE HEART WITHOUT ANGINA PECTORIS: ICD-10-CM

## 2025-06-05 DIAGNOSIS — I21.4 NSTEMI (NON-ST ELEVATED MYOCARDIAL INFARCTION) (HCC): Primary | ICD-10-CM

## 2025-06-05 LAB
ALBUMIN SERPL-MCNC: 3.4 G/DL (ref 3.4–5)
ALBUMIN SERPL-MCNC: 3.6 G/DL (ref 3.4–5)
ALP SERPL-CCNC: 77 U/L (ref 40–129)
ALT SERPL-CCNC: 15 U/L (ref 10–40)
ANION GAP SERPL CALCULATED.3IONS-SCNC: 14 MMOL/L (ref 3–16)
ANTI-XA UNFRAC HEPARIN: 0.7 IU/ML (ref 0.3–0.7)
AST SERPL-CCNC: 23 U/L (ref 15–37)
BACTERIA URNS QL MICRO: ABNORMAL /HPF
BILIRUB DIRECT SERPL-MCNC: <0.1 MG/DL (ref 0–0.3)
BILIRUB INDIRECT SERPL-MCNC: 0.3 MG/DL (ref 0–1)
BILIRUB SERPL-MCNC: 0.4 MG/DL (ref 0–1)
BILIRUB UR QL STRIP.AUTO: NEGATIVE
BUN SERPL-MCNC: 27 MG/DL (ref 7–20)
CALCIUM SERPL-MCNC: 9.2 MG/DL (ref 8.3–10.6)
CHLORIDE SERPL-SCNC: 96 MMOL/L (ref 99–110)
CLARITY UR: CLEAR
CO2 SERPL-SCNC: 24 MMOL/L (ref 21–32)
COLOR UR: YELLOW
CREAT SERPL-MCNC: 5.3 MG/DL (ref 0.8–1.3)
EKG ATRIAL RATE: 69 BPM
EKG DIAGNOSIS: NORMAL
EKG P AXIS: 60 DEGREES
EKG P-R INTERVAL: 214 MS
EKG Q-T INTERVAL: 470 MS
EKG QRS DURATION: 132 MS
EKG QTC CALCULATION (BAZETT): 503 MS
EKG R AXIS: -8 DEGREES
EKG T AXIS: -66 DEGREES
EKG VENTRICULAR RATE: 69 BPM
EPI CELLS #/AREA URNS AUTO: 0 /HPF (ref 0–5)
GFR SERPLBLD CREATININE-BSD FMLA CKD-EPI: 11 ML/MIN/{1.73_M2}
GLUCOSE BLD-MCNC: 160 MG/DL (ref 70–99)
GLUCOSE BLD-MCNC: 183 MG/DL (ref 70–99)
GLUCOSE BLD-MCNC: 229 MG/DL (ref 70–99)
GLUCOSE SERPL-MCNC: 195 MG/DL (ref 70–99)
GLUCOSE UR STRIP.AUTO-MCNC: 250 MG/DL
HGB UR QL STRIP.AUTO: NEGATIVE
HYALINE CASTS #/AREA URNS AUTO: 0 /LPF (ref 0–8)
INR PPP: 1.07 (ref 0.85–1.15)
KETONES UR STRIP.AUTO-MCNC: NEGATIVE MG/DL
LEUKOCYTE ESTERASE UR QL STRIP.AUTO: ABNORMAL
MAGNESIUM SERPL-MCNC: 1.89 MG/DL (ref 1.8–2.4)
NITRITE UR QL STRIP.AUTO: NEGATIVE
PERFORMED ON: ABNORMAL
PH UR STRIP.AUTO: 8.5 [PH] (ref 5–8)
PHOSPHATE SERPL-MCNC: 4.4 MG/DL (ref 2.5–4.9)
POTASSIUM SERPL-SCNC: 4.5 MMOL/L (ref 3.5–5.1)
PROT SERPL-MCNC: 6.5 G/DL (ref 6.4–8.2)
PROT UR STRIP.AUTO-MCNC: 100 MG/DL
PROTHROMBIN TIME: 14.1 SEC (ref 11.9–14.9)
RBC CLUMPS #/AREA URNS AUTO: 1 /HPF (ref 0–4)
REASON FOR REJECTION: NORMAL
REJECTED TEST: NORMAL
SODIUM SERPL-SCNC: 134 MMOL/L (ref 136–145)
SP GR UR STRIP.AUTO: 1.01 (ref 1–1.03)
TROPONIN, HIGH SENSITIVITY: 212 NG/L (ref 0–22)
UA COMPLETE W REFLEX CULTURE PNL UR: ABNORMAL
UA DIPSTICK W REFLEX MICRO PNL UR: YES
URN SPEC COLLECT METH UR: ABNORMAL
UROBILINOGEN UR STRIP-ACNC: 0.2 E.U./DL
VAS LEFT CCA DIST EDV: 14.7 CM/S
VAS LEFT CCA DIST PSV: 59 CM/S
VAS LEFT CCA MID EDV: 7.37 CM/S
VAS LEFT CCA MID PSV: 59 CM/S
VAS LEFT CCA PROX EDV: 14 CM/S
VAS LEFT CCA PROX PSV: 58.8 CM/S
VAS LEFT ECA EDV: 8.08 CM/S
VAS LEFT ECA PSV: 55.9 CM/S
VAS LEFT GSV AT KNEE DIAM: 3.7 MM
VAS LEFT GSV BK PROX DIAM: 4.1 MM
VAS LEFT GSV JUNC DIAM: 2.1 MM
VAS LEFT GSV THIGH DIST DIAM: 2.5 MM
VAS LEFT GSV THIGH MID DIAM: 1.9 MM
VAS LEFT ICA DIST EDV: 28.7 CM/S
VAS LEFT ICA DIST PSV: 84.8 CM/S
VAS LEFT ICA MID EDV: 21 CM/S
VAS LEFT ICA MID PSV: 67.3 CM/S
VAS LEFT ICA PROX EDV: 12.7 CM/S
VAS LEFT ICA PROX PSV: 34.3 CM/S
VAS LEFT ICA/CCA PSV: 1.14
VAS LEFT SUBCLAVIAN PROX PSV: 198 CM/S
VAS LEFT VERTEBRAL EDV: 14 CM/S
VAS LEFT VERTEBRAL PSV: 42.6 CM/S
VAS RIGHT CCA DIST PSV: 51.6 CM/S
VAS RIGHT CCA MID EDV: 9.88 CM/S
VAS RIGHT CCA MID PSV: 65.9 CM/S
VAS RIGHT CCA PROX PSV: 65.3 CM/S
VAS RIGHT ECA EDV: 9.33 CM/S
VAS RIGHT ECA PSV: 48.3 CM/S
VAS RIGHT GSV AT KNEE DIAM: 1.3 MM
VAS RIGHT GSV BK PROX DIAM: 1 MM
VAS RIGHT GSV THIGH DIST DIAM: 1.2 MM
VAS RIGHT ICA DIST EDV: 28.5 CM/S
VAS RIGHT ICA DIST PSV: 75.7 CM/S
VAS RIGHT ICA MID EDV: 14.3 CM/S
VAS RIGHT ICA MID EDV: 19.8 CM/S
VAS RIGHT ICA MID PSV: 71.3 CM/S
VAS RIGHT ICA PROX EDV: 16.5 CM/S
VAS RIGHT ICA PROX PSV: 61.5 CM/S
VAS RIGHT ICA/CCA PSV: 1.08
VAS RIGHT SUBCLAVIAN PROX PSV: 154 CM/S
VAS RIGHT VERTEBRAL EDV: 8.23 CM/S
VAS RIGHT VERTEBRAL PSV: 36.8 CM/S
WBC #/AREA URNS AUTO: 8 /HPF (ref 0–5)

## 2025-06-05 PROCEDURE — 93971 EXTREMITY STUDY: CPT | Performed by: INTERNAL MEDICINE

## 2025-06-05 PROCEDURE — 94760 N-INVAS EAR/PLS OXIMETRY 1: CPT

## 2025-06-05 PROCEDURE — 94010 BREATHING CAPACITY TEST: CPT

## 2025-06-05 PROCEDURE — 93880 EXTRACRANIAL BILAT STUDY: CPT | Performed by: INTERNAL MEDICINE

## 2025-06-05 PROCEDURE — 6370000000 HC RX 637 (ALT 250 FOR IP): Performed by: HOSPITALIST

## 2025-06-05 PROCEDURE — 83735 ASSAY OF MAGNESIUM: CPT

## 2025-06-05 PROCEDURE — 87641 MR-STAPH DNA AMP PROBE: CPT

## 2025-06-05 PROCEDURE — 6370000000 HC RX 637 (ALT 250 FOR IP): Performed by: INTERNAL MEDICINE

## 2025-06-05 PROCEDURE — 85610 PROTHROMBIN TIME: CPT

## 2025-06-05 PROCEDURE — 6360000002 HC RX W HCPCS: Performed by: INTERNAL MEDICINE

## 2025-06-05 PROCEDURE — 80069 RENAL FUNCTION PANEL: CPT

## 2025-06-05 PROCEDURE — 84484 ASSAY OF TROPONIN QUANT: CPT

## 2025-06-05 PROCEDURE — 99233 SBSQ HOSP IP/OBS HIGH 50: CPT | Performed by: INTERNAL MEDICINE

## 2025-06-05 PROCEDURE — 6360000002 HC RX W HCPCS: Performed by: STUDENT IN AN ORGANIZED HEALTH CARE EDUCATION/TRAINING PROGRAM

## 2025-06-05 PROCEDURE — 93970 EXTREMITY STUDY: CPT

## 2025-06-05 PROCEDURE — 36415 COLL VENOUS BLD VENIPUNCTURE: CPT

## 2025-06-05 PROCEDURE — 2100000000 HC CCU R&B

## 2025-06-05 PROCEDURE — 6360000002 HC RX W HCPCS: Performed by: HOSPITALIST

## 2025-06-05 PROCEDURE — 6370000000 HC RX 637 (ALT 250 FOR IP): Performed by: STUDENT IN AN ORGANIZED HEALTH CARE EDUCATION/TRAINING PROGRAM

## 2025-06-05 PROCEDURE — 80076 HEPATIC FUNCTION PANEL: CPT

## 2025-06-05 PROCEDURE — 6370000000 HC RX 637 (ALT 250 FOR IP)

## 2025-06-05 PROCEDURE — 93880 EXTRACRANIAL BILAT STUDY: CPT

## 2025-06-05 PROCEDURE — 2500000003 HC RX 250 WO HCPCS: Performed by: STUDENT IN AN ORGANIZED HEALTH CARE EDUCATION/TRAINING PROGRAM

## 2025-06-05 PROCEDURE — 81001 URINALYSIS AUTO W/SCOPE: CPT

## 2025-06-05 PROCEDURE — 85520 HEPARIN ASSAY: CPT

## 2025-06-05 RX ORDER — METHADONE HYDROCHLORIDE 10 MG/1
60 TABLET ORAL DAILY
Refills: 0 | Status: DISCONTINUED | OUTPATIENT
Start: 2025-06-06 | End: 2025-06-09

## 2025-06-05 RX ORDER — DEXTROSE MONOHYDRATE 100 MG/ML
INJECTION, SOLUTION INTRAVENOUS CONTINUOUS PRN
Status: DISCONTINUED | OUTPATIENT
Start: 2025-06-05 | End: 2025-06-09

## 2025-06-05 RX ORDER — CITRIC ACID/SODIUM CITRATE 334-500MG
10 SOLUTION, ORAL ORAL 2 TIMES DAILY
Status: DISCONTINUED | OUTPATIENT
Start: 2025-06-05 | End: 2025-06-09

## 2025-06-05 RX ORDER — ATORVASTATIN CALCIUM 20 MG/1
20 TABLET, FILM COATED ORAL NIGHTLY
Status: DISCONTINUED | OUTPATIENT
Start: 2025-06-06 | End: 2025-06-05

## 2025-06-05 RX ORDER — HYDROMORPHONE HYDROCHLORIDE 2 MG/1
2 TABLET ORAL EVERY 4 HOURS PRN
Refills: 0 | Status: DISCONTINUED | OUTPATIENT
Start: 2025-06-05 | End: 2025-06-05 | Stop reason: HOSPADM

## 2025-06-05 RX ORDER — HEPARIN SODIUM 1000 [USP'U]/ML
4000 INJECTION, SOLUTION INTRAVENOUS; SUBCUTANEOUS PRN
Status: DISCONTINUED | OUTPATIENT
Start: 2025-06-05 | End: 2025-06-09

## 2025-06-05 RX ORDER — ATORVASTATIN CALCIUM 80 MG/1
80 TABLET, FILM COATED ORAL NIGHTLY
Status: DISCONTINUED | OUTPATIENT
Start: 2025-06-06 | End: 2025-06-09

## 2025-06-05 RX ORDER — ACETAMINOPHEN 325 MG/1
650 TABLET ORAL EVERY 6 HOURS PRN
Status: DISCONTINUED | OUTPATIENT
Start: 2025-06-05 | End: 2025-06-09

## 2025-06-05 RX ORDER — HEPARIN SODIUM 1000 [USP'U]/ML
2000 INJECTION, SOLUTION INTRAVENOUS; SUBCUTANEOUS PRN
Status: DISCONTINUED | OUTPATIENT
Start: 2025-06-05 | End: 2025-06-09

## 2025-06-05 RX ORDER — ONDANSETRON 2 MG/ML
4 INJECTION INTRAMUSCULAR; INTRAVENOUS EVERY 6 HOURS PRN
Status: DISCONTINUED | OUTPATIENT
Start: 2025-06-05 | End: 2025-06-09

## 2025-06-05 RX ORDER — HEPARIN SODIUM 1000 [USP'U]/ML
60 INJECTION, SOLUTION INTRAVENOUS; SUBCUTANEOUS ONCE
Status: DISCONTINUED | OUTPATIENT
Start: 2025-06-05 | End: 2025-06-05

## 2025-06-05 RX ORDER — SODIUM CHLORIDE 0.9 % (FLUSH) 0.9 %
5-40 SYRINGE (ML) INJECTION EVERY 12 HOURS SCHEDULED
Status: DISCONTINUED | OUTPATIENT
Start: 2025-06-05 | End: 2025-06-09

## 2025-06-05 RX ORDER — METOPROLOL TARTRATE 25 MG/1
12.5 TABLET, FILM COATED ORAL 2 TIMES DAILY
Status: DISCONTINUED | OUTPATIENT
Start: 2025-06-05 | End: 2025-06-06

## 2025-06-05 RX ORDER — HYDROMORPHONE HYDROCHLORIDE 2 MG/1
2 TABLET ORAL EVERY 4 HOURS PRN
Qty: 3 TABLET | Refills: 0 | Status: ON HOLD
Start: 2025-06-05 | End: 2025-06-10

## 2025-06-05 RX ORDER — SODIUM CHLORIDE 0.9 % (FLUSH) 0.9 %
5-40 SYRINGE (ML) INJECTION PRN
Status: DISCONTINUED | OUTPATIENT
Start: 2025-06-05 | End: 2025-06-09

## 2025-06-05 RX ORDER — ASPIRIN 81 MG/1
81 TABLET ORAL DAILY
Status: DISCONTINUED | OUTPATIENT
Start: 2025-06-06 | End: 2025-06-09

## 2025-06-05 RX ORDER — POLYETHYLENE GLYCOL 3350 17 G/17G
17 POWDER, FOR SOLUTION ORAL DAILY PRN
Status: DISCONTINUED | OUTPATIENT
Start: 2025-06-05 | End: 2025-06-09

## 2025-06-05 RX ORDER — HEPARIN SODIUM 10000 [USP'U]/100ML
5-30 INJECTION, SOLUTION INTRAVENOUS CONTINUOUS
Status: DISCONTINUED | OUTPATIENT
Start: 2025-06-05 | End: 2025-06-09

## 2025-06-05 RX ORDER — GLUCAGON 1 MG/ML
1 KIT INJECTION PRN
Status: DISCONTINUED | OUTPATIENT
Start: 2025-06-05 | End: 2025-06-09

## 2025-06-05 RX ORDER — HYDROMORPHONE HYDROCHLORIDE 2 MG/1
2 TABLET ORAL EVERY 4 HOURS PRN
Refills: 0 | Status: DISCONTINUED | OUTPATIENT
Start: 2025-06-05 | End: 2025-06-09

## 2025-06-05 RX ORDER — ACETAMINOPHEN 650 MG/1
650 SUPPOSITORY RECTAL EVERY 6 HOURS PRN
Status: DISCONTINUED | OUTPATIENT
Start: 2025-06-05 | End: 2025-06-09

## 2025-06-05 RX ORDER — SODIUM CHLORIDE 9 MG/ML
INJECTION, SOLUTION INTRAVENOUS PRN
Status: DISCONTINUED | OUTPATIENT
Start: 2025-06-05 | End: 2025-06-09

## 2025-06-05 RX ORDER — PANTOPRAZOLE SODIUM 40 MG/10ML
40 INJECTION, POWDER, LYOPHILIZED, FOR SOLUTION INTRAVENOUS DAILY
Status: DISCONTINUED | OUTPATIENT
Start: 2025-06-05 | End: 2025-06-09

## 2025-06-05 RX ORDER — INSULIN LISPRO 100 [IU]/ML
0-8 INJECTION, SOLUTION INTRAVENOUS; SUBCUTANEOUS
Status: DISCONTINUED | OUTPATIENT
Start: 2025-06-05 | End: 2025-06-09

## 2025-06-05 RX ORDER — ONDANSETRON 4 MG/1
4 TABLET, ORALLY DISINTEGRATING ORAL EVERY 8 HOURS PRN
Status: DISCONTINUED | OUTPATIENT
Start: 2025-06-05 | End: 2025-06-09

## 2025-06-05 RX ADMIN — METOPROLOL TARTRATE 12.5 MG: 25 TABLET, FILM COATED ORAL at 20:18

## 2025-06-05 RX ADMIN — HEPARIN SODIUM 1310 UNITS/HR: 10000 INJECTION, SOLUTION INTRAVENOUS at 01:01

## 2025-06-05 RX ADMIN — SODIUM CHLORIDE, PRESERVATIVE FREE 1 MG: 5 INJECTION INTRAVENOUS at 21:27

## 2025-06-05 RX ADMIN — INSULIN LISPRO 2 UNITS: 100 INJECTION, SOLUTION INTRAVENOUS; SUBCUTANEOUS at 16:58

## 2025-06-05 RX ADMIN — INSULIN LISPRO 4 UNITS: 100 INJECTION, SOLUTION INTRAVENOUS; SUBCUTANEOUS at 08:24

## 2025-06-05 RX ADMIN — METHADONE HYDROCHLORIDE 60 MG: 10 TABLET ORAL at 06:02

## 2025-06-05 RX ADMIN — ASPIRIN 81 MG: 81 TABLET, COATED ORAL at 08:16

## 2025-06-05 RX ADMIN — HYDROMORPHONE HYDROCHLORIDE 0.5 MG: 1 INJECTION, SOLUTION INTRAMUSCULAR; INTRAVENOUS; SUBCUTANEOUS at 00:21

## 2025-06-05 RX ADMIN — HYDROMORPHONE HYDROCHLORIDE 2 MG: 2 TABLET ORAL at 09:46

## 2025-06-05 RX ADMIN — HYDROMORPHONE HYDROCHLORIDE 2 MG: 2 TABLET ORAL at 17:00

## 2025-06-05 RX ADMIN — HEPARIN SODIUM 1310 UNITS/HR: 10000 INJECTION, SOLUTION INTRAVENOUS at 21:40

## 2025-06-05 RX ADMIN — SODIUM CHLORIDE, PRESERVATIVE FREE 10 ML: 5 INJECTION INTRAVENOUS at 20:22

## 2025-06-05 RX ADMIN — POLYETHYLENE GLYCOL 3350 17 G: 17 POWDER, FOR SOLUTION ORAL at 04:54

## 2025-06-05 RX ADMIN — PANTOPRAZOLE SODIUM 20 MG: 20 TABLET, DELAYED RELEASE ORAL at 06:02

## 2025-06-05 RX ADMIN — ATORVASTATIN CALCIUM 40 MG: 40 TABLET, FILM COATED ORAL at 08:16

## 2025-06-05 RX ADMIN — SODIUM CITRATE AND CITRIC ACID MONOHYDRATE 10 ML: 500; 334 SOLUTION ORAL at 21:28

## 2025-06-05 ASSESSMENT — PAIN DESCRIPTION - PAIN TYPE: TYPE: CHRONIC PAIN;ACUTE PAIN

## 2025-06-05 ASSESSMENT — PAIN DESCRIPTION - ORIENTATION
ORIENTATION: RIGHT;LEFT;LOWER
ORIENTATION: RIGHT;LEFT;LOWER
ORIENTATION: LOWER

## 2025-06-05 ASSESSMENT — PAIN SCALES - GENERAL
PAINLEVEL_OUTOF10: 7
PAINLEVEL_OUTOF10: 10
PAINLEVEL_OUTOF10: 5

## 2025-06-05 ASSESSMENT — PAIN DESCRIPTION - DESCRIPTORS
DESCRIPTORS: ACHING
DESCRIPTORS: SORE
DESCRIPTORS: ACHING;THROBBING

## 2025-06-05 ASSESSMENT — PAIN DESCRIPTION - LOCATION
LOCATION: ABDOMEN;BACK
LOCATION: ABDOMEN;BACK
LOCATION: BACK

## 2025-06-05 ASSESSMENT — PAIN - FUNCTIONAL ASSESSMENT: PAIN_FUNCTIONAL_ASSESSMENT: ACTIVITIES ARE NOT PREVENTED

## 2025-06-05 ASSESSMENT — PAIN DESCRIPTION - FREQUENCY: FREQUENCY: CONTINUOUS

## 2025-06-05 ASSESSMENT — PAIN DESCRIPTION - ONSET: ONSET: ON-GOING

## 2025-06-05 NOTE — CARE COORDINATION
06/05/25 1625   Readmission Assessment   Number of Days since last admission? 1-7 days   Previous Disposition Other (comment)  (transferred from [facility] for CVTS consult)   Who is being Interviewed Patient  (chart reviewed /transferred from [facility] for CVTS consult)   What was the patient's/caregiver's perception as to why they think they needed to return back to the hospital? Other (Comment)  (transferred from [facility] for CVTS consult)   Did you visit your Primary Care Physician after you left the hospital, before you returned this time? No   Why weren't you able to visit your PCP? Other (Comment)  (transferred from [facility] for CVTS consult)   Did you see a specialist, such as Cardiac, Pulmonary, Orthopedic Physician, etc. after you left the hospital? Yes  (transferred from [facility] for CVTS consult)   Who advised the patient to return to the hospital? Other (Comment);Physician  (transferred from [facility] for CVTS consult)   Does the patient report anything that got in the way of taking their medications? No  (transferred from [facility] for CVTS consult)   In our efforts to provide the best possible care to you and others like you, can you think of anything that we could have done to help you after you left the hospital the first time, so that you might not have needed to return so soon? Other (Comment)  (transferred from [facility] for CVTS consult)

## 2025-06-05 NOTE — CONSULTS
Consultation H&P    Date of Admission:  6/5/2025 12:07 PM  Date of Consultation:  6/5/2025    PCP:  No primary care provider on file.      Cards: GRETA Pérez MD    Reason for consult: NSTEMI/ CAD    History of Present Illness:      Colby Ovalle is a 74 y.o. male with significant past medical history of ESRD, on HD MWF, HTN, DM II, non-hodgkins lymphoma (10-15 yrs ago), marijuana use, opiate dependence, alcohol dependence( in remission), and CAD with prior PCI/atherectomy/Shockwave to ostial /mid LAD who presents to [facility] ED with chest pain 6/1/25. Described as \"indigestion\". Took 2 tums actually before the pain occurred because he \"takes it as a binding agent\".  The pain was nonradiating and not associated with any shortness of breath dizziness nausea vomiting or diaphoresis.  It did not resolve thus he presented to the ER.  In the ER EKG demonstrated sinus rhythm, right bundle branch block with some J-point elevation new in leads V2, V3, V4.  Troponin actually tripled on the delta and thus was started on heparin drip.  He was admitted with an NSTEMI.  Left heart angiogram performed 6/2/25 found severe two-vessel coronary artery disease with 99% ostial circumflex disease and a 99% in-stent restenosis of the LAD.  Left ventricular ejection fraction noted to be 30% with mid to distal anterior wall/apical hypokinesis.  Intra-aortic balloon pump was placed.  Patient was transferred here to [facility] for consideration for coronary bypass grafting.          Past Medical History:  Past Medical History:   Diagnosis Date    Alcohol dependence in remission (HCC)     information from [facility]    Cancer (HCC)     over 10 years ago     Chronic back pain     COVID 01/12/2022    End-stage renal disease on hemodialysis (HCC)     Erectile dysfunction     Full dentures     Hepatitis C     information from [facility]    Hx of non-Hodgkin's lymphoma     Information from [facility]

## 2025-06-05 NOTE — H&P
Thrombocytopenia     Tobacco dependency     Type 2 diabetes mellitus without complication (HCC)        Past Surgical History:          Procedure Laterality Date    BACK SURGERY      CARDIAC PROCEDURE N/A 10/3/2024    Left heart cath / coronary angiography performed by Bindu Barboza DO at [facility] CARDIAC CATH LAB    CARDIAC PROCEDURE N/A 10/3/2024    Intravascular ultrasound performed by Bindu Barboza DO at [facility] CARDIAC CATH LAB    CARDIAC PROCEDURE N/A 10/3/2024    Atherectomy coronary performed by Bindu Barboza DO at [facility] CARDIAC CATH LAB    CARDIAC PROCEDURE N/A 6/2/2025    Left heart cath / coronary angiography performed by Modesto Pérez MD at [facility] CARDIAC CATH LAB    CARDIAC PROCEDURE N/A 6/2/2025    Intra-aortic balloon pump insertion performed by Modesto Pérez MD at [facility] CARDIAC CATH LAB    COLONOSCOPY      CYSTOSCOPY      with stone removal    DIALYSIS FISTULA CREATION Left 3/1/2022    LEFT ARM STAGED CREATION OF A BRACHIO-BASILIC FISTULA AT THE ELBOW performed by Fabián Matt DO at [facility] OR    INVASIVE VASCULAR N/A 10/8/2024    Angioplasty fistula/dialysis circuit performed by Ab Paris MD at [facility] CARDIAC CATH LAB    PORT SURGERY         Medications Prior to Admission:      Prior to Admission medications    Medication Sig Start Date End Date Taking? Authorizing Provider   HYDROmorphone (DILAUDID) 2 MG tablet Take 1 tablet by mouth every 4 hours as needed (for withdrawal symptoms) for up to 5 days. Max Daily Amount: 12 mg 6/5/25 6/10/25  Cortez Sierra MD   methadone (DOLOPHINE) 10 MG tablet Take 6 tablets by mouth daily for 30 days. Max Daily Amount: 60 mg 6/5/25 7/5/25  Cortez Sierra MD   Heparin Sod, Porcine, in D5W (HEPARIN, PORCINE,) 100 UNIT/ML SOLN infusion Infuse 0-4,000 Units/hr intravenously continuous 6/4/25   Cortez Sierra MD   atorvastatin (LIPITOR) 40 MG tablet Take 0.5 tablets by mouth at bedtime    ProviderNikolas MD   lidocaine (LIDODERM) 5 % Place 1  negative

## 2025-06-05 NOTE — PLAN OF CARE
Problem: Chronic Conditions and Co-morbidities  Goal: Patient's chronic conditions and co-morbidity symptoms are monitored and maintained or improved  6/5/2025 0006 by Raman Buckley RN  Outcome: Progressing  Flowsheets (Taken 6/4/2025 2000)  Care Plan - Patient's Chronic Conditions and Co-Morbidity Symptoms are Monitored and Maintained or Improved: Monitor and assess patient's chronic conditions and comorbid symptoms for stability, deterioration, or improvement  6/4/2025 1720 by Laurie Barreto RN  Outcome: Progressing  Flowsheets (Taken 6/4/2025 0800)  Care Plan - Patient's Chronic Conditions and Co-Morbidity Symptoms are Monitored and Maintained or Improved:   Monitor and assess patient's chronic conditions and comorbid symptoms for stability, deterioration, or improvement   Collaborate with multidisciplinary team to address chronic and comorbid conditions and prevent exacerbation or deterioration   Update acute care plan with appropriate goals if chronic or comorbid symptoms are exacerbated and prevent overall improvement and discharge     Problem: Discharge Planning  Goal: Discharge to home or other facility with appropriate resources  6/5/2025 0006 by Raman Buckley RN  Outcome: Progressing  Flowsheets (Taken 6/4/2025 2000)  Discharge to home or other facility with appropriate resources: Identify barriers to discharge with patient and caregiver  6/4/2025 1720 by Laurie Barreto RN  Outcome: Progressing  Flowsheets (Taken 6/4/2025 0800)  Discharge to home or other facility with appropriate resources:   Identify barriers to discharge with patient and caregiver   Identify discharge learning needs (meds, wound care, etc)     Problem: Pain  Goal: Verbalizes/displays adequate comfort level or baseline comfort level  6/5/2025 0006 by Raman Buckley RN  Outcome: Progressing  Flowsheets (Taken 6/5/2025 0000)  Verbalizes/displays adequate comfort level or baseline comfort level:   Encourage patient to

## 2025-06-05 NOTE — PROGRESS NOTES
[facility]   Daily Progress Note      Admit Date:  6/1/2025      Subjective:   Mr. Ovalle is a 74 y.o. male with a past medical history significant for h/o alcohol dependence, ESRD and CAD with prior PCI/atherectomy/Shockwave to ostial/mid LAD who presented to [facility] ED with chets pain. I was asked to see him for this and elevated troponin assays. He follows with Dr. Barboza and saw her in April at which time he states he was doing fine.      Colby states that he took 2 Tums like he normally does (told by Tito to take them). After, he stated that he felt like he had heartburn or indigestion in the center of his chest. He was supposed to have dialysis today. The indigestion lasted for about 1.5 hours before it went away.      This pain did remind him of when he had his stents placed last October 2024. He has not had a recurrence of the discomfort since then.     Interval History:  Avtar is doing well today.  Sinus rhythm on telemetry.  He has yet to undergo dialysis today.  IABP still in place at one-to-one counterpulsation.  He has had no further angina or chest tightness.    Objective:     BP (!) 140/87   Pulse 55   Temp 98.3 °F (36.8 °C) (Oral)   Resp 10   Ht 1.829 m (6')   Wt 77 kg (169 lb 12.1 oz)   SpO2 99%   BMI 23.02 kg/m²      Intake/Output Summary (Last 24 hours) at 6/4/2025 1834  Last data filed at 6/4/2025 1800  Gross per 24 hour   Intake 1259.09 ml   Output 1525 ml   Net -265.91 ml       Physical Exam:  General:  Awake, alert, NAD  Skin:  Warm and dry  Neck:  JVD<8, no carotid bruits  Chest:  Clear to auscultation, no wheezes/rhonchi/rales  Cardiovascular:  RRR, normal S1/S2, no M/R/G  Abdomen:  Soft, nontender, +bowel sounds  Extremities:  No edema  Pulses: 2+ bilat carotid    2+ bilat radial    2+ bilat femoral  IABP in place in right groin        Medications:    sucroferric oxyhydroxide  500 mg Oral TID WC    methadone  60 mg Oral Daily    insulin lispro  0.05 Units/kg 
      [facility]   Daily Progress Note      Admit Date:  6/1/2025      Subjective:   Mr. Ovalle is a 74 y.o. male with a past medical history significant for h/o alcohol dependence, ESRD and CAD with prior PCI/atherectomy/Shockwave to ostial/mid LAD who presented to [facility] ED with chets pain. I was asked to see him for this and elevated troponin assays. He follows with Dr. Barboza and saw her in April at which time he states he was doing fine.      Colby states that he took 2 Tums like he normally does (told by Tito to take them). After, he stated that he felt like he had heartburn or indigestion in the center of his chest. He was supposed to have dialysis today. The indigestion lasted for about 1.5 hours before it went away.      This pain did remind him of when he had his stents placed last October 2024. He has not had a recurrence of the discomfort since then.     Interval History:  Colby is doing well today. Sinus rhythm on telemetry. He did well with dialysis today. IABP at 1:1 counterpulsation. Heparin drip running.       Objective:     /87   Pulse 64   Temp 98 °F (36.7 °C) (Oral)   Resp 16   Ht 1.829 m (6')   Wt 76.2 kg (168 lb)   SpO2 97%   BMI 22.78 kg/m²      Intake/Output Summary (Last 24 hours) at 6/3/2025 1325  Last data filed at 6/3/2025 1228  Gross per 24 hour   Intake 1758.13 ml   Output 3775 ml   Net -2016.87 ml       Physical Exam:  General:  Awake, alert, NAD  Skin:  Warm and dry  Neck:  JVD<8, no carotid bruits  Chest:  Clear to auscultation, no wheezes/rhonchi/rales  Cardiovascular:  RRR, normal S1/S2, no M/R/G  Abdomen:  Soft, nontender, +bowel sounds  Extremities:  No edema  Pulses: 2+ bilat carotid    2+ bilat radial    2+ bilat femoral        Medications:    sodium chloride flush  5-40 mL IntraVENous 2 times per day    insulin lispro  0.05 Units/kg SubCUTAneous TID WC    aspirin  81 mg Oral Daily    atorvastatin  40 mg Oral Daily    clopidogrel  75 mg Oral 
    V2.0  [facility] [place]      Name:  Colby Ovalle /Age/Sex: 1950  (74 y.o. male)   MRN & CSN:  [id number] & [id number] Encounter Date/Time: 2025 10:13 AM EDT    Location:  [address]- PCP: No primary care provider on file.       Hospital Day: 5    Subjective:   Chief Complaint: Follow-up chest pain    Patient seen and evaluated at the bedside, remains medically stable, on the balloon pump, no chest pain or shortness of breath, reports some withdrawal, patient was discharged yesterday, transfer was arranged however transportation could not be arranged hence ended up staying in the hospital.    Review of Systems:    Review of Systems    10 point ROS negative except as stated above in \"subjective\" section    Objective:     Intake/Output Summary (Last 24 hours) at 2025 1013  Last data filed at 2025 0800  Gross per 24 hour   Intake 1533.72 ml   Output 1875 ml   Net -341.28 ml      Vitals:   Vitals:    25 0912   BP:    Pulse: 76   Resp: 13   Temp:    SpO2: 97%     Physical Exam:   General: Awake, alert and  NAD  Cardiovascular: S1S2 present, regular rate and rhythm, no murmurs  Respiratory: Clear to auscultation  Gastrointestinal: Soft, non tender, positive bowel sounds   Genitourinary: no suprapubic tenderness  Musculoskeletal: No edema    Medications:     Medications:    sucroferric oxyhydroxide  500 mg Oral TID WC    methadone  60 mg Oral Daily    insulin lispro  0.05 Units/kg SubCUTAneous TID WC    aspirin  81 mg Oral Daily    atorvastatin  40 mg Oral Daily    [START ON 2025] vitamin D  50,000 Units Oral Weekly    pantoprazole  20 mg Oral QAM AC    lidocaine  1 patch TransDERmal Daily    sodium chloride flush  5-40 mL IntraVENous 2 times per day      Infusions:    heparin (PORCINE) Infusion 1,310 Units/hr (25 0101)    sodium chloride Stopped (25 1227)    dextrose      sodium chloride         Labs      Recent Results (from the past 24 hours)   POCT 
    V2.0  [facility] [place]      Name:  Colby Ovalle /Age/Sex: 1950  (74 y.o. male)   MRN & CSN:  [id number] & [id number] Encounter Date/Time: 2025 11:15 AM EDT    Location:  [place] [address] PCP: No primary care provider on file.       Hospital Day: 4    Subjective:   Chief Complaint: Follow-up chest pain    Patient seen and evaluated at bedside, reports pain and discomfort in his back from laying flat, IABP in place, hemodynamically stable, no further chest pain,    Review of Systems:    Review of Systems    10 point ROS negative except as stated above in \"subjective\" section    Objective:     Intake/Output Summary (Last 24 hours) at 2025 1115  Last data filed at 2025 0822  Gross per 24 hour   Intake 1156.2 ml   Output 825 ml   Net 331.2 ml      Vitals:   Vitals:    25 1100   BP: (!) 114/54   Pulse: 65   Resp: 12   Temp:    SpO2: 96%     Physical Exam:   General: Awake, alert and  NAD  Cardiovascular: S1S2 present, regular rate and rhythm, no murmurs  Respiratory: Clear to auscultation  Gastrointestinal: Soft, non tender, positive bowel sounds   Genitourinary: no suprapubic tenderness  Musculoskeletal: No edema    Medications:     Medications:    methadone  60 mg Oral Daily    insulin lispro  0.05 Units/kg SubCUTAneous TID WC    aspirin  81 mg Oral Daily    atorvastatin  40 mg Oral Daily    [START ON 2025] vitamin D  50,000 Units Oral Weekly    pantoprazole  20 mg Oral QAM AC    lidocaine  1 patch TransDERmal Daily    sodium chloride flush  5-40 mL IntraVENous 2 times per day      Infusions:    heparin (PORCINE) Infusion 1,140 Units/hr (25 0822)    sodium chloride Stopped (25 1227)    dextrose      sodium chloride         Labs       Labs reviewed    Imaging/Diagnostics    Echo (TTE) limited (PRN contrast/bubble/strain/3D)  Result Date: 6/3/2025    Left Ventricle: Mildly reduced left ventricular systolic function with a visually estimated EF of 35 - 40%. 
    V2.0  [facility] [place] Hospitalist Progress Note      Name:  Colby Ovalle /Age/Sex: 1950  (74 y.o. male)   MRN & CSN:  [id number] & [id number] Encounter Date/Time: 6/3/2025 11:02 AM EDT    Location:  [address]- PCP: No primary care provider on file.       Hospital Day: 3    Subjective:   Chief Complaint: Follow-up chest pain    Patient seen and evaluated at bedside, patient underwent cardiac catheterization yesterday, revealed multivessel disease, IABP was placed, EF was noted to be significantly low, noted to be bradycardic, rhythm has been sinus, dialysis this    Review of Systems:    Review of Systems    10 point ROS negative except as stated above in \"subjective\" section    Objective:     Intake/Output Summary (Last 24 hours) at 6/3/2025 1102  Last data filed at 6/3/2025 1012  Gross per 24 hour   Intake 1378.55 ml   Output 3780 ml   Net -2401.45 ml      Vitals:   Vitals:    25 1012   BP: 128/83   Pulse: 55   Resp: 15   Temp:    SpO2: 96%     Physical Exam:   General: Awake, alert and  NAD  Cardiovascular: S1S2 present, regular rate and rhythm, no murmurs  Respiratory: Bibasilar crackles  Gastrointestinal: Soft, non tender, positive bowel sounds   Genitourinary: no suprapubic tenderness  Musculoskeletal: No edema    Medications:     Medications:    magnesium sulfate  1,000 mg IntraVENous Once    sodium chloride flush  5-40 mL IntraVENous 2 times per day    insulin lispro  0.05 Units/kg SubCUTAneous TID WC    aspirin  81 mg Oral Daily    atorvastatin  40 mg Oral Daily    clopidogrel  75 mg Oral Daily    [START ON 2025] vitamin D  50,000 Units Oral Weekly    pantoprazole  20 mg Oral QAM AC    carvedilol  6.25 mg Oral BID WC    methadone  95 mg Oral Daily    lidocaine  1 patch TransDERmal Daily    sodium chloride flush  5-40 mL IntraVENous 2 times per day      Infusions:    heparin (PORCINE) Infusion 1,100 Units/hr (25 0730)    sodium chloride Stopped (25 1227)    dextrose      
  Nephrology Consult Note   [facility]      Chief Complaint: Chest pain    History of Present Illness:Mr Ovalle is a Ms Vasquez is a 75 yo male with past medical history significant for HTN; CAD with prior PCI/atherectomy/Shockwave to ostial/mid LAD ; hyperlipidemia; DM II; non-Hodgkin's lymphoma; chronic pain syndrome; history of chronic alcohol dependence, in remission; ESRD HD MWF that presented to the ED with Chest pain. This pain did remind him of when he had his stents placed last October 2024     Patient was taken to the Cath Lab earlier today and per CVICU RN angiogram showed multivessel disease. All previous stents are occluded and plans are underway to transfer patient to [facility] for CT surgery evaluation.     At the time of my evaluation patient was resting comfortably in bed; on balloon pump. O2 saturations 100 %; K and HCO normal. Nephrology consulted to address patients dialysis needs during current hospitalization    Subjective:    In bed; NAD    ROS: + chest pain which has resolved. No shortness of breath. No LE edema. All other ROS negative    Scheduled Meds:   magnesium sulfate  1,000 mg IntraVENous Once    sodium chloride flush  5-40 mL IntraVENous 2 times per day    insulin lispro  0.05 Units/kg SubCUTAneous TID WC    aspirin  81 mg Oral Daily    atorvastatin  40 mg Oral Daily    clopidogrel  75 mg Oral Daily    [START ON 6/7/2025] vitamin D  50,000 Units Oral Weekly    pantoprazole  20 mg Oral QAM AC    carvedilol  6.25 mg Oral BID WC    methadone  95 mg Oral Daily    lidocaine  1 patch TransDERmal Daily    sodium chloride flush  5-40 mL IntraVENous 2 times per day        heparin (PORCINE) Infusion 1,100 Units/hr (06/03/25 1109)    sodium chloride Stopped (06/02/25 1227)    dextrose      sodium chloride         PRN Meds:.sodium chloride flush, sodium chloride, ondansetron **OR** ondansetron, polyethylene glycol, nitroGLYCERIN, glucose, dextrose bolus **OR** dextrose bolus, glucagon (rDNA), 
  Nephrology Consult Note   [facility]      Chief Complaint: Chest pain    History of Present Illness:Mr Ovalle is a Ms Vasquez is a 73 yo male with past medical history significant for HTN; CAD with prior PCI/atherectomy/Shockwave to ostial/mid LAD ; hyperlipidemia; DM II; non-Hodgkin's lymphoma; chronic pain syndrome; history of chronic alcohol dependence, in remission; ESRD HD MWF that presented to the ED with Chest pain. This pain did remind him of when he had his stents placed last 2024     Patient was taken to the Cath Lab earlier today and per CVICU RN angiogram showed multivessel disease. All previous stents are occluded and plans are underway to transfer patient to [facility] for CT surgery evaluation.     At the time of my evaluation patient was resting comfortably in bed; on balloon pump. O2 saturations 100 %; K and HCO normal. Nephrology consulted to address patients dialysis needs during current hospitalization    Subjective:    In bed; NAD    ROS: + chest pain which has resolved. No shortness of breath. No LE edema. All other ROS negative    Scheduled Meds:   methadone  60 mg Oral Daily    insulin lispro  0.05 Units/kg SubCUTAneous TID WC    aspirin  81 mg Oral Daily    atorvastatin  40 mg Oral Daily    [START ON 2025] vitamin D  50,000 Units Oral Weekly    pantoprazole  20 mg Oral QAM AC    lidocaine  1 patch TransDERmal Daily    sodium chloride flush  5-40 mL IntraVENous 2 times per day        heparin (PORCINE) Infusion 1,140 Units/hr (25 0822)    sodium chloride Stopped (25 1227)    dextrose      sodium chloride         PRN Meds:.HYDROmorphone, midazolam, sodium chloride, ondansetron **OR** ondansetron, polyethylene glycol, nitroGLYCERIN, glucose, dextrose bolus **OR** dextrose bolus, glucagon (rDNA), dextrose, sodium chloride flush, sodium chloride, acetaminophen    Physical Exam:    TEMPERATURE:  Current - Temp: 98.4 °F (36.9 °C); Max - Temp  Av.4 °F (36.9 °C)  Min: 97.8 
4 Eyes Skin Assessment     NAME:  Colby Ovalle  YOB: 1950  MEDICAL RECORD NUMBER:  [id number]    The patient is being assessed for  Admission    I agree that at least one RN has performed a thorough Head to Toe Skin Assessment on the patient. ALL assessment sites listed below have been assessed.      Areas assessed by both nurses:    Head, Face, Ears, Shoulders, Back, Chest, Arms, Elbows, Hands, Sacrum. Buttock, Coccyx, Ischium, Legs. Feet and Heels, and Under Medical Devices         Does the Patient have a Wound? No noted wound(s)       Nicolás Prevention initiated by RN: Yes  Wound Care Orders initiated by RN: No    Pressure Injury (Stage 3,4, Unstageable, DTI, NWPT, and Complex wounds) if present, place Wound referral order by RN under : No    New Ostomies, if present place, Ostomy referral order under : No     Nurse 1 eSignature: Electronically signed by Marija Kwan RN on 6/2/25 at 4:57 AM EDT    **SHARE this note so that the co-signing nurse can place an eSignature**    Nurse 2 eSignature: {Esignature:[id number]}   
Acceptance note    Coming for CABG. 2 vessel dz disease with severe restenosis.  Has IAB  pump. On  methadone , cut down to 60  ( from 95 daily ) dt  qtc  was >620 , now  540.   Also ESRD, had hemodialysis yesterday and today.  Follows with [facility]  
Clinical Pharmacy Note  Heparin Dosing       Lab Results   Component Value Date/Time    ANTIXAUHEP 0.14 06/04/2025 02:02 PM      Lab Results   Component Value Date/Time    HGB 14.9 06/04/2025 06:28 AM    HCT 43.7 06/04/2025 06:28 AM     06/04/2025 06:28 AM    INR 0.99 10/02/2024 06:35 PM       Current Infusion Rate: 1140 units/hr    Plan:  Bolus: 2000 units  Rate: 1310 units/hr  Next anti-Xa level: 2100 6/4    Pharmacy will continue to monitor and adjust based on anti-Xa results.    Devin Mtz [facility], 6/4/2025 2:57 PM    
Clinical Pharmacy Note  Heparin Dosing       Lab Results   Component Value Date/Time    ANTIXAUHEP 0.20 06/04/2025 06:28 AM      Lab Results   Component Value Date/Time    HGB 14.9 06/04/2025 06:28 AM    HCT 43.7 06/04/2025 06:28 AM     06/04/2025 06:28 AM    INR 0.99 10/02/2024 06:35 PM       Current Infusion Rate: 1100 units/hr    Plan:  Bolus: 2000 units  Rate: increase to 1140 units/hr  Next anti-Xa level: 6/4/25 1400    Pharmacy will continue to monitor and adjust based on anti-Xa results.    Fatimah Salguero RPH, PharmD 6/4/2025 7:28 AM  
Clinical Pharmacy Note  Heparin Dosing       Lab Results   Component Value Date/Time    ANTIXAUHEP 0.25 06/02/2025 08:00 PM      Lab Results   Component Value Date/Time    HGB 13.3 06/01/2025 11:37 PM    HCT 39.6 06/01/2025 11:37 PM     06/01/2025 11:37 PM    INR 0.99 10/02/2024 06:35 PM       Current Infusion Rate: 1000 units/hr    Plan:  Bolus: 2000 units  Rate: increase to 1170 units/hr  Next anti-Xa level: 0300 6/3/25    Pharmacy will continue to monitor and adjust based on anti-Xa results.    Fatimah Salguero RPH, PharmD 6/2/2025 8:56 PM  
Clinical Pharmacy Note  Heparin Dosing       Lab Results   Component Value Date/Time    ANTIXAUHEP 0.34 06/03/2025 09:45 AM      Lab Results   Component Value Date/Time    HGB 13.3 06/03/2025 02:45 AM    HCT 39.5 06/03/2025 02:45 AM     06/03/2025 02:45 AM    INR 0.99 10/02/2024 06:35 PM       Current Infusion Rate: 1100 units/hr    Plan:  Rate: continue at 1100 units/hr  Next anti-Xa level: 1600 6/3/25    Pharmacy will continue to monitor and adjust based on anti-Xa results.  Courtney Corcoran [facility],6/3/2025,10:25 AM    
Clinical Pharmacy Note  Heparin Dosing       Lab Results   Component Value Date/Time    ANTIXAUHEP 0.59 06/04/2025 09:58 PM      Lab Results   Component Value Date/Time    HGB 14.9 06/04/2025 06:28 AM    HCT 43.7 06/04/2025 06:28 AM     06/04/2025 06:28 AM    INR 0.99 10/02/2024 06:35 PM       Current Infusion Rate: 1310 units/hr    Plan:  Bolus: 0 units  Rate: 1310 units/hr  Next anti-Xa level: 0500 06/05/25    Pharmacy will continue to monitor and adjust based on anti-Xa results.    Patricia Bailey, PharmD    
Clinical Pharmacy Note  Heparin Dosing       Lab Results   Component Value Date/Time    ANTIXAUHEP 0.61 06/03/2025 04:00 PM      Lab Results   Component Value Date/Time    HGB 13.3 06/03/2025 02:45 AM    HCT 39.5 06/03/2025 02:45 AM     06/03/2025 02:45 AM    INR 0.99 10/02/2024 06:35 PM       Current Infusion Rate: 1100 units/hr    Plan:  Continue Current Rate   Next anti-Xa level: 06/04/25 0600    Pharmacy will continue to monitor and adjust based on anti-Xa results.     Rachelle Morales, PharmD  6/3/2025 4:56 PM    
Clinical Pharmacy Note  Heparin Dosing       Lab Results   Component Value Date/Time    ANTIXAUHEP 0.70 06/05/2025 06:25 AM      Lab Results   Component Value Date/Time    HGB 14.9 06/04/2025 06:28 AM    HCT 43.7 06/04/2025 06:28 AM     06/04/2025 06:28 AM    INR 0.99 10/02/2024 06:35 PM       Current Infusion Rate: 1310 units/hr    Plan:  Rate: 1310 units/hr  Next anti-Xa level: 1300 6/5/25    Pharmacy will continue to monitor and adjust based on anti-Xa results.   
Clinical Pharmacy Note  Heparin Dosing       Lab Results   Component Value Date/Time    ANTIXAUHEP 0.75 06/03/2025 02:45 AM      Lab Results   Component Value Date/Time    HGB 13.3 06/03/2025 02:45 AM    HCT 39.5 06/03/2025 02:45 AM     06/03/2025 02:45 AM    INR 0.99 10/02/2024 06:35 PM       Current Infusion Rate: 1170 units/hr    Plan:  Rate: decrease to 1100 units/hr  Next anti-Xa level: 1000 6/3/25    Pharmacy will continue to monitor and adjust based on anti-Xa results.    Tee García RPH, PharmD 6/3/2025 3:18 AM  
Clinical Pharmacy Note  Heparin Dosing Consult    Colby Ovalle is a 74 y.o. male ordered heparin per CAD/STEMI/NSTEMI/UA/AFIB nomogram by Dr. Moody.     Lab Results   Component Value Date/Time    ANTIXAUHEP 0.20 10/03/2024 02:55 AM      Lab Results   Component Value Date/Time    HGB 13.3 06/01/2025 11:37 PM    HCT 39.6 06/01/2025 11:37 PM     06/01/2025 11:37 PM    INR 0.99 10/02/2024 06:35 PM       Ht Readings from Last 1 Encounters:   06/01/25 1.829 m (6')        Wt Readings from Last 1 Encounters:   06/01/25 83.7 kg (184 lb 8.4 oz)        Assessment/Plan:  Initial bolus: 4000 units  Initial infusion rate: 1000 units/hr  Next anti-Xa:: 0800 6/2/25    Pharmacy will continue to monitor adjust heparin based on anti-Xa results using nomogram below:     CAD/STEMI/NSTEMI/UA/AFIB Heparin Nomogram     Initial Bolus: 60 units/kg Max Bolus: 4,000 units       Initial Rate: 12 units/kg/hr Max Initial Rate: 1,000 units/hr     anti-Xa Bolus Titration   < 0.1 Heparin 60 units/kg bolus Increase drip by 4 units/kg/hr   0.1 - 0.29 Heparin 30 units/kg bolus Increase drip by 2 units/kg/hr   0.3 - 0.7 No Bolus No Change   0.71 - 0.8 No Bolus Decrease drip by 1 units/kg/hr   0.81 - 0.99 No Bolus Decrease drip by 2 units/kg/hr   > 1 Hold Heparin for 1 hour Decrease drip by 3 units/kg/hr     Obtain anti-Xa 6 hours after initial bolus and 6 hours after any dose change until two consecutive therapeutic anti-Xa levels are achieved - then daily.    Tee García, PharmD  
Morning admission follow-up note    Patient seen and evaluated at the bedside, was admitted earlier this morning for concerns of chest pain which has since resolved.  Noted to have elevated troponin, some atypical features noted, currently wife at bedside, remains n.p.o., patient also on dialysis, he is on Monday/Wednesday/Friday dialysis, today would be his next dialysis.  Cardiology has been consulted for further recommendations, patient chronically on methadone, that has been verified by pharmacy, will be continued, saxagliptin is currently on hold    Vitals:    06/02/25 1101   BP: 112/75   Pulse: 58   Resp: 16   Temp:    SpO2: 98%      Conditions under treatment    # Chest pain  # Elevated troponin  # End-stage renal disease  # Essential hypertension  # Chronic pain  # Diabetes mellitus  
NAME:  Colby Ovalle  YOB: 1950  MEDICAL RECORD NUMBER:  [id number]    Shift Summary: Pt very irritable overnight and c/o methadone withdrawal noted by restlessness and BUE shaking . PRN Dilaudid given x1 per pt request in addition to PRN Versed added. Order to give Methadone early per pt request received. IABP running 1:1 without issue. R fem site soft and intact. Pt refused labs by phlebotomist at 0400, but agreeable to being drawn at a later time.     Family updated: No    Rhythm:  1st degree AVB, SB, +QT, IVCD    Most recent vitals:   Visit Vitals  BP (!) 120/59   Pulse 58   Temp 98.3 °F (36.8 °C) (Axillary)   Resp 15   Ht 1.829 m (6')   Wt 76.2 kg (168 lb)   SpO2 98%   BMI 22.78 kg/m²           No data found.    Patient Vitals for the past 12 hrs:   IAB Frequency IABP Trigger Assisted ABP Assisted ABP Mean Augmented Mean IABP Flushed IABP Zeroed Timing Auto Gas Fill Auto He Cylinder Status   06/04/25 0400 -- ECG 86/45 79 111 -- -- -- -- --   06/04/25 0300 -- ECG 84/41 70 106 -- -- -- -- --   06/04/25 0200 -- ECG 83/39 74 106 -- -- -- -- --   06/04/25 0100 -- ECG 95/43 84 120 -- -- -- -- --   06/04/25 0000 -- /44 90 125 Yes Other (Comment) Yes Yes 1/4 Full   06/03/25 2300 -- /46 92 125 -- -- -- -- --   06/03/25 2200 -- ECG 91/52 85 116 Yes Other (Comment) Yes Yes 1/4 Full   06/03/25 2100 -- PacerA 107/40 87 123 -- -- -- -- --   06/03/25 2000 1:1 ECG 85/42 76 107 Yes Other (Comment) Yes Yes 1/4 Full   06/03/25 1900 -- ECG 99/54 89 126 -- -- -- -- --   06/03/25 1815 -- PacerA 94/42 83 116 -- -- -- -- --   06/03/25 1800 1:1 ECG 91/48 79 114 Yes Yes Yes Yes 1/4 Full         Respiratory support needed (if any):  - RA    Admission weight Weight - Scale: 83.7 kg (184 lb 8.4 oz) (06/01/25 2311)    Today's weight    Wt Readings from Last 1 Encounters:   06/04/25 77 kg (169 lb 12.1 oz)        Rucker need assessed each shift: N/A - no rucker present  UOP >30ml/hr: YES  Last documented BM (in 
NAME:  Colby Ovalle  YOB: 1950  MEDICAL RECORD NUMBER:  [id number]    Shift Summary: No acute events overnight. IABP counterpulsation device functioning appropriately with good augmentation pressures. Patient free of chest pain throughout the night. See other note from this writer regarding early admin of methadone dose. ECHO ordered for the morning. HD pending transfer arrangements.    Family updated: Yes:  SO updated at bedside last night    Rhythm:  SB w variable 1st & 2nd degree AV blocks intermittently overnight    Most recent vitals:   Visit Vitals  /61   Pulse (!) 47   Temp 98 °F (36.7 °C) (Oral)   Resp 12   Ht 1.829 m (6')   Wt 79.5 kg (175 lb 4.3 oz)   SpO2 98%   BMI 23.77 kg/m²           No data found.    Patient Vitals for the past 12 hrs:   IAB Frequency IABP Trigger Assisted ABP Assisted ABP Mean Augmented Mean IABP Flushed IABP Zeroed Timing Auto Gas Fill Auto He Cylinder Status   06/03/25 0400 1:1 ECG 95/42 84 126 Yes Yes Yes Yes 1/4 Full   06/03/25 0300 1:1 /58 92 134 Yes Yes Yes Yes Full   06/03/25 0200 1:1 ECG 75/41 70 100 Yes Yes Yes Yes 1/4 Full   06/03/25 0100 1:1 ECG 79/40 70 103 Yes Yes Yes Yes 1/4 Full   06/03/25 0000 1:1 ECG 83/41 71 107 Yes Yes Yes Yes 1/4 Full   06/02/25 2300 1:1 ECG 79/45 75 108 Yes Yes Yes Yes 1/4 Full   06/02/25 2200 1:1 ECG 72/46 72 101 Yes Yes Yes Yes 1/4 Full   06/02/25 2100 1:1 /52 84 130 Yes Yes Yes Yes 1/4 Full   06/02/25 2000 1:1 ECG 92/54 88 127 Yes Yes Yes Yes 1/4 Full   06/02/25 1900 -- PacerA 84/40 69 112 -- -- -- -- --   06/02/25 1845 -- ECG 74/45 71 109 -- -- -- -- --   06/02/25 1830 -- ECG 91/50 76 121 -- -- -- -- --   06/02/25 1815 -- ECG 73/43 71 108 -- -- -- -- --   06/02/25 1800 -- ECG 76/40 68 106 -- -- -- -- --   06/02/25 1745 -- ECG 77/41 68 117 -- -- -- -- --   06/02/25 1730 -- ECG 80/44 74 118 -- -- -- -- --   06/02/25 1715 -- ECG 82/42 77 115 -- -- -- -- --   06/02/25 1700 1:1 ECG 82/46 77 109 Yes Yes Yes 
NAME:  Colby Ovalle  YOB: 1950  MEDICAL RECORD NUMBER:  [id number]    Shift Summary: Admitted to CVU s/p cath lab after IABP placement. Plan is to transfer to [place] at some point for CTS workup. Beto wants echo tomorrow to see LVEF without IABP assistance / paused. HD to occur first thing tomorrow morning.     Family updated: Yes:  wife at bedside    Rhythm:  Sinus Bradycardia    Most recent vitals:   Visit Vitals  /88   Pulse (!) 43   Temp 98 °F (36.7 °C) (Oral)   Resp 15   Ht 1.829 m (6')   Wt 79.5 kg (175 lb 4.3 oz)   SpO2 100%   BMI 23.77 kg/m²           No data found.    Patient Vitals for the past 12 hrs:   IAB Frequency IABP Trigger Assisted ABP Assisted ABP Mean Augmented Mean IABP Flushed IABP Zeroed Timing Auto Gas Fill Auto He Cylinder Status   06/02/25 1845 -- ECG 74/45 71 109 -- -- -- -- --   06/02/25 1830 -- ECG 91/50 76 121 -- -- -- -- --   06/02/25 1815 -- ECG 73/43 71 108 -- -- -- -- --   06/02/25 1800 -- ECG 76/40 68 106 -- -- -- -- --   06/02/25 1745 -- ECG 77/41 68 117 -- -- -- -- --   06/02/25 1730 -- ECG 80/44 74 118 -- -- -- -- --   06/02/25 1715 -- ECG 82/42 77 115 -- -- -- -- --   06/02/25 1700 1:1 ECG 82/46 77 109 Yes Yes Yes Yes 1/4 Full   06/02/25 1645 -- ECG 62/37 57 87 -- -- -- -- --   06/02/25 1630 -- ECG 71/46 71 102 -- -- -- -- --   06/02/25 1615 -- ECG 77/45 71 112 -- -- -- -- --   06/02/25 1600 1:1 ECG 81/51 77 114 Yes Yes Yes Yes 1/4 Full   06/02/25 1545 -- ECG 90/46 83 126 -- -- -- -- --   06/02/25 1500 1:1 ECG 75/47 75 111 Yes Yes Yes Yes 1/4 Full   06/02/25 1400 1:1 ECG 86/41 78 110 Yes Yes Yes Yes 1/4 Full   06/02/25 1309 1:1 ECG -- -- -- Yes -- Yes Yes --         Respiratory support needed (if any):  - RA    Admission weight Weight - Scale: 83.7 kg (184 lb 8.4 oz) (06/01/25 2311)    Today's weight    Wt Readings from Last 1 Encounters:   06/02/25 79.5 kg (175 lb 4.3 oz)        Rucker need assessed each shift: N/A - no rucker present  UOP 
NAME:  Colby Ovalle  YOB: 1950  MEDICAL RECORD NUMBER:  [id number]    Shift Summary: Pt continues on CVU with IABP in place. R fem site absent of bleeding or hematoma. Peripheral pulses audible via doppler. PRN Dilaudid given X2 with moderate relief. No BM this shift. Glycolax given. Plan for [facility] today.     Family updated: No    Rhythm: Normal Sinus Rhythm     Most recent vitals:   Visit Vitals  BP (!) 140/82   Pulse 86   Temp 98.4 °F (36.9 °C) (Oral)   Resp 13   Ht 1.829 m (6')   Wt 77 kg (169 lb 12.1 oz)   SpO2 96%   BMI 23.02 kg/m²           No data found.    Patient Vitals for the past 12 hrs:   IAB Frequency IABP Trigger Assisted ABP Assisted ABP Mean Augmented Mean IABP Flushed IABP Zeroed Timing Auto Gas Fill Auto He Cylinder Status   06/05/25 0600 -- /49 94 126 -- -- -- -- --   06/05/25 0500 -- PacerA 133/53 103 142 -- -- -- -- --   06/05/25 0400 1:1 /49 94 132 Yes Other (Comment) Yes Yes 1/4 Full   06/05/25 0300 -- ECG 99/51 88 123 -- -- -- -- --   06/05/25 0200 1:1 PacerA 115/46 96 135 Yes Other (Comment) Yes Yes 1/4 Full   06/05/25 0100 -- /57 104 137 -- -- -- -- --   06/05/25 0000 1:1 ECG 95/44 85 119 Yes Other (Comment) Yes Yes 1/4 Full   06/04/25 2300 -- /52 93 121 -- -- -- -- --   06/04/25 2200 1:1 PacerA 113/46 93 125 Yes Other (Comment) Yes Yes 1/4 Full   06/04/25 2100 -- /53 101 139 -- -- -- -- --   06/04/25 2015 1:1 PacerA 125/60 104 145 Yes Other (Comment) Yes Yes 1/4 Full   06/04/25 2000 1:1 PacerA 128/54 105 150 Yes Other (Comment) Yes Yes 1/4 Full   06/04/25 1900 1:1 /58 100 144 -- -- -- -- --         Respiratory support needed (if any):  - RA    Admission weight Weight - Scale: 83.7 kg (184 lb 8.4 oz) (06/01/25 2311)    Today's weight    Wt Readings from Last 1 Encounters:   06/04/25 77 kg (169 lb 12.1 oz)        Rucker need assessed each shift: N/A - no rucker present  UOP >30ml/hr: YES  Last 
NAME:  Colby Ovalle  YOB: 1950  MEDICAL RECORD NUMBER:  [id number]    Shift Summary:  IABP remains in R femoral artery, intermittent augmentation alarms, pt's vitals remained stable. Pt's HR: 1 degree AV block,Sinus Arrhythmia, Sinus Adam, Rare Junctional escape beats, prolonged QT. Patient suddenly became irritable and began c/o sudden back pain. Notified Dr.Roy JOSE gomesid ordered and given. Methadone dosage decreased to 60mg d/t prolonged QT. 1g MAG replacement.      Family updated: Yes:  wife at bedside    Rhythm: Sinus Arrhythmia, Sinus Adam, with Junction escape beats,prolonged QT    Most recent vitals:   Visit Vitals  BP (!) 165/101   Pulse 71   Temp 97.8 °F (36.6 °C) (Oral)   Resp 15   Ht 1.829 m (6')   Wt 76.2 kg (168 lb)   SpO2 97%   BMI 22.78 kg/m²           No data found.    Patient Vitals for the past 12 hrs:   IAB Frequency IABP Trigger Assisted ABP Assisted ABP Mean Augmented Mean IABP Flushed IABP Zeroed Timing Auto Gas Fill Auto He Cylinder Status   06/03/25 1739 -- PacerA 104/46 94 136 -- -- -- -- --   06/03/25 1700 -- /61 122 165 Yes Yes Yes Yes 1/4 Full   06/03/25 1600 1:1 ECG 94/41 80 122 Yes Yes Yes Yes 1/4 Full   06/03/25 1500 1:1 ECG 73/42 72 103 Yes Yes Yes Yes 1/4 Full   06/03/25 1400 1:1 ECG 73/43 68 103 Yes Yes Yes Yes 1/4 Full   06/03/25 1315 -- ECG 76/47 74 110 -- -- -- -- --   06/03/25 1300 1:1 ECG 83/48 75 112 Yes Yes Yes Yes 1/4 Full   06/03/25 1200 1:1 ECG 80/42 77 115 Yes Yes Yes Yes 1/4 Full   06/03/25 1100 -- PacerA 93/40 78 122 -- -- -- -- --   06/03/25 1000 1:1 PacerA 86/43 79 120 Yes Yes Yes Yes 1/4 Full   06/03/25 0900 1:1 ECG 73/43 74 106 Yes -- Yes Yes 1/4 Full   06/03/25 0836 -- ECG 66/43 69 99 -- -- -- -- --   06/03/25 0800 1:1 ECG 67/41 67 94 Yes Yes Yes Yes 1/4 Full   06/03/25 0700 1:1 ECG 95/46 83 119 Yes Yes Yes Yes 1/4 Full         Respiratory support needed (if any):  - RA    Admission weight Weight - Scale: 83.7 kg (184 lb 8.4 oz) 
Patient admitted to [facility] and prepped for procedure.  Questions and concerns addressed.  
Patient came to ED 6/1/25 due to chest pain. Patient arrived to inpatient PCU room 5261 this AM. Patient is alert and oriented to self, time, place and situation. Patient was able to transfer self from ED bed to inpatient PCU bed with stand by assist of staff. Patient is oriented to room and call system. All personal items are within reach.  
Patient communicated with this RN that he was beginning to experience methadone withdrawal symptoms or shaking, restlessness, irritability. Shaking noted in BUE. He said he believes that the methadone syrup he takes at home lasts longer than the pills supplied at the hospital, and requested his methadone dose early. Secure message sent to Dr EDEL Burgos, who provided a telephone order to administer the morning dose of methadone now.  
Patient down for cath procedure, no orders were placed. Consent to be done downstairs. Hep gtt infusing at time of transport. Wife downstairs in waiting.    Electronically signed by Kelsey George RN on 6/2/2025 at 10:56 AM    
Patient transferring to an acute care hospital. Report called to facility Nurse Chely. Patient transported to facility in the care of strategic transport service via stretcher.  Patient noted without acute distress upon discharge. Belonging sent with patient. This nurse to accompany patient for management of balloon pump.   Electronically signed by Fide Mendoza RN on 6/5/2025        
Patient's non-controlled home medication, sucroferric oxyhydroxide (VELPHORO) collected and placed in resealable bag & security bag and locked in Home medication cabinet in Medication room B in CVU. All paperwork completed and signed by patient. Home med alert sign posted outside of pt's room.  
Pharmacy Medication Reconciliation Note     List of medications patient is currently taking is complete.    Source [facility] information:   [facility]  [facility]  3. Patient interview    Notes regarding home medications:   1. Added Lidocaine 5% patch    2. Changed Methadone from 100mg to 95mg daily  3. Changed Atorvastatin to 20mg QHS  4. Removed VItamin D    Denies taking any other OTC or herbal medications    Devin Mtz, ALESHIA, RP 6/2/2025 9:20 AM      
[facility] patient supplied medication returned to patient prior to transfer to [place].   
Treatment time: 3.5hr    Net UF: 2200ml    Pre weight: 78.7kg  Post weight: 76.4kg  EDW: 76.5kg    Access used: ruaf  Access function: good    Medications or blood products given: none    Regular outpatient schedule: mwf    Summary of response to treatment: Patient tolerated tx well. VSS. Used 16g needle per outpt clinic.    Copy of dialysis treatment record placed in chart, to be scanned into EMR.  
admit)   06/03/25 2000 06/01/25                Restraints (in use currently or dc'd in last 12 hrs): No    Order current and documentation up to date? No    Lines/Drains reviewed @ bedside.  Peripheral IV 06/02/25 Right Forearm (Active)   Number of days: 2       Peripheral IV 06/02/25 Right;Anterior Forearm (Active)   Number of days: 2       Intra Aortic Balloon Pump 06/02/25 Right (Active)   Number of days: 2       Hemodialysis Fistula/Graft Arteriovenous fistula Left Forearm (Active)   Number of days: 2         Drip rates at handoff:    heparin (PORCINE) Infusion 1,310 Units/hr (06/04/25 1702)    sodium chloride Stopped (06/02/25 1227)    dextrose      sodium chloride         Lab Data:   CBC:   Recent Labs     06/03/25 0245 06/04/25  0628   WBC 7.1 7.4   HGB 13.3* 14.9   HCT 39.5* 43.7   MCV 90.9 90.7    150     BMP:    Recent Labs     06/03/25 0245 06/04/25  0628   * 133*   K 4.9 4.3   CO2 25 23   BUN 55* 39*   CREATININE 8.2* 6.8*     ABG: No results for input(s): \"PHART\", \"SIK1RZL\", \"PO2ART\" in the last 72 hours.    Any consults during the shift? No    Any signed and held orders to be released?  No        4 Eyes Skin Assessment       The patient is being assessed for  Shift Handoff    I agree that at least one RN has performed a thorough Head to Toe Skin Assessment on the patient. ALL assessment sites listed below have been assessed.      Areas assessed by both nurses: Head, Face, Ears, Shoulders, Back, Chest, Arms, Elbows, Hands, Sacrum. Buttock, Coccyx, Ischium, Legs. Feet and Heels, and Under Medical Devices         Does the Patient have a Wound? No noted wound(s)    Wound Care Orders initiated or active by RN: No       Nicolás Prevention initiated or active: Yes    Pressure Injury (Stage 3,4, Unstageable, DTI, NWPT, and Complex wounds): No  If present, place \"IP Wound Care/Ostomy Nurse Eval and Treat\" in : No    Ostomies present: No  If new Ostomy, place \"IP Wound Care/Ostomy Nurse

## 2025-06-06 ENCOUNTER — APPOINTMENT (OUTPATIENT)
Dept: GENERAL RADIOLOGY | Age: 75
DRG: 235 | End: 2025-06-06
Attending: INTERNAL MEDICINE
Payer: OTHER GOVERNMENT

## 2025-06-06 ENCOUNTER — APPOINTMENT (OUTPATIENT)
Dept: CT IMAGING | Age: 75
DRG: 235 | End: 2025-06-06
Attending: INTERNAL MEDICINE
Payer: OTHER GOVERNMENT

## 2025-06-06 LAB
ALBUMIN SERPL-MCNC: 3.4 G/DL (ref 3.4–5)
ANION GAP SERPL CALCULATED.3IONS-SCNC: 10 MMOL/L (ref 3–16)
ANTI-XA UNFRAC HEPARIN: 0.48 IU/ML (ref 0.3–0.7)
BASE EXCESS BLDA CALC-SCNC: 2.5 MMOL/L (ref -3–3)
BASOPHILS # BLD: 0 K/UL (ref 0–0.2)
BASOPHILS NFR BLD: 0.6 %
BUN SERPL-MCNC: 42 MG/DL (ref 7–20)
CALCIUM SERPL-MCNC: 10.2 MG/DL (ref 8.3–10.6)
CHLORIDE SERPL-SCNC: 98 MMOL/L (ref 99–110)
CO2 BLDA-SCNC: 64.8 MMOL/L
CO2 SERPL-SCNC: 25 MMOL/L (ref 21–32)
COHGB MFR BLDA: 0.7 % (ref 0–1.5)
CREAT SERPL-MCNC: 6.3 MG/DL (ref 0.8–1.3)
DEPRECATED RDW RBC AUTO: 14.4 % (ref 12.4–15.4)
EOSINOPHIL # BLD: 0 K/UL (ref 0–0.6)
EOSINOPHIL NFR BLD: 0.8 %
GFR SERPLBLD CREATININE-BSD FMLA CKD-EPI: 9 ML/MIN/{1.73_M2}
GLUCOSE BLD-MCNC: 128 MG/DL (ref 70–99)
GLUCOSE BLD-MCNC: 189 MG/DL (ref 70–99)
GLUCOSE BLD-MCNC: 205 MG/DL (ref 70–99)
GLUCOSE BLD-MCNC: 226 MG/DL (ref 70–99)
GLUCOSE SERPL-MCNC: 233 MG/DL (ref 70–99)
HCO3 BLDA-SCNC: 27.6 MMOL/L (ref 21–29)
HCT VFR BLD AUTO: 38.7 % (ref 40.5–52.5)
HGB BLD-MCNC: 12.8 G/DL (ref 13.5–17.5)
HGB BLDA-MCNC: 13.9 G/DL (ref 13.5–17.5)
INR PPP: 1 (ref 0.85–1.15)
LYMPHOCYTES # BLD: 0.9 K/UL (ref 1–5.1)
LYMPHOCYTES NFR BLD: 16.7 %
MAGNESIUM SERPL-MCNC: 2 MG/DL (ref 1.8–2.4)
MCH RBC QN AUTO: 30.2 PG (ref 26–34)
MCHC RBC AUTO-ENTMCNC: 33.2 G/DL (ref 31–36)
MCV RBC AUTO: 91 FL (ref 80–100)
METHGB MFR BLDA: 1.9 %
MONOCYTES # BLD: 0.5 K/UL (ref 0–1.3)
MONOCYTES NFR BLD: 9.5 %
MRSA DNA SPEC QL NAA+PROBE: NORMAL
NEUTROPHILS # BLD: 4 K/UL (ref 1.7–7.7)
NEUTROPHILS NFR BLD: 72.4 %
O2 THERAPY: ABNORMAL
PCO2 BLDA: 43.5 MMHG (ref 35–45)
PERFORMED ON: ABNORMAL
PH BLDA: 7.41 [PH] (ref 7.35–7.45)
PHOSPHATE SERPL-MCNC: 5.1 MG/DL (ref 2.5–4.9)
PLATELET # BLD AUTO: 94 K/UL (ref 135–450)
PLATELET BLD QL SMEAR: ADEQUATE
PMV BLD AUTO: 9.4 FL (ref 5–10.5)
PO2 BLDA: 79.5 MMHG (ref 75–108)
POTASSIUM SERPL-SCNC: 5.5 MMOL/L (ref 3.5–5.1)
PROTHROMBIN TIME: 13.4 SEC (ref 11.9–14.9)
RBC # BLD AUTO: 4.25 M/UL (ref 4.2–5.9)
RBC MORPH BLD: NORMAL
SAO2 % BLDA: 94.6 %
SLIDE REVIEW: ABNORMAL
SODIUM SERPL-SCNC: 133 MMOL/L (ref 136–145)
WBC # BLD AUTO: 5.5 K/UL (ref 4–11)

## 2025-06-06 PROCEDURE — 6370000000 HC RX 637 (ALT 250 FOR IP)

## 2025-06-06 PROCEDURE — 83735 ASSAY OF MAGNESIUM: CPT

## 2025-06-06 PROCEDURE — 2100000000 HC CCU R&B

## 2025-06-06 PROCEDURE — 2500000003 HC RX 250 WO HCPCS: Performed by: STUDENT IN AN ORGANIZED HEALTH CARE EDUCATION/TRAINING PROGRAM

## 2025-06-06 PROCEDURE — 85025 COMPLETE CBC W/AUTO DIFF WBC: CPT

## 2025-06-06 PROCEDURE — 36600 WITHDRAWAL OF ARTERIAL BLOOD: CPT

## 2025-06-06 PROCEDURE — 80069 RENAL FUNCTION PANEL: CPT

## 2025-06-06 PROCEDURE — 85520 HEPARIN ASSAY: CPT

## 2025-06-06 PROCEDURE — 71250 CT THORAX DX C-: CPT

## 2025-06-06 PROCEDURE — 71045 X-RAY EXAM CHEST 1 VIEW: CPT

## 2025-06-06 PROCEDURE — 90935 HEMODIALYSIS ONE EVALUATION: CPT

## 2025-06-06 PROCEDURE — 6370000000 HC RX 637 (ALT 250 FOR IP): Performed by: INTERNAL MEDICINE

## 2025-06-06 PROCEDURE — 82803 BLOOD GASES ANY COMBINATION: CPT

## 2025-06-06 PROCEDURE — 85610 PROTHROMBIN TIME: CPT

## 2025-06-06 PROCEDURE — 6370000000 HC RX 637 (ALT 250 FOR IP): Performed by: STUDENT IN AN ORGANIZED HEALTH CARE EDUCATION/TRAINING PROGRAM

## 2025-06-06 PROCEDURE — 6360000002 HC RX W HCPCS: Performed by: STUDENT IN AN ORGANIZED HEALTH CARE EDUCATION/TRAINING PROGRAM

## 2025-06-06 PROCEDURE — 99291 CRITICAL CARE FIRST HOUR: CPT | Performed by: INTERNAL MEDICINE

## 2025-06-06 RX ORDER — HYDRALAZINE HYDROCHLORIDE 20 MG/ML
10 INJECTION INTRAMUSCULAR; INTRAVENOUS EVERY 6 HOURS PRN
Status: DISCONTINUED | OUTPATIENT
Start: 2025-06-06 | End: 2025-06-09

## 2025-06-06 RX ORDER — PHENAZOPYRIDINE HYDROCHLORIDE 100 MG/1
200 TABLET, FILM COATED ORAL 3 TIMES DAILY PRN
Status: DISCONTINUED | OUTPATIENT
Start: 2025-06-06 | End: 2025-06-06

## 2025-06-06 RX ADMIN — METHADONE HYDROCHLORIDE 60 MG: 10 TABLET ORAL at 06:04

## 2025-06-06 RX ADMIN — PANTOPRAZOLE SODIUM 40 MG: 40 INJECTION, POWDER, FOR SOLUTION INTRAVENOUS at 09:03

## 2025-06-06 RX ADMIN — HYDROMORPHONE HYDROCHLORIDE 2 MG: 2 TABLET ORAL at 01:40

## 2025-06-06 RX ADMIN — ONDANSETRON 4 MG: 2 INJECTION, SOLUTION INTRAMUSCULAR; INTRAVENOUS at 01:45

## 2025-06-06 RX ADMIN — HYDROMORPHONE HYDROCHLORIDE 2 MG: 2 TABLET ORAL at 20:22

## 2025-06-06 RX ADMIN — SODIUM CITRATE AND CITRIC ACID MONOHYDRATE 10 ML: 500; 334 SOLUTION ORAL at 20:22

## 2025-06-06 RX ADMIN — SODIUM CHLORIDE, PRESERVATIVE FREE 1 MG: 5 INJECTION INTRAVENOUS at 02:45

## 2025-06-06 RX ADMIN — ASPIRIN 81 MG: 81 TABLET, DELAYED RELEASE ORAL at 09:04

## 2025-06-06 RX ADMIN — INSULIN LISPRO 2 UNITS: 100 INJECTION, SOLUTION INTRAVENOUS; SUBCUTANEOUS at 09:37

## 2025-06-06 RX ADMIN — INSULIN LISPRO 2 UNITS: 100 INJECTION, SOLUTION INTRAVENOUS; SUBCUTANEOUS at 17:36

## 2025-06-06 RX ADMIN — METOPROLOL TARTRATE 12.5 MG: 25 TABLET, FILM COATED ORAL at 09:04

## 2025-06-06 RX ADMIN — SODIUM CHLORIDE, PRESERVATIVE FREE 10 ML: 5 INJECTION INTRAVENOUS at 20:23

## 2025-06-06 RX ADMIN — ATORVASTATIN CALCIUM 80 MG: 80 TABLET, FILM COATED ORAL at 20:22

## 2025-06-06 RX ADMIN — SODIUM CITRATE AND CITRIC ACID MONOHYDRATE 10 ML: 500; 334 SOLUTION ORAL at 09:07

## 2025-06-06 RX ADMIN — HEPARIN SODIUM 1310 UNITS/HR: 10000 INJECTION, SOLUTION INTRAVENOUS at 17:40

## 2025-06-06 RX ADMIN — SODIUM CHLORIDE, PRESERVATIVE FREE 1 MG: 5 INJECTION INTRAVENOUS at 22:00

## 2025-06-06 RX ADMIN — INSULIN LISPRO 2 UNITS: 100 INJECTION, SOLUTION INTRAVENOUS; SUBCUTANEOUS at 21:01

## 2025-06-06 ASSESSMENT — PAIN DESCRIPTION - FREQUENCY: FREQUENCY: CONTINUOUS

## 2025-06-06 ASSESSMENT — PAIN DESCRIPTION - PAIN TYPE: TYPE: CHRONIC PAIN;ACUTE PAIN

## 2025-06-06 ASSESSMENT — PAIN SCALES - GENERAL
PAINLEVEL_OUTOF10: 7
PAINLEVEL_OUTOF10: 0
PAINLEVEL_OUTOF10: 0

## 2025-06-06 ASSESSMENT — PAIN DESCRIPTION - ORIENTATION: ORIENTATION: LOWER

## 2025-06-06 ASSESSMENT — PAIN - FUNCTIONAL ASSESSMENT: PAIN_FUNCTIONAL_ASSESSMENT: ACTIVITIES ARE NOT PREVENTED

## 2025-06-06 ASSESSMENT — PAIN DESCRIPTION - LOCATION: LOCATION: BACK

## 2025-06-06 ASSESSMENT — PAIN DESCRIPTION - DESCRIPTORS: DESCRIPTORS: SORE

## 2025-06-06 ASSESSMENT — PAIN DESCRIPTION - ONSET: ONSET: GRADUAL

## 2025-06-07 PROBLEM — E87.5 HYPERKALEMIA: Status: ACTIVE | Noted: 2025-06-07

## 2025-06-07 PROBLEM — I25.5 ISCHEMIC CARDIOMYOPATHY: Status: ACTIVE | Noted: 2025-06-07

## 2025-06-07 PROBLEM — R94.31 PROLONGED Q-T INTERVAL ON ECG: Status: ACTIVE | Noted: 2025-06-07

## 2025-06-07 LAB
ALBUMIN SERPL-MCNC: 3.6 G/DL (ref 3.4–5)
ALBUMIN SERPL-MCNC: 4 G/DL (ref 3.4–5)
ALBUMIN/GLOB SERPL: 1.1 {RATIO} (ref 1.1–2.2)
ALP SERPL-CCNC: 88 U/L (ref 40–129)
ALT SERPL-CCNC: 39 U/L (ref 10–40)
ANION GAP SERPL CALCULATED.3IONS-SCNC: 13 MMOL/L (ref 3–16)
ANION GAP SERPL CALCULATED.3IONS-SCNC: 15 MMOL/L (ref 3–16)
ANTI-XA UNFRAC HEPARIN: <0.1 IU/ML (ref 0.3–0.7)
ANTI-XA UNFRAC HEPARIN: >1.1 IU/ML (ref 0.3–0.7)
AST SERPL-CCNC: 52 U/L (ref 15–37)
BASOPHILS # BLD: 0.1 K/UL (ref 0–0.2)
BASOPHILS NFR BLD: 1.3 %
BILIRUB SERPL-MCNC: 0.4 MG/DL (ref 0–1)
BUN SERPL-MCNC: 36 MG/DL (ref 7–20)
BUN SERPL-MCNC: 43 MG/DL (ref 7–20)
CALCIUM SERPL-MCNC: 9.3 MG/DL (ref 8.3–10.6)
CALCIUM SERPL-MCNC: 9.5 MG/DL (ref 8.3–10.6)
CHLORIDE SERPL-SCNC: 93 MMOL/L (ref 99–110)
CHLORIDE SERPL-SCNC: 94 MMOL/L (ref 99–110)
CO2 SERPL-SCNC: 22 MMOL/L (ref 21–32)
CO2 SERPL-SCNC: 28 MMOL/L (ref 21–32)
CREAT SERPL-MCNC: 5.8 MG/DL (ref 0.8–1.3)
CREAT SERPL-MCNC: 6.2 MG/DL (ref 0.8–1.3)
DEPRECATED RDW RBC AUTO: 14.6 % (ref 12.4–15.4)
DEPRECATED RDW RBC AUTO: 14.8 % (ref 12.4–15.4)
EOSINOPHIL # BLD: 0.1 K/UL (ref 0–0.6)
EOSINOPHIL NFR BLD: 1.4 %
GFR SERPLBLD CREATININE-BSD FMLA CKD-EPI: 10 ML/MIN/{1.73_M2}
GFR SERPLBLD CREATININE-BSD FMLA CKD-EPI: 9 ML/MIN/{1.73_M2}
GLUCOSE BLD-MCNC: 127 MG/DL (ref 70–99)
GLUCOSE BLD-MCNC: 176 MG/DL (ref 70–99)
GLUCOSE BLD-MCNC: 185 MG/DL (ref 70–99)
GLUCOSE SERPL-MCNC: 195 MG/DL (ref 70–99)
GLUCOSE SERPL-MCNC: 255 MG/DL (ref 70–99)
HCT VFR BLD AUTO: 41.4 % (ref 40.5–52.5)
HCT VFR BLD AUTO: 41.9 % (ref 40.5–52.5)
HGB BLD-MCNC: 13.7 G/DL (ref 13.5–17.5)
HGB BLD-MCNC: 13.9 G/DL (ref 13.5–17.5)
INR PPP: 1.03 (ref 0.85–1.15)
LYMPHOCYTES # BLD: 1.3 K/UL (ref 1–5.1)
LYMPHOCYTES NFR BLD: 18.7 %
MAGNESIUM SERPL-MCNC: 2.02 MG/DL (ref 1.8–2.4)
MCH RBC QN AUTO: 30.1 PG (ref 26–34)
MCH RBC QN AUTO: 30.3 PG (ref 26–34)
MCHC RBC AUTO-ENTMCNC: 33.1 G/DL (ref 31–36)
MCHC RBC AUTO-ENTMCNC: 33.3 G/DL (ref 31–36)
MCV RBC AUTO: 90.9 FL (ref 80–100)
MCV RBC AUTO: 91.1 FL (ref 80–100)
MONOCYTES # BLD: 0.6 K/UL (ref 0–1.3)
MONOCYTES NFR BLD: 9 %
NEUTROPHILS # BLD: 4.7 K/UL (ref 1.7–7.7)
NEUTROPHILS NFR BLD: 69.6 %
PERFORMED ON: ABNORMAL
PHOSPHATE SERPL-MCNC: 3.7 MG/DL (ref 2.5–4.9)
PHOSPHATE SERPL-MCNC: 4.3 MG/DL (ref 2.5–4.9)
PLATELET # BLD AUTO: 105 K/UL (ref 135–450)
PLATELET # BLD AUTO: 96 K/UL (ref 135–450)
PMV BLD AUTO: 9.6 FL (ref 5–10.5)
PMV BLD AUTO: 9.8 FL (ref 5–10.5)
POTASSIUM SERPL-SCNC: 4.9 MMOL/L (ref 3.5–5.1)
POTASSIUM SERPL-SCNC: 5.3 MMOL/L (ref 3.5–5.1)
PROT SERPL-MCNC: 7 G/DL (ref 6.4–8.2)
PROTHROMBIN TIME: 13.7 SEC (ref 11.9–14.9)
RBC # BLD AUTO: 4.55 M/UL (ref 4.2–5.9)
RBC # BLD AUTO: 4.6 M/UL (ref 4.2–5.9)
SODIUM SERPL-SCNC: 131 MMOL/L (ref 136–145)
SODIUM SERPL-SCNC: 134 MMOL/L (ref 136–145)
WBC # BLD AUTO: 6.8 K/UL (ref 4–11)
WBC # BLD AUTO: 8.3 K/UL (ref 4–11)

## 2025-06-07 PROCEDURE — 2100000000 HC CCU R&B

## 2025-06-07 PROCEDURE — 2500000003 HC RX 250 WO HCPCS: Performed by: STUDENT IN AN ORGANIZED HEALTH CARE EDUCATION/TRAINING PROGRAM

## 2025-06-07 PROCEDURE — 99291 CRITICAL CARE FIRST HOUR: CPT | Performed by: INTERNAL MEDICINE

## 2025-06-07 PROCEDURE — 83735 ASSAY OF MAGNESIUM: CPT

## 2025-06-07 PROCEDURE — 85025 COMPLETE CBC W/AUTO DIFF WBC: CPT

## 2025-06-07 PROCEDURE — 6370000000 HC RX 637 (ALT 250 FOR IP)

## 2025-06-07 PROCEDURE — 84100 ASSAY OF PHOSPHORUS: CPT

## 2025-06-07 PROCEDURE — 6360000002 HC RX W HCPCS: Performed by: STUDENT IN AN ORGANIZED HEALTH CARE EDUCATION/TRAINING PROGRAM

## 2025-06-07 PROCEDURE — 85520 HEPARIN ASSAY: CPT

## 2025-06-07 PROCEDURE — 51701 INSERT BLADDER CATHETER: CPT

## 2025-06-07 PROCEDURE — 80053 COMPREHEN METABOLIC PANEL: CPT

## 2025-06-07 PROCEDURE — 85610 PROTHROMBIN TIME: CPT

## 2025-06-07 PROCEDURE — 51798 US URINE CAPACITY MEASURE: CPT

## 2025-06-07 PROCEDURE — 6370000000 HC RX 637 (ALT 250 FOR IP): Performed by: INTERNAL MEDICINE

## 2025-06-07 PROCEDURE — 85027 COMPLETE CBC AUTOMATED: CPT

## 2025-06-07 PROCEDURE — 6370000000 HC RX 637 (ALT 250 FOR IP): Performed by: STUDENT IN AN ORGANIZED HEALTH CARE EDUCATION/TRAINING PROGRAM

## 2025-06-07 PROCEDURE — 6370000000 HC RX 637 (ALT 250 FOR IP): Performed by: THORACIC SURGERY (CARDIOTHORACIC VASCULAR SURGERY)

## 2025-06-07 RX ORDER — SENNA AND DOCUSATE SODIUM 50; 8.6 MG/1; MG/1
2 TABLET, FILM COATED ORAL DAILY
Status: DISCONTINUED | OUTPATIENT
Start: 2025-06-07 | End: 2025-06-09

## 2025-06-07 RX ORDER — METOPROLOL TARTRATE 25 MG/1
12.5 TABLET, FILM COATED ORAL 2 TIMES DAILY
Status: DISCONTINUED | OUTPATIENT
Start: 2025-06-07 | End: 2025-06-09

## 2025-06-07 RX ORDER — METHOCARBAMOL 500 MG/1
500 TABLET, FILM COATED ORAL 4 TIMES DAILY
Status: DISCONTINUED | OUTPATIENT
Start: 2025-06-07 | End: 2025-06-09

## 2025-06-07 RX ADMIN — PANTOPRAZOLE SODIUM 40 MG: 40 INJECTION, POWDER, FOR SOLUTION INTRAVENOUS at 08:04

## 2025-06-07 RX ADMIN — ATORVASTATIN CALCIUM 80 MG: 80 TABLET, FILM COATED ORAL at 21:10

## 2025-06-07 RX ADMIN — SODIUM CHLORIDE, PRESERVATIVE FREE 10 ML: 5 INJECTION INTRAVENOUS at 08:06

## 2025-06-07 RX ADMIN — HEPARIN SODIUM 4000 UNITS: 1000 INJECTION INTRAVENOUS; SUBCUTANEOUS at 17:22

## 2025-06-07 RX ADMIN — SODIUM CITRATE AND CITRIC ACID MONOHYDRATE 10 ML: 500; 334 SOLUTION ORAL at 21:11

## 2025-06-07 RX ADMIN — INSULIN LISPRO 2 UNITS: 100 INJECTION, SOLUTION INTRAVENOUS; SUBCUTANEOUS at 17:21

## 2025-06-07 RX ADMIN — INSULIN LISPRO 4 UNITS: 100 INJECTION, SOLUTION INTRAVENOUS; SUBCUTANEOUS at 08:04

## 2025-06-07 RX ADMIN — SODIUM CITRATE AND CITRIC ACID MONOHYDRATE 10 ML: 500; 334 SOLUTION ORAL at 09:16

## 2025-06-07 RX ADMIN — SODIUM CHLORIDE, PRESERVATIVE FREE 1 MG: 5 INJECTION INTRAVENOUS at 21:28

## 2025-06-07 RX ADMIN — METOPROLOL TARTRATE 12.5 MG: 25 TABLET, FILM COATED ORAL at 09:17

## 2025-06-07 RX ADMIN — METOPROLOL TARTRATE 12.5 MG: 25 TABLET, FILM COATED ORAL at 21:10

## 2025-06-07 RX ADMIN — ASPIRIN 81 MG: 81 TABLET, DELAYED RELEASE ORAL at 08:04

## 2025-06-07 RX ADMIN — SODIUM CHLORIDE, PRESERVATIVE FREE 1 MG: 5 INJECTION INTRAVENOUS at 14:11

## 2025-06-07 RX ADMIN — HEPARIN SODIUM 1310 UNITS/HR: 10000 INJECTION, SOLUTION INTRAVENOUS at 14:22

## 2025-06-07 RX ADMIN — SODIUM CHLORIDE, PRESERVATIVE FREE 1 MG: 5 INJECTION INTRAVENOUS at 06:01

## 2025-06-07 RX ADMIN — ACETAMINOPHEN 650 MG: 650 SUPPOSITORY RECTAL at 16:10

## 2025-06-07 RX ADMIN — STANDARDIZED SENNA CONCENTRATE AND DOCUSATE SODIUM 2 TABLET: 8.6; 5 TABLET ORAL at 21:10

## 2025-06-07 RX ADMIN — HYDROMORPHONE HYDROCHLORIDE 2 MG: 2 TABLET ORAL at 06:51

## 2025-06-07 RX ADMIN — METHOCARBAMOL 500 MG: 500 TABLET ORAL at 09:16

## 2025-06-07 RX ADMIN — METHADONE HYDROCHLORIDE 60 MG: 10 TABLET ORAL at 07:20

## 2025-06-07 ASSESSMENT — PAIN DESCRIPTION - LOCATION
LOCATION: BACK

## 2025-06-07 ASSESSMENT — PAIN SCALES - GENERAL
PAINLEVEL_OUTOF10: 9
PAINLEVEL_OUTOF10: 5
PAINLEVEL_OUTOF10: 7

## 2025-06-08 ENCOUNTER — APPOINTMENT (OUTPATIENT)
Dept: GENERAL RADIOLOGY | Age: 75
DRG: 235 | End: 2025-06-08
Attending: INTERNAL MEDICINE
Payer: OTHER GOVERNMENT

## 2025-06-08 PROBLEM — R50.9 FEVER: Status: ACTIVE | Noted: 2025-06-08

## 2025-06-08 LAB
ABO/RH: NORMAL
ALBUMIN SERPL-MCNC: 3.5 G/DL (ref 3.4–5)
ALBUMIN/GLOB SERPL: 1 {RATIO} (ref 1.1–2.2)
ALP SERPL-CCNC: 86 U/L (ref 40–129)
ALT SERPL-CCNC: 42 U/L (ref 10–40)
ANION GAP SERPL CALCULATED.3IONS-SCNC: 14 MMOL/L (ref 3–16)
ANTI-XA UNFRAC HEPARIN: 0.27 IU/ML (ref 0.3–0.7)
ANTI-XA UNFRAC HEPARIN: 0.37 IU/ML (ref 0.3–0.7)
ANTI-XA UNFRAC HEPARIN: <0.1 IU/ML (ref 0.3–0.7)
ANTIBODY SCREEN: NORMAL
AST SERPL-CCNC: 48 U/L (ref 15–37)
BACTERIA URNS QL MICRO: ABNORMAL /HPF
BASOPHILS # BLD: 0 K/UL (ref 0–0.2)
BASOPHILS NFR BLD: 0.2 %
BILIRUB SERPL-MCNC: 0.5 MG/DL (ref 0–1)
BILIRUB UR QL STRIP.AUTO: NEGATIVE
BUN SERPL-MCNC: 49 MG/DL (ref 7–20)
CALCIUM SERPL-MCNC: 10 MG/DL (ref 8.3–10.6)
CHLORIDE SERPL-SCNC: 96 MMOL/L (ref 99–110)
CLARITY UR: CLEAR
CO2 SERPL-SCNC: 23 MMOL/L (ref 21–32)
COLOR UR: YELLOW
CREAT SERPL-MCNC: 6.9 MG/DL (ref 0.8–1.3)
DEPRECATED RDW RBC AUTO: 14.6 % (ref 12.4–15.4)
EOSINOPHIL # BLD: 0 K/UL (ref 0–0.6)
EOSINOPHIL NFR BLD: 0.1 %
EPI CELLS #/AREA URNS AUTO: 0 /HPF (ref 0–5)
GFR SERPLBLD CREATININE-BSD FMLA CKD-EPI: 8 ML/MIN/{1.73_M2}
GLUCOSE BLD-MCNC: 132 MG/DL (ref 70–99)
GLUCOSE BLD-MCNC: 150 MG/DL (ref 70–99)
GLUCOSE BLD-MCNC: 181 MG/DL (ref 70–99)
GLUCOSE SERPL-MCNC: 173 MG/DL (ref 70–99)
GLUCOSE UR STRIP.AUTO-MCNC: 250 MG/DL
HCT VFR BLD AUTO: 39.9 % (ref 40.5–52.5)
HGB BLD-MCNC: 13.4 G/DL (ref 13.5–17.5)
HGB UR QL STRIP.AUTO: ABNORMAL
HYALINE CASTS #/AREA URNS AUTO: 4 /LPF (ref 0–8)
KETONES UR STRIP.AUTO-MCNC: ABNORMAL MG/DL
LEUKOCYTE ESTERASE UR QL STRIP.AUTO: ABNORMAL
LYMPHOCYTES # BLD: 0.3 K/UL (ref 1–5.1)
LYMPHOCYTES NFR BLD: 2.6 %
MAGNESIUM SERPL-MCNC: 1.77 MG/DL (ref 1.8–2.4)
MAGNESIUM SERPL-MCNC: 1.78 MG/DL (ref 1.8–2.4)
MCH RBC QN AUTO: 29.9 PG (ref 26–34)
MCHC RBC AUTO-ENTMCNC: 33.7 G/DL (ref 31–36)
MCV RBC AUTO: 88.8 FL (ref 80–100)
MONOCYTES # BLD: 0.6 K/UL (ref 0–1.3)
MONOCYTES NFR BLD: 4.8 %
NEUTROPHILS # BLD: 11.3 K/UL (ref 1.7–7.7)
NEUTROPHILS NFR BLD: 92.3 %
NITRITE UR QL STRIP.AUTO: NEGATIVE
PERFORMED ON: ABNORMAL
PH UR STRIP.AUTO: 7 [PH] (ref 5–8)
PHOSPHATE SERPL-MCNC: 4.4 MG/DL (ref 2.5–4.9)
PLATELET # BLD AUTO: 94 K/UL (ref 135–450)
PMV BLD AUTO: 9.5 FL (ref 5–10.5)
POTASSIUM SERPL-SCNC: 4.9 MMOL/L (ref 3.5–5.1)
PROT SERPL-MCNC: 6.9 G/DL (ref 6.4–8.2)
PROT UR STRIP.AUTO-MCNC: 100 MG/DL
RBC # BLD AUTO: 4.49 M/UL (ref 4.2–5.9)
RBC CLUMPS #/AREA URNS AUTO: 19 /HPF (ref 0–4)
SODIUM SERPL-SCNC: 133 MMOL/L (ref 136–145)
SP GR UR STRIP.AUTO: 1.01 (ref 1–1.03)
UA COMPLETE W REFLEX CULTURE PNL UR: ABNORMAL
UA DIPSTICK W REFLEX MICRO PNL UR: YES
URN SPEC COLLECT METH UR: ABNORMAL
UROBILINOGEN UR STRIP-ACNC: 1 E.U./DL
WBC # BLD AUTO: 12.2 K/UL (ref 4–11)
WBC #/AREA URNS AUTO: 7 /HPF (ref 0–5)

## 2025-06-08 PROCEDURE — 87186 SC STD MICRODIL/AGAR DIL: CPT

## 2025-06-08 PROCEDURE — 85025 COMPLETE CBC W/AUTO DIFF WBC: CPT

## 2025-06-08 PROCEDURE — 02HV33Z INSERTION OF INFUSION DEVICE INTO SUPERIOR VENA CAVA, PERCUTANEOUS APPROACH: ICD-10-PCS | Performed by: INTERNAL MEDICINE

## 2025-06-08 PROCEDURE — 86900 BLOOD TYPING SEROLOGIC ABO: CPT

## 2025-06-08 PROCEDURE — 2500000003 HC RX 250 WO HCPCS: Performed by: STUDENT IN AN ORGANIZED HEALTH CARE EDUCATION/TRAINING PROGRAM

## 2025-06-08 PROCEDURE — 2100000000 HC CCU R&B

## 2025-06-08 PROCEDURE — 87150 DNA/RNA AMPLIFIED PROBE: CPT

## 2025-06-08 PROCEDURE — 81001 URINALYSIS AUTO W/SCOPE: CPT

## 2025-06-08 PROCEDURE — 6370000000 HC RX 637 (ALT 250 FOR IP): Performed by: THORACIC SURGERY (CARDIOTHORACIC VASCULAR SURGERY)

## 2025-06-08 PROCEDURE — 84100 ASSAY OF PHOSPHORUS: CPT

## 2025-06-08 PROCEDURE — 86920 COMPATIBILITY TEST SPIN: CPT

## 2025-06-08 PROCEDURE — 86850 RBC ANTIBODY SCREEN: CPT

## 2025-06-08 PROCEDURE — 6370000000 HC RX 637 (ALT 250 FOR IP): Performed by: STUDENT IN AN ORGANIZED HEALTH CARE EDUCATION/TRAINING PROGRAM

## 2025-06-08 PROCEDURE — 85520 HEPARIN ASSAY: CPT

## 2025-06-08 PROCEDURE — C1751 CATH, INF, PER/CENT/MIDLINE: HCPCS

## 2025-06-08 PROCEDURE — 90935 HEMODIALYSIS ONE EVALUATION: CPT

## 2025-06-08 PROCEDURE — 87040 BLOOD CULTURE FOR BACTERIA: CPT

## 2025-06-08 PROCEDURE — 86922 COMPATIBILITY TEST ANTIGLOB: CPT

## 2025-06-08 PROCEDURE — 36569 INSJ PICC 5 YR+ W/O IMAGING: CPT

## 2025-06-08 PROCEDURE — 99291 CRITICAL CARE FIRST HOUR: CPT | Performed by: INTERNAL MEDICINE

## 2025-06-08 PROCEDURE — 86901 BLOOD TYPING SEROLOGIC RH(D): CPT

## 2025-06-08 PROCEDURE — 71045 X-RAY EXAM CHEST 1 VIEW: CPT

## 2025-06-08 PROCEDURE — 83735 ASSAY OF MAGNESIUM: CPT

## 2025-06-08 PROCEDURE — 05H933Z INSERTION OF INFUSION DEVICE INTO RIGHT BRACHIAL VEIN, PERCUTANEOUS APPROACH: ICD-10-PCS | Performed by: INTERNAL MEDICINE

## 2025-06-08 PROCEDURE — 80053 COMPREHEN METABOLIC PANEL: CPT

## 2025-06-08 PROCEDURE — 36415 COLL VENOUS BLD VENIPUNCTURE: CPT

## 2025-06-08 PROCEDURE — 6360000002 HC RX W HCPCS: Performed by: STUDENT IN AN ORGANIZED HEALTH CARE EDUCATION/TRAINING PROGRAM

## 2025-06-08 RX ORDER — CHLORHEXIDINE GLUCONATE ORAL RINSE 1.2 MG/ML
15 SOLUTION DENTAL ONCE
Status: COMPLETED | OUTPATIENT
Start: 2025-06-09 | End: 2025-06-09

## 2025-06-08 RX ORDER — MUPIROCIN 2 %
OINTMENT (GRAM) TOPICAL 2 TIMES DAILY
Status: DISCONTINUED | OUTPATIENT
Start: 2025-06-08 | End: 2025-06-09 | Stop reason: HOSPADM

## 2025-06-08 RX ORDER — SODIUM CHLORIDE 0.9 % (FLUSH) 0.9 %
5-40 SYRINGE (ML) INJECTION PRN
Status: DISCONTINUED | OUTPATIENT
Start: 2025-06-08 | End: 2025-06-09

## 2025-06-08 RX ORDER — CHLORHEXIDINE GLUCONATE 40 MG/ML
SOLUTION TOPICAL SEE ADMIN INSTRUCTIONS
Status: DISCONTINUED | OUTPATIENT
Start: 2025-06-08 | End: 2025-06-09 | Stop reason: HOSPADM

## 2025-06-08 RX ORDER — SODIUM CHLORIDE 0.9 % (FLUSH) 0.9 %
5-40 SYRINGE (ML) INJECTION EVERY 12 HOURS SCHEDULED
Status: DISCONTINUED | OUTPATIENT
Start: 2025-06-08 | End: 2025-06-09

## 2025-06-08 RX ORDER — PANTOPRAZOLE SODIUM 40 MG/10ML
40 INJECTION, POWDER, LYOPHILIZED, FOR SOLUTION INTRAVENOUS ONCE
Status: COMPLETED | OUTPATIENT
Start: 2025-06-09 | End: 2025-06-09

## 2025-06-08 RX ORDER — SODIUM CHLORIDE 9 MG/ML
INJECTION, SOLUTION INTRAVENOUS PRN
Status: DISCONTINUED | OUTPATIENT
Start: 2025-06-08 | End: 2025-06-09

## 2025-06-08 RX ADMIN — HYDROMORPHONE HYDROCHLORIDE 2 MG: 2 TABLET ORAL at 14:12

## 2025-06-08 RX ADMIN — ACETAMINOPHEN 650 MG: 325 TABLET ORAL at 01:56

## 2025-06-08 RX ADMIN — ACETAMINOPHEN 650 MG: 650 SUPPOSITORY RECTAL at 20:50

## 2025-06-08 RX ADMIN — MUPIROCIN: 20 OINTMENT TOPICAL at 20:56

## 2025-06-08 RX ADMIN — SODIUM CHLORIDE, PRESERVATIVE FREE 10 ML: 5 INJECTION INTRAVENOUS at 08:00

## 2025-06-08 RX ADMIN — SODIUM CHLORIDE, PRESERVATIVE FREE 1 MG: 5 INJECTION INTRAVENOUS at 17:07

## 2025-06-08 RX ADMIN — ASPIRIN 81 MG: 81 TABLET, DELAYED RELEASE ORAL at 14:12

## 2025-06-08 RX ADMIN — HEPARIN SODIUM 4000 UNITS: 1000 INJECTION INTRAVENOUS; SUBCUTANEOUS at 02:47

## 2025-06-08 RX ADMIN — PANTOPRAZOLE SODIUM 40 MG: 40 INJECTION, POWDER, FOR SOLUTION INTRAVENOUS at 20:56

## 2025-06-08 RX ADMIN — METHADONE HYDROCHLORIDE 60 MG: 10 TABLET ORAL at 06:19

## 2025-06-08 RX ADMIN — ACETAMINOPHEN 650 MG: 650 SUPPOSITORY RECTAL at 08:10

## 2025-06-08 NOTE — CONSULTS
POWER MIDLINE PROCEDURE NOTE  Chart reviewed for allergies, diagnosis, labs, known contraindications, reason for line placement and planned length of treatment.  Insertion procedure discussed with patient/family member.  Informed consent not required for Midline placement.  Three patient identifiers - Patient name,   and MRN -  completed &  confirmed verbally.   Hat, mask and eye shield donned.  Midline site scrubbed with Chloraprep  One-Step applicator  for 30 seconds x 1.   Hand Hygiene  performed with 3% Chlorhexidine surgical scrub x1 min prior to  Sterile gloves, sterile gown being donned.  Patient draped using maximal sterile barrier technique ( head to toe ).  Midline site scrubbed a 2nd time with Chloraprep One-Step applicator x 30 sec. Vein located by Ultra Sound and site marked with sterile pen.  1% Lidocaine 5 ml injected intradermal pre-insertion for trimmed Midline.  Midline inserted.  Positive brisk blood return obtained.  Valve applied to lumen.  Midline flushed with 10 mls  0.9% Sterile Sodium Chloride. Midline flushes easily with no resistance.   Skin prep applied to site. Catheter secured with non-sutured locking device per hospital protocol.  Bio-patch/CHG impregnated sterile tegaderm dressing applied. Alcohol Swab Caps applied to valve.   Sterile field maintained during procedure. Positioning wire accounted for post procedure. Pt. Response to procedure, tolerated well. Appearance of site: Clean dry and intact without bleeding or edema. All edges of Tegaderm occlusive.   Site marked with date and initials of RN placing line.Top 2 side rails in up position, call button in reach, RN notified of all of the above.   A Power Midline 14 CM placed in the MICHELLE Brachial vein. 0 cm showing from insertion site.

## 2025-06-08 NOTE — CARE COORDINATION
Chart reviewed for dc planning.    Pt having CABG tomorrow. Pt has [employer] insurance.    Will need to Verify with Jeremías in financial services during work week if pt has any secondary insurance. [employer] has NO SNF benefits- pt could go to ARU with this insurance.    Will need to notify [facility] during work week to follow and assist with HC at discharge.    Agueda Sunshine RN, BSN  [phone]

## 2025-06-09 ENCOUNTER — APPOINTMENT (OUTPATIENT)
Dept: GENERAL RADIOLOGY | Age: 75
DRG: 235 | End: 2025-06-09
Attending: INTERNAL MEDICINE
Payer: OTHER GOVERNMENT

## 2025-06-09 ENCOUNTER — ANESTHESIA EVENT (OUTPATIENT)
Dept: OPERATING ROOM | Age: 75
End: 2025-06-09
Payer: OTHER GOVERNMENT

## 2025-06-09 ENCOUNTER — ANESTHESIA (OUTPATIENT)
Dept: OPERATING ROOM | Age: 75
End: 2025-06-09
Payer: OTHER GOVERNMENT

## 2025-06-09 LAB
ACTIVATED CLOTTING TIME: 112 SEC (ref 99–130)
ACTIVATED CLOTTING TIME: 118 SEC (ref 99–130)
ACTIVATED CLOTTING TIME: 426 SEC (ref 99–130)
ACTIVATED CLOTTING TIME: 462 SEC (ref 99–130)
ACTIVATED CLOTTING TIME: 469 SEC (ref 99–130)
ACTIVATED CLOTTING TIME: 582 SEC (ref 99–130)
ALBUMIN SERPL-MCNC: 3.8 G/DL (ref 3.4–5)
ANION GAP SERPL CALCULATED.3IONS-SCNC: 15 MMOL/L (ref 3–16)
ANION GAP SERPL CALCULATED.3IONS-SCNC: 18 MMOL/L (ref 3–16)
ANTI-XA UNFRAC HEPARIN: 0.14 IU/ML (ref 0.3–0.7)
APTT BLD: 33.3 SEC (ref 22.1–36.4)
BASE EXCESS BLDA CALC-SCNC: -1 MMOL/L (ref -3–3)
BASE EXCESS BLDA CALC-SCNC: -2 MMOL/L (ref -3–3)
BASE EXCESS BLDA CALC-SCNC: -3 MMOL/L (ref -3–3)
BASE EXCESS BLDA CALC-SCNC: 0 MMOL/L (ref -3–3)
BASE EXCESS BLDA CALC-SCNC: 1 MMOL/L (ref -3–3)
BASE EXCESS BLDA CALC-SCNC: 1 MMOL/L (ref -3–3)
BASE EXCESS BLDA CALC-SCNC: 2 MMOL/L (ref -3–3)
BASE EXCESS BLDA CALC-SCNC: 2 MMOL/L (ref -3–3)
BASOPHILS # BLD: 0 K/UL (ref 0–0.2)
BASOPHILS NFR BLD: 0.2 %
BUN SERPL-MCNC: 38 MG/DL (ref 7–20)
BUN SERPL-MCNC: 42 MG/DL (ref 7–20)
CA-I BLD-SCNC: 1.07 MMOL/L (ref 1.12–1.32)
CA-I BLD-SCNC: 1.09 MMOL/L (ref 1.12–1.32)
CA-I BLD-SCNC: 1.1 MMOL/L (ref 1.12–1.32)
CA-I BLD-SCNC: 1.1 MMOL/L (ref 1.12–1.32)
CA-I BLD-SCNC: 1.15 MMOL/L (ref 1.12–1.32)
CA-I BLD-SCNC: 1.16 MMOL/L (ref 1.12–1.32)
CA-I BLD-SCNC: 1.19 MMOL/L (ref 1.12–1.32)
CA-I BLD-SCNC: 1.25 MMOL/L (ref 1.12–1.32)
CALCIUM SERPL-MCNC: 9.7 MG/DL (ref 8.3–10.6)
CALCIUM SERPL-MCNC: 9.8 MG/DL (ref 8.3–10.6)
CHLORIDE SERPL-SCNC: 101 MMOL/L (ref 99–110)
CHLORIDE SERPL-SCNC: 94 MMOL/L (ref 99–110)
CO2 BLDA-SCNC: 23 MMOL/L
CO2 BLDA-SCNC: 23 MMOL/L
CO2 BLDA-SCNC: 24 MMOL/L
CO2 BLDA-SCNC: 26 MMOL/L
CO2 BLDA-SCNC: 27 MMOL/L
CO2 BLDA-SCNC: 28 MMOL/L
CO2 SERPL-SCNC: 23 MMOL/L (ref 21–32)
CO2 SERPL-SCNC: 25 MMOL/L (ref 21–32)
CREAT SERPL-MCNC: 5.1 MG/DL (ref 0.8–1.3)
CREAT SERPL-MCNC: 5.5 MG/DL (ref 0.8–1.3)
DEPRECATED RDW RBC AUTO: 14.7 % (ref 12.4–15.4)
DEPRECATED RDW RBC AUTO: 14.8 % (ref 12.4–15.4)
EOSINOPHIL # BLD: 0 K/UL (ref 0–0.6)
EOSINOPHIL NFR BLD: 0 %
FIBRINOGEN PPP-MCNC: 334 MG/DL (ref 227–534)
GFR SERPLBLD CREATININE-BSD FMLA CKD-EPI: 10 ML/MIN/{1.73_M2}
GFR SERPLBLD CREATININE-BSD FMLA CKD-EPI: 11 ML/MIN/{1.73_M2}
GLUCOSE BLD-MCNC: 101 MG/DL (ref 70–99)
GLUCOSE BLD-MCNC: 102 MG/DL (ref 70–99)
GLUCOSE BLD-MCNC: 107 MG/DL (ref 70–99)
GLUCOSE BLD-MCNC: 108 MG/DL (ref 70–99)
GLUCOSE BLD-MCNC: 120 MG/DL (ref 70–99)
GLUCOSE BLD-MCNC: 120 MG/DL (ref 70–99)
GLUCOSE BLD-MCNC: 123 MG/DL (ref 70–99)
GLUCOSE BLD-MCNC: 154 MG/DL (ref 70–99)
GLUCOSE BLD-MCNC: 156 MG/DL (ref 70–99)
GLUCOSE BLD-MCNC: 164 MG/DL (ref 70–99)
GLUCOSE BLD-MCNC: 187 MG/DL (ref 70–99)
GLUCOSE BLD-MCNC: 247 MG/DL (ref 70–99)
GLUCOSE BLD-MCNC: 82 MG/DL (ref 70–99)
GLUCOSE BLD-MCNC: 82 MG/DL (ref 70–99)
GLUCOSE BLD-MCNC: 97 MG/DL (ref 70–99)
GLUCOSE BLD-MCNC: 98 MG/DL (ref 70–99)
GLUCOSE SERPL-MCNC: 183 MG/DL (ref 70–99)
GLUCOSE SERPL-MCNC: 93 MG/DL (ref 70–99)
HCO3 BLDA-SCNC: 22.1 MMOL/L (ref 21–29)
HCO3 BLDA-SCNC: 22.1 MMOL/L (ref 21–29)
HCO3 BLDA-SCNC: 23.1 MMOL/L (ref 21–29)
HCO3 BLDA-SCNC: 23.2 MMOL/L (ref 21–29)
HCO3 BLDA-SCNC: 23.2 MMOL/L (ref 21–29)
HCO3 BLDA-SCNC: 24.3 MMOL/L (ref 21–29)
HCO3 BLDA-SCNC: 24.6 MMOL/L (ref 21–29)
HCO3 BLDA-SCNC: 24.7 MMOL/L (ref 21–29)
HCO3 BLDA-SCNC: 24.9 MMOL/L (ref 21–29)
HCO3 BLDA-SCNC: 25 MMOL/L (ref 21–29)
HCO3 BLDA-SCNC: 25.3 MMOL/L (ref 21–29)
HCO3 BLDA-SCNC: 25.4 MMOL/L (ref 21–29)
HCO3 BLDA-SCNC: 25.6 MMOL/L (ref 21–29)
HCO3 BLDA-SCNC: 25.7 MMOL/L (ref 21–29)
HCO3 BLDA-SCNC: 25.7 MMOL/L (ref 21–29)
HCO3 BLDA-SCNC: 26.1 MMOL/L (ref 21–29)
HCO3 BLDA-SCNC: 27 MMOL/L (ref 21–29)
HCT VFR BLD AUTO: 22 % (ref 40.5–52.5)
HCT VFR BLD AUTO: 22 % (ref 40.5–52.5)
HCT VFR BLD AUTO: 24 % (ref 40.5–52.5)
HCT VFR BLD AUTO: 24.9 % (ref 40.5–52.5)
HCT VFR BLD AUTO: 29 % (ref 40.5–52.5)
HCT VFR BLD AUTO: 29.2 % (ref 40.5–52.5)
HCT VFR BLD AUTO: 32 % (ref 40.5–52.5)
HCT VFR BLD AUTO: 34 % (ref 40.5–52.5)
HCT VFR BLD AUTO: 42.6 % (ref 40.5–52.5)
HCT VFR BLD AUTO: 44 % (ref 40.5–52.5)
HGB BLD CALC-MCNC: 10.9 GM/DL (ref 13.5–17.5)
HGB BLD CALC-MCNC: 11.7 GM/DL (ref 13.5–17.5)
HGB BLD CALC-MCNC: 14.8 GM/DL (ref 13.5–17.5)
HGB BLD CALC-MCNC: 7.3 GM/DL (ref 13.5–17.5)
HGB BLD CALC-MCNC: 7.6 GM/DL (ref 13.5–17.5)
HGB BLD CALC-MCNC: 8 GM/DL (ref 13.5–17.5)
HGB BLD CALC-MCNC: 9.8 GM/DL (ref 13.5–17.5)
HGB BLD-MCNC: 14.5 G/DL (ref 13.5–17.5)
HGB BLD-MCNC: 8.5 G/DL (ref 13.5–17.5)
HGB BLD-MCNC: 9.9 G/DL (ref 13.5–17.5)
INR PPP: 1.49 (ref 0.85–1.15)
LACTATE BLD-SCNC: 0.77 MMOL/L (ref 0.4–2)
LACTATE BLD-SCNC: 0.88 MMOL/L (ref 0.4–2)
LACTATE BLD-SCNC: 1.37 MMOL/L (ref 0.4–2)
LACTATE BLD-SCNC: 1.4 MMOL/L (ref 0.4–2)
LACTATE BLD-SCNC: 1.45 MMOL/L (ref 0.4–2)
LACTATE BLD-SCNC: 1.73 MMOL/L (ref 0.4–2)
LACTATE BLD-SCNC: 1.98 MMOL/L (ref 0.4–2)
LACTATE BLD-SCNC: 2.64 MMOL/L (ref 0.4–2)
LYMPHOCYTES # BLD: 0.4 K/UL (ref 1–5.1)
LYMPHOCYTES NFR BLD: 3.7 %
MAGNESIUM SERPL-MCNC: 2.78 MG/DL (ref 1.8–2.4)
MCH RBC QN AUTO: 30.2 PG (ref 26–34)
MCH RBC QN AUTO: 30.5 PG (ref 26–34)
MCHC RBC AUTO-ENTMCNC: 34 G/DL (ref 31–36)
MCHC RBC AUTO-ENTMCNC: 34.1 G/DL (ref 31–36)
MCV RBC AUTO: 88.8 FL (ref 80–100)
MCV RBC AUTO: 89.6 FL (ref 80–100)
MONOCYTES # BLD: 0.7 K/UL (ref 0–1.3)
MONOCYTES NFR BLD: 6.4 %
NEUTROPHILS # BLD: 9.6 K/UL (ref 1.7–7.7)
NEUTROPHILS NFR BLD: 89.7 %
PCO2 BLDA: 33.8 MM HG (ref 35–45)
PCO2 BLDA: 36.9 MM HG (ref 35–45)
PCO2 BLDA: 37.6 MM HG (ref 35–45)
PCO2 BLDA: 38 MM HG (ref 35–45)
PCO2 BLDA: 39.7 MM HG (ref 35–45)
PCO2 BLDA: 40.1 MM HG (ref 35–45)
PCO2 BLDA: 40.3 MM HG (ref 35–45)
PCO2 BLDA: 40.4 MM HG (ref 35–45)
PCO2 BLDA: 42.1 MM HG (ref 35–45)
PCO2 BLDA: 42.2 MM HG (ref 35–45)
PCO2 BLDA: 42.4 MM HG (ref 35–45)
PCO2 BLDA: 42.5 MM HG (ref 35–45)
PCO2 BLDA: 42.5 MM HG (ref 35–45)
PCO2 BLDA: 42.7 MM HG (ref 35–45)
PCO2 BLDA: 42.9 MM HG (ref 35–45)
PCO2 BLDA: 43.2 MM HG (ref 35–45)
PCO2 BLDA: 45 MM HG (ref 35–45)
PERFORMED ON: ABNORMAL
PH BLDA: 7.34 [PH] (ref 7.35–7.45)
PH BLDA: 7.36 [PH] (ref 7.35–7.45)
PH BLDA: 7.37 [PH] (ref 7.35–7.45)
PH BLDA: 7.38 [PH] (ref 7.35–7.45)
PH BLDA: 7.39 [PH] (ref 7.35–7.45)
PH BLDA: 7.4 [PH] (ref 7.35–7.45)
PH BLDA: 7.4 [PH] (ref 7.35–7.45)
PH BLDA: 7.41 [PH] (ref 7.35–7.45)
PH BLDA: 7.41 [PH] (ref 7.35–7.45)
PH BLDA: 7.45 [PH] (ref 7.35–7.45)
PH BLDA: 7.45 [PH] (ref 7.35–7.45)
PHOSPHATE SERPL-MCNC: 6 MG/DL (ref 2.5–4.9)
PLATELET # BLD AUTO: 111 K/UL (ref 135–450)
PLATELET # BLD AUTO: 69 K/UL (ref 135–450)
PMV BLD AUTO: 9.3 FL (ref 5–10.5)
PMV BLD AUTO: 9.4 FL (ref 5–10.5)
PO2 BLDA: 133.8 MM HG (ref 75–108)
PO2 BLDA: 137.7 MM HG (ref 75–108)
PO2 BLDA: 147.3 MM HG (ref 75–108)
PO2 BLDA: 147.9 MM HG (ref 75–108)
PO2 BLDA: 152.7 MM HG (ref 75–108)
PO2 BLDA: 169.2 MM HG (ref 75–108)
PO2 BLDA: 172.8 MM HG (ref 75–108)
PO2 BLDA: 173.1 MM HG (ref 75–108)
PO2 BLDA: 178.4 MM HG (ref 75–108)
PO2 BLDA: 178.8 MM HG (ref 75–108)
PO2 BLDA: 181.8 MM HG (ref 75–108)
PO2 BLDA: 182.7 MM HG (ref 75–108)
PO2 BLDA: 185 MM HG (ref 75–108)
PO2 BLDA: 198.9 MM HG (ref 75–108)
PO2 BLDA: 230.8 MM HG (ref 75–108)
PO2 BLDA: 454.2 MM HG (ref 75–108)
PO2 BLDA: 462.8 MM HG (ref 75–108)
POC SAMPLE TYPE: ABNORMAL
POTASSIUM BLD-SCNC: 3.7 MMOL/L (ref 3.5–5.1)
POTASSIUM BLD-SCNC: 3.9 MMOL/L (ref 3.5–5.1)
POTASSIUM BLD-SCNC: 4 MMOL/L (ref 3.5–5.1)
POTASSIUM BLD-SCNC: 4.5 MMOL/L (ref 3.5–5.1)
POTASSIUM BLD-SCNC: 4.7 MMOL/L (ref 3.5–5.1)
POTASSIUM BLD-SCNC: 4.8 MMOL/L (ref 3.5–5.1)
POTASSIUM SERPL-SCNC: 4.2 MMOL/L (ref 3.5–5.1)
POTASSIUM SERPL-SCNC: 4.6 MMOL/L (ref 3.5–5.1)
PROTHROMBIN TIME: 18.1 SEC (ref 11.9–14.9)
RBC # BLD AUTO: 3.29 M/UL (ref 4.2–5.9)
RBC # BLD AUTO: 4.76 M/UL (ref 4.2–5.9)
REPORT: NORMAL
SAO2 % BLDA: 100 % (ref 93–100)
SAO2 % BLDA: 99 % (ref 93–100)
SODIUM BLD-SCNC: 134 MMOL/L (ref 136–145)
SODIUM BLD-SCNC: 137 MMOL/L (ref 136–145)
SODIUM BLD-SCNC: 139 MMOL/L (ref 136–145)
SODIUM BLD-SCNC: 140 MMOL/L (ref 136–145)
SODIUM BLD-SCNC: 140 MMOL/L (ref 136–145)
SODIUM BLD-SCNC: 141 MMOL/L (ref 136–145)
SODIUM BLD-SCNC: 141 MMOL/L (ref 136–145)
SODIUM SERPL-SCNC: 137 MMOL/L (ref 136–145)
SODIUM SERPL-SCNC: 139 MMOL/L (ref 136–145)
WBC # BLD AUTO: 10.8 K/UL (ref 4–11)
WBC # BLD AUTO: 4.8 K/UL (ref 4–11)

## 2025-06-09 PROCEDURE — 6370000000 HC RX 637 (ALT 250 FOR IP): Performed by: PHYSICIAN ASSISTANT

## 2025-06-09 PROCEDURE — C1729 CATH, DRAINAGE: HCPCS | Performed by: THORACIC SURGERY (CARDIOTHORACIC VASCULAR SURGERY)

## 2025-06-09 PROCEDURE — 2709999900 HC NON-CHARGEABLE SUPPLY: Performed by: THORACIC SURGERY (CARDIOTHORACIC VASCULAR SURGERY)

## 2025-06-09 PROCEDURE — 2580000003 HC RX 258: Performed by: PHYSICIAN ASSISTANT

## 2025-06-09 PROCEDURE — 33533 CABG ARTERIAL SINGLE: CPT | Performed by: THORACIC SURGERY (CARDIOTHORACIC VASCULAR SURGERY)

## 2025-06-09 PROCEDURE — 7100000011 HC PHASE II RECOVERY - ADDTL 15 MIN

## 2025-06-09 PROCEDURE — 6370000000 HC RX 637 (ALT 250 FOR IP): Performed by: THORACIC SURGERY (CARDIOTHORACIC VASCULAR SURGERY)

## 2025-06-09 PROCEDURE — P9045 ALBUMIN (HUMAN), 5%, 250 ML: HCPCS | Performed by: ANESTHESIOLOGY

## 2025-06-09 PROCEDURE — 2500000003 HC RX 250 WO HCPCS: Performed by: THORACIC SURGERY (CARDIOTHORACIC VASCULAR SURGERY)

## 2025-06-09 PROCEDURE — 85610 PROTHROMBIN TIME: CPT

## 2025-06-09 PROCEDURE — 85027 COMPLETE CBC AUTOMATED: CPT

## 2025-06-09 PROCEDURE — 7100000010 HC PHASE II RECOVERY - FIRST 15 MIN

## 2025-06-09 PROCEDURE — 6360000002 HC RX W HCPCS: Performed by: THORACIC SURGERY (CARDIOTHORACIC VASCULAR SURGERY)

## 2025-06-09 PROCEDURE — 84295 ASSAY OF SERUM SODIUM: CPT

## 2025-06-09 PROCEDURE — 82803 BLOOD GASES ANY COMBINATION: CPT

## 2025-06-09 PROCEDURE — 83735 ASSAY OF MAGNESIUM: CPT

## 2025-06-09 PROCEDURE — 82947 ASSAY GLUCOSE BLOOD QUANT: CPT

## 2025-06-09 PROCEDURE — 3600000018 HC SURGERY OHS ADDTL 15MIN: Performed by: THORACIC SURGERY (CARDIOTHORACIC VASCULAR SURGERY)

## 2025-06-09 PROCEDURE — 85730 THROMBOPLASTIN TIME PARTIAL: CPT

## 2025-06-09 PROCEDURE — 94010 BREATHING CAPACITY TEST: CPT | Performed by: INTERNAL MEDICINE

## 2025-06-09 PROCEDURE — 2580000003 HC RX 258: Performed by: ANESTHESIOLOGY

## 2025-06-09 PROCEDURE — 6360000002 HC RX W HCPCS: Performed by: PHYSICIAN ASSISTANT

## 2025-06-09 PROCEDURE — 94002 VENT MGMT INPAT INIT DAY: CPT

## 2025-06-09 PROCEDURE — 94761 N-INVAS EAR/PLS OXIMETRY MLT: CPT

## 2025-06-09 PROCEDURE — 5A1221Z PERFORMANCE OF CARDIAC OUTPUT, CONTINUOUS: ICD-10-PCS | Performed by: THORACIC SURGERY (CARDIOTHORACIC VASCULAR SURGERY)

## 2025-06-09 PROCEDURE — 2500000003 HC RX 250 WO HCPCS: Performed by: ANESTHESIOLOGY

## 2025-06-09 PROCEDURE — 71045 X-RAY EXAM CHEST 1 VIEW: CPT

## 2025-06-09 PROCEDURE — 3700000001 HC ADD 15 MINUTES (ANESTHESIA): Performed by: THORACIC SURGERY (CARDIOTHORACIC VASCULAR SURGERY)

## 2025-06-09 PROCEDURE — 3600000008 HC SURGERY OHS BASE: Performed by: THORACIC SURGERY (CARDIOTHORACIC VASCULAR SURGERY)

## 2025-06-09 PROCEDURE — 83605 ASSAY OF LACTIC ACID: CPT

## 2025-06-09 PROCEDURE — 3700000000 HC ANESTHESIA ATTENDED CARE: Performed by: THORACIC SURGERY (CARDIOTHORACIC VASCULAR SURGERY)

## 2025-06-09 PROCEDURE — 6360000002 HC RX W HCPCS: Performed by: ANESTHESIOLOGY

## 2025-06-09 PROCEDURE — 33518 CABG ARTERY-VEIN TWO: CPT | Performed by: THORACIC SURGERY (CARDIOTHORACIC VASCULAR SURGERY)

## 2025-06-09 PROCEDURE — 06BQ4ZZ EXCISION OF LEFT SAPHENOUS VEIN, PERCUTANEOUS ENDOSCOPIC APPROACH: ICD-10-PCS | Performed by: THORACIC SURGERY (CARDIOTHORACIC VASCULAR SURGERY)

## 2025-06-09 PROCEDURE — 85520 HEPARIN ASSAY: CPT

## 2025-06-09 PROCEDURE — 80069 RENAL FUNCTION PANEL: CPT

## 2025-06-09 PROCEDURE — 82330 ASSAY OF CALCIUM: CPT

## 2025-06-09 PROCEDURE — 85018 HEMOGLOBIN: CPT

## 2025-06-09 PROCEDURE — 06BP4ZZ EXCISION OF RIGHT SAPHENOUS VEIN, PERCUTANEOUS ENDOSCOPIC APPROACH: ICD-10-PCS | Performed by: THORACIC SURGERY (CARDIOTHORACIC VASCULAR SURGERY)

## 2025-06-09 PROCEDURE — 2580000003 HC RX 258: Performed by: THORACIC SURGERY (CARDIOTHORACIC VASCULAR SURGERY)

## 2025-06-09 PROCEDURE — 85014 HEMATOCRIT: CPT

## 2025-06-09 PROCEDURE — 2700000000 HC OXYGEN THERAPY PER DAY

## 2025-06-09 PROCEDURE — 85025 COMPLETE CBC W/AUTO DIFF WBC: CPT

## 2025-06-09 PROCEDURE — 3E033XZ INTRODUCTION OF VASOPRESSOR INTO PERIPHERAL VEIN, PERCUTANEOUS APPROACH: ICD-10-PCS | Performed by: INTERNAL MEDICINE

## 2025-06-09 PROCEDURE — 2720000010 HC SURG SUPPLY STERILE: Performed by: THORACIC SURGERY (CARDIOTHORACIC VASCULAR SURGERY)

## 2025-06-09 PROCEDURE — 2100000000 HC CCU R&B

## 2025-06-09 PROCEDURE — 2580000003 HC RX 258

## 2025-06-09 PROCEDURE — 02100Z9 BYPASS CORONARY ARTERY, ONE ARTERY FROM LEFT INTERNAL MAMMARY, OPEN APPROACH: ICD-10-PCS | Performed by: THORACIC SURGERY (CARDIOTHORACIC VASCULAR SURGERY)

## 2025-06-09 PROCEDURE — 84132 ASSAY OF SERUM POTASSIUM: CPT

## 2025-06-09 PROCEDURE — 85384 FIBRINOGEN ACTIVITY: CPT

## 2025-06-09 PROCEDURE — 33508 ENDOSCOPIC VEIN HARVEST: CPT | Performed by: THORACIC SURGERY (CARDIOTHORACIC VASCULAR SURGERY)

## 2025-06-09 PROCEDURE — 2500000003 HC RX 250 WO HCPCS: Performed by: PHYSICIAN ASSISTANT

## 2025-06-09 PROCEDURE — 85347 COAGULATION TIME ACTIVATED: CPT

## 2025-06-09 PROCEDURE — 021009W BYPASS CORONARY ARTERY, ONE ARTERY FROM AORTA WITH AUTOLOGOUS VENOUS TISSUE, OPEN APPROACH: ICD-10-PCS | Performed by: THORACIC SURGERY (CARDIOTHORACIC VASCULAR SURGERY)

## 2025-06-09 PROCEDURE — P9045 ALBUMIN (HUMAN), 5%, 250 ML: HCPCS | Performed by: PHYSICIAN ASSISTANT

## 2025-06-09 PROCEDURE — C1751 CATH, INF, PER/CENT/MIDLINE: HCPCS | Performed by: THORACIC SURGERY (CARDIOTHORACIC VASCULAR SURGERY)

## 2025-06-09 PROCEDURE — 5A1D70Z PERFORMANCE OF URINARY FILTRATION, INTERMITTENT, LESS THAN 6 HOURS PER DAY: ICD-10-PCS | Performed by: STUDENT IN AN ORGANIZED HEALTH CARE EDUCATION/TRAINING PROGRAM

## 2025-06-09 RX ORDER — ATORVASTATIN CALCIUM 80 MG/1
80 TABLET, FILM COATED ORAL NIGHTLY
Status: DISCONTINUED | OUTPATIENT
Start: 2025-06-10 | End: 2025-06-16 | Stop reason: HOSPADM

## 2025-06-09 RX ORDER — LIDOCAINE HYDROCHLORIDE 20 MG/ML
INJECTION, SOLUTION INTRAVENOUS
Status: DISCONTINUED | OUTPATIENT
Start: 2025-06-09 | End: 2025-06-09 | Stop reason: SDUPTHER

## 2025-06-09 RX ORDER — DIPHENHYDRAMINE HCL 25 MG
12.5 TABLET ORAL NIGHTLY PRN
Status: DISCONTINUED | OUTPATIENT
Start: 2025-06-09 | End: 2025-06-16 | Stop reason: HOSPADM

## 2025-06-09 RX ORDER — DEXMEDETOMIDINE HYDROCHLORIDE 4 UG/ML
.1-1.5 INJECTION, SOLUTION INTRAVENOUS CONTINUOUS PRN
Status: DISCONTINUED | OUTPATIENT
Start: 2025-06-09 | End: 2025-06-16 | Stop reason: HOSPADM

## 2025-06-09 RX ORDER — SODIUM CHLORIDE 9 MG/ML
INJECTION, SOLUTION INTRAVENOUS PRN
Status: DISCONTINUED | OUTPATIENT
Start: 2025-06-09 | End: 2025-06-16 | Stop reason: HOSPADM

## 2025-06-09 RX ORDER — SODIUM CHLORIDE 0.9 % (FLUSH) 0.9 %
5-40 SYRINGE (ML) INJECTION PRN
Status: DISCONTINUED | OUTPATIENT
Start: 2025-06-09 | End: 2025-06-16 | Stop reason: HOSPADM

## 2025-06-09 RX ORDER — SODIUM CHLORIDE 0.9 % (FLUSH) 0.9 %
5-40 SYRINGE (ML) INJECTION EVERY 12 HOURS SCHEDULED
Status: DISCONTINUED | OUTPATIENT
Start: 2025-06-09 | End: 2025-06-16 | Stop reason: HOSPADM

## 2025-06-09 RX ORDER — HEPARIN SODIUM 1000 [USP'U]/ML
INJECTION, SOLUTION INTRAVENOUS; SUBCUTANEOUS
Status: DISCONTINUED | OUTPATIENT
Start: 2025-06-09 | End: 2025-06-09 | Stop reason: SDUPTHER

## 2025-06-09 RX ORDER — POTASSIUM CHLORIDE 1500 MG/1
40 TABLET, EXTENDED RELEASE ORAL PRN
Status: DISCONTINUED | OUTPATIENT
Start: 2025-06-10 | End: 2025-06-16 | Stop reason: HOSPADM

## 2025-06-09 RX ORDER — SENNA AND DOCUSATE SODIUM 50; 8.6 MG/1; MG/1
1 TABLET, FILM COATED ORAL 2 TIMES DAILY
Status: DISCONTINUED | OUTPATIENT
Start: 2025-06-10 | End: 2025-06-16 | Stop reason: HOSPADM

## 2025-06-09 RX ORDER — ASPIRIN 300 MG/1
300 SUPPOSITORY RECTAL ONCE
Status: COMPLETED | OUTPATIENT
Start: 2025-06-09 | End: 2025-06-09

## 2025-06-09 RX ORDER — POTASSIUM CHLORIDE 29.8 MG/ML
20 INJECTION INTRAVENOUS PRN
Status: DISCONTINUED | OUTPATIENT
Start: 2025-06-09 | End: 2025-06-12

## 2025-06-09 RX ORDER — SODIUM CHLORIDE 9 MG/ML
INJECTION, SOLUTION INTRAVENOUS
Status: COMPLETED
Start: 2025-06-09 | End: 2025-06-09

## 2025-06-09 RX ORDER — ONDANSETRON 2 MG/ML
4 INJECTION INTRAMUSCULAR; INTRAVENOUS EVERY 6 HOURS PRN
Status: DISCONTINUED | OUTPATIENT
Start: 2025-06-09 | End: 2025-06-16 | Stop reason: HOSPADM

## 2025-06-09 RX ORDER — MILRINONE LACTATE 0.2 MG/ML
.1-.75 INJECTION, SOLUTION INTRAVENOUS CONTINUOUS PRN
Status: DISCONTINUED | OUTPATIENT
Start: 2025-06-09 | End: 2025-06-16 | Stop reason: HOSPADM

## 2025-06-09 RX ORDER — BISACODYL 10 MG
10 SUPPOSITORY, RECTAL RECTAL DAILY PRN
Status: DISCONTINUED | OUTPATIENT
Start: 2025-06-09 | End: 2025-06-16 | Stop reason: HOSPADM

## 2025-06-09 RX ORDER — CEFAZOLIN SODIUM 1 G/3ML
INJECTION, POWDER, FOR SOLUTION INTRAMUSCULAR; INTRAVENOUS
Status: DISCONTINUED | OUTPATIENT
Start: 2025-06-09 | End: 2025-06-09 | Stop reason: SDUPTHER

## 2025-06-09 RX ORDER — NOREPINEPHRINE BITARTRATE 0.06 MG/ML
.01-3.3 INJECTION, SOLUTION INTRAVENOUS CONTINUOUS PRN
Status: DISCONTINUED | OUTPATIENT
Start: 2025-06-09 | End: 2025-06-16 | Stop reason: HOSPADM

## 2025-06-09 RX ORDER — METHADONE HYDROCHLORIDE 10 MG/1
60 TABLET ORAL DAILY
Refills: 0 | Status: DISCONTINUED | OUTPATIENT
Start: 2025-06-10 | End: 2025-06-16

## 2025-06-09 RX ORDER — DOBUTAMINE HYDROCHLORIDE 200 MG/100ML
2-10 INJECTION INTRAVENOUS CONTINUOUS PRN
Status: DISCONTINUED | OUTPATIENT
Start: 2025-06-09 | End: 2025-06-16 | Stop reason: HOSPADM

## 2025-06-09 RX ORDER — METHADONE HYDROCHLORIDE 10 MG/1
60 TABLET ORAL DAILY
Refills: 0 | Status: DISCONTINUED | OUTPATIENT
Start: 2025-06-10 | End: 2025-06-09

## 2025-06-09 RX ORDER — ONDANSETRON 4 MG/1
4 TABLET, ORALLY DISINTEGRATING ORAL EVERY 8 HOURS PRN
Status: DISCONTINUED | OUTPATIENT
Start: 2025-06-09 | End: 2025-06-16 | Stop reason: HOSPADM

## 2025-06-09 RX ORDER — HYDROMORPHONE HYDROCHLORIDE 1 MG/ML
0.1 INJECTION, SOLUTION INTRAMUSCULAR; INTRAVENOUS; SUBCUTANEOUS
Status: DISCONTINUED | OUTPATIENT
Start: 2025-06-09 | End: 2025-06-16 | Stop reason: HOSPADM

## 2025-06-09 RX ORDER — MIDAZOLAM HYDROCHLORIDE 5 MG/ML
INJECTION INTRAMUSCULAR; INTRAVENOUS
Status: DISCONTINUED | OUTPATIENT
Start: 2025-06-09 | End: 2025-06-09 | Stop reason: SDUPTHER

## 2025-06-09 RX ORDER — AMINOCAPROIC ACID 250 MG/ML
INJECTION, SOLUTION INTRAVENOUS
Status: DISCONTINUED | OUTPATIENT
Start: 2025-06-09 | End: 2025-06-09 | Stop reason: SDUPTHER

## 2025-06-09 RX ORDER — MAGNESIUM SULFATE IN WATER 40 MG/ML
2000 INJECTION, SOLUTION INTRAVENOUS PRN
Status: DISCONTINUED | OUTPATIENT
Start: 2025-06-09 | End: 2025-06-16 | Stop reason: HOSPADM

## 2025-06-09 RX ORDER — PANTOPRAZOLE SODIUM 40 MG/1
40 TABLET, DELAYED RELEASE ORAL DAILY
Status: DISCONTINUED | OUTPATIENT
Start: 2025-06-09 | End: 2025-06-16 | Stop reason: HOSPADM

## 2025-06-09 RX ORDER — SUCCINYLCHOLINE/SOD CL,ISO/PF 200MG/10ML
SYRINGE (ML) INTRAVENOUS
Status: DISCONTINUED | OUTPATIENT
Start: 2025-06-09 | End: 2025-06-09 | Stop reason: SDUPTHER

## 2025-06-09 RX ORDER — SODIUM CHLORIDE 9 MG/ML
INJECTION, SOLUTION INTRAVENOUS CONTINUOUS
Status: DISCONTINUED | OUTPATIENT
Start: 2025-06-09 | End: 2025-06-16 | Stop reason: HOSPADM

## 2025-06-09 RX ORDER — ALBUMIN HUMAN 50 G/1000ML
SOLUTION INTRAVENOUS
Status: DISCONTINUED | OUTPATIENT
Start: 2025-06-09 | End: 2025-06-09 | Stop reason: SDUPTHER

## 2025-06-09 RX ORDER — HYDROCODONE BITARTRATE AND ACETAMINOPHEN 5; 325 MG/1; MG/1
2 TABLET ORAL EVERY 6 HOURS PRN
Status: DISCONTINUED | OUTPATIENT
Start: 2025-06-09 | End: 2025-06-16 | Stop reason: HOSPADM

## 2025-06-09 RX ORDER — ASPIRIN 81 MG/1
81 TABLET ORAL DAILY
Status: DISCONTINUED | OUTPATIENT
Start: 2025-06-09 | End: 2025-06-16 | Stop reason: HOSPADM

## 2025-06-09 RX ORDER — POLYETHYLENE GLYCOL 3350 17 G/17G
17 POWDER, FOR SOLUTION ORAL DAILY
Status: DISCONTINUED | OUTPATIENT
Start: 2025-06-10 | End: 2025-06-16 | Stop reason: HOSPADM

## 2025-06-09 RX ORDER — ALBUMIN HUMAN 50 G/1000ML
25 SOLUTION INTRAVENOUS PRN
Status: DISCONTINUED | OUTPATIENT
Start: 2025-06-09 | End: 2025-06-16 | Stop reason: HOSPADM

## 2025-06-09 RX ORDER — ASPIRIN 300 MG/1
300 SUPPOSITORY RECTAL DAILY
Status: DISCONTINUED | OUTPATIENT
Start: 2025-06-09 | End: 2025-06-09

## 2025-06-09 RX ORDER — DOBUTAMINE HYDROCHLORIDE 200 MG/100ML
INJECTION INTRAVENOUS
Status: DISCONTINUED | OUTPATIENT
Start: 2025-06-09 | End: 2025-06-09 | Stop reason: SDUPTHER

## 2025-06-09 RX ORDER — METHADONE HYDROCHLORIDE 10 MG/ML
60 CONCENTRATE ORAL ONCE
Refills: 0 | Status: COMPLETED | OUTPATIENT
Start: 2025-06-09 | End: 2025-06-10

## 2025-06-09 RX ORDER — FENTANYL CITRATE 50 UG/ML
INJECTION, SOLUTION INTRAMUSCULAR; INTRAVENOUS
Status: DISCONTINUED | OUTPATIENT
Start: 2025-06-09 | End: 2025-06-09 | Stop reason: SDUPTHER

## 2025-06-09 RX ORDER — PROPOFOL 10 MG/ML
INJECTION, EMULSION INTRAVENOUS
Status: DISCONTINUED | OUTPATIENT
Start: 2025-06-09 | End: 2025-06-09 | Stop reason: SDUPTHER

## 2025-06-09 RX ORDER — DEXTROSE MONOHYDRATE 100 MG/ML
INJECTION, SOLUTION INTRAVENOUS CONTINUOUS PRN
Status: DISCONTINUED | OUTPATIENT
Start: 2025-06-09 | End: 2025-06-16 | Stop reason: HOSPADM

## 2025-06-09 RX ORDER — HEPARIN SODIUM 5000 [USP'U]/ML
5000 INJECTION, SOLUTION INTRAVENOUS; SUBCUTANEOUS EVERY 8 HOURS SCHEDULED
Status: DISCONTINUED | OUTPATIENT
Start: 2025-06-09 | End: 2025-06-13

## 2025-06-09 RX ORDER — GLUCAGON 1 MG/ML
1 KIT INJECTION PRN
Status: DISCONTINUED | OUTPATIENT
Start: 2025-06-09 | End: 2025-06-16 | Stop reason: HOSPADM

## 2025-06-09 RX ORDER — MUPIROCIN 2 %
OINTMENT (GRAM) TOPICAL 2 TIMES DAILY
Status: COMPLETED | OUTPATIENT
Start: 2025-06-09 | End: 2025-06-11

## 2025-06-09 RX ORDER — HYDROMORPHONE HYDROCHLORIDE 1 MG/ML
0.25 INJECTION, SOLUTION INTRAMUSCULAR; INTRAVENOUS; SUBCUTANEOUS
Status: DISCONTINUED | OUTPATIENT
Start: 2025-06-09 | End: 2025-06-16 | Stop reason: HOSPADM

## 2025-06-09 RX ORDER — ROCURONIUM BROMIDE 10 MG/ML
INJECTION, SOLUTION INTRAVENOUS
Status: DISCONTINUED | OUTPATIENT
Start: 2025-06-09 | End: 2025-06-09 | Stop reason: SDUPTHER

## 2025-06-09 RX ORDER — HYDROCODONE BITARTRATE AND ACETAMINOPHEN 5; 325 MG/1; MG/1
1 TABLET ORAL EVERY 6 HOURS PRN
Status: DISCONTINUED | OUTPATIENT
Start: 2025-06-09 | End: 2025-06-16 | Stop reason: HOSPADM

## 2025-06-09 RX ORDER — INDOMETHACIN 25 MG/1
50 CAPSULE ORAL ONCE
Status: COMPLETED | OUTPATIENT
Start: 2025-06-09 | End: 2025-06-09

## 2025-06-09 RX ORDER — DEXMEDETOMIDINE HYDROCHLORIDE 4 UG/ML
INJECTION, SOLUTION INTRAVENOUS
Status: DISCONTINUED | OUTPATIENT
Start: 2025-06-09 | End: 2025-06-09 | Stop reason: SDUPTHER

## 2025-06-09 RX ORDER — SODIUM CHLORIDE 9 MG/ML
INJECTION, SOLUTION INTRAVENOUS
Status: DISCONTINUED | OUTPATIENT
Start: 2025-06-09 | End: 2025-06-09 | Stop reason: SDUPTHER

## 2025-06-09 RX ORDER — MAGNESIUM HYDROXIDE 1200 MG/15ML
LIQUID ORAL CONTINUOUS PRN
Status: COMPLETED | OUTPATIENT
Start: 2025-06-09 | End: 2025-06-09

## 2025-06-09 RX ORDER — ACETAMINOPHEN 500 MG
1000 TABLET ORAL EVERY 8 HOURS SCHEDULED
Status: DISCONTINUED | OUTPATIENT
Start: 2025-06-09 | End: 2025-06-16 | Stop reason: HOSPADM

## 2025-06-09 RX ORDER — PROTAMINE SULFATE 10 MG/ML
INJECTION, SOLUTION INTRAVENOUS
Status: DISCONTINUED | OUTPATIENT
Start: 2025-06-09 | End: 2025-06-09 | Stop reason: SDUPTHER

## 2025-06-09 RX ADMIN — Medication 0.06 MCG/KG/MIN: at 15:50

## 2025-06-09 RX ADMIN — ROCURONIUM BROMIDE 50 MG: 10 INJECTION, SOLUTION INTRAVENOUS at 11:20

## 2025-06-09 RX ADMIN — FENTANYL CITRATE 250 MCG: 50 INJECTION, SOLUTION INTRAMUSCULAR; INTRAVENOUS at 10:10

## 2025-06-09 RX ADMIN — Medication 25 MG: at 10:10

## 2025-06-09 RX ADMIN — HEPARIN SODIUM 10000 UNITS: 1000 INJECTION INTRAVENOUS; SUBCUTANEOUS at 10:35

## 2025-06-09 RX ADMIN — SODIUM CHLORIDE 500 ML: 0.9 INJECTION, SOLUTION INTRAVENOUS at 22:10

## 2025-06-09 RX ADMIN — Medication 0.02 MCG/KG/MIN: at 14:50

## 2025-06-09 RX ADMIN — Medication 200 MG: at 08:10

## 2025-06-09 RX ADMIN — LIDOCAINE HYDROCHLORIDE 100 MG: 20 INJECTION, SOLUTION INTRAVENOUS at 08:10

## 2025-06-09 RX ADMIN — ALBUMIN (HUMAN) 50 G: 12.5 INJECTION, SOLUTION INTRAVENOUS at 10:02

## 2025-06-09 RX ADMIN — CHLORHEXIDINE GLUCONATE 118 ML: 4 SOLUTION TOPICAL at 03:55

## 2025-06-09 RX ADMIN — SODIUM BICARBONATE 50 MEQ: 84 INJECTION INTRAVENOUS at 16:39

## 2025-06-09 RX ADMIN — CALCIUM CHLORIDE 1000 MG: 100 INJECTION INTRAVENOUS; INTRAVENTRICULAR at 23:27

## 2025-06-09 RX ADMIN — CEFEPIME 1000 MG: 1 INJECTION, POWDER, FOR SOLUTION INTRAMUSCULAR; INTRAVENOUS at 09:02

## 2025-06-09 RX ADMIN — POTASSIUM CHLORIDE 10 MEQ: 29.8 INJECTION, SOLUTION INTRAVENOUS at 14:31

## 2025-06-09 RX ADMIN — HEPARIN SODIUM 30000 UNITS: 1000 INJECTION INTRAVENOUS; SUBCUTANEOUS at 10:11

## 2025-06-09 RX ADMIN — WATER 2000 MG: 1 INJECTION INTRAMUSCULAR; INTRAVENOUS; SUBCUTANEOUS at 22:15

## 2025-06-09 RX ADMIN — MIDAZOLAM 5 MG: 5 INJECTION INTRAMUSCULAR; INTRAVENOUS at 10:10

## 2025-06-09 RX ADMIN — MUPIROCIN: 20 OINTMENT TOPICAL at 16:18

## 2025-06-09 RX ADMIN — DEXMEDETOMIDINE HYDROCHLORIDE 1 MCG/KG/HR: 400 INJECTION, SOLUTION INTRAVENOUS at 14:33

## 2025-06-09 RX ADMIN — AMINOCAPROIC ACID 1000 MG/HR: 250 INJECTION, SOLUTION INTRAVENOUS at 09:02

## 2025-06-09 RX ADMIN — ALBUMIN (HUMAN) 25 G: 12.5 INJECTION, SOLUTION INTRAVENOUS at 18:55

## 2025-06-09 RX ADMIN — DEXMEDETOMIDINE HYDROCHLORIDE 0.5 MCG/KG/HR: 4 INJECTION, SOLUTION INTRAVENOUS at 08:10

## 2025-06-09 RX ADMIN — SODIUM CHLORIDE 1 UNITS/HR: 9 INJECTION, SOLUTION INTRAVENOUS at 14:38

## 2025-06-09 RX ADMIN — ALBUMIN (HUMAN) 25 G: 12.5 INJECTION, SOLUTION INTRAVENOUS at 15:00

## 2025-06-09 RX ADMIN — PANTOPRAZOLE SODIUM 40 MG: 40 INJECTION, POWDER, FOR SOLUTION INTRAVENOUS at 03:54

## 2025-06-09 RX ADMIN — Medication 25 MG: at 08:10

## 2025-06-09 RX ADMIN — SODIUM CHLORIDE, PRESERVATIVE FREE 10 ML: 5 INJECTION INTRAVENOUS at 22:18

## 2025-06-09 RX ADMIN — FENTANYL CITRATE 250 MCG: 50 INJECTION, SOLUTION INTRAMUSCULAR; INTRAVENOUS at 08:10

## 2025-06-09 RX ADMIN — ASPIRIN 300 MG: 300 SUPPOSITORY RECTAL at 20:53

## 2025-06-09 RX ADMIN — SODIUM CHLORIDE: 0.9 INJECTION, SOLUTION INTRAVENOUS at 14:29

## 2025-06-09 RX ADMIN — ROCURONIUM BROMIDE 50 MG: 10 INJECTION, SOLUTION INTRAVENOUS at 08:10

## 2025-06-09 RX ADMIN — CHLORHEXIDINE GLUCONATE 15 ML: 1.2 RINSE ORAL at 04:10

## 2025-06-09 RX ADMIN — INSULIN HUMAN 5 UNITS/HR: 1 INJECTION, SOLUTION INTRAVENOUS at 08:10

## 2025-06-09 RX ADMIN — MIDAZOLAM 5 MG: 5 INJECTION INTRAMUSCULAR; INTRAVENOUS at 08:10

## 2025-06-09 RX ADMIN — SODIUM CHLORIDE: 9 INJECTION, SOLUTION INTRAVENOUS at 08:10

## 2025-06-09 RX ADMIN — DOBUTAMINE HYDROCHLORIDE 1 MCG/KG/MIN: 200 INJECTION INTRAVENOUS at 08:10

## 2025-06-09 RX ADMIN — PROTAMINE SULFATE 250 MG: 10 INJECTION, SOLUTION INTRAVENOUS at 12:25

## 2025-06-09 RX ADMIN — ROCURONIUM BROMIDE 50 MG: 10 INJECTION, SOLUTION INTRAVENOUS at 10:10

## 2025-06-09 RX ADMIN — ROCURONIUM BROMIDE 50 MG: 10 INJECTION, SOLUTION INTRAVENOUS at 08:55

## 2025-06-09 RX ADMIN — MUPIROCIN: 20 OINTMENT TOPICAL at 22:17

## 2025-06-09 RX ADMIN — AMINOCAPROIC ACID 1000 MG: 250 INJECTION, SOLUTION INTRAVENOUS at 08:10

## 2025-06-09 RX ADMIN — CALCIUM CHLORIDE 1000 MG: 100 INJECTION INTRAVENOUS; INTRAVENTRICULAR at 20:47

## 2025-06-09 RX ADMIN — NOREPINEPHRINE BITARTRATE 0.05 MCG/KG/MIN: 1 INJECTION, SOLUTION, CONCENTRATE INTRAVENOUS at 09:03

## 2025-06-09 RX ADMIN — WATER 2000 MG: 1 INJECTION INTRAMUSCULAR; INTRAVENOUS; SUBCUTANEOUS at 16:17

## 2025-06-09 RX ADMIN — CEFAZOLIN 2 G: 1 INJECTION, POWDER, FOR SOLUTION INTRAVENOUS at 08:10

## 2025-06-09 RX ADMIN — PROPOFOL 100 MG: 10 INJECTION, EMULSION INTRAVENOUS at 08:10

## 2025-06-09 ASSESSMENT — PULMONARY FUNCTION TESTS
PIF_VALUE: 16

## 2025-06-09 ASSESSMENT — PAIN SCALES - GENERAL
PAINLEVEL_OUTOF10: 0

## 2025-06-09 ASSESSMENT — PAIN - FUNCTIONAL ASSESSMENT: PAIN_FUNCTIONAL_ASSESSMENT: 0-10

## 2025-06-09 NOTE — CARE COORDINATION
06/09/25 1550   Service Assessment   Patient Orientation Other (see comment);Unable to Assess  (had CABG today)

## 2025-06-09 NOTE — CARE COORDINATION
Chart reviewed.    Pt had CABG today.    CM spoke to Jeremías in financial services [id number] and she will check in am if pt has a secondary insurance to his [employer] d/t this has no SNF benefits if needed- pt can go to ARU with [employer].    CM will follow for pt progress and discharge plan.    Agueda Sunshine RN, BSN  [phone]

## 2025-06-09 NOTE — FLOWSHEET NOTE
06/08/25 1627 06/08/25 2000   Vital Signs   BP (!) 171/48 106/61   Temp (!) 100.8 °F (38.2 °C) (!) 101.8 °F (38.8 °C)   Pulse 81 (!) 104   Respirations 22 16       Treatment time: 3.5 hours  Net UF: 2 L    Pre weight: 72.8 kg (bed scale)  Post weight: 70.8 kg (bed scale)  EDW: 76.5 kg    Access used: DOMINIC AVF  Access function: No problems    Medications or blood products given: None    Regular outpatient schedule: [facility]    Summary of response to treatment: Tolerated 3.5 hour HD tx without difficulty.  Net UF 2 L.  Vital signs stable throughout tx.  No meds given.  Temp pre tx 100.8, post tx 101.8.    Copy of dialysis treatment record placed in chart, to be scanned into EMR.    Report given to Ambrocio Early RN

## 2025-06-09 NOTE — ANESTHESIA PRE PROCEDURE
malignancy/cancer.                 Abdominal:             Vascular:          Other Findings:             Anesthesia Plan      general     ASA 4       Induction: intravenous.  arterial line, CVP, PA catheter, central line and SCAR  MIPS: Postoperative opioids intended and Prophylactic antiemetics administered.  Anesthetic plan and risks discussed with patient.    Use of blood products discussed with patient whom.      Attending anesthesiologist reviewed and agrees with Preprocedure content                Vidal Tyson MD   6/9/2025

## 2025-06-09 NOTE — OP NOTE
Operative Note      Patient: Colby Ovalle  YOB: 1950  MRN: [id number]    Date of Procedure: 6/9/2025    Pre-Op Diagnosis Codes:      * Coronary artery disease, unspecified vessel or lesion type, unspecified whether angina present, unspecified whether native or transplanted heart [I25.10] 2V CAD with prior PCI LAD with in stent 99% LAD stenosis, intra-aortic balloon pump with EF of 30% with apical akinesis previously improved with IntraOp preop SCAR, end-stage renal disease on hemodialysis Monday Wednesday Friday hypertension, T2DM, non-Hodgkin's lymphoma 10 to 15 years ago with prior chest wall radiation, methadone    Post-Op Diagnosis: Same       Procedure(s):  CORONARY ARTERY BYPASS GRAFT X2 USING RIGHT SAPHENOUS VEIN AND LIMA; LEFT EVH SAPHENOUS VEIN; TAKEDOWN OF LEFT INTRAMAMMARY ARTERY; PLACEMENT OF TEMPORARY ATRIAL AND VENTRICLE PACING WIRES; CPB; SCAR  (CABG x 2 (LIMA to LAD, reverse saphenous vein graft to a large intramyocardial ramus))    Surgeon(s):  Ronaldo Duke MD    Assistant:   Surgical Assistant: Dain Mckeon Assistant: Masoud Rizvi RN    Anesthesia: General    Estimated Blood Loss (mL): 200     Complications: None      Drains:   Chest Tube Left Pleural (Active)   Chest Tube Airleak No 06/09/25 1600   Status Continuous Suction 06/09/25 1400   Suction -20 cm H2O 06/09/25 1400   Y Connector Used No 06/09/25 1600   Dressing Status Clean, dry & intact 06/09/25 1600   Chest Tube Dressing Dry 06/09/25 1400   Site Assessment Not assessed 06/09/25 1600   Surrounding Skin Clean, dry & intact 06/09/25 1600   Output (ml) 0 ml 06/09/25 1900       Chest Tube Mediastinal 1 (Active)   Chest Tube Airleak No 06/09/25 1600   Status Continuous Suction 06/09/25 1400   Suction -20 cm H2O 06/09/25 1400   Y Connector Used Yes 06/09/25 1600   Drainage Description Sanguinous 06/09/25 1600   Dressing Status Clean, dry & intact 06/09/25 1600   Chest Tube Dressing Dry 06/09/25 1400

## 2025-06-09 NOTE — ADT AUTH CERT
Care     Overall Determination: Indications Met     Criteria:  [×] Admission is indicated for  1 or more  of the following  (1) (2) (3) (4) (5):      [×] Acute myocardial infarction (MI) [A] [B] (not in context of cardiac procedure within last 48 hours), as indicated by  ALL  of the following  (1) (2) (3) (4) (13) (14):          [×] Elevated cardiac troponin level, [C] [D] [E] [F] as indicated by  1 or more  of the following :              [×] Initial troponin elevated with subsequent increase or decrease in level of 20% or more (ie, indicative of acute myocardial injury)                  6/6/2025 12:02 PM                      -- 6/6/2025 12:02 PM by Melissa Mittal RN --                          troponins 61, 156, 638          [×] Myocardial injury due to acute ischemia, as indicated by  1 or more  of the following :              [×] Symptoms consistent with myocardial ischemia (eg, chest pain, dyspnea)                  6/6/2025 12:02 PM                      -- 6/6/2025 12:02 PM by Melissa Mittal RN --                          Patient [facility] presented [facility] on 6/[facility] for chest pain

## 2025-06-09 NOTE — ANESTHESIA POSTPROCEDURE EVALUATION
Department of Anesthesiology  Postprocedure Note    Patient: Colby Ovalle  MRN: [id number]  YOB: 1950  Date of evaluation: 6/9/2025    Procedure Summary       Date: 06/09/25 Room / Location: [place] [address] / [facility]    Anesthesia Start: 0808 Anesthesia Stop:     Procedure: CORONARY ARTERY BYPASS GRAFT X2 USING RIGHT SAPHENOUS VEIN AND LIMA; LEFT EVH SAPHENOUS VEIN; TAKEDOWN OF LEFT INTRAMAMMARY ARTERY; PLACEMENT OF TEMPORARY ATRIAL AND VENTRICLE PACING WIRES; CPB; SCAR (Chest) Diagnosis:       Coronary artery disease, unspecified vessel or lesion type, unspecified whether angina present, unspecified whether native or transplanted heart      (Coronary artery disease, unspecified vessel or lesion type, unspecified whether angina present, unspecified whether native or transplanted heart [I25.10])    Surgeons: Ronaldo Duke MD Responsible Provider: Vidal Tyson MD    Anesthesia Type: general ASA Status: 4            Anesthesia Type: No value filed.    Dawood Phase I: Dawood Score: 10    Dawood Phase II:      Anesthesia Post Evaluation    Patient location during evaluation: ICU  Patient participation: complete - patient cannot participate  Level of consciousness: sedated and ventilated  Airway patency: patent  Nausea & Vomiting: no nausea and no vomiting  Cardiovascular status: hemodynamically stable  Respiratory status: acceptable  Hydration status: euvolemic    No notable events documented.

## 2025-06-09 NOTE — PROCEDURES
Pulmonary Function Testing      Patient name:  Colby Ovalle      Unit #:   [id number]   Date of test:  6/5/2025  Date of interpretation:   6/9/2025    Mr. Colby Ovalle is a 74 y.o. year-old male. The spirometry data were acceptable and reproducible.     Spirometry:  Flow volume loops were normal. The FEV-1/FVC ratio was normal. The pre-bronchodilator FEV-1 was 2.31 liters (70% of predicted), which was moderately decreased. The FVC was 3.11 liters (70% of predicted), which was decreased. Response to inhaled bronchodilators (albuterol) was not performed.    Lung volumes:  Lung volumes were not tested.    Diffusion capacity was noted tested.    Interpretation:  Pt could have restriction based on this spirometry. Need lung volume study to clarify this.    Comments:

## 2025-06-09 NOTE — BRIEF OP NOTE
Brief Postoperative Note      Patient: Colby Ovalle  YOB: 1950  MRN: [id number]    Date of Procedure: 6/9/2025    Pre-Op Diagnosis Codes:      * Coronary artery disease, unspecified vessel or lesion type, unspecified whether angina present, unspecified whether native or transplanted heart [I25.10] 2v cad with prior pci LAD with in-stent 99% LAD stenosis, IABP with EF 30% with apical akinesis previously, improved with intra-op preop SCAR, ESRD on HD MWF, HTN, T2DM, non-Hodgkins Lymphoma 10-15 yrs ago with prior chest wall radiation, methadone    Post-Op Diagnosis: Same, vein conduit challenge       Procedure(s):  CORONARY ARTERY BYPASS GRAFT X2 USING RIGHT SAPHENOUS VEIN AND LIMA; LEFT EVH SAPHENOUS VEIN; TAKEDOWN OF LEFT INTRAMAMMARY ARTERY; PLACEMENT OF TEMPORARY ATRIAL AND VENTRICLE PACING WIRES; CPB; SCAR  (Cabg x 2 (LIMA to LAD, RSVG to large intramyocardial Ramus))      Surgeon(s):  Ronaldo Duke MD    Assistant:  Surgical Assistant: Dain Mckeon  First Assistant: Masoud Rizvi RN    Anesthesia: General    Estimated Blood Loss (mL): 200     Complications: none      Drains:   Chest Tube Left Pleural (Active)       Chest Tube Mediastinal (Active)       Urinary Catheter 06/07/25 (Active)   Output (mL) 10 mL 06/09/25 0000       Findings:  Infection Present At Time Of Surgery (PATOS) (choose all levels that have infection present):  No infection present  Other Findings: small L thigh endoscopic vein harvesting, R thigh harvest with skip incisions was useable      Full Op to be Dictated  Electronically signed by Ronaldo Duke MD on 6/9/2025 at 1:35 PM

## 2025-06-10 ENCOUNTER — APPOINTMENT (OUTPATIENT)
Dept: GENERAL RADIOLOGY | Age: 75
DRG: 235 | End: 2025-06-10
Attending: INTERNAL MEDICINE
Payer: OTHER GOVERNMENT

## 2025-06-10 LAB
ANION GAP SERPL CALCULATED.3IONS-SCNC: 16 MMOL/L (ref 3–16)
BASE EXCESS BLDA CALC-SCNC: -2 MMOL/L (ref -3–3)
BASE EXCESS BLDA CALC-SCNC: -3 MMOL/L (ref -3–3)
BASE EXCESS BLDA CALC-SCNC: -3 MMOL/L (ref -3–3)
BASE EXCESS BLDA CALC-SCNC: -4 MMOL/L (ref -3–3)
BASE EXCESS BLDA CALC-SCNC: -4 MMOL/L (ref -3–3)
BASE EXCESS BLDA CALC-SCNC: -6 MMOL/L (ref -3–3)
BASE EXCESS BLDA CALC-SCNC: 0 MMOL/L (ref -3–3)
BASE EXCESS BLDA CALC-SCNC: 0 MMOL/L (ref -3–3)
BASE EXCESS BLDA CALC-SCNC: 4 MMOL/L (ref -3–3)
BASE EXCESS BLDA CALC-SCNC: 6 MMOL/L (ref -3–3)
BUN SERPL-MCNC: 50 MG/DL (ref 7–20)
CA-I BLD-SCNC: 1.21 MMOL/L (ref 1.12–1.32)
CA-I BLD-SCNC: 1.21 MMOL/L (ref 1.12–1.32)
CA-I BLD-SCNC: 1.23 MMOL/L (ref 1.12–1.32)
CALCIUM SERPL-MCNC: 9.8 MG/DL (ref 8.3–10.6)
CHLORIDE SERPL-SCNC: 105 MMOL/L (ref 99–110)
CO2 BLDA-SCNC: 22 MMOL/L
CO2 BLDA-SCNC: 24 MMOL/L
CO2 BLDA-SCNC: 24 MMOL/L
CO2 BLDA-SCNC: 25 MMOL/L
CO2 BLDA-SCNC: 26 MMOL/L
CO2 BLDA-SCNC: 29 MMOL/L
CO2 BLDA-SCNC: 31 MMOL/L
CO2 BLDA-SCNC: 32 MMOL/L
CO2 SERPL-SCNC: 22 MMOL/L (ref 21–32)
CREAT SERPL-MCNC: 6 MG/DL (ref 0.8–1.3)
DEPRECATED RDW RBC AUTO: 14.7 % (ref 12.4–15.4)
EKG ATRIAL RATE: 59 BPM
EKG DIAGNOSIS: NORMAL
EKG P AXIS: 49 DEGREES
EKG P-R INTERVAL: 130 MS
EKG Q-T INTERVAL: 454 MS
EKG QRS DURATION: 124 MS
EKG QTC CALCULATION (BAZETT): 449 MS
EKG R AXIS: -47 DEGREES
EKG T AXIS: 90 DEGREES
EKG VENTRICULAR RATE: 59 BPM
FIBRINOGEN PPP-MCNC: 358 MG/DL (ref 227–534)
GFR SERPLBLD CREATININE-BSD FMLA CKD-EPI: 9 ML/MIN/{1.73_M2}
GLUCOSE BLD-MCNC: 102 MG/DL (ref 70–99)
GLUCOSE BLD-MCNC: 105 MG/DL (ref 70–99)
GLUCOSE BLD-MCNC: 107 MG/DL (ref 70–99)
GLUCOSE BLD-MCNC: 107 MG/DL (ref 70–99)
GLUCOSE BLD-MCNC: 112 MG/DL (ref 70–99)
GLUCOSE BLD-MCNC: 117 MG/DL (ref 70–99)
GLUCOSE BLD-MCNC: 125 MG/DL (ref 70–99)
GLUCOSE BLD-MCNC: 126 MG/DL (ref 70–99)
GLUCOSE BLD-MCNC: 137 MG/DL (ref 70–99)
GLUCOSE BLD-MCNC: 142 MG/DL (ref 70–99)
GLUCOSE BLD-MCNC: 149 MG/DL (ref 70–99)
GLUCOSE BLD-MCNC: 150 MG/DL (ref 70–99)
GLUCOSE BLD-MCNC: 179 MG/DL (ref 70–99)
GLUCOSE BLD-MCNC: 189 MG/DL (ref 70–99)
GLUCOSE BLD-MCNC: 82 MG/DL (ref 70–99)
GLUCOSE BLD-MCNC: 86 MG/DL (ref 70–99)
GLUCOSE BLD-MCNC: 95 MG/DL (ref 70–99)
GLUCOSE BLD-MCNC: 96 MG/DL (ref 70–99)
GLUCOSE BLD-MCNC: 96 MG/DL (ref 70–99)
GLUCOSE SERPL-MCNC: 107 MG/DL (ref 70–99)
HCO3 BLDA-SCNC: 21 MMOL/L (ref 21–29)
HCO3 BLDA-SCNC: 22.3 MMOL/L (ref 21–29)
HCO3 BLDA-SCNC: 22.4 MMOL/L (ref 21–29)
HCO3 BLDA-SCNC: 23.5 MMOL/L (ref 21–29)
HCO3 BLDA-SCNC: 23.8 MMOL/L (ref 21–29)
HCO3 BLDA-SCNC: 23.8 MMOL/L (ref 21–29)
HCO3 BLDA-SCNC: 24.3 MMOL/L (ref 21–29)
HCO3 BLDA-SCNC: 27 MMOL/L (ref 21–29)
HCO3 BLDA-SCNC: 29.3 MMOL/L (ref 21–29)
HCO3 BLDA-SCNC: 30.1 MMOL/L (ref 21–29)
HCT VFR BLD AUTO: 20 % (ref 40.5–52.5)
HCT VFR BLD AUTO: 21 % (ref 40.5–52.5)
HCT VFR BLD AUTO: 22.4 % (ref 40.5–52.5)
HCT VFR BLD AUTO: 23.7 % (ref 40.5–52.5)
HGB BLD CALC-MCNC: 6.7 GM/DL (ref 13.5–17.5)
HGB BLD CALC-MCNC: 7 GM/DL (ref 13.5–17.5)
HGB BLD-MCNC: 7.5 G/DL (ref 13.5–17.5)
HGB BLD-MCNC: 7.9 G/DL (ref 13.5–17.5)
LACTATE BLD-SCNC: 0.64 MMOL/L (ref 0.4–2)
LACTATE BLD-SCNC: 0.66 MMOL/L (ref 0.4–2)
MAGNESIUM SERPL-MCNC: 2.83 MG/DL (ref 1.8–2.4)
MCH RBC QN AUTO: 30 PG (ref 26–34)
MCHC RBC AUTO-ENTMCNC: 33.6 G/DL (ref 31–36)
MCV RBC AUTO: 89.4 FL (ref 80–100)
PCO2 BLDA: 35.3 MM HG (ref 35–45)
PCO2 BLDA: 44.6 MM HG (ref 35–45)
PCO2 BLDA: 45.2 MM HG (ref 35–45)
PCO2 BLDA: 45.5 MM HG (ref 35–45)
PCO2 BLDA: 47.3 MM HG (ref 35–45)
PCO2 BLDA: 48.9 MM HG (ref 35–45)
PCO2 BLDA: 49.8 MM HG (ref 35–45)
PCO2 BLDA: 50.4 MM HG (ref 35–45)
PCO2 BLDA: 51.2 MM HG (ref 35–45)
PCO2 BLDA: 58.7 MM HG (ref 35–45)
PERFORMED ON: ABNORMAL
PERFORMED ON: NORMAL
PERFORMED ON: NORMAL
PH BLDA: 7.27 [PH] (ref 7.35–7.45)
PH BLDA: 7.28 [PH] (ref 7.35–7.45)
PH BLDA: 7.28 [PH] (ref 7.35–7.45)
PH BLDA: 7.29 [PH] (ref 7.35–7.45)
PH BLDA: 7.29 [PH] (ref 7.35–7.45)
PH BLDA: 7.3 [PH] (ref 7.35–7.45)
PH BLDA: 7.3 [PH] (ref 7.35–7.45)
PH BLDA: 7.37 [PH] (ref 7.35–7.45)
PH BLDA: 7.41 [PH] (ref 7.35–7.45)
PH BLDA: 7.44 [PH] (ref 7.35–7.45)
PLATELET # BLD AUTO: 64 K/UL (ref 135–450)
PMV BLD AUTO: 10.7 FL (ref 5–10.5)
PO2 BLDA: 147 MM HG (ref 75–108)
PO2 BLDA: 156.9 MM HG (ref 75–108)
PO2 BLDA: 162.4 MM HG (ref 75–108)
PO2 BLDA: 165.6 MM HG (ref 75–108)
PO2 BLDA: 173 MM HG (ref 75–108)
PO2 BLDA: 174.3 MM HG (ref 75–108)
PO2 BLDA: 181.2 MM HG (ref 75–108)
PO2 BLDA: 186.9 MM HG (ref 75–108)
PO2 BLDA: 195.6 MM HG (ref 75–108)
PO2 BLDA: 198.5 MM HG (ref 75–108)
POC SAMPLE TYPE: ABNORMAL
POC SAMPLE TYPE: NORMAL
POTASSIUM BLD-SCNC: 3.7 MMOL/L (ref 3.5–5.1)
POTASSIUM BLD-SCNC: 4.8 MMOL/L (ref 3.5–5.1)
POTASSIUM SERPL-SCNC: 5.3 MMOL/L (ref 3.5–5.1)
RBC # BLD AUTO: 2.51 M/UL (ref 4.2–5.9)
SAO2 % BLDA: 100 % (ref 93–100)
SAO2 % BLDA: 99 % (ref 93–100)
SODIUM BLD-SCNC: 140 MMOL/L (ref 136–145)
SODIUM SERPL-SCNC: 143 MMOL/L (ref 136–145)
WBC # BLD AUTO: 4.2 K/UL (ref 4–11)

## 2025-06-10 PROCEDURE — 82947 ASSAY GLUCOSE BLOOD QUANT: CPT

## 2025-06-10 PROCEDURE — 2500000003 HC RX 250 WO HCPCS: Performed by: PHYSICIAN ASSISTANT

## 2025-06-10 PROCEDURE — 93005 ELECTROCARDIOGRAM TRACING: CPT | Performed by: PHYSICIAN ASSISTANT

## 2025-06-10 PROCEDURE — 94660 CPAP INITIATION&MGMT: CPT

## 2025-06-10 PROCEDURE — 90935 HEMODIALYSIS ONE EVALUATION: CPT

## 2025-06-10 PROCEDURE — 2500000003 HC RX 250 WO HCPCS

## 2025-06-10 PROCEDURE — 80048 BASIC METABOLIC PNL TOTAL CA: CPT

## 2025-06-10 PROCEDURE — 71045 X-RAY EXAM CHEST 1 VIEW: CPT

## 2025-06-10 PROCEDURE — 94150 VITAL CAPACITY TEST: CPT

## 2025-06-10 PROCEDURE — 83605 ASSAY OF LACTIC ACID: CPT

## 2025-06-10 PROCEDURE — 6360000002 HC RX W HCPCS: Performed by: THORACIC SURGERY (CARDIOTHORACIC VASCULAR SURGERY)

## 2025-06-10 PROCEDURE — 84295 ASSAY OF SERUM SODIUM: CPT

## 2025-06-10 PROCEDURE — 2700000000 HC OXYGEN THERAPY PER DAY

## 2025-06-10 PROCEDURE — 2500000003 HC RX 250 WO HCPCS: Performed by: THORACIC SURGERY (CARDIOTHORACIC VASCULAR SURGERY)

## 2025-06-10 PROCEDURE — 94761 N-INVAS EAR/PLS OXIMETRY MLT: CPT

## 2025-06-10 PROCEDURE — 83735 ASSAY OF MAGNESIUM: CPT

## 2025-06-10 PROCEDURE — 84132 ASSAY OF SERUM POTASSIUM: CPT

## 2025-06-10 PROCEDURE — 94669 MECHANICAL CHEST WALL OSCILL: CPT

## 2025-06-10 PROCEDURE — 2000000000 HC ICU R&B

## 2025-06-10 PROCEDURE — 82330 ASSAY OF CALCIUM: CPT

## 2025-06-10 PROCEDURE — 85018 HEMOGLOBIN: CPT

## 2025-06-10 PROCEDURE — 2580000003 HC RX 258

## 2025-06-10 PROCEDURE — 6360000002 HC RX W HCPCS: Performed by: PHYSICIAN ASSISTANT

## 2025-06-10 PROCEDURE — 6360000002 HC RX W HCPCS

## 2025-06-10 PROCEDURE — 85027 COMPLETE CBC AUTOMATED: CPT

## 2025-06-10 PROCEDURE — 99024 POSTOP FOLLOW-UP VISIT: CPT | Performed by: PHYSICIAN ASSISTANT

## 2025-06-10 PROCEDURE — 6370000000 HC RX 637 (ALT 250 FOR IP): Performed by: PHYSICIAN ASSISTANT

## 2025-06-10 PROCEDURE — P9047 ALBUMIN (HUMAN), 25%, 50ML: HCPCS

## 2025-06-10 PROCEDURE — 85014 HEMATOCRIT: CPT

## 2025-06-10 PROCEDURE — 82803 BLOOD GASES ANY COMBINATION: CPT

## 2025-06-10 PROCEDURE — 94799 UNLISTED PULMONARY SVC/PX: CPT

## 2025-06-10 PROCEDURE — 85384 FIBRINOGEN ACTIVITY: CPT

## 2025-06-10 PROCEDURE — 93010 ELECTROCARDIOGRAM REPORT: CPT | Performed by: INTERNAL MEDICINE

## 2025-06-10 RX ORDER — INDOMETHACIN 25 MG/1
50 CAPSULE ORAL ONCE
Status: COMPLETED | OUTPATIENT
Start: 2025-06-10 | End: 2025-06-10

## 2025-06-10 RX ORDER — INDOMETHACIN 25 MG/1
CAPSULE ORAL
Status: COMPLETED
Start: 2025-06-10 | End: 2025-06-10

## 2025-06-10 RX ORDER — CALCIUM CHLORIDE 100 MG/ML
1000 INJECTION INTRAVENOUS; INTRAVENTRICULAR ONCE
Status: COMPLETED | OUTPATIENT
Start: 2025-06-10 | End: 2025-06-10

## 2025-06-10 RX ADMIN — METHADONE HYDROCHLORIDE 60 MG: 10 CONCENTRATE ORAL at 00:58

## 2025-06-10 RX ADMIN — INDOMETHACIN 50 MEQ: 25 CAPSULE ORAL at 02:35

## 2025-06-10 RX ADMIN — SODIUM CHLORIDE, PRESERVATIVE FREE 10 ML: 5 INJECTION INTRAVENOUS at 20:25

## 2025-06-10 RX ADMIN — METHADONE HYDROCHLORIDE 60 MG: 10 TABLET ORAL at 09:12

## 2025-06-10 RX ADMIN — WATER 2000 MG: 1 INJECTION INTRAMUSCULAR; INTRAVENOUS; SUBCUTANEOUS at 06:24

## 2025-06-10 RX ADMIN — ACETAMINOPHEN 1000 MG: 500 TABLET ORAL at 09:13

## 2025-06-10 RX ADMIN — SODIUM BICARBONATE 50 MEQ: 84 INJECTION INTRAVENOUS at 02:35

## 2025-06-10 RX ADMIN — MUPIROCIN: 20 OINTMENT TOPICAL at 20:24

## 2025-06-10 RX ADMIN — POLYETHYLENE GLYCOL 3350 17 G: 17 POWDER, FOR SOLUTION ORAL at 09:14

## 2025-06-10 RX ADMIN — HYDROCODONE BITARTRATE AND ACETAMINOPHEN 2 TABLET: 5; 325 TABLET ORAL at 09:57

## 2025-06-10 RX ADMIN — ATORVASTATIN CALCIUM 80 MG: 80 TABLET, FILM COATED ORAL at 20:23

## 2025-06-10 RX ADMIN — HYDROCODONE BITARTRATE AND ACETAMINOPHEN 2 TABLET: 5; 325 TABLET ORAL at 20:23

## 2025-06-10 RX ADMIN — STANDARDIZED SENNA CONCENTRATE AND DOCUSATE SODIUM 1 TABLET: 8.6; 5 TABLET ORAL at 09:13

## 2025-06-10 RX ADMIN — CALCIUM CHLORIDE 1000 MG: 100 INJECTION INTRAVENOUS; INTRAVENTRICULAR at 23:20

## 2025-06-10 RX ADMIN — Medication 0.01 MCG/KG/MIN: at 22:59

## 2025-06-10 RX ADMIN — VASOPRESSIN IN 0.9% SODIUM CHLORIDE 0.03 UNITS/MIN: 20 INJECTION INTRAVENOUS at 02:09

## 2025-06-10 RX ADMIN — MUPIROCIN: 20 OINTMENT TOPICAL at 08:09

## 2025-06-10 RX ADMIN — SODIUM CHLORIDE, PRESERVATIVE FREE 10 ML: 5 INJECTION INTRAVENOUS at 08:11

## 2025-06-10 RX ADMIN — SODIUM CHLORIDE, PRESERVATIVE FREE 40 MG: 5 INJECTION INTRAVENOUS at 08:11

## 2025-06-10 RX ADMIN — STANDARDIZED SENNA CONCENTRATE AND DOCUSATE SODIUM 1 TABLET: 8.6; 5 TABLET ORAL at 20:23

## 2025-06-10 RX ADMIN — AMIODARONE HYDROCHLORIDE 150 MG: 1.5 INJECTION, SOLUTION INTRAVENOUS at 22:47

## 2025-06-10 RX ADMIN — ASPIRIN 81 MG: 81 TABLET, DELAYED RELEASE ORAL at 09:13

## 2025-06-10 RX ADMIN — ACETAMINOPHEN 1000 MG: 500 TABLET ORAL at 00:58

## 2025-06-10 RX ADMIN — VASOPRESSIN IN 0.9% SODIUM CHLORIDE 0.01 UNITS/MIN: 20 INJECTION INTRAVENOUS at 23:08

## 2025-06-10 RX ADMIN — SODIUM BICARBONATE 50 MEQ: 84 INJECTION INTRAVENOUS at 07:34

## 2025-06-10 ASSESSMENT — PAIN DESCRIPTION - ORIENTATION
ORIENTATION: MID
ORIENTATION: MID

## 2025-06-10 ASSESSMENT — PAIN SCALES - GENERAL
PAINLEVEL_OUTOF10: 9
PAINLEVEL_OUTOF10: 0
PAINLEVEL_OUTOF10: 0
PAINLEVEL_OUTOF10: 8
PAINLEVEL_OUTOF10: 0
PAINLEVEL_OUTOF10: 7
PAINLEVEL_OUTOF10: 3

## 2025-06-10 ASSESSMENT — PAIN DESCRIPTION - LOCATION
LOCATION: CHEST
LOCATION: CHEST

## 2025-06-10 ASSESSMENT — PAIN DESCRIPTION - PAIN TYPE: TYPE: SURGICAL PAIN

## 2025-06-10 ASSESSMENT — PAIN DESCRIPTION - ONSET: ONSET: ON-GOING

## 2025-06-10 ASSESSMENT — PAIN DESCRIPTION - DESCRIPTORS
DESCRIPTORS: ACHING
DESCRIPTORS: ACHING

## 2025-06-10 ASSESSMENT — PAIN DESCRIPTION - FREQUENCY: FREQUENCY: CONTINUOUS

## 2025-06-10 ASSESSMENT — PULMONARY FUNCTION TESTS: PIF_VALUE: 15

## 2025-06-10 ASSESSMENT — PAIN - FUNCTIONAL ASSESSMENT: PAIN_FUNCTIONAL_ASSESSMENT: PREVENTS OR INTERFERES WITH MANY ACTIVE NOT PASSIVE ACTIVITIES

## 2025-06-10 NOTE — CARE COORDINATION
Case Management Assessment  Initial Evaluation    Date/Time of Evaluation: 6/10/2025 8:53 AM  Assessment Completed by: Sherie Strauss RN    If patient is discharged prior to next notation, then this note serves as note for discharge by case management.    Patient Name: Colby Ovalle                   YOB: 1950  Diagnosis: NSTEMI (non-ST elevated myocardial infarction) (HCC) [I21.4]  Two-vessel coronary artery disease [I25.10]                   Date / Time: 6/5/2025 12:07 PM    Patient Admission Status: Inpatient   Readmission Risk (Low < 19, Mod (19-27), High > 27): Readmission Risk Score: 23.1    Current PCP: No primary care provider on file.  PCP verified by CM? Yes ([facility])    Chart Reviewed: Yes      History Provided by: Patient, Medical Record  Patient Orientation: Alert and Oriented    Patient Cognition: Alert    Hospitalization in the last 30 days (Readmission):  Yes    If yes, Readmission Assessment in CM Navigator will be completed.    Readmission Assessment  Number of Days since last admission?: 1-7 days  Previous Disposition: Other (comment) (transferred from [facility] for CVTS consult)  Who is being Interviewed: Patient (chart reviewed /transferred from [facility] for CVTS consult)  What was the patient's/caregiver's perception as to why they think they needed to return back to the hospital?: Other (Comment) (transferred from [facility] for CVTS consult)  Did you visit your Primary Care Physician after you left the hospital, before you returned this time?: No  Why weren't you able to visit your PCP?: Other (Comment) (transferred from [facility] for CVTS consult)  Did you see a specialist, such as Cardiac, Pulmonary, Orthopedic Physician, etc. after you left the hospital?: Yes (transferred from [facility] for CVTS consult)  Who advised the patient to return to the hospital?: Other (Comment), Physician (transferred from [facility] for CVTS consult)  Does the patient report anything

## 2025-06-11 ENCOUNTER — APPOINTMENT (OUTPATIENT)
Dept: GENERAL RADIOLOGY | Age: 75
DRG: 235 | End: 2025-06-11
Attending: INTERNAL MEDICINE
Payer: OTHER GOVERNMENT

## 2025-06-11 LAB
ANION GAP SERPL CALCULATED.3IONS-SCNC: 13 MMOL/L (ref 3–16)
BUN SERPL-MCNC: 31 MG/DL (ref 7–20)
CALCIUM SERPL-MCNC: 9.2 MG/DL (ref 8.3–10.6)
CHLORIDE SERPL-SCNC: 102 MMOL/L (ref 99–110)
CO2 SERPL-SCNC: 25 MMOL/L (ref 21–32)
CREAT SERPL-MCNC: 4.3 MG/DL (ref 0.8–1.3)
DEPRECATED RDW RBC AUTO: 14.9 % (ref 12.4–15.4)
FIBRINOGEN PPP-MCNC: 440 MG/DL (ref 220–516)
GFR SERPLBLD CREATININE-BSD FMLA CKD-EPI: 14 ML/MIN/{1.73_M2}
GLUCOSE BLD-MCNC: 103 MG/DL (ref 70–99)
GLUCOSE BLD-MCNC: 111 MG/DL (ref 70–99)
GLUCOSE BLD-MCNC: 113 MG/DL (ref 70–99)
GLUCOSE BLD-MCNC: 117 MG/DL (ref 70–99)
GLUCOSE BLD-MCNC: 118 MG/DL (ref 70–99)
GLUCOSE BLD-MCNC: 217 MG/DL (ref 70–99)
GLUCOSE BLD-MCNC: 220 MG/DL (ref 70–99)
GLUCOSE BLD-MCNC: 78 MG/DL (ref 70–99)
GLUCOSE BLD-MCNC: 86 MG/DL (ref 70–99)
GLUCOSE BLD-MCNC: 98 MG/DL (ref 70–99)
GLUCOSE BLD-MCNC: 99 MG/DL (ref 70–99)
GLUCOSE SERPL-MCNC: 107 MG/DL (ref 70–99)
HCT VFR BLD AUTO: 22.4 % (ref 40.5–52.5)
HGB BLD-MCNC: 7.4 G/DL (ref 13.5–17.5)
MAGNESIUM SERPL-MCNC: 2.43 MG/DL (ref 1.8–2.4)
MCH RBC QN AUTO: 29.8 PG (ref 26–34)
MCHC RBC AUTO-ENTMCNC: 33 G/DL (ref 31–36)
MCV RBC AUTO: 90.4 FL (ref 80–100)
PERFORMED ON: ABNORMAL
PERFORMED ON: NORMAL
PLATELET # BLD AUTO: 75 K/UL (ref 135–450)
PMV BLD AUTO: 10.2 FL (ref 5–10.5)
POTASSIUM SERPL-SCNC: 3.8 MMOL/L (ref 3.5–5.1)
POTASSIUM SERPL-SCNC: 4.3 MMOL/L (ref 3.5–5.1)
RBC # BLD AUTO: 2.47 M/UL (ref 4.2–5.9)
SODIUM SERPL-SCNC: 140 MMOL/L (ref 136–145)
WBC # BLD AUTO: 4.9 K/UL (ref 4–11)

## 2025-06-11 PROCEDURE — 2000000000 HC ICU R&B

## 2025-06-11 PROCEDURE — 90935 HEMODIALYSIS ONE EVALUATION: CPT

## 2025-06-11 PROCEDURE — 2700000000 HC OXYGEN THERAPY PER DAY

## 2025-06-11 PROCEDURE — 6370000000 HC RX 637 (ALT 250 FOR IP)

## 2025-06-11 PROCEDURE — 6360000002 HC RX W HCPCS: Performed by: THORACIC SURGERY (CARDIOTHORACIC VASCULAR SURGERY)

## 2025-06-11 PROCEDURE — 94761 N-INVAS EAR/PLS OXIMETRY MLT: CPT

## 2025-06-11 PROCEDURE — 2500000003 HC RX 250 WO HCPCS: Performed by: THORACIC SURGERY (CARDIOTHORACIC VASCULAR SURGERY)

## 2025-06-11 PROCEDURE — 85027 COMPLETE CBC AUTOMATED: CPT

## 2025-06-11 PROCEDURE — 83735 ASSAY OF MAGNESIUM: CPT

## 2025-06-11 PROCEDURE — 71045 X-RAY EXAM CHEST 1 VIEW: CPT

## 2025-06-11 PROCEDURE — 80048 BASIC METABOLIC PNL TOTAL CA: CPT

## 2025-06-11 PROCEDURE — 2500000003 HC RX 250 WO HCPCS: Performed by: PHYSICIAN ASSISTANT

## 2025-06-11 PROCEDURE — 6360000002 HC RX W HCPCS: Performed by: PHYSICIAN ASSISTANT

## 2025-06-11 PROCEDURE — 99024 POSTOP FOLLOW-UP VISIT: CPT

## 2025-06-11 PROCEDURE — 6370000000 HC RX 637 (ALT 250 FOR IP): Performed by: THORACIC SURGERY (CARDIOTHORACIC VASCULAR SURGERY)

## 2025-06-11 PROCEDURE — 2580000003 HC RX 258

## 2025-06-11 PROCEDURE — 92610 EVALUATE SWALLOWING FUNCTION: CPT

## 2025-06-11 PROCEDURE — 2580000003 HC RX 258: Performed by: PHYSICIAN ASSISTANT

## 2025-06-11 PROCEDURE — 85384 FIBRINOGEN ACTIVITY: CPT

## 2025-06-11 PROCEDURE — 6370000000 HC RX 637 (ALT 250 FOR IP): Performed by: PHYSICIAN ASSISTANT

## 2025-06-11 PROCEDURE — 6360000002 HC RX W HCPCS: Performed by: STUDENT IN AN ORGANIZED HEALTH CARE EDUCATION/TRAINING PROGRAM

## 2025-06-11 PROCEDURE — 94669 MECHANICAL CHEST WALL OSCILL: CPT

## 2025-06-11 PROCEDURE — 94660 CPAP INITIATION&MGMT: CPT

## 2025-06-11 RX ORDER — SODIUM CHLORIDE 9 MG/ML
INJECTION, SOLUTION INTRAVENOUS
Status: COMPLETED
Start: 2025-06-11 | End: 2025-06-11

## 2025-06-11 RX ORDER — POLYETHYLENE GLYCOL 3350 17 G/17G
17 POWDER, FOR SOLUTION ORAL ONCE
Status: COMPLETED | OUTPATIENT
Start: 2025-06-11 | End: 2025-06-11

## 2025-06-11 RX ORDER — INSULIN LISPRO 100 [IU]/ML
0-8 INJECTION, SOLUTION INTRAVENOUS; SUBCUTANEOUS
Status: DISCONTINUED | OUTPATIENT
Start: 2025-06-11 | End: 2025-06-13

## 2025-06-11 RX ORDER — ASPIRIN 81 MG/1
TABLET, CHEWABLE ORAL
Status: COMPLETED
Start: 2025-06-11 | End: 2025-06-11

## 2025-06-11 RX ORDER — POTASSIUM CHLORIDE 7.45 MG/ML
10 INJECTION INTRAVENOUS ONCE
Status: COMPLETED | OUTPATIENT
Start: 2025-06-11 | End: 2025-06-11

## 2025-06-11 RX ADMIN — ASPIRIN 81 MG: 81 TABLET, CHEWABLE ORAL at 13:30

## 2025-06-11 RX ADMIN — METHADONE HYDROCHLORIDE 60 MG: 10 TABLET ORAL at 06:43

## 2025-06-11 RX ADMIN — POLYETHYLENE GLYCOL 3350 17 G: 17 POWDER, FOR SOLUTION ORAL at 13:30

## 2025-06-11 RX ADMIN — MUPIROCIN: 20 OINTMENT TOPICAL at 20:05

## 2025-06-11 RX ADMIN — EPOETIN ALFA-EPBX 10000 UNITS: 10000 INJECTION, SOLUTION INTRAVENOUS; SUBCUTANEOUS at 17:37

## 2025-06-11 RX ADMIN — STANDARDIZED SENNA CONCENTRATE AND DOCUSATE SODIUM 1 TABLET: 8.6; 5 TABLET ORAL at 20:03

## 2025-06-11 RX ADMIN — INSULIN LISPRO 2 UNITS: 100 INJECTION, SOLUTION INTRAVENOUS; SUBCUTANEOUS at 17:37

## 2025-06-11 RX ADMIN — ACETAMINOPHEN 1000 MG: 500 TABLET ORAL at 20:03

## 2025-06-11 RX ADMIN — INSULIN LISPRO 2 UNITS: 100 INJECTION, SOLUTION INTRAVENOUS; SUBCUTANEOUS at 20:03

## 2025-06-11 RX ADMIN — POTASSIUM CHLORIDE 10 MEQ: 7.46 INJECTION, SOLUTION INTRAVENOUS at 01:36

## 2025-06-11 RX ADMIN — STANDARDIZED SENNA CONCENTRATE AND DOCUSATE SODIUM 1 TABLET: 8.6; 5 TABLET ORAL at 13:30

## 2025-06-11 RX ADMIN — AMIODARONE HYDROCHLORIDE 1 MG/MIN: 1.8 INJECTION, SOLUTION INTRAVENOUS at 13:49

## 2025-06-11 RX ADMIN — ASPIRIN 81 MG: 81 TABLET, DELAYED RELEASE ORAL at 13:31

## 2025-06-11 RX ADMIN — MUPIROCIN: 20 OINTMENT TOPICAL at 08:25

## 2025-06-11 RX ADMIN — AMIODARONE HYDROCHLORIDE 1 MG/MIN: 1.8 INJECTION, SOLUTION INTRAVENOUS at 19:33

## 2025-06-11 RX ADMIN — HYDROCODONE BITARTRATE AND ACETAMINOPHEN 2 TABLET: 5; 325 TABLET ORAL at 03:20

## 2025-06-11 RX ADMIN — WATER 1000 MG: 1 INJECTION INTRAMUSCULAR; INTRAVENOUS; SUBCUTANEOUS at 06:41

## 2025-06-11 RX ADMIN — ACETAMINOPHEN 1000 MG: 500 TABLET ORAL at 13:30

## 2025-06-11 RX ADMIN — SODIUM CHLORIDE, PRESERVATIVE FREE 40 MG: 5 INJECTION INTRAVENOUS at 08:24

## 2025-06-11 RX ADMIN — SODIUM CHLORIDE, PRESERVATIVE FREE 10 ML: 5 INJECTION INTRAVENOUS at 20:03

## 2025-06-11 RX ADMIN — VASOPRESSIN IN 0.9% SODIUM CHLORIDE 0.01 UNITS/MIN: 20 INJECTION INTRAVENOUS at 02:12

## 2025-06-11 RX ADMIN — SODIUM CHLORIDE 1.4 UNITS/HR: 9 INJECTION, SOLUTION INTRAVENOUS at 02:16

## 2025-06-11 RX ADMIN — SODIUM CHLORIDE 500 ML: 0.9 INJECTION, SOLUTION INTRAVENOUS at 00:49

## 2025-06-11 RX ADMIN — ATORVASTATIN CALCIUM 80 MG: 80 TABLET, FILM COATED ORAL at 20:03

## 2025-06-11 RX ADMIN — AMIODARONE HYDROCHLORIDE 1 MG/MIN: 1.8 INJECTION, SOLUTION INTRAVENOUS at 08:22

## 2025-06-11 ASSESSMENT — PAIN DESCRIPTION - ORIENTATION: ORIENTATION: MID

## 2025-06-11 ASSESSMENT — PAIN SCALES - GENERAL
PAINLEVEL_OUTOF10: 7
PAINLEVEL_OUTOF10: 5
PAINLEVEL_OUTOF10: 9
PAINLEVEL_OUTOF10: 0
PAINLEVEL_OUTOF10: 7

## 2025-06-11 ASSESSMENT — PAIN DESCRIPTION - LOCATION
LOCATION: CHEST

## 2025-06-11 ASSESSMENT — PAIN DESCRIPTION - PAIN TYPE: TYPE: SURGICAL PAIN

## 2025-06-11 ASSESSMENT — PAIN DESCRIPTION - DESCRIPTORS: DESCRIPTORS: ACHING

## 2025-06-11 ASSESSMENT — PAIN - FUNCTIONAL ASSESSMENT: PAIN_FUNCTIONAL_ASSESSMENT: PREVENTS OR INTERFERES SOME ACTIVE ACTIVITIES AND ADLS

## 2025-06-11 ASSESSMENT — PAIN DESCRIPTION - ONSET: ONSET: GRADUAL

## 2025-06-11 ASSESSMENT — PAIN DESCRIPTION - FREQUENCY: FREQUENCY: CONTINUOUS

## 2025-06-11 NOTE — PLAN OF CARE
Problem: Chronic Conditions and Co-morbidities  Goal: Patient's chronic conditions and co-morbidity symptoms are monitored and maintained or improved  Outcome: Progressing     Problem: Safety - Adult  Goal: Free from fall injury  Outcome: Progressing  Flowsheets (Taken 6/11/2025 1915)  Free From Fall Injury: Instruct family/caregiver on patient safety     Problem: Skin/Tissue Integrity  Goal: Skin integrity remains intact  Description: 1.  Monitor for areas of redness and/or skin breakdown2.  Assess vascular access sites hourly3.  Every 4-6 hours minimum:  Change oxygen saturation probe site4.  Every 4-6 hours:  If on nasal continuous positive airway pressure, respiratory therapy assess nares and determine need for appliance change or resting period  Outcome: Progressing  Flowsheets (Taken 6/11/2025 1915)  Skin Integrity Remains Intact:   Monitor for areas of redness and/or skin breakdown   Every 4-6 hours minimum:  Change oxygen saturation probe site     Problem: ABCDS Injury Assessment  Goal: Absence of physical injury  Outcome: Progressing  Flowsheets (Taken 6/11/2025 1915)  Absence of Physical Injury: Implement safety measures based on patient assessment     Problem: Pain  Goal: Verbalizes/displays adequate comfort level or baseline comfort level  Outcome: Progressing  Flowsheets (Taken 6/11/2025 1920)  Verbalizes/displays adequate comfort level or baseline comfort level: Encourage patient to monitor pain and request assistance     Problem: Discharge Planning  Goal: Discharge to home or other facility with appropriate resources  Outcome: Progressing     Problem: Nutrition Deficit:  Goal: Optimize nutritional status  Outcome: Progressing     Problem: Cardiovascular - Adult  Goal: Maintains optimal cardiac output and hemodynamic stability  Outcome: Progressing  Flowsheets (Taken 6/11/2025 1915)  Maintains optimal cardiac output and hemodynamic stability:   Monitor blood pressure and heart rate   Monitor urine

## 2025-06-12 ENCOUNTER — APPOINTMENT (OUTPATIENT)
Dept: GENERAL RADIOLOGY | Age: 75
DRG: 235 | End: 2025-06-12
Attending: INTERNAL MEDICINE
Payer: OTHER GOVERNMENT

## 2025-06-12 LAB
ANION GAP SERPL CALCULATED.3IONS-SCNC: 14 MMOL/L (ref 3–16)
BACTERIA BLD CULT ORG #2: ABNORMAL
BACTERIA BLD CULT: ABNORMAL
BACTERIA BLD CULT: ABNORMAL
BLOOD BANK DISPENSE STATUS: NORMAL
BLOOD BANK PRODUCT CODE: NORMAL
BPU ID: NORMAL
BUN SERPL-MCNC: 27 MG/DL (ref 7–20)
CALCIUM SERPL-MCNC: 8.4 MG/DL (ref 8.3–10.6)
CHLORIDE SERPL-SCNC: 98 MMOL/L (ref 99–110)
CO2 SERPL-SCNC: 25 MMOL/L (ref 21–32)
CREAT SERPL-MCNC: 4.1 MG/DL (ref 0.8–1.3)
DEPRECATED RDW RBC AUTO: 14.7 % (ref 12.4–15.4)
DESCRIPTION BLOOD BANK: NORMAL
GFR SERPLBLD CREATININE-BSD FMLA CKD-EPI: 15 ML/MIN/{1.73_M2}
GLUCOSE BLD-MCNC: 119 MG/DL (ref 70–99)
GLUCOSE BLD-MCNC: 215 MG/DL (ref 70–99)
GLUCOSE BLD-MCNC: 304 MG/DL (ref 70–99)
GLUCOSE SERPL-MCNC: 193 MG/DL (ref 70–99)
HCT VFR BLD AUTO: 22.1 % (ref 40.5–52.5)
HGB BLD-MCNC: 7.4 G/DL (ref 13.5–17.5)
MAGNESIUM SERPL-MCNC: 2.24 MG/DL (ref 1.8–2.4)
MCH RBC QN AUTO: 30 PG (ref 26–34)
MCHC RBC AUTO-ENTMCNC: 33.4 G/DL (ref 31–36)
MCV RBC AUTO: 89.9 FL (ref 80–100)
ORGANISM: ABNORMAL
PERFORMED ON: ABNORMAL
PLATELET # BLD AUTO: 87 K/UL (ref 135–450)
PMV BLD AUTO: 9.2 FL (ref 5–10.5)
POTASSIUM SERPL-SCNC: 4.3 MMOL/L (ref 3.5–5.1)
RBC # BLD AUTO: 2.46 M/UL (ref 4.2–5.9)
SODIUM SERPL-SCNC: 137 MMOL/L (ref 136–145)
WBC # BLD AUTO: 4.3 K/UL (ref 4–11)

## 2025-06-12 PROCEDURE — 94669 MECHANICAL CHEST WALL OSCILL: CPT

## 2025-06-12 PROCEDURE — 97530 THERAPEUTIC ACTIVITIES: CPT

## 2025-06-12 PROCEDURE — 2000000000 HC ICU R&B

## 2025-06-12 PROCEDURE — 6370000000 HC RX 637 (ALT 250 FOR IP)

## 2025-06-12 PROCEDURE — 97166 OT EVAL MOD COMPLEX 45 MIN: CPT

## 2025-06-12 PROCEDURE — 6360000002 HC RX W HCPCS: Performed by: PHYSICIAN ASSISTANT

## 2025-06-12 PROCEDURE — 6370000000 HC RX 637 (ALT 250 FOR IP): Performed by: THORACIC SURGERY (CARDIOTHORACIC VASCULAR SURGERY)

## 2025-06-12 PROCEDURE — 80048 BASIC METABOLIC PNL TOTAL CA: CPT

## 2025-06-12 PROCEDURE — 83735 ASSAY OF MAGNESIUM: CPT

## 2025-06-12 PROCEDURE — 71045 X-RAY EXAM CHEST 1 VIEW: CPT

## 2025-06-12 PROCEDURE — 97162 PT EVAL MOD COMPLEX 30 MIN: CPT

## 2025-06-12 PROCEDURE — 5A09357 ASSISTANCE WITH RESPIRATORY VENTILATION, LESS THAN 24 CONSECUTIVE HOURS, CONTINUOUS POSITIVE AIRWAY PRESSURE: ICD-10-PCS | Performed by: INTERNAL MEDICINE

## 2025-06-12 PROCEDURE — 85027 COMPLETE CBC AUTOMATED: CPT

## 2025-06-12 PROCEDURE — 99024 POSTOP FOLLOW-UP VISIT: CPT | Performed by: PHYSICIAN ASSISTANT

## 2025-06-12 PROCEDURE — 97116 GAIT TRAINING THERAPY: CPT

## 2025-06-12 PROCEDURE — 6360000002 HC RX W HCPCS: Performed by: THORACIC SURGERY (CARDIOTHORACIC VASCULAR SURGERY)

## 2025-06-12 PROCEDURE — 2500000003 HC RX 250 WO HCPCS: Performed by: THORACIC SURGERY (CARDIOTHORACIC VASCULAR SURGERY)

## 2025-06-12 PROCEDURE — 2500000003 HC RX 250 WO HCPCS: Performed by: PHYSICIAN ASSISTANT

## 2025-06-12 PROCEDURE — 6370000000 HC RX 637 (ALT 250 FOR IP): Performed by: PHYSICIAN ASSISTANT

## 2025-06-12 RX ORDER — AMIODARONE HYDROCHLORIDE 200 MG/1
400 TABLET ORAL 2 TIMES DAILY
Status: DISCONTINUED | OUTPATIENT
Start: 2025-06-12 | End: 2025-06-16

## 2025-06-12 RX ORDER — LACTULOSE 10 G/15ML
30 SOLUTION ORAL ONCE
Status: COMPLETED | OUTPATIENT
Start: 2025-06-12 | End: 2025-06-12

## 2025-06-12 RX ADMIN — AMIODARONE HYDROCHLORIDE 400 MG: 200 TABLET ORAL at 20:11

## 2025-06-12 RX ADMIN — BISACODYL 10 MG: 10 SUPPOSITORY RECTAL at 20:11

## 2025-06-12 RX ADMIN — ASPIRIN 81 MG: 81 TABLET, DELAYED RELEASE ORAL at 07:54

## 2025-06-12 RX ADMIN — ATORVASTATIN CALCIUM 80 MG: 80 TABLET, FILM COATED ORAL at 20:11

## 2025-06-12 RX ADMIN — PANTOPRAZOLE SODIUM 40 MG: 40 TABLET, DELAYED RELEASE ORAL at 07:52

## 2025-06-12 RX ADMIN — AMIODARONE HYDROCHLORIDE 400 MG: 200 TABLET ORAL at 08:28

## 2025-06-12 RX ADMIN — INSULIN LISPRO 6 UNITS: 100 INJECTION, SOLUTION INTRAVENOUS; SUBCUTANEOUS at 11:36

## 2025-06-12 RX ADMIN — INSULIN LISPRO 2 UNITS: 100 INJECTION, SOLUTION INTRAVENOUS; SUBCUTANEOUS at 20:33

## 2025-06-12 RX ADMIN — POLYETHYLENE GLYCOL 3350 17 G: 17 POWDER, FOR SOLUTION ORAL at 07:52

## 2025-06-12 RX ADMIN — SODIUM CHLORIDE, PRESERVATIVE FREE 10 ML: 5 INJECTION INTRAVENOUS at 20:12

## 2025-06-12 RX ADMIN — ACETAMINOPHEN 1000 MG: 500 TABLET ORAL at 15:24

## 2025-06-12 RX ADMIN — LACTULOSE 30 G: 10 SOLUTION ORAL at 11:36

## 2025-06-12 RX ADMIN — METHADONE HYDROCHLORIDE 60 MG: 10 TABLET ORAL at 05:44

## 2025-06-12 RX ADMIN — AMIODARONE HYDROCHLORIDE 1 MG/MIN: 1.8 INJECTION, SOLUTION INTRAVENOUS at 07:43

## 2025-06-12 RX ADMIN — POTASSIUM CHLORIDE 20 MEQ: 29.8 INJECTION, SOLUTION INTRAVENOUS at 05:09

## 2025-06-12 RX ADMIN — INSULIN LISPRO 2 UNITS: 100 INJECTION, SOLUTION INTRAVENOUS; SUBCUTANEOUS at 08:09

## 2025-06-12 RX ADMIN — STANDARDIZED SENNA CONCENTRATE AND DOCUSATE SODIUM 1 TABLET: 8.6; 5 TABLET ORAL at 20:11

## 2025-06-12 RX ADMIN — STANDARDIZED SENNA CONCENTRATE AND DOCUSATE SODIUM 1 TABLET: 8.6; 5 TABLET ORAL at 07:52

## 2025-06-12 RX ADMIN — AMIODARONE HYDROCHLORIDE 1 MG/MIN: 1.8 INJECTION, SOLUTION INTRAVENOUS at 01:16

## 2025-06-12 RX ADMIN — ACETAMINOPHEN 1000 MG: 500 TABLET ORAL at 05:44

## 2025-06-12 RX ADMIN — SODIUM CHLORIDE, PRESERVATIVE FREE 10 ML: 5 INJECTION INTRAVENOUS at 07:59

## 2025-06-12 RX ADMIN — WATER 1000 MG: 1 INJECTION INTRAMUSCULAR; INTRAVENOUS; SUBCUTANEOUS at 08:07

## 2025-06-12 ASSESSMENT — PAIN DESCRIPTION - LOCATION
LOCATION: CHEST
LOCATION: CHEST

## 2025-06-12 ASSESSMENT — PAIN SCALES - GENERAL
PAINLEVEL_OUTOF10: 2
PAINLEVEL_OUTOF10: 7
PAINLEVEL_OUTOF10: 5

## 2025-06-12 NOTE — CONSULTS
Colby Ovalle  6/12/2025  [id number]    Chief Complaint: NSTEMI (non-ST elevated myocardial infarction) ([facility])    Subjective   HPI: Colby Ovalle is a 74 y.o. male with PMHx notable for HTN, HLD, ESRD who presented to [facility] on 6/2/25 with chest pain, found to have NSTEMI, underwent cardiac catheterization on 6/2 showing severe 2-vessel CAD, and was transferred to [facility] on 6/5/2025 for CVTS evaluation. He underwent CABG x2 on 6/9. Hospital course complicated by: A-fib, ESRD, staph bacteremia, constipation.     Patient reports that he is doing well. He is most limited by fatigue and generalized weakness. He denies any chest pain or dyspnea. Appetite is poor. Last BM (including prior to admit): 06/01/25.    Past Medical History:   Diagnosis Date    Alcohol dependence in remission (HCC)     information from [facility]    Cancer ([facility])     over 10 years ago     Chronic back pain     COVID 01/12/2022    End-stage renal disease on hemodialysis ([facility])     Erectile dysfunction     Full dentures     Hepatitis C     information from [facility]    Hx of non-Hodgkin's lymphoma     Information from [facility]    Hyperlipidemia     Hypertension     Kidney stone     Lumbago     information from [facility]    Opioid dependence (HCC)     information from [facility]    Thrombocytopenia     Tobacco dependency     Type 2 diabetes mellitus without complication ([facility])        Past Surgical History:   Procedure Laterality Date    BACK SURGERY      CARDIAC PROCEDURE N/A 10/3/2024    Left heart cath / coronary angiography performed by Bindu Barboza DO at [facility] CARDIAC CATH LAB    CARDIAC PROCEDURE N/A 10/3/2024    Intravascular ultrasound performed by Bindu Barboza DO at [facility] CARDIAC CATH LAB    CARDIAC PROCEDURE N/A 10/3/2024    Atherectomy coronary performed by Bindu Barboza DO at [facility] CARDIAC CATH LAB    CARDIAC PROCEDURE N/A 6/2/2025    Left heart cath / coronary angiography performed by

## 2025-06-12 NOTE — CARE COORDINATION
Discharge Planning Note:     Writer spoke to patient about ARU location. Patient would like to stay here at [facility] for inpatient rehab but [facility] sure [facility]. Writer notified Hiren cruz/[facility].    Electronically signed by Sherie Strauss RN on 6/12/2025 at 4:08 PM

## 2025-06-12 NOTE — CARE COORDINATION
Prior Authorization submitted for ARU approval.  ARU will continue to follow progress and update discharge plan as needed.    TYREE ZelayaN, .[phone]

## 2025-06-13 ENCOUNTER — APPOINTMENT (OUTPATIENT)
Dept: GENERAL RADIOLOGY | Age: 75
DRG: 235 | End: 2025-06-13
Attending: INTERNAL MEDICINE
Payer: OTHER GOVERNMENT

## 2025-06-13 LAB
ANION GAP SERPL CALCULATED.3IONS-SCNC: 13 MMOL/L (ref 3–16)
BUN SERPL-MCNC: 38 MG/DL (ref 7–20)
CALCIUM SERPL-MCNC: 8.9 MG/DL (ref 8.3–10.6)
CHLORIDE SERPL-SCNC: 99 MMOL/L (ref 99–110)
CO2 SERPL-SCNC: 25 MMOL/L (ref 21–32)
CREAT SERPL-MCNC: 5.5 MG/DL (ref 0.8–1.3)
DEPRECATED RDW RBC AUTO: 14.8 % (ref 12.4–15.4)
GFR SERPLBLD CREATININE-BSD FMLA CKD-EPI: 10 ML/MIN/{1.73_M2}
GLUCOSE BLD-MCNC: 119 MG/DL (ref 70–99)
GLUCOSE BLD-MCNC: 214 MG/DL (ref 70–99)
GLUCOSE BLD-MCNC: 279 MG/DL (ref 70–99)
GLUCOSE BLD-MCNC: 384 MG/DL (ref 70–99)
GLUCOSE BLD-MCNC: 391 MG/DL (ref 70–99)
GLUCOSE BLD-MCNC: 414 MG/DL (ref 70–99)
GLUCOSE BLD-MCNC: 86 MG/DL (ref 70–99)
GLUCOSE SERPL-MCNC: 168 MG/DL (ref 70–99)
HCT VFR BLD AUTO: 22.6 % (ref 40.5–52.5)
HGB BLD-MCNC: 7.6 G/DL (ref 13.5–17.5)
MAGNESIUM SERPL-MCNC: 2.22 MG/DL (ref 1.8–2.4)
MCH RBC QN AUTO: 30.2 PG (ref 26–34)
MCHC RBC AUTO-ENTMCNC: 33.6 G/DL (ref 31–36)
MCV RBC AUTO: 89.7 FL (ref 80–100)
PERFORMED ON: ABNORMAL
PERFORMED ON: NORMAL
PHOSPHATE SERPL-MCNC: 3.3 MG/DL (ref 2.5–4.9)
PLATELET # BLD AUTO: 119 K/UL (ref 135–450)
PMV BLD AUTO: 9.2 FL (ref 5–10.5)
POTASSIUM SERPL-SCNC: 4.3 MMOL/L (ref 3.5–5.1)
RBC # BLD AUTO: 2.52 M/UL (ref 4.2–5.9)
SODIUM SERPL-SCNC: 137 MMOL/L (ref 136–145)
WBC # BLD AUTO: 5.9 K/UL (ref 4–11)

## 2025-06-13 PROCEDURE — 2000000000 HC ICU R&B

## 2025-06-13 PROCEDURE — 6370000000 HC RX 637 (ALT 250 FOR IP): Performed by: PHYSICIAN ASSISTANT

## 2025-06-13 PROCEDURE — 71045 X-RAY EXAM CHEST 1 VIEW: CPT

## 2025-06-13 PROCEDURE — 6360000002 HC RX W HCPCS: Performed by: STUDENT IN AN ORGANIZED HEALTH CARE EDUCATION/TRAINING PROGRAM

## 2025-06-13 PROCEDURE — 90935 HEMODIALYSIS ONE EVALUATION: CPT

## 2025-06-13 PROCEDURE — 94669 MECHANICAL CHEST WALL OSCILL: CPT

## 2025-06-13 PROCEDURE — 6370000000 HC RX 637 (ALT 250 FOR IP): Performed by: STUDENT IN AN ORGANIZED HEALTH CARE EDUCATION/TRAINING PROGRAM

## 2025-06-13 PROCEDURE — 80048 BASIC METABOLIC PNL TOTAL CA: CPT

## 2025-06-13 PROCEDURE — 92526 ORAL FUNCTION THERAPY: CPT

## 2025-06-13 PROCEDURE — 6360000002 HC RX W HCPCS: Performed by: THORACIC SURGERY (CARDIOTHORACIC VASCULAR SURGERY)

## 2025-06-13 PROCEDURE — 85027 COMPLETE CBC AUTOMATED: CPT

## 2025-06-13 PROCEDURE — 6370000000 HC RX 637 (ALT 250 FOR IP)

## 2025-06-13 PROCEDURE — 2500000003 HC RX 250 WO HCPCS: Performed by: PHYSICIAN ASSISTANT

## 2025-06-13 PROCEDURE — 99024 POSTOP FOLLOW-UP VISIT: CPT | Performed by: PHYSICIAN ASSISTANT

## 2025-06-13 PROCEDURE — 83735 ASSAY OF MAGNESIUM: CPT

## 2025-06-13 PROCEDURE — 6370000000 HC RX 637 (ALT 250 FOR IP): Performed by: THORACIC SURGERY (CARDIOTHORACIC VASCULAR SURGERY)

## 2025-06-13 PROCEDURE — 2500000003 HC RX 250 WO HCPCS: Performed by: THORACIC SURGERY (CARDIOTHORACIC VASCULAR SURGERY)

## 2025-06-13 PROCEDURE — 84100 ASSAY OF PHOSPHORUS: CPT

## 2025-06-13 RX ORDER — MIDODRINE HYDROCHLORIDE 5 MG/1
10 TABLET ORAL ONCE
Status: COMPLETED | OUTPATIENT
Start: 2025-06-13 | End: 2025-06-13

## 2025-06-13 RX ORDER — INSULIN LISPRO 100 [IU]/ML
0-16 INJECTION, SOLUTION INTRAVENOUS; SUBCUTANEOUS
Status: DISCONTINUED | OUTPATIENT
Start: 2025-06-13 | End: 2025-06-16 | Stop reason: HOSPADM

## 2025-06-13 RX ADMIN — STANDARDIZED SENNA CONCENTRATE AND DOCUSATE SODIUM 1 TABLET: 8.6; 5 TABLET ORAL at 08:27

## 2025-06-13 RX ADMIN — MIDODRINE HYDROCHLORIDE 10 MG: 5 TABLET ORAL at 14:08

## 2025-06-13 RX ADMIN — PANTOPRAZOLE SODIUM 40 MG: 40 TABLET, DELAYED RELEASE ORAL at 08:28

## 2025-06-13 RX ADMIN — LINACLOTIDE 145 MCG: 145 CAPSULE, GELATIN COATED ORAL at 08:26

## 2025-06-13 RX ADMIN — INSULIN LISPRO 15 UNITS: 100 INJECTION, SOLUTION INTRAVENOUS; SUBCUTANEOUS at 12:14

## 2025-06-13 RX ADMIN — ATORVASTATIN CALCIUM 80 MG: 80 TABLET, FILM COATED ORAL at 20:53

## 2025-06-13 RX ADMIN — EPOETIN ALFA-EPBX 10000 UNITS: 10000 INJECTION, SOLUTION INTRAVENOUS; SUBCUTANEOUS at 17:20

## 2025-06-13 RX ADMIN — WATER 1000 MG: 1 INJECTION INTRAMUSCULAR; INTRAVENOUS; SUBCUTANEOUS at 08:46

## 2025-06-13 RX ADMIN — AMIODARONE HYDROCHLORIDE 400 MG: 200 TABLET ORAL at 20:53

## 2025-06-13 RX ADMIN — INSULIN LISPRO 2 UNITS: 100 INJECTION, SOLUTION INTRAVENOUS; SUBCUTANEOUS at 08:27

## 2025-06-13 RX ADMIN — SODIUM CHLORIDE, PRESERVATIVE FREE 10 ML: 5 INJECTION INTRAVENOUS at 08:27

## 2025-06-13 RX ADMIN — ACETAMINOPHEN 1000 MG: 500 TABLET ORAL at 14:08

## 2025-06-13 RX ADMIN — ACETAMINOPHEN 1000 MG: 500 TABLET ORAL at 20:53

## 2025-06-13 RX ADMIN — ASPIRIN 81 MG: 81 TABLET, DELAYED RELEASE ORAL at 08:28

## 2025-06-13 RX ADMIN — INSULIN LISPRO 8 UNITS: 100 INJECTION, SOLUTION INTRAVENOUS; SUBCUTANEOUS at 11:27

## 2025-06-13 RX ADMIN — STANDARDIZED SENNA CONCENTRATE AND DOCUSATE SODIUM 1 TABLET: 8.6; 5 TABLET ORAL at 20:53

## 2025-06-13 RX ADMIN — METHADONE HYDROCHLORIDE 60 MG: 10 TABLET ORAL at 06:09

## 2025-06-13 RX ADMIN — AMIODARONE HYDROCHLORIDE 400 MG: 200 TABLET ORAL at 08:26

## 2025-06-13 RX ADMIN — POLYETHYLENE GLYCOL 3350 17 G: 17 POWDER, FOR SOLUTION ORAL at 08:28

## 2025-06-13 RX ADMIN — ACETAMINOPHEN 1000 MG: 500 TABLET ORAL at 06:09

## 2025-06-13 RX ADMIN — APIXABAN 2.5 MG: 2.5 TABLET, FILM COATED ORAL at 20:53

## 2025-06-13 ASSESSMENT — PAIN DESCRIPTION - LOCATION: LOCATION: CHEST

## 2025-06-13 ASSESSMENT — PAIN DESCRIPTION - ORIENTATION: ORIENTATION: RIGHT;OUTER

## 2025-06-13 NOTE — FLOWSHEET NOTE
06/13/25 1738   Vital Signs   /70   Pulse 83   SpO2 95 %   Weight - Scale 82.5 kg (181 lb 14.1 oz)   Percent Weight Change -1.9   Post-Hemodialysis Assessment   Post-Treatment Procedures Blood returned;Access bleeding time < 10 minutes   Rinseback Volume (ml) 500 ml   Blood Volume Processed (Liters) 59.7 L   Dialyzer Clearance Lightly streaked   Duration of Treatment (minutes) 180 minutes   Hemodialysis Intake (ml) 500 ml   Hemodialysis Output (ml) 2130 ml   NET Removed (ml) 1630   Tolerated Treatment Good   Patient Response to Treatment Well   Time Off 1738     Treatment terminated 30 mins early per Patient request, Dr. Hyman notified. 1.63L removed.

## 2025-06-13 NOTE — PLAN OF CARE
Problem: Chronic Conditions and Co-morbidities  Goal: Patient's chronic conditions and co-morbidity symptoms are monitored and maintained or improved  Outcome: Progressing     Problem: Safety - Adult  Goal: Free from fall injury  Outcome: Progressing     Problem: Skin/Tissue Integrity  Goal: Skin integrity remains intact  Description: 1.  Monitor for areas of redness and/or skin breakdown2.  Assess vascular access sites hourly3.  Every 4-6 hours minimum:  Change oxygen saturation probe site4.  Every 4-6 hours:  If on nasal continuous positive airway pressure, respiratory therapy assess nares and determine need for appliance change or resting period  Outcome: Progressing     Problem: ABCDS Injury Assessment  Goal: Absence of physical injury  Outcome: Progressing     Problem: Pain  Goal: Verbalizes/displays adequate comfort level or baseline comfort level  Outcome: Progressing     Problem: Discharge Planning  Goal: Discharge to home or other facility with appropriate resources  Outcome: Progressing     Problem: Nutrition Deficit:  Goal: Optimize nutritional status  Outcome: Progressing     Problem: Cardiovascular - Adult  Goal: Maintains optimal cardiac output and hemodynamic stability  Outcome: Progressing  Flowsheets (Taken 6/13/2025 0834)  Maintains optimal cardiac output and hemodynamic stability:   Monitor blood pressure and heart rate   Monitor urine output and notify Licensed Independent Practitioner for values outside of normal range   Assess for signs of decreased cardiac output   Administer fluid and/or volume expanders as ordered   Administer vasoactive medications as ordered  Goal: Absence of cardiac dysrhythmias or at baseline  Outcome: Progressing  Flowsheets (Taken 6/13/2025 0834)  Absence of cardiac dysrhythmias or at baseline:   Monitor cardiac rate and rhythm   Assess for signs of decreased cardiac output   Administer antiarrhythmia medication and electrolyte replacement as ordered     Problem:

## 2025-06-13 NOTE — CARE COORDINATION
Case Management/Follow up:    Chart reviewed for length of stay. Hospital day # 8  Unit: CVICU    Diagnosis and current status as per MD progress:NSTEMI (non-ST elevated myocardial infarction)   Anticipated d/c date: few days  Expected plan for discharge: [facility] ARU  Potential barriers: precert  Precert required/date initiated:Yes on 6/12  Confirmed plan with patient and/or family: yes  Comments: POD 4 CABG x 2. HD @ [facility] 11 am. From home with spouse.    Electronically signed by Sherei Strauss RN on 6/13/2025 at 12:45 PM

## 2025-06-13 NOTE — PLAN OF CARE
Problem: Chronic Conditions and Co-morbidities  Goal: Patient's chronic conditions and co-morbidity symptoms are monitored and maintained or improved  Outcome: Progressing     Problem: Safety - Adult  Goal: Free from fall injury  Outcome: Progressing     Problem: Skin/Tissue Integrity  Goal: Skin integrity remains intact  Description: 1.  Monitor for areas of redness and/or skin breakdown2.  Assess vascular access sites hourly3.  Every 4-6 hours minimum:  Change oxygen saturation probe site4.  Every 4-6 hours:  If on nasal continuous positive airway pressure, respiratory therapy assess nares and determine need for appliance change or resting period  Outcome: Progressing     Problem: ABCDS Injury Assessment  Goal: Absence of physical injury  Outcome: Progressing     Problem: Pain  Goal: Verbalizes/displays adequate comfort level or baseline comfort level  Outcome: Progressing     Problem: Discharge Planning  Goal: Discharge to home or other facility with appropriate resources  Outcome: Progressing     Problem: Nutrition Deficit:  Goal: Optimize nutritional status  Outcome: Progressing     Problem: Cardiovascular - Adult  Goal: Maintains optimal cardiac output and hemodynamic stability  Outcome: Progressing  Goal: Absence of cardiac dysrhythmias or at baseline  Outcome: Progressing     Problem: Metabolic/Fluid and Electrolytes - Adult  Goal: Electrolytes maintained within normal limits  Outcome: Progressing  Goal: Hemodynamic stability and optimal renal function maintained  Outcome: Progressing  Goal: Glucose maintained within prescribed range  Outcome: Progressing     Problem: Hematologic - Adult  Goal: Maintains hematologic stability  Outcome: Progressing     Problem: Infection - Adult  Goal: Absence of infection at discharge  Outcome: Progressing  Goal: Absence of infection during hospitalization  Outcome: Progressing     Problem: Genitourinary - Adult  Goal: Absence of urinary retention  Outcome:

## 2025-06-14 ENCOUNTER — APPOINTMENT (OUTPATIENT)
Dept: GENERAL RADIOLOGY | Age: 75
DRG: 235 | End: 2025-06-14
Attending: INTERNAL MEDICINE
Payer: OTHER GOVERNMENT

## 2025-06-14 LAB
ANION GAP SERPL CALCULATED.3IONS-SCNC: 13 MMOL/L (ref 3–16)
BUN SERPL-MCNC: 25 MG/DL (ref 7–20)
CALCIUM SERPL-MCNC: 8.4 MG/DL (ref 8.3–10.6)
CHLORIDE SERPL-SCNC: 97 MMOL/L (ref 99–110)
CO2 SERPL-SCNC: 26 MMOL/L (ref 21–32)
CREAT SERPL-MCNC: 4.3 MG/DL (ref 0.8–1.3)
DEPRECATED RDW RBC AUTO: 14.9 % (ref 12.4–15.4)
GFR SERPLBLD CREATININE-BSD FMLA CKD-EPI: 14 ML/MIN/{1.73_M2}
GLUCOSE BLD-MCNC: 123 MG/DL (ref 70–99)
GLUCOSE BLD-MCNC: 154 MG/DL (ref 70–99)
GLUCOSE BLD-MCNC: 313 MG/DL (ref 70–99)
GLUCOSE BLD-MCNC: 89 MG/DL (ref 70–99)
GLUCOSE SERPL-MCNC: 125 MG/DL (ref 70–99)
HCT VFR BLD AUTO: 21.8 % (ref 40.5–52.5)
HGB BLD-MCNC: 7.2 G/DL (ref 13.5–17.5)
MAGNESIUM SERPL-MCNC: 2.05 MG/DL (ref 1.8–2.4)
MCH RBC QN AUTO: 30 PG (ref 26–34)
MCHC RBC AUTO-ENTMCNC: 33.2 G/DL (ref 31–36)
MCV RBC AUTO: 90.6 FL (ref 80–100)
PERFORMED ON: ABNORMAL
PERFORMED ON: NORMAL
PLATELET # BLD AUTO: 140 K/UL (ref 135–450)
PMV BLD AUTO: 8.3 FL (ref 5–10.5)
POTASSIUM SERPL-SCNC: 4.4 MMOL/L (ref 3.5–5.1)
RBC # BLD AUTO: 2.4 M/UL (ref 4.2–5.9)
SODIUM SERPL-SCNC: 136 MMOL/L (ref 136–145)
WBC # BLD AUTO: 5.5 K/UL (ref 4–11)

## 2025-06-14 PROCEDURE — 85027 COMPLETE CBC AUTOMATED: CPT

## 2025-06-14 PROCEDURE — 2500000003 HC RX 250 WO HCPCS: Performed by: THORACIC SURGERY (CARDIOTHORACIC VASCULAR SURGERY)

## 2025-06-14 PROCEDURE — 6370000000 HC RX 637 (ALT 250 FOR IP): Performed by: PHYSICIAN ASSISTANT

## 2025-06-14 PROCEDURE — 94669 MECHANICAL CHEST WALL OSCILL: CPT

## 2025-06-14 PROCEDURE — 6360000002 HC RX W HCPCS: Performed by: THORACIC SURGERY (CARDIOTHORACIC VASCULAR SURGERY)

## 2025-06-14 PROCEDURE — 6370000000 HC RX 637 (ALT 250 FOR IP): Performed by: THORACIC SURGERY (CARDIOTHORACIC VASCULAR SURGERY)

## 2025-06-14 PROCEDURE — 87040 BLOOD CULTURE FOR BACTERIA: CPT

## 2025-06-14 PROCEDURE — 2500000003 HC RX 250 WO HCPCS: Performed by: PHYSICIAN ASSISTANT

## 2025-06-14 PROCEDURE — 99024 POSTOP FOLLOW-UP VISIT: CPT | Performed by: THORACIC SURGERY (CARDIOTHORACIC VASCULAR SURGERY)

## 2025-06-14 PROCEDURE — 80048 BASIC METABOLIC PNL TOTAL CA: CPT

## 2025-06-14 PROCEDURE — 87070 CULTURE OTHR SPECIMN AEROBIC: CPT

## 2025-06-14 PROCEDURE — 2000000000 HC ICU R&B

## 2025-06-14 PROCEDURE — 83735 ASSAY OF MAGNESIUM: CPT

## 2025-06-14 PROCEDURE — 71045 X-RAY EXAM CHEST 1 VIEW: CPT

## 2025-06-14 PROCEDURE — 94761 N-INVAS EAR/PLS OXIMETRY MLT: CPT

## 2025-06-14 RX ORDER — METOPROLOL TARTRATE 25 MG/1
12.5 TABLET, FILM COATED ORAL 2 TIMES DAILY
Status: DISCONTINUED | OUTPATIENT
Start: 2025-06-14 | End: 2025-06-16 | Stop reason: HOSPADM

## 2025-06-14 RX ORDER — ALOGLIPTIN 6.25 MG/1
6.25 TABLET, FILM COATED ORAL DAILY
Status: DISCONTINUED | OUTPATIENT
Start: 2025-06-14 | End: 2025-06-16 | Stop reason: HOSPADM

## 2025-06-14 RX ADMIN — HYDROCODONE BITARTRATE AND ACETAMINOPHEN 2 TABLET: 5; 325 TABLET ORAL at 23:56

## 2025-06-14 RX ADMIN — WATER 1000 MG: 1 INJECTION INTRAMUSCULAR; INTRAVENOUS; SUBCUTANEOUS at 07:34

## 2025-06-14 RX ADMIN — ALOGLIPTIN 6.25 MG: 6.25 TABLET, FILM COATED ORAL at 17:44

## 2025-06-14 RX ADMIN — APIXABAN 2.5 MG: 2.5 TABLET, FILM COATED ORAL at 23:57

## 2025-06-14 RX ADMIN — STANDARDIZED SENNA CONCENTRATE AND DOCUSATE SODIUM 1 TABLET: 8.6; 5 TABLET ORAL at 07:34

## 2025-06-14 RX ADMIN — PANTOPRAZOLE SODIUM 40 MG: 40 TABLET, DELAYED RELEASE ORAL at 07:34

## 2025-06-14 RX ADMIN — AMIODARONE HYDROCHLORIDE 400 MG: 200 TABLET ORAL at 07:34

## 2025-06-14 RX ADMIN — HYDROCODONE BITARTRATE AND ACETAMINOPHEN 1 TABLET: 5; 325 TABLET ORAL at 14:19

## 2025-06-14 RX ADMIN — ATORVASTATIN CALCIUM 80 MG: 80 TABLET, FILM COATED ORAL at 23:57

## 2025-06-14 RX ADMIN — AMIODARONE HYDROCHLORIDE 400 MG: 200 TABLET ORAL at 23:56

## 2025-06-14 RX ADMIN — DIPHENHYDRAMINE HYDROCHLORIDE 12.5 MG: 25 TABLET ORAL at 23:57

## 2025-06-14 RX ADMIN — METOPROLOL TARTRATE 12.5 MG: 25 TABLET, FILM COATED ORAL at 23:57

## 2025-06-14 RX ADMIN — APIXABAN 2.5 MG: 2.5 TABLET, FILM COATED ORAL at 07:34

## 2025-06-14 RX ADMIN — ASPIRIN 81 MG: 81 TABLET, DELAYED RELEASE ORAL at 07:34

## 2025-06-14 RX ADMIN — ACETAMINOPHEN 1000 MG: 500 TABLET ORAL at 06:50

## 2025-06-14 RX ADMIN — METHADONE HYDROCHLORIDE 60 MG: 10 TABLET ORAL at 06:50

## 2025-06-14 RX ADMIN — INSULIN LISPRO 12 UNITS: 100 INJECTION, SOLUTION INTRAVENOUS; SUBCUTANEOUS at 11:13

## 2025-06-14 RX ADMIN — LINACLOTIDE 145 MCG: 145 CAPSULE, GELATIN COATED ORAL at 07:34

## 2025-06-14 RX ADMIN — SODIUM CHLORIDE, PRESERVATIVE FREE 10 ML: 5 INJECTION INTRAVENOUS at 07:35

## 2025-06-14 RX ADMIN — POLYETHYLENE GLYCOL 3350 17 G: 17 POWDER, FOR SOLUTION ORAL at 07:34

## 2025-06-14 ASSESSMENT — PAIN DESCRIPTION - LOCATION
LOCATION: CHEST

## 2025-06-14 ASSESSMENT — PAIN SCALES - GENERAL
PAINLEVEL_OUTOF10: 7
PAINLEVEL_OUTOF10: 8
PAINLEVEL_OUTOF10: 8
PAINLEVEL_OUTOF10: 3
PAINLEVEL_OUTOF10: 8

## 2025-06-14 ASSESSMENT — PAIN DESCRIPTION - ORIENTATION
ORIENTATION: RIGHT;LEFT
ORIENTATION: MID;LEFT

## 2025-06-14 ASSESSMENT — PAIN DESCRIPTION - DESCRIPTORS: DESCRIPTORS: ACHING

## 2025-06-14 ASSESSMENT — PAIN DESCRIPTION - ONSET: ONSET: GRADUAL

## 2025-06-14 ASSESSMENT — PAIN DESCRIPTION - FREQUENCY: FREQUENCY: INTERMITTENT

## 2025-06-14 ASSESSMENT — PAIN DESCRIPTION - PAIN TYPE: TYPE: SURGICAL PAIN

## 2025-06-14 NOTE — DISCHARGE INSTRUCTIONS
Discharge Instructions Following Open Heart Surgery      Supplies you will need at home:  Accurate Scale  Digital Thermometer  Antibacterial Soap  Clean Wash Cloths  Someone to be with you for one week    Activity Instructions:  Do not lift, push, or pull anything over 5 pounds for 8 weeks from the day of surgery. This could prevent your breastbone from healing properly. (A gallon of milk is more than 5 pounds so you should buy ½ gallons). When you are given permission from your surgeon to begin lifting again, do so gradually. You will need time to build your muscle strength.    Housework - Do not cut the grass, vacuum, sweep or do any heavy housework until cleared by your surgeon.    Work - Returning to work may take 4 weeks if you have a desk job. If you have a more physical job, you may need to wait up to 12 weeks. Consult your doctor.    Driving - Do not drive a car or any vehicle for 4 weeks from the day of surgery. You cannot drive a truck, tractor or  for 8 weeks from the day of surgery. After 4 weeks, you can drive vehicles with power steering only. Do not drive while on pain medication.    Riding - You may ride in a car for local trips, up to 45 minutes. If you have to travel further then 2 hours stop every 45 minutes and walk around to increase circulation. Wear your lap and shoulder seatbelts in the car. Place your heart pillow between your chest and the shoulder seatbelt in case of an accident causing the air bag to deploy.    Walking & Stair Climbing - Use your activity diary in your binder for your walking plan. Plan to walk indoors on days the temperature is below 40º or over 80º or during smog alerts. At first limit, your stair climbing to once or twice a day. You may use the handrail for balance only. Do not pull yourself up with it. It is not unusual to feel tired for the first few weeks, but walking builds up your strength.    Sleeping - If you have trouble sleeping during the night,

## 2025-06-14 NOTE — PLAN OF CARE
Problem: Safety - Adult  Goal: Free from fall injury  Outcome: Progressing     Problem: Skin/Tissue Integrity  Goal: Skin integrity remains intact  Description: 1.  Monitor for areas of redness and/or skin breakdown2.  Assess vascular access sites hourly3.  Every 4-6 hours minimum:  Change oxygen saturation probe site4.  Every 4-6 hours:  If on nasal continuous positive airway pressure, respiratory therapy assess nares and determine need for appliance change or resting period  Outcome: Progressing     Problem: Pain  Goal: Verbalizes/displays adequate comfort level or baseline comfort level  Outcome: Progressing     Problem: Discharge Planning  Goal: Discharge to home or other facility with appropriate resources  Outcome: Progressing     Problem: Nutrition Deficit:  Goal: Optimize nutritional status  Outcome: Progressing     Problem: Cardiovascular - Adult  Goal: Maintains optimal cardiac output and hemodynamic stability  Outcome: Progressing  Goal: Absence of cardiac dysrhythmias or at baseline  Outcome: Progressing     Problem: Metabolic/Fluid and Electrolytes - Adult  Goal: Electrolytes maintained within normal limits  Outcome: Progressing  Goal: Hemodynamic stability and optimal renal function maintained  Outcome: Progressing  Goal: Glucose maintained within prescribed range  Outcome: Progressing     Problem: Hematologic - Adult  Goal: Maintains hematologic stability  Outcome: Progressing     Problem: Infection - Adult  Goal: Absence of infection at discharge  Outcome: Progressing  Goal: Absence of infection during hospitalization  Outcome: Progressing

## 2025-06-15 ENCOUNTER — APPOINTMENT (OUTPATIENT)
Dept: GENERAL RADIOLOGY | Age: 75
DRG: 235 | End: 2025-06-15
Attending: INTERNAL MEDICINE
Payer: OTHER GOVERNMENT

## 2025-06-15 LAB
ANION GAP SERPL CALCULATED.3IONS-SCNC: 14 MMOL/L (ref 3–16)
BUN SERPL-MCNC: 39 MG/DL (ref 7–20)
CALCIUM SERPL-MCNC: 8.7 MG/DL (ref 8.3–10.6)
CHLORIDE SERPL-SCNC: 94 MMOL/L (ref 99–110)
CO2 SERPL-SCNC: 26 MMOL/L (ref 21–32)
CREAT SERPL-MCNC: 6.5 MG/DL (ref 0.8–1.3)
DEPRECATED RDW RBC AUTO: 14.7 % (ref 12.4–15.4)
GFR SERPLBLD CREATININE-BSD FMLA CKD-EPI: 8 ML/MIN/{1.73_M2}
GLUCOSE BLD-MCNC: 183 MG/DL (ref 70–99)
GLUCOSE BLD-MCNC: 186 MG/DL (ref 70–99)
GLUCOSE BLD-MCNC: 204 MG/DL (ref 70–99)
GLUCOSE BLD-MCNC: 209 MG/DL (ref 70–99)
GLUCOSE SERPL-MCNC: 172 MG/DL (ref 70–99)
HCT VFR BLD AUTO: 23 % (ref 40.5–52.5)
HGB BLD-MCNC: 7.9 G/DL (ref 13.5–17.5)
MAGNESIUM SERPL-MCNC: 2.14 MG/DL (ref 1.8–2.4)
MCH RBC QN AUTO: 30.8 PG (ref 26–34)
MCHC RBC AUTO-ENTMCNC: 34.6 G/DL (ref 31–36)
MCV RBC AUTO: 89 FL (ref 80–100)
PERFORMED ON: ABNORMAL
PLATELET # BLD AUTO: 201 K/UL (ref 135–450)
PMV BLD AUTO: 8.3 FL (ref 5–10.5)
POTASSIUM SERPL-SCNC: 4.3 MMOL/L (ref 3.5–5.1)
RBC # BLD AUTO: 2.58 M/UL (ref 4.2–5.9)
SODIUM SERPL-SCNC: 134 MMOL/L (ref 136–145)
WBC # BLD AUTO: 6 K/UL (ref 4–11)

## 2025-06-15 PROCEDURE — 94669 MECHANICAL CHEST WALL OSCILL: CPT

## 2025-06-15 PROCEDURE — 2500000003 HC RX 250 WO HCPCS: Performed by: PHYSICIAN ASSISTANT

## 2025-06-15 PROCEDURE — 80048 BASIC METABOLIC PNL TOTAL CA: CPT

## 2025-06-15 PROCEDURE — 6370000000 HC RX 637 (ALT 250 FOR IP): Performed by: THORACIC SURGERY (CARDIOTHORACIC VASCULAR SURGERY)

## 2025-06-15 PROCEDURE — 94761 N-INVAS EAR/PLS OXIMETRY MLT: CPT

## 2025-06-15 PROCEDURE — 6370000000 HC RX 637 (ALT 250 FOR IP): Performed by: PHYSICIAN ASSISTANT

## 2025-06-15 PROCEDURE — 6360000002 HC RX W HCPCS: Performed by: THORACIC SURGERY (CARDIOTHORACIC VASCULAR SURGERY)

## 2025-06-15 PROCEDURE — 83735 ASSAY OF MAGNESIUM: CPT

## 2025-06-15 PROCEDURE — 2000000000 HC ICU R&B

## 2025-06-15 PROCEDURE — 99024 POSTOP FOLLOW-UP VISIT: CPT | Performed by: THORACIC SURGERY (CARDIOTHORACIC VASCULAR SURGERY)

## 2025-06-15 PROCEDURE — 2500000003 HC RX 250 WO HCPCS: Performed by: THORACIC SURGERY (CARDIOTHORACIC VASCULAR SURGERY)

## 2025-06-15 PROCEDURE — 71045 X-RAY EXAM CHEST 1 VIEW: CPT

## 2025-06-15 PROCEDURE — 85027 COMPLETE CBC AUTOMATED: CPT

## 2025-06-15 RX ORDER — LIDOCAINE AND PRILOCAINE 25; 25 MG/G; MG/G
CREAM TOPICAL
Status: DISCONTINUED | OUTPATIENT
Start: 2025-06-16 | End: 2025-06-16 | Stop reason: HOSPADM

## 2025-06-15 RX ADMIN — INSULIN LISPRO 3 UNITS: 100 INJECTION, SOLUTION INTRAVENOUS; SUBCUTANEOUS at 21:40

## 2025-06-15 RX ADMIN — METOPROLOL TARTRATE 12.5 MG: 25 TABLET, FILM COATED ORAL at 08:08

## 2025-06-15 RX ADMIN — INSULIN LISPRO 3 UNITS: 100 INJECTION, SOLUTION INTRAVENOUS; SUBCUTANEOUS at 11:31

## 2025-06-15 RX ADMIN — DIPHENHYDRAMINE HYDROCHLORIDE 12.5 MG: 25 TABLET ORAL at 21:40

## 2025-06-15 RX ADMIN — SODIUM CHLORIDE, PRESERVATIVE FREE 10 ML: 5 INJECTION INTRAVENOUS at 08:09

## 2025-06-15 RX ADMIN — INSULIN LISPRO 6 UNITS: 100 INJECTION, SOLUTION INTRAVENOUS; SUBCUTANEOUS at 16:45

## 2025-06-15 RX ADMIN — WATER 1000 MG: 1 INJECTION INTRAMUSCULAR; INTRAVENOUS; SUBCUTANEOUS at 08:09

## 2025-06-15 RX ADMIN — STANDARDIZED SENNA CONCENTRATE AND DOCUSATE SODIUM 1 TABLET: 8.6; 5 TABLET ORAL at 00:01

## 2025-06-15 RX ADMIN — INSULIN LISPRO 3 UNITS: 100 INJECTION, SOLUTION INTRAVENOUS; SUBCUTANEOUS at 08:08

## 2025-06-15 RX ADMIN — AMIODARONE HYDROCHLORIDE 400 MG: 200 TABLET ORAL at 21:40

## 2025-06-15 RX ADMIN — INSULIN LISPRO 2 UNITS: 100 INJECTION, SOLUTION INTRAVENOUS; SUBCUTANEOUS at 00:12

## 2025-06-15 RX ADMIN — LINACLOTIDE 145 MCG: 145 CAPSULE, GELATIN COATED ORAL at 08:09

## 2025-06-15 RX ADMIN — ALOGLIPTIN 6.25 MG: 6.25 TABLET, FILM COATED ORAL at 08:08

## 2025-06-15 RX ADMIN — APIXABAN 2.5 MG: 2.5 TABLET, FILM COATED ORAL at 21:44

## 2025-06-15 RX ADMIN — PANTOPRAZOLE SODIUM 40 MG: 40 TABLET, DELAYED RELEASE ORAL at 08:08

## 2025-06-15 RX ADMIN — METHADONE HYDROCHLORIDE 60 MG: 10 TABLET ORAL at 06:10

## 2025-06-15 RX ADMIN — AMIODARONE HYDROCHLORIDE 400 MG: 200 TABLET ORAL at 08:08

## 2025-06-15 RX ADMIN — HYDROCODONE BITARTRATE AND ACETAMINOPHEN 1 TABLET: 5; 325 TABLET ORAL at 15:48

## 2025-06-15 RX ADMIN — ACETAMINOPHEN 1000 MG: 500 TABLET ORAL at 21:40

## 2025-06-15 RX ADMIN — APIXABAN 2.5 MG: 2.5 TABLET, FILM COATED ORAL at 08:08

## 2025-06-15 RX ADMIN — STANDARDIZED SENNA CONCENTRATE AND DOCUSATE SODIUM 1 TABLET: 8.6; 5 TABLET ORAL at 08:08

## 2025-06-15 RX ADMIN — ATORVASTATIN CALCIUM 80 MG: 80 TABLET, FILM COATED ORAL at 21:40

## 2025-06-15 RX ADMIN — POLYETHYLENE GLYCOL 3350 17 G: 17 POWDER, FOR SOLUTION ORAL at 08:08

## 2025-06-15 RX ADMIN — ASPIRIN 81 MG: 81 TABLET, DELAYED RELEASE ORAL at 08:10

## 2025-06-15 RX ADMIN — SODIUM CHLORIDE, PRESERVATIVE FREE 10 ML: 5 INJECTION INTRAVENOUS at 21:42

## 2025-06-15 ASSESSMENT — PAIN DESCRIPTION - ORIENTATION
ORIENTATION: MID
ORIENTATION: RIGHT;LEFT

## 2025-06-15 ASSESSMENT — PAIN DESCRIPTION - LOCATION
LOCATION: CHEST

## 2025-06-15 ASSESSMENT — PAIN SCALES - GENERAL
PAINLEVEL_OUTOF10: 6
PAINLEVEL_OUTOF10: 3
PAINLEVEL_OUTOF10: 0
PAINLEVEL_OUTOF10: 4
PAINLEVEL_OUTOF10: 0
PAINLEVEL_OUTOF10: 8
PAINLEVEL_OUTOF10: 4
PAINLEVEL_OUTOF10: 8
PAINLEVEL_OUTOF10: 1

## 2025-06-15 ASSESSMENT — PAIN DESCRIPTION - DESCRIPTORS
DESCRIPTORS: ACHING

## 2025-06-15 ASSESSMENT — PAIN DESCRIPTION - PAIN TYPE: TYPE: SURGICAL PAIN

## 2025-06-15 NOTE — PLAN OF CARE
Problem: Chronic Conditions and Co-morbidities  Goal: Patient's chronic conditions and co-morbidity symptoms are monitored and maintained or improved  6/15/2025 0213 by Anna Natarajan, RN  Outcome: Progressing  Flowsheets (Taken 6/15/2025 0213)  Care Plan - Patient's Chronic Conditions and Co-Morbidity Symptoms are Monitored and Maintained or Improved:   Monitor and assess patient's chronic conditions and comorbid symptoms for stability, deterioration, or improvement   Collaborate with multidisciplinary team to address chronic and comorbid conditions and prevent exacerbation or deterioration   Update acute care plan with appropriate goals if chronic or comorbid symptoms are exacerbated and prevent overall improvement and discharge  6/15/2025 0213 by Anna Natarajan, RN  Reactivated  Flowsheets  Taken 6/15/2025 0213  Care Plan - Patient's Chronic Conditions and Co-Morbidity Symptoms are Monitored and Maintained or Improved:   Monitor and assess patient's chronic conditions and comorbid symptoms for stability, deterioration, or improvement   Collaborate with multidisciplinary team to address chronic and comorbid conditions and prevent exacerbation or deterioration   Update acute care plan with appropriate goals if chronic or comorbid symptoms are exacerbated and prevent overall improvement and discharge  Taken 6/14/2025 2000  Care Plan - Patient's Chronic Conditions and Co-Morbidity Symptoms are Monitored and Maintained or Improved:   Monitor and assess patient's chronic conditions and comorbid symptoms for stability, deterioration, or improvement   Collaborate with multidisciplinary team to address chronic and comorbid conditions and prevent exacerbation or deterioration   Update acute care plan with appropriate goals if chronic or comorbid symptoms are exacerbated and prevent overall improvement and discharge  6/14/2025 1754 by Edwin Lakhani, RN  Outcome: Completed     Problem: Safety - Adult  Goal: Free from

## 2025-06-15 NOTE — PLAN OF CARE
Problem: Chronic Conditions and Co-morbidities  Goal: Patient's chronic conditions and co-morbidity symptoms are monitored and maintained or improved  Outcome: Progressing     Problem: Safety - Adult  Goal: Free from fall injury  Outcome: Progressing     Problem: Skin/Tissue Integrity  Goal: Skin integrity remains intact  Description: 1.  Monitor for areas of redness and/or skin breakdown2.  Assess vascular access sites hourly3.  Every 4-6 hours minimum:  Change oxygen saturation probe site4.  Every 4-6 hours:  If on nasal continuous positive airway pressure, respiratory therapy assess nares and determine need for appliance change or resting period  Outcome: Progressing     Problem: ABCDS Injury Assessment  Goal: Absence of physical injury  Outcome: Progressing     Problem: Pain  Goal: Verbalizes/displays adequate comfort level or baseline comfort level  Outcome: Progressing     Problem: Discharge Planning  Goal: Discharge to home or other facility with appropriate resources  Outcome: Progressing     Problem: Nutrition Deficit:  Goal: Optimize nutritional status  Outcome: Progressing     Problem: Cardiovascular - Adult  Goal: Maintains optimal cardiac output and hemodynamic stability  Outcome: Progressing  Goal: Absence of cardiac dysrhythmias or at baseline  Outcome: Progressing     Problem: Metabolic/Fluid and Electrolytes - Adult  Goal: Electrolytes maintained within normal limits  Outcome: Progressing  Goal: Hemodynamic stability and optimal renal function maintained  Outcome: Progressing  Goal: Glucose maintained within prescribed range  Outcome: Progressing     Problem: Hematologic - Adult  Goal: Maintains hematologic stability  Outcome: Progressing     Problem: Infection - Adult  Goal: Absence of infection at discharge  Outcome: Progressing  Goal: Absence of infection during hospitalization  Outcome: Progressing

## 2025-06-16 ENCOUNTER — HOSPITAL ENCOUNTER (INPATIENT)
Age: 75
LOS: 12 days | Discharge: HOME HEALTH | DRG: 949 | End: 2025-06-28
Attending: STUDENT IN AN ORGANIZED HEALTH CARE EDUCATION/TRAINING PROGRAM | Admitting: STUDENT IN AN ORGANIZED HEALTH CARE EDUCATION/TRAINING PROGRAM
Payer: OTHER GOVERNMENT

## 2025-06-16 ENCOUNTER — APPOINTMENT (OUTPATIENT)
Dept: GENERAL RADIOLOGY | Age: 75
DRG: 235 | End: 2025-06-16
Attending: INTERNAL MEDICINE
Payer: OTHER GOVERNMENT

## 2025-06-16 VITALS
OXYGEN SATURATION: 96 % | TEMPERATURE: 97.9 F | HEART RATE: 99 BPM | DIASTOLIC BLOOD PRESSURE: 86 MMHG | BODY MASS INDEX: 24.61 KG/M2 | RESPIRATION RATE: 18 BRPM | WEIGHT: 181.66 LBS | SYSTOLIC BLOOD PRESSURE: 113 MMHG | HEIGHT: 72 IN

## 2025-06-16 PROBLEM — E11.69 TYPE 2 DIABETES MELLITUS WITH OTHER SPECIFIED COMPLICATION (HCC): Status: ACTIVE | Noted: 2025-06-02

## 2025-06-16 PROBLEM — B18.2 CHRONIC HEPATITIS C WITHOUT HEPATIC COMA (HCC): Status: ACTIVE | Noted: 2025-06-16

## 2025-06-16 PROBLEM — B95.7 COAGULASE NEGATIVE STAPHYLOCOCCUS BACTEREMIA: Status: ACTIVE | Noted: 2025-06-16

## 2025-06-16 PROBLEM — I25.10 DISEASE OF CARDIOVASCULAR SYSTEM: Status: ACTIVE | Noted: 2025-06-16

## 2025-06-16 PROBLEM — Z99.2 HEMODIALYSIS ACCESS, AV GRAFT: Status: ACTIVE | Noted: 2025-06-16

## 2025-06-16 PROBLEM — E78.2 MIXED HYPERLIPIDEMIA: Status: ACTIVE | Noted: 2024-10-23

## 2025-06-16 PROBLEM — R78.81 COAGULASE NEGATIVE STAPHYLOCOCCUS BACTEREMIA: Status: ACTIVE | Noted: 2025-06-16

## 2025-06-16 LAB
ANION GAP SERPL CALCULATED.3IONS-SCNC: 10 MMOL/L (ref 3–16)
ANION GAP SERPL CALCULATED.3IONS-SCNC: 15 MMOL/L (ref 3–16)
BUN SERPL-MCNC: 34 MG/DL (ref 7–20)
BUN SERPL-MCNC: 50 MG/DL (ref 7–20)
CALCIUM SERPL-MCNC: 8.7 MG/DL (ref 8.3–10.6)
CALCIUM SERPL-MCNC: 9.6 MG/DL (ref 8.3–10.6)
CHLORIDE SERPL-SCNC: 101 MMOL/L (ref 99–110)
CHLORIDE SERPL-SCNC: 96 MMOL/L (ref 99–110)
CO2 SERPL-SCNC: 24 MMOL/L (ref 21–32)
CO2 SERPL-SCNC: 28 MMOL/L (ref 21–32)
CREAT SERPL-MCNC: 6 MG/DL (ref 0.8–1.3)
CREAT SERPL-MCNC: 7.9 MG/DL (ref 0.8–1.3)
DEPRECATED RDW RBC AUTO: 15.1 % (ref 12.4–15.4)
FERRITIN SERPL IA-MCNC: 598 NG/ML (ref 30–400)
GFR SERPLBLD CREATININE-BSD FMLA CKD-EPI: 7 ML/MIN/{1.73_M2}
GFR SERPLBLD CREATININE-BSD FMLA CKD-EPI: 9 ML/MIN/{1.73_M2}
GLUCOSE BLD-MCNC: 148 MG/DL (ref 70–99)
GLUCOSE BLD-MCNC: 174 MG/DL (ref 70–99)
GLUCOSE BLD-MCNC: 177 MG/DL (ref 70–99)
GLUCOSE BLD-MCNC: 255 MG/DL (ref 70–99)
GLUCOSE SERPL-MCNC: 160 MG/DL (ref 70–99)
GLUCOSE SERPL-MCNC: 160 MG/DL (ref 70–99)
HCT VFR BLD AUTO: 21.6 % (ref 40.5–52.5)
HGB BLD-MCNC: 7.4 G/DL (ref 13.5–17.5)
IRON SATN MFR SERPL: 20 % (ref 20–50)
IRON SERPL-MCNC: 31 UG/DL (ref 59–158)
MAGNESIUM SERPL-MCNC: 2.16 MG/DL (ref 1.8–2.4)
MCH RBC QN AUTO: 30.7 PG (ref 26–34)
MCHC RBC AUTO-ENTMCNC: 34.3 G/DL (ref 31–36)
MCV RBC AUTO: 89.4 FL (ref 80–100)
PERFORMED ON: ABNORMAL
PLATELET # BLD AUTO: 218 K/UL (ref 135–450)
PMV BLD AUTO: 8.5 FL (ref 5–10.5)
POTASSIUM SERPL-SCNC: 4.5 MMOL/L (ref 3.5–5.1)
POTASSIUM SERPL-SCNC: 4.5 MMOL/L (ref 3.5–5.1)
RBC # BLD AUTO: 2.42 M/UL (ref 4.2–5.9)
SODIUM SERPL-SCNC: 135 MMOL/L (ref 136–145)
SODIUM SERPL-SCNC: 139 MMOL/L (ref 136–145)
TIBC SERPL-MCNC: 157 UG/DL (ref 260–445)
WBC # BLD AUTO: 5.6 K/UL (ref 4–11)

## 2025-06-16 PROCEDURE — 6370000000 HC RX 637 (ALT 250 FOR IP): Performed by: THORACIC SURGERY (CARDIOTHORACIC VASCULAR SURGERY)

## 2025-06-16 PROCEDURE — 90935 HEMODIALYSIS ONE EVALUATION: CPT

## 2025-06-16 PROCEDURE — 83550 IRON BINDING TEST: CPT

## 2025-06-16 PROCEDURE — 71045 X-RAY EXAM CHEST 1 VIEW: CPT

## 2025-06-16 PROCEDURE — 94669 MECHANICAL CHEST WALL OSCILL: CPT

## 2025-06-16 PROCEDURE — 6360000002 HC RX W HCPCS: Performed by: STUDENT IN AN ORGANIZED HEALTH CARE EDUCATION/TRAINING PROGRAM

## 2025-06-16 PROCEDURE — 83735 ASSAY OF MAGNESIUM: CPT

## 2025-06-16 PROCEDURE — 6370000000 HC RX 637 (ALT 250 FOR IP): Performed by: STUDENT IN AN ORGANIZED HEALTH CARE EDUCATION/TRAINING PROGRAM

## 2025-06-16 PROCEDURE — 99024 POSTOP FOLLOW-UP VISIT: CPT

## 2025-06-16 PROCEDURE — 36415 COLL VENOUS BLD VENIPUNCTURE: CPT

## 2025-06-16 PROCEDURE — 2500000003 HC RX 250 WO HCPCS: Performed by: PHYSICIAN ASSISTANT

## 2025-06-16 PROCEDURE — 82728 ASSAY OF FERRITIN: CPT

## 2025-06-16 PROCEDURE — 83540 ASSAY OF IRON: CPT

## 2025-06-16 PROCEDURE — 97530 THERAPEUTIC ACTIVITIES: CPT

## 2025-06-16 PROCEDURE — 2500000003 HC RX 250 WO HCPCS: Performed by: THORACIC SURGERY (CARDIOTHORACIC VASCULAR SURGERY)

## 2025-06-16 PROCEDURE — 80048 BASIC METABOLIC PNL TOTAL CA: CPT

## 2025-06-16 PROCEDURE — 6370000000 HC RX 637 (ALT 250 FOR IP): Performed by: PHYSICIAN ASSISTANT

## 2025-06-16 PROCEDURE — 1280000000 HC REHAB R&B

## 2025-06-16 PROCEDURE — 97116 GAIT TRAINING THERAPY: CPT

## 2025-06-16 PROCEDURE — 94761 N-INVAS EAR/PLS OXIMETRY MLT: CPT

## 2025-06-16 PROCEDURE — 85027 COMPLETE CBC AUTOMATED: CPT

## 2025-06-16 PROCEDURE — 6360000002 HC RX W HCPCS: Performed by: THORACIC SURGERY (CARDIOTHORACIC VASCULAR SURGERY)

## 2025-06-16 RX ORDER — ALOGLIPTIN 6.25 MG/1
6.25 TABLET, FILM COATED ORAL DAILY
Qty: 14 TABLET | Refills: 0 | Status: ON HOLD | OUTPATIENT
Start: 2025-06-17 | End: 2025-07-01

## 2025-06-16 RX ORDER — ASPIRIN 81 MG/1
81 TABLET ORAL DAILY
Qty: 30 TABLET | Refills: 3 | Status: ON HOLD | OUTPATIENT
Start: 2025-06-17

## 2025-06-16 RX ORDER — AMIODARONE HYDROCHLORIDE 200 MG/1
200 TABLET ORAL DAILY
Status: DISCONTINUED | OUTPATIENT
Start: 2025-06-21 | End: 2025-06-28 | Stop reason: HOSPADM

## 2025-06-16 RX ORDER — ASPIRIN 81 MG/1
81 TABLET ORAL DAILY
Status: CANCELLED | OUTPATIENT
Start: 2025-06-17

## 2025-06-16 RX ORDER — INSULIN LISPRO 100 [IU]/ML
0-16 INJECTION, SOLUTION INTRAVENOUS; SUBCUTANEOUS
Status: DISCONTINUED | OUTPATIENT
Start: 2025-06-16 | End: 2025-06-19

## 2025-06-16 RX ORDER — METOPROLOL TARTRATE 25 MG/1
12.5 TABLET, FILM COATED ORAL 2 TIMES DAILY
Status: DISCONTINUED | OUTPATIENT
Start: 2025-06-16 | End: 2025-06-28 | Stop reason: HOSPADM

## 2025-06-16 RX ORDER — METHADONE HYDROCHLORIDE 10 MG/ML
95 CONCENTRATE ORAL DAILY
Refills: 0 | Status: DISCONTINUED | OUTPATIENT
Start: 2025-06-17 | End: 2025-06-28 | Stop reason: HOSPADM

## 2025-06-16 RX ORDER — ACETAMINOPHEN 325 MG/1
650 TABLET ORAL EVERY 4 HOURS PRN
Status: CANCELLED | OUTPATIENT
Start: 2025-06-16

## 2025-06-16 RX ORDER — AMIODARONE HYDROCHLORIDE 200 MG/1
400 TABLET ORAL 2 TIMES DAILY
Status: CANCELLED | OUTPATIENT
Start: 2025-06-16 | End: 2025-06-20

## 2025-06-16 RX ORDER — ATORVASTATIN CALCIUM 80 MG/1
80 TABLET, FILM COATED ORAL NIGHTLY
Status: CANCELLED | OUTPATIENT
Start: 2025-06-16

## 2025-06-16 RX ORDER — PANTOPRAZOLE SODIUM 40 MG/1
40 TABLET, DELAYED RELEASE ORAL
Status: DISCONTINUED | OUTPATIENT
Start: 2025-06-17 | End: 2025-06-28 | Stop reason: HOSPADM

## 2025-06-16 RX ORDER — BISACODYL 10 MG
10 SUPPOSITORY, RECTAL RECTAL DAILY PRN
Status: DISCONTINUED | OUTPATIENT
Start: 2025-06-16 | End: 2025-06-28 | Stop reason: HOSPADM

## 2025-06-16 RX ORDER — METOPROLOL TARTRATE 25 MG/1
12.5 TABLET, FILM COATED ORAL 2 TIMES DAILY
Status: CANCELLED | OUTPATIENT
Start: 2025-06-16

## 2025-06-16 RX ORDER — HYDROCODONE BITARTRATE AND ACETAMINOPHEN 5; 325 MG/1; MG/1
1 TABLET ORAL EVERY 6 HOURS PRN
Status: CANCELLED | OUTPATIENT
Start: 2025-06-16

## 2025-06-16 RX ORDER — GLUCAGON 1 MG/ML
1 KIT INJECTION PRN
Status: DISCONTINUED | OUTPATIENT
Start: 2025-06-16 | End: 2025-06-28 | Stop reason: HOSPADM

## 2025-06-16 RX ORDER — INSULIN LISPRO 100 [IU]/ML
0-16 INJECTION, SOLUTION INTRAVENOUS; SUBCUTANEOUS
Qty: 9 EACH | Refills: 0 | Status: ON HOLD | OUTPATIENT
Start: 2025-06-16 | End: 2025-06-30

## 2025-06-16 RX ORDER — SENNA AND DOCUSATE SODIUM 50; 8.6 MG/1; MG/1
1 TABLET, FILM COATED ORAL 2 TIMES DAILY
Status: CANCELLED | OUTPATIENT
Start: 2025-06-16

## 2025-06-16 RX ORDER — ATORVASTATIN CALCIUM 80 MG/1
80 TABLET, FILM COATED ORAL NIGHTLY
Status: DISCONTINUED | OUTPATIENT
Start: 2025-06-16 | End: 2025-06-28 | Stop reason: HOSPADM

## 2025-06-16 RX ORDER — METHADONE HYDROCHLORIDE 10 MG/ML
95 CONCENTRATE ORAL DAILY
Refills: 0 | Status: DISCONTINUED | OUTPATIENT
Start: 2025-06-17 | End: 2025-06-16 | Stop reason: HOSPADM

## 2025-06-16 RX ORDER — METHADONE HYDROCHLORIDE 10 MG/1
30 TABLET ORAL ONCE
Refills: 0 | Status: COMPLETED | OUTPATIENT
Start: 2025-06-16 | End: 2025-06-16

## 2025-06-16 RX ORDER — POLYETHYLENE GLYCOL 3350 17 G/17G
17 POWDER, FOR SOLUTION ORAL DAILY
Status: DISCONTINUED | OUTPATIENT
Start: 2025-06-16 | End: 2025-06-28 | Stop reason: HOSPADM

## 2025-06-16 RX ORDER — LIDOCAINE AND PRILOCAINE 25; 25 MG/G; MG/G
CREAM TOPICAL
Status: DISCONTINUED | OUTPATIENT
Start: 2025-06-16 | End: 2025-06-28 | Stop reason: HOSPADM

## 2025-06-16 RX ORDER — LIDOCAINE AND PRILOCAINE 25; 25 MG/G; MG/G
CREAM TOPICAL
Status: CANCELLED | OUTPATIENT
Start: 2025-06-16

## 2025-06-16 RX ORDER — ALOGLIPTIN 6.25 MG/1
6.25 TABLET, FILM COATED ORAL DAILY
Status: DISCONTINUED | OUTPATIENT
Start: 2025-06-17 | End: 2025-06-28 | Stop reason: HOSPADM

## 2025-06-16 RX ORDER — POLYETHYLENE GLYCOL 3350 17 G/17G
17 POWDER, FOR SOLUTION ORAL DAILY
Status: CANCELLED | OUTPATIENT
Start: 2025-06-16

## 2025-06-16 RX ORDER — HYDROCODONE BITARTRATE AND ACETAMINOPHEN 5; 325 MG/1; MG/1
1 TABLET ORAL EVERY 6 HOURS PRN
Status: DISCONTINUED | OUTPATIENT
Start: 2025-06-16 | End: 2025-06-18

## 2025-06-16 RX ORDER — AMIODARONE HYDROCHLORIDE 200 MG/1
200 TABLET ORAL DAILY
Status: DISCONTINUED | OUTPATIENT
Start: 2025-06-21 | End: 2025-06-16 | Stop reason: HOSPADM

## 2025-06-16 RX ORDER — POTASSIUM CHLORIDE 1500 MG/1
40 TABLET, EXTENDED RELEASE ORAL PRN
Qty: 60 TABLET | Refills: 3 | Status: ON HOLD | OUTPATIENT
Start: 2025-06-16

## 2025-06-16 RX ORDER — SENNA AND DOCUSATE SODIUM 50; 8.6 MG/1; MG/1
1 TABLET, FILM COATED ORAL 2 TIMES DAILY
Status: DISCONTINUED | OUTPATIENT
Start: 2025-06-16 | End: 2025-06-28 | Stop reason: HOSPADM

## 2025-06-16 RX ORDER — HYDROCODONE BITARTRATE AND ACETAMINOPHEN 5; 325 MG/1; MG/1
1 TABLET ORAL EVERY 6 HOURS PRN
Qty: 20 TABLET | Refills: 0 | Status: ON HOLD | OUTPATIENT
Start: 2025-06-16 | End: 2025-06-21

## 2025-06-16 RX ORDER — METHADONE HYDROCHLORIDE 10 MG/ML
95 CONCENTRATE ORAL DAILY
Refills: 0 | Status: CANCELLED | OUTPATIENT
Start: 2025-06-17

## 2025-06-16 RX ORDER — AMIODARONE HYDROCHLORIDE 200 MG/1
TABLET ORAL
Qty: 46 TABLET | Refills: 0 | Status: ON HOLD | OUTPATIENT
Start: 2025-06-16 | End: 2025-07-20

## 2025-06-16 RX ORDER — AMIODARONE HYDROCHLORIDE 200 MG/1
400 TABLET ORAL 2 TIMES DAILY
Status: DISCONTINUED | OUTPATIENT
Start: 2025-06-16 | End: 2025-06-16 | Stop reason: HOSPADM

## 2025-06-16 RX ORDER — PANTOPRAZOLE SODIUM 40 MG/1
40 TABLET, DELAYED RELEASE ORAL
Status: CANCELLED | OUTPATIENT
Start: 2025-06-17

## 2025-06-16 RX ORDER — ONDANSETRON 4 MG/1
4 TABLET, ORALLY DISINTEGRATING ORAL EVERY 8 HOURS PRN
Status: CANCELLED | OUTPATIENT
Start: 2025-06-16

## 2025-06-16 RX ORDER — INSULIN LISPRO 100 [IU]/ML
0-16 INJECTION, SOLUTION INTRAVENOUS; SUBCUTANEOUS
Status: CANCELLED | OUTPATIENT
Start: 2025-06-16

## 2025-06-16 RX ORDER — AMIODARONE HYDROCHLORIDE 200 MG/1
200 TABLET ORAL DAILY
Status: CANCELLED | OUTPATIENT
Start: 2025-06-21

## 2025-06-16 RX ORDER — ALOGLIPTIN 6.25 MG/1
6.25 TABLET, FILM COATED ORAL DAILY
Status: CANCELLED | OUTPATIENT
Start: 2025-06-17

## 2025-06-16 RX ORDER — BISACODYL 10 MG
10 SUPPOSITORY, RECTAL RECTAL DAILY PRN
Status: CANCELLED | OUTPATIENT
Start: 2025-06-16

## 2025-06-16 RX ORDER — DEXTROSE MONOHYDRATE 100 MG/ML
INJECTION, SOLUTION INTRAVENOUS CONTINUOUS PRN
Status: CANCELLED | OUTPATIENT
Start: 2025-06-16

## 2025-06-16 RX ORDER — ONDANSETRON 4 MG/1
4 TABLET, ORALLY DISINTEGRATING ORAL EVERY 8 HOURS PRN
Status: DISCONTINUED | OUTPATIENT
Start: 2025-06-16 | End: 2025-06-28 | Stop reason: HOSPADM

## 2025-06-16 RX ORDER — DEXTROSE MONOHYDRATE 100 MG/ML
INJECTION, SOLUTION INTRAVENOUS CONTINUOUS PRN
Status: DISCONTINUED | OUTPATIENT
Start: 2025-06-16 | End: 2025-06-28 | Stop reason: HOSPADM

## 2025-06-16 RX ORDER — AMIODARONE HYDROCHLORIDE 200 MG/1
400 TABLET ORAL 2 TIMES DAILY
Status: DISPENSED | OUTPATIENT
Start: 2025-06-16 | End: 2025-06-20

## 2025-06-16 RX ORDER — METOPROLOL TARTRATE 25 MG/1
12.5 TABLET, FILM COATED ORAL 2 TIMES DAILY
Qty: 60 TABLET | Refills: 3 | Status: ON HOLD | OUTPATIENT
Start: 2025-06-16

## 2025-06-16 RX ORDER — ATORVASTATIN CALCIUM 80 MG/1
80 TABLET, FILM COATED ORAL NIGHTLY
Qty: 30 TABLET | Refills: 3 | Status: ON HOLD | OUTPATIENT
Start: 2025-06-16

## 2025-06-16 RX ORDER — ASPIRIN 81 MG/1
81 TABLET ORAL DAILY
Status: DISCONTINUED | OUTPATIENT
Start: 2025-06-17 | End: 2025-06-28 | Stop reason: HOSPADM

## 2025-06-16 RX ORDER — PANTOPRAZOLE SODIUM 40 MG/1
40 TABLET, DELAYED RELEASE ORAL DAILY
Qty: 30 TABLET | Refills: 0 | Status: ON HOLD | OUTPATIENT
Start: 2025-06-17

## 2025-06-16 RX ORDER — GLUCAGON 1 MG/ML
1 KIT INJECTION PRN
Status: CANCELLED | OUTPATIENT
Start: 2025-06-16

## 2025-06-16 RX ORDER — ACETAMINOPHEN 325 MG/1
650 TABLET ORAL EVERY 4 HOURS PRN
Status: DISCONTINUED | OUTPATIENT
Start: 2025-06-16 | End: 2025-06-28 | Stop reason: HOSPADM

## 2025-06-16 RX ADMIN — LINACLOTIDE 145 MCG: 145 CAPSULE, GELATIN COATED ORAL at 09:03

## 2025-06-16 RX ADMIN — WATER 1000 MG: 1 INJECTION INTRAMUSCULAR; INTRAVENOUS; SUBCUTANEOUS at 09:12

## 2025-06-16 RX ADMIN — SODIUM CHLORIDE, PRESERVATIVE FREE 10 ML: 5 INJECTION INTRAVENOUS at 05:54

## 2025-06-16 RX ADMIN — ASPIRIN 81 MG: 81 TABLET, DELAYED RELEASE ORAL at 09:03

## 2025-06-16 RX ADMIN — AMIODARONE HYDROCHLORIDE 400 MG: 200 TABLET ORAL at 09:03

## 2025-06-16 RX ADMIN — POLYETHYLENE GLYCOL 3350 17 G: 17 POWDER, FOR SOLUTION ORAL at 20:52

## 2025-06-16 RX ADMIN — INSULIN LISPRO 8 UNITS: 100 INJECTION, SOLUTION INTRAVENOUS; SUBCUTANEOUS at 20:55

## 2025-06-16 RX ADMIN — INSULIN LISPRO 3 UNITS: 100 INJECTION, SOLUTION INTRAVENOUS; SUBCUTANEOUS at 11:41

## 2025-06-16 RX ADMIN — METHADONE HYDROCHLORIDE 30 MG: 10 TABLET ORAL at 10:04

## 2025-06-16 RX ADMIN — AMIODARONE HYDROCHLORIDE 400 MG: 200 TABLET ORAL at 20:54

## 2025-06-16 RX ADMIN — APIXABAN 2.5 MG: 2.5 TABLET, FILM COATED ORAL at 20:54

## 2025-06-16 RX ADMIN — SODIUM CHLORIDE, PRESERVATIVE FREE 10 ML: 5 INJECTION INTRAVENOUS at 09:04

## 2025-06-16 RX ADMIN — PANTOPRAZOLE SODIUM 40 MG: 40 TABLET, DELAYED RELEASE ORAL at 09:03

## 2025-06-16 RX ADMIN — INSULIN LISPRO 3 UNITS: 100 INJECTION, SOLUTION INTRAVENOUS; SUBCUTANEOUS at 09:12

## 2025-06-16 RX ADMIN — POLYETHYLENE GLYCOL 3350 17 G: 17 POWDER, FOR SOLUTION ORAL at 09:03

## 2025-06-16 RX ADMIN — STANDARDIZED SENNA CONCENTRATE AND DOCUSATE SODIUM 1 TABLET: 8.6; 5 TABLET ORAL at 20:54

## 2025-06-16 RX ADMIN — ATORVASTATIN CALCIUM 80 MG: 80 TABLET, FILM COATED ORAL at 20:54

## 2025-06-16 RX ADMIN — METOPROLOL TARTRATE 12.5 MG: 25 TABLET, FILM COATED ORAL at 09:03

## 2025-06-16 RX ADMIN — EPOETIN ALFA-EPBX 10000 UNITS: 10000 INJECTION, SOLUTION INTRAVENOUS; SUBCUTANEOUS at 20:57

## 2025-06-16 RX ADMIN — METOPROLOL TARTRATE 12.5 MG: 25 TABLET, FILM COATED ORAL at 20:53

## 2025-06-16 RX ADMIN — METHADONE HYDROCHLORIDE 60 MG: 10 TABLET ORAL at 08:15

## 2025-06-16 RX ADMIN — APIXABAN 2.5 MG: 2.5 TABLET, FILM COATED ORAL at 09:03

## 2025-06-16 RX ADMIN — ALOGLIPTIN 6.25 MG: 6.25 TABLET, FILM COATED ORAL at 09:03

## 2025-06-16 RX ADMIN — STANDARDIZED SENNA CONCENTRATE AND DOCUSATE SODIUM 1 TABLET: 8.6; 5 TABLET ORAL at 09:03

## 2025-06-16 ASSESSMENT — ENCOUNTER SYMPTOMS
SINUS PRESSURE: 0
CONSTIPATION: 0
EYE REDNESS: 0
RHINORRHEA: 0
NAUSEA: 0
DIARRHEA: 0
SINUS PAIN: 0
SHORTNESS OF BREATH: 0
EYE DISCHARGE: 0
BACK PAIN: 0
WHEEZING: 0
SORE THROAT: 0
COUGH: 0
ABDOMINAL PAIN: 0

## 2025-06-16 ASSESSMENT — PAIN SCALES - GENERAL
PAINLEVEL_OUTOF10: 0

## 2025-06-16 NOTE — PROGRESS NOTES
score 0-27, or enter 99 if unable to complete (if symptom frequency (column 2) is blank for 3 or more items).   0     Social Isolation  \"How often do you feel lonely or isolated from those around you?\"  [x] 0.  Never  [] 1.  Rarely  [] 2.  Sometimes  [] 3.  Often  [] 4.  Always  [] 7.  Patient declines to respond  [] 8.  Patient unable to respond    Pain Effect on Sleep  \"Over the past 5 days, how much of the time has pain made it hard for you to sleep at night?\"  []  0.  Does not apply - I have not had any pain or hurting in the past 5 days  [x]  1.  Rarely or not at all  []  2.  Occasionally  []  3.  Frequently  []  4.  Almost constantly  []  8.  Unable to answer    **If the patient answers \"0.  Does not apply\" to this question, skip the next two \"Pain Effect...\" questions**    Pain Interference with Therapy Activities  \"Over the past 5 days, how often have you limited your participation in rehabilitation therapy sessions due to pain?\"  []  0.  Does not apply - I have not received rehabilitation therapy in the past 5 days  [x]  1.  Rarely or not at all  []  2.  Occasionally  []  3.  Frequently  []  4.  Almost constantly  []  8.  Unable to answer    Pain Interference with Day-to-Day Activities:  \"Over the past 5 days, how often have you limited your day-to-day activities (excluding rehabilitation therapy session)?\"  [x]  1.  Rarely or not at all  []  2.  Occasionally  []  3.  Frequently  []  4.  Almost constantly  []  8.  Unable to answer    Nutritional Approaches  Check all of the following nutritional approaches that apply on admission:  []  A.  Parenteral/IV feeding (including IV fluids if needed for hydration, but not as part of dialysis/chemo)  []  B.  Feeding tube (e.g., nasogastric or abdominal (PEG))  []  C.  Mechanically altered diet - requires change in texture of food or liquids (e.g., pureed food, thickened liquids)  [x]  D.  Therapeutic diet (e.g., low salt, diabetic, low cholesterol)  []  Z.  None of  reviewed and updated as necessary, and are accurate for the admission assessment period.    Assessing/Reviewing RN: Josephine Gale, 6/16/25, [id number]    Assessing/Reviewing RN: Nury Morel, 6/19/25 at 2920

## 2025-06-16 NOTE — PROGRESS NOTES
Patient was admitted to room 4912 at 1530.  Patient was oriented to the Call Light, Phone, TV, Thermostat, Bed Controls, Bathroom and Emergency Cord.  Patient verbalized and demonstrated understanding of all.  Patient was also given an overview of Unit Routines for Acute Rehab, including the patient's rights and responsibilities as well as the CMS IRF AN[employer] Privacy Act Statement providing notice of data collection.  Patient states that their normal bowel regime is about three times per week. Meal times were explained, including how to order food.  The white board, (which is posted on the wall by the door is used for communication) has the Therapy Scheduled that is posted each day along with the name of your doctor, nurse, and therapist for your convenience.  We recommend any family that will be care givers or any care givers the patient has, take part in therapy.  We have no set visiting hours, we suggest non-caregiver friends and family visitors come after therapy (at 4 PM or later) to allow patient to rest in between sessions.      In conjunction with the patient and patient’s family, this nurse worked to establish a tailored Fall TIPS plan to ensure patient safety and compliance:    Falls TIPS Completion    Patient identified as increased risk for harm if fall:  [x] Yes     Fall Risks  History of Falls:    [] Yes   Medication Side Effects:   [] Yes   Walking Aid:    [x] Yes   IV Pole or Equipment:   [] Yes   Unsteady Walk:     [x] Yes   May Forget or Choose Not to Call: [] Yes     Fall Interventions   Communicate Recent Fall and/or Risk of Harm: [x] Yes   Walking Aids:  Crutches: [] Yes   Cane: [] Yes   Walker: [x] Yes   IV Assistance When Walking: [] Yes   Toileting Schedule: Every 2 Hours  Bedpan:   [] Yes   Assist to Commode: [] Yes   Assist to Bathroom: [x] Yes   Bed Alarm On:  Yes   Assistance Out of Bed:  Bedrest: [] Yes   1 Person: [x] Yes   2 People: [] Yes

## 2025-06-16 NOTE — CARE COORDINATION
Case Management -  Discharge Note      Patient Name: Colby Ovalle                   YOB: 1950  Room: [facility] [place] [address]            Readmission Risk (Low < 19, Mod (19-27), High > 27): Readmission Risk Score: 24.5    Current PCP: No primary care provider on file.      (IMM) Important Message from Medicare:    Has pt received appropriate compliance notices before being discharged if required: N/A  Compliance doc:  [] 2nd IMM; [] Code 44 [] Owen  Date Given:  Given By:     PT AM-PAC: 9 /24  OT AM-PAC: 13 /24    Patient/patient representative has been educated on the benefits of ARU as well as the possible risks of declining recommended services. Patient/patient representative has acknowledged the information provided and decided on the following discharge plan. Patient/ patient representative has been provided freedom of choice regarding service provider, supported by basic dialogue that supports the patient's individualized plan of care/goals.    [facility]  [address] [place] [address]  Phone: [phone]  Fax: [phone]      [facility] agency notified of discharge:  [] Yes [] No  [x] NA    Family notified of discharge:  [x] Yes  [] No  [] NA    Facility notified of discharge:  [x] Yes  [] No  [] NA    Pt is being discharged with Outpt IV Antibiotics  [] Yes [] No  [x] NA  If yes, make sure EMANUEL is faxed to [facility] agency, and meds are called in to pharmacy by RN from EMANUEL orders only.      Financial    Payor: [employer] / Plan: [employer] / Product Type: *No Product type* /     Pharmacy:  Potential assistance Purchasing Medications: No  Meds-to-Beds request: Yes      [id number] - [place], [place] - [address] - P [phone] - F [phone]  [address]  [facility] [address]  Phone: [phone] Fax: [phone]    [facility] - [place], [place] - [address] - P [phone] - F [phone]  [address] [place] [address]

## 2025-06-16 NOTE — PROGRESS NOTES
IABP REMOVAL    Vitals:    06/10/25 0803   BP:    Pulse: 62   Resp: 19   Temp:    SpO2: (!) 85%       Procedure explained to patient. All questions answered. Agreeable to IABP removal. Nursing aware of removal and in the room to assist. All sutures cut prior to removal. IABP machine switched off and tubing cut to ensure that IABP fully deflated. IABP removed in its entirety and it is completely intact. Pressure held initially by myself, no bleeding and no evidence of hematoma forming. I held pressure held for 30 minutes.  Vital signs stable prior to and after removal.     Please call with any questions or concerns.     Electronically signed by Ty Young PA-C on 6/10/2025 at 9:13 AM   
        CARDIOLOGY PROGRESS NOTE      Patient Name: Colby Ovalle  Date of admission: 6/5/2025 12:07 PM  Admission Dx: NSTEMI (non-ST elevated myocardial infarction) (HCC) [I21.4]  Two-vessel coronary artery disease [I25.10]  Reason for Consult: Non-STEMI  Requesting Physician: Darryl Moreno MD  Primary Care physician: No primary care provider on file.    Subjective:     Colby Ovalle is a 74 y.o. patient with pmh of alcohol dependence, ESRD and CAD with prior PCI/atherectomy/Shockwave to ostial/mid LAD who presented to [facility] ED with chest pain.     Patient transferred from [facility] to [facility] for CABG consult after LHC with IABP.     Patient denies chest pain at this time, he is resting in bed, dinner plate.       Home Medications:  Were reviewed and are listed in nursing record and/or below  Prior to Admission medications    Medication Sig Start Date End Date Taking? Authorizing Provider   HYDROmorphone (DILAUDID) 2 MG tablet Take 1 tablet by mouth every 4 hours as needed (for withdrawal symptoms) for up to 5 days. Max Daily Amount: 12 mg 6/5/25 6/10/25  Cortez Sierra MD   methadone (DOLOPHINE) 10 MG tablet Take 6 tablets by mouth daily for 30 days. Max Daily Amount: 60 mg 6/5/25 7/5/25  Cortez Sierra MD   Heparin Sod, Porcine, in D5W (HEPARIN, PORCINE,) 100 UNIT/ML SOLN infusion Infuse 0-4,000 Units/hr intravenously continuous 6/4/25   Cortez Sierra MD   atorvastatin (LIPITOR) 40 MG tablet Take 0.5 tablets by mouth at bedtime    Nikolas Joshi MD   lidocaine (LIDODERM) 5 % Place 1 patch onto the skin daily To lower back  12 hours on, 12 hours off  .    Nikolas Joshi MD   aspirin 81 MG EC tablet Take 1 tablet by mouth daily 10/23/24   Bindu Barboza DO   pantoprazole (PROTONIX) 20 MG tablet Take 1 tablet by mouth daily    Nikolas Joshi MD   sAXagliptin (ONGLYZA) 2.5 MG TABS tablet Take 1 tablet by mouth daily    Nikolas Joshi MD        CURRENT 
      Hospitalist Progress Note    Name:  Colby Ovalle    /Age/Sex: 1950  (74 y.o. male)  MRN & CSN:  [id number] & [id number]    PCP: No primary care provider on file.    Date of Admission: 2025    Patient Status:  Inpatient     Chief Complaint: No chief complaint on file.      Hospital Course:   Patient originally presented to [facility] on  for chest pain.  Cardiology at that time was consulted and patient was found to have an NSTEMI.  Troponin elevated to 638.  Patient underwent cardiac cath on  and was found to have severe two-vessel disease with 95% ostial circumflex disease and 99% in-stent restenosis of LAD.  At that time, LVEF was measured at 30% and an intra-aortic balloon pump placed.  Patient placed on heparin drip.     Patient was transferred to [facility] on  for consideration of CABG.    Subjective:  Today is:  Hospital Day: 2.  Patient seen and examined in CVU-290/290.     Lying in bed. No events overnight.      Medications:  Reviewed    Infusion Medications    heparin (PORCINE) Infusion 1,310 Units/hr (25 2140)    sodium chloride      dextrose       Scheduled Medications    aspirin  81 mg Oral Daily    methadone  60 mg Oral Daily    sodium chloride flush  5-40 mL IntraVENous 2 times per day    pantoprazole  40 mg IntraVENous Daily    insulin lispro  0-8 Units SubCUTAneous 4x Daily AC & HS    sucroferric oxyhydroxide  500 mg Oral TID WC    atorvastatin  80 mg Oral Nightly    metoprolol tartrate  12.5 mg Oral BID    citric acid-sodium citrate  10 mL Oral BID     PRN Meds: heparin (porcine), heparin (porcine), sodium chloride flush, sodium chloride, ondansetron **OR** ondansetron, polyethylene glycol, acetaminophen **OR** acetaminophen, dextrose bolus **OR** dextrose bolus, glucagon (rDNA), dextrose, HYDROmorphone, LORazepam      Intake/Output Summary (Last 24 hours) at 2025 0915  Last data filed at 2025 0600  Gross per 24 hour   Intake 720 ml   Output 
      Hospitalist Progress Note    Name:  Colby Ovalle    /Age/Sex: 1950  (74 y.o. male)  MRN & CSN:  [id number] & [id number]    PCP: No primary care provider on file.    Date of Admission: 2025    Patient Status:  Inpatient     Chief Complaint: No chief complaint on file.      Hospital Course:   Patient originally presented to [facility] on  for chest pain.  Cardiology at that time was consulted and patient was found to have an NSTEMI.  Troponin elevated to 638.  Patient underwent cardiac cath on  and was found to have severe two-vessel disease with 95% ostial circumflex disease and 99% in-stent restenosis of LAD.  At that time, LVEF was measured at 30% and an intra-aortic balloon pump placed.  Patient placed on heparin drip.     Patient was transferred to [facility] on  for consideration of CABG.    Subjective:  Today is:  Hospital Day: 3.  Patient seen and examined in CVU-290/290.     Lying in bed.  No chest pain or shortness of breath.  Eating okay.      Medications:  Reviewed    Infusion Medications    heparin (PORCINE) Infusion 1,310 Units/hr (25 1740)    sodium chloride      dextrose       Scheduled Medications    aspirin  81 mg Oral Daily    methadone  60 mg Oral Daily    sodium chloride flush  5-40 mL IntraVENous 2 times per day    pantoprazole  40 mg IntraVENous Daily    insulin lispro  0-8 Units SubCUTAneous 4x Daily AC & HS    sucroferric oxyhydroxide  500 mg Oral TID WC    atorvastatin  80 mg Oral Nightly    citric acid-sodium citrate  10 mL Oral BID     PRN Meds: hydrALAZINE, heparin (porcine), heparin (porcine), sodium chloride flush, sodium chloride, ondansetron **OR** ondansetron, polyethylene glycol, acetaminophen **OR** acetaminophen, dextrose bolus **OR** dextrose bolus, glucagon (rDNA), dextrose, HYDROmorphone, LORazepam      Intake/Output Summary (Last 24 hours) at 2025 0757  Last data filed at 2025 0600  Gross per 24 hour   Intake 1220 ml 
      Hospitalist Progress Note    Name:  Colby Ovalle    /Age/Sex: 1950  (74 y.o. male)  MRN & CSN:  [id number] & [id number]    PCP: No primary care provider on file.    Date of Admission: 2025    Patient Status:  Inpatient     Chief Complaint: No chief complaint on file.      Hospital Course:   Patient originally presented to [facility] on  for chest pain.  Cardiology at that time was consulted and patient was found to have an NSTEMI.  Troponin elevated to 638.  Patient underwent cardiac cath on  and was found to have severe two-vessel disease with 95% ostial circumflex disease and 99% in-stent restenosis of LAD.  At that time, LVEF was measured at 30% and an intra-aortic balloon pump placed.  Patient placed on heparin drip.     Patient was transferred to [facility] on  for consideration of CABG.    Underwent CABG x 2 on .  Returned to the ICU intubated and sedated.    Subjective:  Today is:  Hospital Day: 6.  Patient seen and examined in CVU-2905/2905-.     Was able to be extubated to BiPAP today shortly after Midnight.      Medications:  Reviewed    Infusion Medications    sodium chloride      DOBUTamine      milrinone      norepinephrine 0.03 mcg/kg/min (06/10/25 1554)    VASOpressin Stopped (06/10/25 0911)    niCARdipine      insulin 1.8 Units/hr (06/10/25 1554)    dextrose      sodium chloride Stopped (25 2047)    dexmedeTOMIDine 1 mcg/kg/hr (25 1433)     Scheduled Medications    [START ON 2025] ceFAZolin  1,000 mg IntraVENous Daily    epoetin lonnie-epbx  10,000 Units SubCUTAneous Once    [START ON 2025] epoetin lonnie-epbx  10,000 Units SubCUTAneous Once per day on     sodium chloride flush  5-40 mL IntraVENous 2 times per day    aspirin  81 mg Oral Daily    mupirocin   Each Nostril BID    polyethylene glycol  17 g Oral Daily    sennosides-docusate sodium  1 tablet Oral BID    atorvastatin  80 mg Oral Nightly    pantoprazole  40 mg 
   06/10/25 1929   Treatment   Treatment Type Vibratory mucous clearing therapy or intervention       
   06/10/25 9710   NIV Type   NIV Started/Stopped Off  (RN states to hold bipap and RT and RN will monitor pt., no respiratory distress noted at this time)       
   06/11/25 3554   Treatment   Treatment Type IS   Oxygen Therapy/Pulse Ox   Pulse 70   Respirations 15   SpO2 93 %   Incentive Spirometry Tx   Treatment Effort Subsequent;Assisted by RT   Predicted Volume 776   Maximum Achieved Volume (mL) 400 mL   Number of Breaths 10       
   06/11/25 4758   NIV Type   $NIV $Daily Charge   Ventilator ID [id number]   NIV Started/Stopped On   Mode Bilevel   Mask Type Full face mask   Mask Size Large   Assessment   Pulse 70   Respirations 15   SpO2 93 %   Breath Sounds   Respiratory Pattern Regular   Breath Sounds Bilateral Diminished   Settings/Measurements   PIP Observed 11 cm H20   IPAP 10 cmH20   CPAP/EPAP 5 cmH2O   Vt (Measured) 446 mL   Rate Ordered 8   FiO2  40 %   I Time/ I Time % 1 s   Minute Volume (L/min) 7.4 Liters   Mask Leak (lpm) 19 lpm   Patient's Home Machine No   Alarm Settings   Alarms On Y   Low Pressure (cmH2O) 3 cmH2O   High Pressure (cmH2O) 30 cmH2O   RR Low (bpm) 9   RR High (bpm) 40 br/min       
   06/12/25 1926   Treatment   Treatment Type Vibratory mucous clearing therapy or intervention   $Bronchial Hygiene $Oscillatory therapy       
   06/13/25 0530   Treatment   Treatment Type Vibratory mucous clearing therapy or intervention       
  [facility] [place] Daily Progress Note      Admit Date:  6/5/2025    No chief complaint on file.       Subjective:  Mr. Ovalle balloon pump in place. S/s withdrawal overnight, requiring ativan. Resting comfortably in bed this morning. Denies chest pain overnight. Hopeful CVOR on Monday.   IABP 1:1.    Has some brief episodes of bradycardia    Objective:   BP (!) 141/85   Pulse 58   Temp 97.5 °F (36.4 °C)   Resp 17   Ht 1.829 m (6' 0.01\")   Wt 80 kg (176 lb 5.9 oz)   SpO2 96%   BMI 23.91 kg/m²     Intake/Output Summary (Last 24 hours) at 6/6/2025 1301  Last data filed at 6/6/2025 1100  Gross per 24 hour   Intake 960 ml   Output 1450 ml   Net -490 ml       TELEMETRY: Sinus     Physical Exam:  General:  Awake, alert, oriented x 3, NAD  Skin:  Warm and dry  Neck:  JVD flat  Chest:  crackles  Cardiovascular:  RRR S1S2, no S3, no mrmr  Abdomen:  Soft, ND, NT, No HSM  Extremities:  No edema    Medications:    aspirin  81 mg Oral Daily    methadone  60 mg Oral Daily    sodium chloride flush  5-40 mL IntraVENous 2 times per day    pantoprazole  40 mg IntraVENous Daily    insulin lispro  0-8 Units SubCUTAneous 4x Daily AC & HS    sucroferric oxyhydroxide  500 mg Oral TID WC    atorvastatin  80 mg Oral Nightly    metoprolol tartrate  12.5 mg Oral BID    citric acid-sodium citrate  10 mL Oral BID      heparin (PORCINE) Infusion 1,310 Units/hr (06/05/25 2140)    sodium chloride      dextrose       heparin (porcine), heparin (porcine), sodium chloride flush, sodium chloride, ondansetron **OR** ondansetron, polyethylene glycol, acetaminophen **OR** acetaminophen, dextrose bolus **OR** dextrose bolus, glucagon (rDNA), dextrose, HYDROmorphone, LORazepam    Lab Data:  CBC:   Recent Labs     06/04/25 0628 06/06/25  0500   WBC 7.4 5.5   HGB 14.9 12.8*   HCT 43.7 38.7*   MCV 90.7 91.0    94*     BMP:   Recent Labs     06/04/25 0628 06/05/25  0625 06/06/25  0500   * 134* 133*   K 4.3 4.5 5.5*   CL 94* 96* 98* 
  [facility] - Inpatient Rehabilitation Department   Phone: [phone]    Occupational Therapy    [x] Initial Evaluation            [] Daily Treatment Note         [] Discharge Summary      Patient: Colby Ovalle   : 1950   MRN: [id number]   Date of Service:  2025    Admitting Diagnosis:  NSTEMI (non-ST elevated myocardial infarction) (HCC)  Current Admission Summary: Per ED note, \"Colby Ovalle is a 74 y.o. male with past medical history significant for alcohol depndence, ESRD on HD who presents with chest pain   Patient today chest pain began about 1 hour ago, patient took 324 of aspirin, pain from a 7-10 is now pain-free reports history of stents in the heart, reports symptoms today similar to previous episodes of angina and ACS  Otherwise now symptom-free no other ill symptoms  Described the pain as burning in the middle of his chest\"  Past Medical History:  has a past medical history of Alcohol dependence in remission (HCC), Cancer (HCC), Chronic back pain, COVID, End-stage renal disease on hemodialysis (HCC), Erectile dysfunction, Full dentures, Hepatitis C, Hx of non-Hodgkin's lymphoma, Hyperlipidemia, Hypertension, Kidney stone, Lumbago, Opioid dependence (HCC), Thrombocytopenia, Tobacco dependency, and Type 2 diabetes mellitus without complication (HCC).  Past Surgical History:  has a past surgical history that includes Port Surgery; Cystoscopy; back surgery; Colonoscopy; Dialysis fistula creation (Left, 3/1/2022); Cardiac procedure (N/A, 10/3/2024); Cardiac procedure (N/A, 10/3/2024); Cardiac procedure (N/A, 10/3/2024); invasive vascular (N/A, 10/8/2024); Cardiac procedure (N/A, 2025); Cardiac procedure (N/A, 2025); and Coronary artery bypass graft (N/A, 2025).    Discharge Recommendations: Colby Ovalle scored a 13/24 on the AM-PAC ADL Inpatient form. Current research shows that an AM-PAC score of 17 or less is typically not associated with a discharge to 
  Nephrology Progress Note  [facility]  [phone]  [email or web address]        Subjective:   Colby Ovalle: 75 y.o.  male who we are seeing in consultation for ESRD Management.    Brief HPI:  Colby Ovalle is a 75 y.o. male with PMH significant for hypertension, hyperlipidemia, DM II, non-Hodgkin's lymphoma, chronic pain syndrome, chronic alcohol dependence, ESRD on maintenance hemodialysis , nephrolithiasis presented to [facility] with chest pain.  He has known history of CAD with prior PCI/arthrectomy to ostial-mid LAD.  He was taken to cardiac cath lab on 2025 and angiogram showed  severe two-vessel disease with 95% ostial circumflex disease and 99% in-stent restenosis of LAD.  At that time, LVEF was measured at 30% and an intra-aortic balloon pump placed.  He was transferred to [facility] on  for consideration of CABG.He Underwent CABG x 2 on .  Returned to the ICU intubated and sedated. Eventually extubated.       Interval History / Subjective:  Seen and examined this am.   He reports that he did not get his methadone this am and was having anxiety and withdrawal symptoms and requested to cut short his dialysis treatment to 2hrs only.   Might be transferred to ARU today     Objective:   VITALS:    Vitals:    25 0926   BP:    Pulse: 96   Resp: 18   Temp:    SpO2: 95%                                                                                    Temp (24hrs), Av.1 °F (36.7 °C), Min:97.8 °F (36.6 °C), Max:98.4 °F (36.9 °C)     BP  Min: 87/55  Max: 125/64                                  Pulse  Av.3  Min: 71  Max: 98    24HR INTAKE/OUTPUT:    Intake/Output Summary (Last 24 hours) at 2025 1105  Last data filed at 2025 0555  Gross per 24 hour   Intake 1000 ml   Output 70 ml   Net 930 ml     Wt Readings from Last 3 Encounters:   25 82.4 kg (181 lb 10.5 oz)   25 77 kg (169 lb 12.1 oz)   25 77.6 kg (171 
  Nephrology Progress Note  [facility]  [phone]  [email or web address]        Subjective:   Colby Ovalle: 74 y.o.  male who we are seeing in consultation for ESRD Management.    Brief HPI:  Colby Ovalle is a 74 y.o. male with PMH significant for hypertension, hyperlipidemia, DM II, non-Hodgkin's lymphoma, chronic pain syndrome, chronic alcohol dependence, ESRD on maintenance hemodialysis , nephrolithiasis presented to [facility] with chest pain.  He has known history of CAD with prior PCI/arthrectomy to ostial-mid LAD.  He was taken to cardiac cath lab on 2025 and angiogram showed  severe two-vessel disease with 95% ostial circumflex disease and 99% in-stent restenosis of LAD.  At that time, LVEF was measured at 30% and an intra-aortic balloon pump placed.  He was transferred to [facility] on  for consideration of CABG.He Underwent CABG x 2 on .  Returned to the ICU intubated and sedated.      Interval History / Subjective:  Seen and examined this am.   BP soft but stable, off pressors.   On 2L NC. Breathing comfortably.   HD yesterday with 1L UF, could not tolerate more given low BP.     Objective:   VITALS:    Vitals:    25 0614   BP:    Pulse:    Resp: 20   Temp:    SpO2:                                                                                     Temp (24hrs), Av °F (36.7 °C), Min:97.5 °F (36.4 °C), Max:98.6 °F (37 °C)     BP  Min: 89/49  Max: 139/34                                  Pulse  Av  Min: 60  Max: 90    24HR INTAKE/OUTPUT:    Intake/Output Summary (Last 24 hours) at 2025 1104  Last data filed at 2025 0602  Gross per 24 hour   Intake 997.51 ml   Output 125 ml   Net 872.51 ml     Wt Readings from Last 3 Encounters:   25 83.6 kg (184 lb 4.9 oz)   25 77 kg (169 lb 12.1 oz)   25 77.6 kg (171 lb)       Physical Exam:    Gen:  not in acute distress, on 2L NC   Lungs:  CTAB  Cardiovascular: 
  Nephrology Progress Note  [facility]  [phone]  [email or web address]        Subjective:   Colby Ovalle: 75 y.o.  male who we are seeing in consultation for ESRD Management.    Brief HPI:  Colby Ovalle is a 75 y.o. male with PMH significant for hypertension, hyperlipidemia, DM II, non-Hodgkin's lymphoma, chronic pain syndrome, chronic alcohol dependence, ESRD on maintenance hemodialysis , nephrolithiasis presented to [facility] with chest pain.  He has known history of CAD with prior PCI/arthrectomy to ostial-mid LAD.  He was taken to cardiac cath lab on 2025 and angiogram showed  severe two-vessel disease with 95% ostial circumflex disease and 99% in-stent restenosis of LAD.  At that time, LVEF was measured at 30% and an intra-aortic balloon pump placed.  He was transferred to [facility] on  for consideration of CABG.He Underwent CABG x 2 on .  Returned to the ICU intubated and sedated. Eventually extubated.       Interval History / Subjective:  Seen and examined this am.   No complains today  BP stable    Objective:   VITALS:    Vitals:    06/15/25 0734   BP: 136/71   Pulse: 83   Resp: 18   Temp: 98.6 °F (37 °C)   SpO2: 98%                                                                                    Temp (24hrs), Av.6 °F (37 °C), Min:97.3 °F (36.3 °C), Max:99.4 °F (37.4 °C)     BP  Min: 88/57  Max: 148/62                                  Pulse  Av.1  Min: 74  Max: 88    24HR INTAKE/OUTPUT:    Intake/Output Summary (Last 24 hours) at 6/15/2025 0921  Last data filed at 6/15/2025 0640  Gross per 24 hour   Intake 1090 ml   Output 230 ml   Net 860 ml     Wt Readings from Last 3 Encounters:   06/15/25 82.3 kg (181 lb 7 oz)   25 77 kg (169 lb 12.1 oz)   25 77.6 kg (171 lb)       Physical Exam:    Gen:  not in acute distress, on 2L NC   Lungs:  CTAB  Cardiovascular: RRR   Edema:  + edema  Abd: soft, non tender, positive 
  Nephrology Progress Note  [facility]  [phone]  [email or web address]        Subjective:   Colby Ovalle: 74 y.o.  male who we are seeing in consultation for ESRD Management.    Brief HPI:  Colby Ovalle is a 74 y.o. male with PMH significant for hypertension, hyperlipidemia, DM II, non-Hodgkin's lymphoma, chronic pain syndrome, chronic alcohol dependence, ESRD on maintenance hemodialysis , nephrolithiasis presented to [facility] with chest pain.  He has known history of CAD with prior PCI/arthrectomy to ostial-mid LAD.  He was taken to cardiac cath lab on 2025 and angiogram showed  severe two-vessel disease with 95% ostial circumflex disease and 99% in-stent restenosis of LAD.  At that time, LVEF was measured at 30% and an intra-aortic balloon pump placed.  He was transferred to [facility] on  for consideration of CABG.He Underwent CABG x 2 on .  Returned to the ICU intubated and sedated.      Interval History / Subjective:  Seen and examined this am on hemodialysis.   Stable hemodynamics.   He went into rapid afib yesterday and started on amiodarone, converted to NSR.   Drop in BP yesterday during HD and UF goal lowered to 1L .     Objective:   VITALS:    Vitals:    25 1000   BP: (!) 94/52   Pulse: 78   Resp: 14   Temp:    SpO2: 96%                                                                                    Temp (24hrs), Av.2 °F (36.8 °C), Min:96.8 °F (36 °C), Max:99.6 °F (37.6 °C)     BP  Min: 80/38  Max: 140/44                                  Pulse  Av  Min: 67  Max: 116    24HR INTAKE/OUTPUT:    Intake/Output Summary (Last 24 hours) at 2025 1149  Last data filed at 2025 1000  Gross per 24 hour   Intake 1072.68 ml   Output 490 ml   Net 582.68 ml     Wt Readings from Last 3 Encounters:   25 82.1 kg (181 lb)   25 77 kg (169 lb 12.1 oz)   25 77.6 kg (171 lb)       Physical Exam:    Gen:  not in 
  Nephrology Progress Note  [facility]  [phone]  [email or web address]        Subjective:   Colby Ovalle: 74 y.o.  male who we are seeing in consultation for ESRD Management.    Brief HPI:  Colby Ovalle is a 74 y.o. male with PMH significant for hypertension, hyperlipidemia, DM II, non-Hodgkin's lymphoma, chronic pain syndrome, chronic alcohol dependence, ESRD on maintenance hemodialysis , nephrolithiasis presented to [facility] with chest pain.  He has known history of CAD with prior PCI/arthrectomy to ostial-mid LAD.  He was taken to cardiac cath lab on 2025 and angiogram showed  severe two-vessel disease with 95% ostial circumflex disease and 99% in-stent restenosis of LAD.  At that time, LVEF was measured at 30% and an intra-aortic balloon pump placed.  He was transferred to [facility] on  for consideration of CABG.He Underwent CABG x 2 on .  Returned to the ICU intubated and sedated.      Interval History / Subjective:  Seen and examined this am.   Extubated overnight, on BiPAP when seen.   IABP removed this am.   He was on Levo 0.01 and vasopressin 0.03 early this am but were both off when seen.   UOP 830cc/24hrs.   K 5.3 this am  CXR with pulmonary vascular congestion     Objective:   VITALS:    Vitals:    06/10/25 1000   BP:    Pulse: 72   Resp: 20   Temp: 96.8 °F (36 °C)   SpO2:                                                                                     Temp (24hrs), Av.4 °F (36.9 °C), Min:96.4 °F (35.8 °C), Max:99.5 °F (37.5 °C)     BP  Min: 77/44  Max: 146/48                                  Pulse  Av.5  Min: 59  Max: 89    24HR INTAKE/OUTPUT:    Intake/Output Summary (Last 24 hours) at 6/10/2025 1010  Last data filed at 6/10/2025 0951  Gross per 24 hour   Intake 647.67 ml   Output 1285 ml   Net -637.33 ml     Wt Readings from Last 3 Encounters:   06/10/25 81.5 kg (179 lb 10.8 oz)   25 77 kg (169 lb 12.1 oz) 
  Nephrology Progress Note  [facility]  [phone]  [email or web address]        Subjective:   Colby Ovalle: 75 y.o.  male who we are seeing in consultation for ESRD Management.    Brief HPI:  Colby Ovalle is a 75 y.o. male with PMH significant for hypertension, hyperlipidemia, DM II, non-Hodgkin's lymphoma, chronic pain syndrome, chronic alcohol dependence, ESRD on maintenance hemodialysis , nephrolithiasis presented to [facility] with chest pain.  He has known history of CAD with prior PCI/arthrectomy to ostial-mid LAD.  He was taken to cardiac cath lab on 2025 and angiogram showed  severe two-vessel disease with 95% ostial circumflex disease and 99% in-stent restenosis of LAD.  At that time, LVEF was measured at 30% and an intra-aortic balloon pump placed.  He was transferred to [facility] on  for consideration of CABG.He Underwent CABG x 2 on .  Returned to the ICU intubated and sedated. Eventually extubated.       Interval History / Subjective:  Seen and examined this am.   No complains today  Dialysis uneventful    Objective:   VITALS:    Vitals:    25 0850   BP:    Pulse: 93   Resp: 18   Temp:    SpO2: 95%                                                                                    Temp (24hrs), Av.1 °F (36.7 °C), Min:97.6 °F (36.4 °C), Max:98.7 °F (37.1 °C)     BP  Min: 101/70  Max: 155/94                                  Pulse  Av.6  Min: 75  Max: 113    24HR INTAKE/OUTPUT:    Intake/Output Summary (Last 24 hours) at 2025 0912  Last data filed at 2025 1858  Gross per 24 hour   Intake 750 ml   Output 2130 ml   Net -1380 ml     Wt Readings from Last 3 Encounters:   25 81.8 kg (180 lb 5.4 oz)   25 77 kg (169 lb 12.1 oz)   25 77.6 kg (171 lb)       Physical Exam:    Gen:  not in acute distress, on 2L NC   Lungs:  CTAB  Cardiovascular: RRR   Edema:  + edema  Abd: soft, non tender, positive bowel 
  Nephrology Progress Note  [facility]  [phone]  [email or web address].        Subjective:   Colby Ovalle: 75 y.o.  male who we are seeing in consultation for ESRD Management.    Brief HPI:  Colby Ovalle is a 75 y.o. male with PMH significant for hypertension, hyperlipidemia, DM II, non-Hodgkin's lymphoma, chronic pain syndrome, chronic alcohol dependence, ESRD on maintenance hemodialysis , nephrolithiasis presented to [facility] with chest pain.  He has known history of CAD with prior PCI/arthrectomy to ostial-mid LAD.  He was taken to cardiac cath lab on 2025 and angiogram showed  severe two-vessel disease with 95% ostial circumflex disease and 99% in-stent restenosis of LAD.  At that time, LVEF was measured at 30% and an intra-aortic balloon pump placed.  He was transferred to [facility] on  for consideration of CABG.He Underwent CABG x 2 on .  Returned to the ICU intubated and sedated. Eventually extubated.       Interval History / Subjective:  Seen and examined this am.   BP soft but stable, remains off pressors.   On 2L NC. Breathing comfortably.      Objective:   VITALS:    Vitals:    25 1115   BP: 108/60   Pulse: 83   Resp: 18   Temp: 98.6 °F (37 °C)   SpO2: 95%                                                                                    Temp (24hrs), Av.2 °F (36.8 °C), Min:98 °F (36.7 °C), Max:98.6 °F (37 °C)     BP  Min: 86/53  Max: 133/52                                  Pulse  Av.2  Min: 71  Max: 114    24HR INTAKE/OUTPUT:    Intake/Output Summary (Last 24 hours) at 2025 1351  Last data filed at 2025 1000  Gross per 24 hour   Intake 608.73 ml   Output 500 ml   Net 108.73 ml     Wt Readings from Last 3 Encounters:   25 84.1 kg (185 lb 6.5 oz)   25 77 kg (169 lb 12.1 oz)   25 77.6 kg (171 lb)       Physical Exam:    Gen:  not in acute distress, on 2L NC   Lungs:  CTAB  Cardiovascular: RRR 
  Nephrology Progress Note  [facility]  [phone]  [email or web address]        Subjective:   Colby Ovalle: 74 y.o.  male who we are seeing in consultation for ESRD Management.    Brief HPI:  Colby Ovalle is a 74 y.o. male with PMH significant for hypertension, hyperlipidemia, DM II, non-Hodgkin's lymphoma, chronic pain syndrome, chronic alcohol dependence, ESRD on maintenance hemodialysis , nephrolithiasis presented to [facility] with chest pain.  He has known history of CAD with prior PCI/arthrectomy to ostial-mid LAD.  He was taken to cardiac cath lab on 6/3/2025 and angiogram showed multivessel disease with occlusion of previously placed stents.  He was transferred to [facility] for CT surgery evaluation.    Interval History / Subjective:  Seen post CABG. Vented. Not on pressors currently.   Had hemodialysis yesterday  (before planned CABG today) with net UF 2L   He was febrile yesterday and today this am.         Objective:   VITALS:    Vitals:    25 0603   BP: 125/62   Pulse: 90   Resp: 24   Temp: (!) 102.1 °F (38.9 °C)   SpO2: 96%                                                                                    Temp (24hrs), Av.6 °F (38.7 °C), Min:100.3 °F (37.9 °C), Max:102.5 °F (39.2 °C)     BP  Min: 92/48  Max: 188/154                                  Pulse  Av  Min: 75  Max: 104    24HR INTAKE/OUTPUT:    Intake/Output Summary (Last 24 hours) at 2025 1232  Last data filed at 2025 0000  Gross per 24 hour   Intake --   Output 215 ml   Net -215 ml     Wt Readings from Last 3 Encounters:   25 71.7 kg (158 lb 1.1 oz)   25 77 kg (169 lb 12.1 oz)   25 77.6 kg (171 lb)       Physical Exam:    Gen:  not in acute distress  Lungs:  vented   Cardiovascular: RRR   Edema:  No edema  Abd: soft, non tender, positive bowel sounds  Neuro: sedated      Access : Left upper arm brachial artery to basilic AV fistula. Good 
  [facility] - Inpatient Rehabilitation Department   Phone: [phone]    Physical Therapy    [x] Initial Evaluation            [] Daily Treatment Note         [] Discharge Summary      Patient: Colby Ovalle   : 1950   MRN: [id number]   Date of Service:  2025  Admitting Diagnosis: NSTEMI (non-ST elevated myocardial infarction) ([facility])  Current Admission Summary: per H&P , \"74 y.o. male with PMHx of DM II, ESRD, HTN, CAD, HLD who presented to [facility] with complaints of chest pain.     Patient originally presented to [facility] on  for chest pain.  Cardiology at that time was consulted and patient was found to have an NSTEMI.  Troponin elevated to 638.  Patient underwent cardiac cath on  and was found to have severe two-vessel disease with 95% ostial circumflex disease and 99% in-stent restenosis of LAD.  At that time, LVEF was measured at 30% and an intra-aortic balloon pump placed.  Patient placed on heparin drip.     Patient was transferred to [facility] on  for consideration of CABG.     Patient currently lying in bed.  No longer admits to chest pain.  Denies shortness of breath, nausea, vomiting, GI/ symptoms, edema, fever, or chills.     Denies tobacco, alcohol, or illicit drug use.\"     CABG x2  6/10 IABP removal     Past Medical History:  has a past medical history of Alcohol dependence in remission (HCC), Cancer (HCC), Chronic back pain, COVID, End-stage renal disease on hemodialysis (HCC), Erectile dysfunction, Full dentures, Hepatitis C, Hx of non-Hodgkin's lymphoma, Hyperlipidemia, Hypertension, Kidney stone, Lumbago, Opioid dependence (HCC), Thrombocytopenia, Tobacco dependency, and Type 2 diabetes mellitus without complication (HCC).  Past Surgical History:  has a past surgical history that includes Port Surgery; Cystoscopy; back surgery; Colonoscopy; Dialysis fistula creation (Left, 3/1/2022); Cardiac procedure (N/A, 10/3/2024); Cardiac procedure 
  Speech Language Pathology  [facility] - Inpatient [facility] Services  [phone]  SLP Clinical Swallow Evaluation       Patient: Colby Ovalle   : 1950   MRN: [id number]      Evaluation Date: 2025      Admitting Dx: NSTEMI (non-ST elevated myocardial infarction) (HCC) [I21.4]  Two-vessel coronary artery disease [I25.10]  Treatment Diagnosis: Oropharyngeal Dysphagia   Pain: Did not state                                  Recommendations      Recommended Diet and Intervention 2025:  Diet Solids Recommendation:  Regular texture diet  Liquid Consistency Recommendation:  Thin liquids  Recommended form of Meds: Meds in puree     For poor diet tolerance or decline in respiratory status, recommend downgrade to NPO pending SLP re-evaluation.     Compensatory strategies: Aspiration Precautions , Alternate solids/liquids , Eat or Feed Slowly, Remain Upright 30-45 min , Small Bites and Sips , Upright as possible with all PO intake , Feeding Assitance    Discharge Recommendations:  Discharge recommendations to be determined pending ongoing follow-up during acute care stay    History/Course of Treatment     H&P: 2025  \"74 y.o. male with PMHx of DM II, ESRD, HTN, CAD, HLD who presented to [facility] with complaints of chest pain.  Patient originally presented to [facility] on  for chest pain.  Cardiology at that time was consulted and patient was found to have an NSTEMI.  Troponin elevated to 638.  Patient underwent cardiac cath on  and was found to have severe two-vessel disease with 95% ostial circumflex disease and 99% in-stent restenosis of LAD.  At that time, LVEF was measured at 30% and an intra-aortic balloon pump placed.  Patient placed on heparin drip.  Patient was transferred to [facility] on  for consideration of CABG.  Patient currently lying in bed.  No longer admits to chest pain.  Denies shortness of breath, nausea, vomiting, GI/ symptoms, edema, fever, 
  [facility]   Nephrology progress Note  [phone]  [phone]   [email or web address]           SUMMARY :  We are following this patient for ESRD on maintenance hemodialysis  74-year-old male for past medical history of hypertension, hyperlipidemia, T2DM, non-Hodgkin's lymphoma, chronic pain syndrome, chronic alcohol dependence, ESRD on maintenance hemodialysis , nephrolithiasis presented to [facility] with chest pain.  He has known history of CAD with prior PCI/arthrectomy to ostial-mid LAD.  He was taken to cardiac cath lab on 6/3/2025 and angiogram showed multivessel disease with occlusion of previously placed stents.  He was transferred to [facility] for CT surgery evaluation.    SUBJECTIVE:   Patient progress reviewed.   25: The patient is due for CABG on Monday, 2025, about to initiate dialysis      Physical Exam:    VITALS:  BP (!) 122/52   Pulse 53   Temp 97 °F (36.1 °C) (Temporal)   Resp 11   Wt 80 kg (176 lb 5.9 oz)   SpO2 94%   BMI 23.92 kg/m²   BLOOD PRESSURE RANGE:  Systolic (24hrs), Av , Min:121 , Max:161   ; Diastolic (24hrs), Av, Min:47, Max:134    24HR INTAKE/OUTPUT:    Intake/Output Summary (Last 24 hours) at 2025 1012  Last data filed at 2025 0900  Gross per 24 hour   Intake 720 ml   Output 1300 ml   Net -580 ml       Gen:  alert, oriented x 3  PERRL , EOM +  Pallor +, No icterus  JVP not raised   CV: RRR no murmur or rub .  Lungs:B/ L air entry, Normal breath sounds   Abd: soft, bowel sounds + , No organomegaly   Ext: No edema, no cyanosis  Skin: Warm.  No rash  Neuro: nonfocal.  Access: Left upper arm brachial artery to basilic AV fistula.  Good thrill , no signs of infection  Right groin: Intra-aortic balloon insertion site, done for low EF      DATA:    CBC with Differential:    Lab Results   Component Value Date/Time    WBC 5.5 2025 05:00 AM    RBC 4.25 2025 05:00 AM    HGB 12.8 2025 05:00 AM    
  [facility]   Nephrology progress Note  [phone]  [phone]   [email or web address]           SUMMARY :  We are following this patient for ESRD on maintenance hemodialysis    74-year-old male for past medical history of hypertension, hyperlipidemia, DM II, non-Hodgkin's lymphoma, chronic pain syndrome, chronic alcohol dependence, ESRD on maintenance hemodialysis , nephrolithiasis presented to [facility] with chest pain.  He has known history of CAD with prior PCI/arthrectomy to ostial-mid LAD.  He was taken to cardiac cath lab on 6/3/2025 and angiogram showed multivessel disease with occlusion of previously placed stents.  He was transferred to [facility] for CT surgery evaluation.    SUBJECTIVE:    25: The patient is due for CABG on Monday, 2025    Physical Exam:    VITALS:  BP (!) 135/58   Pulse 73   Temp 98.2 °F (36.8 °C) (Temporal)   Resp 15   Ht 1.829 m (6' 0.01\")   Wt 72.2 kg (159 lb 2.8 oz)   SpO2 97%   BMI 21.58 kg/m²   BLOOD PRESSURE RANGE:  Systolic (24hrs), Av , Min:102 , Max:141   ; Diastolic (24hrs), Av, Min:45, Max:103    24HR INTAKE/OUTPUT:    Intake/Output Summary (Last 24 hours) at 2025 0916  Last data filed at 2025 0600  Gross per 24 hour   Intake 1220 ml   Output 2600 ml   Net -1380 ml       General: Alert, Awake, NAD  HEENT: Normocephalic, atraumatic  Chest: clear to auscultation,  CVS: RRR, no rub  Abdomen: soft, non tender  Extremities: no edema  Musculoskeletal: normal ROM, no joint swelling  Neurological: no focal motor neurological deficit  Psych: normal affect; O x 3  Access: Left upper arm brachial artery to basilic AV fistula.  Good thrill , no signs of infection  Right groin: Intra-aortic balloon insertion site, done for low EF      DATA:    CBC with Differential:    Lab Results   Component Value Date/Time    WBC 6.8 2025 06:00 AM    RBC 4.55 2025 06:00 AM    HGB 13.7 2025 06:00 AM    HCT 
  [facility]   Nephrology progress Note  [phone]  [phone]   [email or web address]           SUMMARY :  We are following this patient for ESRD on maintenance hemodialysis    74-year-old male for past medical history of hypertension, hyperlipidemia, DM II, non-Hodgkin's lymphoma, chronic pain syndrome, chronic alcohol dependence, ESRD on maintenance hemodialysis , nephrolithiasis presented to [facility] with chest pain.  He has known history of CAD with prior PCI/arthrectomy to ostial-mid LAD.  He was taken to cardiac cath lab on 6/3/2025 and angiogram showed multivessel disease with occlusion of previously placed stents.  He was transferred to [facility] for CT surgery evaluation.    SUBJECTIVE:    25: The patient is due for CABG on Monday, 2025    Physical Exam:    VITALS:  /77   Pulse 73   Temp (!) 101.4 °F (38.6 °C) (Bladder)   Resp 22   Ht 1.829 m (6' 0.01\")   Wt 72.8 kg (160 lb 7.9 oz)   SpO2 95%   BMI 21.76 kg/m²   BLOOD PRESSURE RANGE:  Systolic (24hrs), Av , Min:107 , Max:181   ; Diastolic (24hrs), Av, Min:49, Max:156    24HR INTAKE/OUTPUT:    Intake/Output Summary (Last 24 hours) at 2025 1000  Last data filed at 2025 0600  Gross per 24 hour   Intake 834.45 ml   Output 880 ml   Net -45.55 ml       General: Alert, Awake, NAD  HEENT: Normocephalic, atraumatic  Chest: clear to auscultation,  CVS: RRR, no rub  Abdomen: soft, non tender  Extremities: no edema  Musculoskeletal: normal ROM, no joint swelling  Neurological: no focal motor neurological deficit  Psych: normal affect; O x 3  Access: Left upper arm brachial artery to basilic AV fistula.  Good thrill , no signs of infection  Right groin: Intra-aortic balloon insertion site, done for low EF      DATA:    CBC with Differential:    Lab Results   Component Value Date/Time    WBC 12.2 2025 04:25 AM    RBC 4.49 2025 04:25 AM    HGB 13.4 2025 04:25 AM    
  [facility] - Inpatient Rehabilitation Department   Phone: [phone]    Physical Therapy    [] Initial Evaluation            [x] Daily Treatment Note         [] Discharge Summary      Patient: Colby Ovalle   : 1950   MRN: [id number]   Date of Service:  2025  Admitting Diagnosis: NSTEMI (non-ST elevated myocardial infarction) ([facility])  Current Admission Summary: per H&P , \"74 y.o. male with PMHx of DM II, ESRD, HTN, CAD, HLD who presented to [facility] with complaints of chest pain.     Patient originally presented to [facility] on  for chest pain.  Cardiology at that time was consulted and patient was found to have an NSTEMI.  Troponin elevated to 638.  Patient underwent cardiac cath on  and was found to have severe two-vessel disease with 95% ostial circumflex disease and 99% in-stent restenosis of LAD.  At that time, LVEF was measured at 30% and an intra-aortic balloon pump placed.  Patient placed on heparin drip.     Patient was transferred to [facility] on  for consideration of CABG.     Patient currently lying in bed.  No longer admits to chest pain.  Denies shortness of breath, nausea, vomiting, GI/ symptoms, edema, fever, or chills.     Denies tobacco, alcohol, or illicit drug use.\"     CABG x2  6/10 IABP removal     Past Medical History:  has a past medical history of Alcohol dependence in remission (HCC), Cancer (HCC), Chronic back pain, COVID, End-stage renal disease on hemodialysis (HCC), Erectile dysfunction, Full dentures, Hepatitis C, Hx of non-Hodgkin's lymphoma, Hyperlipidemia, Hypertension, Kidney stone, Lumbago, Opioid dependence (HCC), Thrombocytopenia, Tobacco dependency, and Type 2 diabetes mellitus without complication (HCC).  Past Surgical History:  has a past surgical history that includes Port Surgery; Cystoscopy; back surgery; Colonoscopy; Dialysis fistula creation (Left, 3/1/2022); Cardiac procedure (N/A, 10/3/2024); Cardiac procedure 
Arterial line no longer with appropriate waveforms and no longer drawing blood. New dressing applied with no improvement in reading. Pt states site is painful when flushed. Arterial line discontinued at this time. Hourly BP reading via BP cuff now.    Electronically signed by Josephine Orellana RN on 6/11/2025 at 2:57 AM    
Back into NSR @ 9939  
Bedside PFT completed, patient gave a good effort, results placed in hard chart.  
Blood culture collected thru right triple lumen catheter per protocol.  RIJ catheter removed, tip sent for culture.  Pt tolerated well. Site no bleeding or hematoma.  Occlusive dressing applied.   
CV SURGERY ATTENDING  CABG with Dr. Duke on Monday, HD tomorrow.  No chest pain, doing fine.  
CV SURGERY ATTENDING  Pre-op CABG for Dr. Duke tomorrow.  He had some issues yesterday worth noting; vascular access issues, got a midline IV today, was incontinent multiple times and rucker catheter placed. He may have been withdrawing from EtOH a bit last night (?) and got some Ativan, improved. Lastly, he was febrile last night to 101.4 and WBC up to 12K from 8K.  Checked a U/A and reflex but UA is not impressive. Blood cultures sent. CXR is clear.  IABP leg looks fine.  Will contact Dr. Duke re: the fever and WBC and if he is proceeding (IABP, acuity, not a valve case) when his preference is to turn off the Heparin.  
CVTS Cardiothoracic Progress Note:                                CC:  Post op follow up     Surgery: 6/9/2025  CORONARY ARTERY BYPASS GRAFT X2 USING RIGHT SAPHENOUS VEIN AND LIMA; LEFT EVH SAPHENOUS VEIN; TAKEDOWN OF LEFT INTRAMAMMARY ARTERY; PLACEMENT OF TEMPORARY ATRIAL AND VENTRICLE PACING WIRES; CPB; SCAR  (CABG x 2 (LIMA to LAD, reverse saphenous vein graft to a large intramyocardial ramus)     Surgeon(s):  Ronaldo Duke MD    Subjective:     Vital Signs:  O2 Flow Rate (L/min): 2 L/min   Afebrile  -155 systolic  Normal sinus rhythm  Room air  Admission Weight: Weight - Scale: 80 kg (176 lb 5.9 oz)       Weight   06/14/25 0500 81.8 kg (180 lb 5.4 oz)   06/13/25 1738 82.5 kg (181 lb 14.1 oz)   06/13/25 0617 84.1 kg (185 lb 6.5 oz)     Intake/Output:   Intake/Output Summary (Last 24 hours) at 6/14/2025 1503  Last data filed at 6/14/2025 0947  Gross per 24 hour   Intake 850 ml   Output 2130 ml   Net -1280 ml      Urine output 1 unmeasured output (ES RD)  Dialysis yesterday 2130 negative  Extubation Time: 6/10/25 at 1 AM; admission time 13: 30      LABORATORY DATA:     CBC:   Recent Labs     06/12/25 0428 06/13/25 0515 06/14/25  0446   WBC 4.3 5.9 5.5   HGB 7.4* 7.6* 7.2*   HCT 22.1* 22.6* 21.8*   MCV 89.9 89.7 90.6   PLT 87* 119* 140     BMP:   Recent Labs     06/12/25 0428 06/13/25  0515 06/14/25  0446    137 136   K 4.3 4.3 4.4   CL 98* 99 97*   CO2 25 25 26   PHOS  --  3.3  --    BUN 27* 38* 25*   CREATININE 4.1* 5.5* 4.3*     MG:    Recent Labs     06/12/25 0428 06/13/25  0515 06/14/25  0446   MG 2.24 2.22 2.05      ________________________________________________________________________    Current medications:  Amiodarone 400 mg twice daily  Eliquis 2.5 mg p.o. twice daily  Aspirin 81 mg daily  Lipitor 80 mg nightly    Tylenol  Ancef IV  Retacrit  Linzess  Methadone  Protonix  Senokot/MiraLAX      1. NSTEMI/2 V CAD/c LV dysfunction- POD#4 S/p CABG X 2.   - On ASA, Statin, blood pressure 
CVTS Cardiothoracic Progress Note:                                CC:  Post op follow up     Surgery: 6/9/2025  CORONARY ARTERY BYPASS GRAFT X2 USING RIGHT SAPHENOUS VEIN AND LIMA; LEFT EVH SAPHENOUS VEIN; TAKEDOWN OF LEFT INTRAMAMMARY ARTERY; PLACEMENT OF TEMPORARY ATRIAL AND VENTRICLE PACING WIRES; CPB; SCAR  (CABG x 2 (LIMA to LAD, reverse saphenous vein graft to a large intramyocardial ramus)     Surgeon(s):  Ronaldo Duke MD    Subjective:   6/10: On Bipap. C/o sternal pain    6/11: pt seen and examined laying in bed, in no acute distress. IABP removed yesterday morning. Overnight patient went into atrial fibrillation RVR with HR's in the 1 teens; amio bolus was given with patient eventually converted back into NSR around 2 am. Currently only on insulin gtt. Remains in NSR with HR's in the 80's, SBP's low 100's. Pt is on NC@4L's; +10.4 kg up from pre-op weight (HD yesterday) plan HD again today    Vital Signs: BP (!) 140/44   Pulse 83   Temp 98.6 °F (37 °C) (Bladder)   Resp 20   Ht 1.829 m (6' 0.01\")   Wt 82.1 kg (181 lb)   SpO2 97%   BMI 24.54 kg/m²  O2 Flow Rate (L/min): 4 L/min     Admission Weight: Weight - Scale: 80 kg (176 lb 5.9 oz)    6/9: (71.7 kg Pre-op   6/10: 81.5 kg  6/11: 82.1 kg    Intake/Output:   Intake/Output Summary (Last 24 hours) at 6/11/2025 0908  Last data filed at 6/11/2025 0700  Gross per 24 hour   Intake 965.71 ml   Output 485 ml   Net 480.71 ml        GTTS: Insulin @ 0.8 units/hr.  Meacham Venkat Catheter: PAP31/17 (22), CVP 13, CO 5.3, CI 2.7, SVR 1026      Extubation Time: 6/10/25 at 00:57      LABORATORY DATA:     CBC:   Recent Labs     06/09/25  1340 06/09/25  1341 06/10/25  0215 06/10/25  0217 06/10/25  0505 06/11/25  0545   WBC 4.8  --   --   --  4.2 4.9   HGB 9.9*   < > 7.9* 6.7* 7.5* 7.4*   HCT 29.2*   < > 23.7*  --  22.4* 22.4*   MCV 88.8  --   --   --  89.4 90.4   PLT 69*  --   --   --  64* 75*    < > = values in this interval not displayed.     BMP:   Recent Labs     
CVTS Cardiothoracic Progress Note:                                CC:  Post op follow up     Surgery: 6/9/2025  CORONARY ARTERY BYPASS GRAFT X2 USING RIGHT SAPHENOUS VEIN AND LIMA; LEFT EVH SAPHENOUS VEIN; TAKEDOWN OF LEFT INTRAMAMMARY ARTERY; PLACEMENT OF TEMPORARY ATRIAL AND VENTRICLE PACING WIRES; CPB; SCAR  (CABG x 2 (LIMA to LAD, reverse saphenous vein graft to a large intramyocardial ramus)     Surgeon(s):  Ronaldo Duke MD    Subjective:   6/10: On Bipap. C/o sternal pain    6/11: pt seen and examined laying in bed, in no acute distress. IABP removed yesterday morning. Overnight patient went into atrial fibrillation RVR with HR's in the 1 teens; amio bolus was given with patient eventually converted back into NSR around 2 am. Currently only on insulin gtt. Remains in NSR with HR's in the 80's, SBP's low 100's. Pt is on NC@4L's; +10.4 kg up from pre-op weight (HD yesterday) plan HD again today  6/12: Denies any concerns    Vital Signs: BP (!) 129/36   Pulse 77   Temp 98.6 °F (37 °C) (Temporal)   Resp 20   Ht 1.829 m (6' 0.01\")   Wt 83.6 kg (184 lb 4.9 oz)   SpO2 95%   BMI 24.99 kg/m²  O2 Flow Rate (L/min): 2 L/min     Admission Weight: Weight - Scale: 80 kg (176 lb 5.9 oz)    6/9: (71.7 kg Pre-op   6/10: 81.5 kg  6/11: 82.1 kg  6/12: 83.6 kg    Intake/Output:   Intake/Output Summary (Last 24 hours) at 6/12/2025 0750  Last data filed at 6/12/2025 0602  Gross per 24 hour   Intake 1117.51 ml   Output 215 ml   Net 902.51 ml          Extubation Time: 6/10/25 at 00:57      LABORATORY DATA:     CBC:   Recent Labs     06/10/25  0505 06/11/25  0545 06/12/25  0428   WBC 4.2 4.9 4.3   HGB 7.5* 7.4* 7.4*   HCT 22.4* 22.4* 22.1*   MCV 89.4 90.4 89.9   PLT 64* 75* 87*     BMP:   Recent Labs     06/10/25  0505 06/10/25  2340 06/11/25  0545 06/12/25  0428     --  140 137   K 5.3* 3.8 4.3 4.3     --  102 98*   CO2 22  --  25 25   BUN 50*  --  31* 27*   CREATININE 6.0*  --  4.3* 4.1*     MG:    Recent 
CVTS Cardiothoracic Progress Note:                                CC:  Post op follow up     Surgery: 6/9/2025  CORONARY ARTERY BYPASS GRAFT X2 USING RIGHT SAPHENOUS VEIN AND LIMA; LEFT EVH SAPHENOUS VEIN; TAKEDOWN OF LEFT INTRAMAMMARY ARTERY; PLACEMENT OF TEMPORARY ATRIAL AND VENTRICLE PACING WIRES; CPB; SCAR  (CABG x 2 (LIMA to LAD, reverse saphenous vein graft to a large intramyocardial ramus)     Surgeon(s):  Ronaldo Duke MD    Subjective:   6/11: pt seen and examined laying in bed, in no acute distress. IABP removed yesterday morning. Overnight patient went into atrial fibrillation RVR with HR's in the 1 teens; amio bolus was given with patient eventually converted back into NSR around 2 am. Currently only on insulin gtt. Remains in NSR with HR's in the 80's, SBP's low 100's. Pt is on NC@4L's; +10.4 kg up from pre-op weight (HD yesterday) plan HD again today  6/12: Denies any concerns  6/13: No issues overnight. No BM yet.     Vital Signs: /62   Pulse 89   Temp 98 °F (36.7 °C) (Temporal)   Resp 18   Ht 1.829 m (6' 0.01\")   Wt 84.1 kg (185 lb 6.5 oz)   SpO2 95%   BMI 25.14 kg/m²  O2 Flow Rate (L/min): 2 L/min     Admission Weight: Weight - Scale: 80 kg (176 lb 5.9 oz)    6/9: (71.7 kg Pre-op   6/10: 81.5 kg  6/11: 82.1 kg  6/12: 83.6 kg  6/13: 84.1 kg    Intake/Output:   Intake/Output Summary (Last 24 hours) at 6/13/2025 0931  Last data filed at 6/13/2025 0830  Gross per 24 hour   Intake 508.73 ml   Output 500 ml   Net 8.73 ml          Extubation Time: 6/10/25 at 00:57      LABORATORY DATA:     CBC:   Recent Labs     06/11/25  0545 06/12/25  0428 06/13/25  0515   WBC 4.9 4.3 5.9   HGB 7.4* 7.4* 7.6*   HCT 22.4* 22.1* 22.6*   MCV 90.4 89.9 89.7   PLT 75* 87* 119*     BMP:   Recent Labs     06/11/25  0545 06/12/25  0428 06/13/25  0515    137 137   K 4.3 4.3 4.3    98* 99   CO2 25 25 25   PHOS  --   --  3.3   BUN 31* 27* 38*   CREATININE 4.3* 4.1* 5.5*     MG:    Recent Labs     
CVTS Cardiothoracic Progress Note:                                CC:  Post op follow up     Surgery: 6/9/2025  CORONARY ARTERY BYPASS GRAFT X2 USING RIGHT SAPHENOUS VEIN AND LIMA; LEFT EVH SAPHENOUS VEIN; TAKEDOWN OF LEFT INTRAMAMMARY ARTERY; PLACEMENT OF TEMPORARY ATRIAL AND VENTRICLE PACING WIRES; CPB; SCAR  (CABG x 2 (LIMA to LAD, reverse saphenous vein graft to a large intramyocardial ramus)     Surgeon(s):  Ronaldo Duke MD    Subjective:   6/16: patient seen and examined sitting up in bed getting ready for dialysis session. Creat this morning 7.9, pt remains afebrile, NSR with HR'S in the 90's SBP 90's - 1 teens (on lopressor 12.5mg BID). Plan ARU today.       Admission Weight: Weight - Scale: 80 kg (176 lb 5.9 oz)      Patient Vitals for the past 96 hrs (Last 3 readings):   Weight   06/16/25 0615 82.4 kg (181 lb 10.5 oz)   06/16/25 0544 82.4 kg (181 lb 10.5 oz)   06/15/25 0636 82.3 kg (181 lb 7 oz)     Intake/Output:   Intake/Output Summary (Last 24 hours) at 6/16/2025 0946  Last data filed at 6/16/2025 0555  Gross per 24 hour   Intake 1200 ml   Output 70 ml   Net 1130 ml      Urine output 50 (ES RD)  Extubation Time: 6/10/25 at 1 AM; admission time 13: 30      LABORATORY DATA:     CBC:   Recent Labs     06/14/25  0446 06/15/25  0628 06/16/25  0558   WBC 5.5 6.0 5.6   HGB 7.2* 7.9* 7.4*   HCT 21.8* 23.0* 21.6*   MCV 90.6 89.0 89.4    201 218     BMP:   Recent Labs     06/14/25  0446 06/15/25  0628 06/16/25  0558    134* 135*   K 4.4 4.3 4.5   CL 97* 94* 96*   CO2 26 26 24   BUN 25* 39* 50*   CREATININE 4.3* 6.5* 7.9*     MG:    Recent Labs     06/14/25  0446 06/15/25  0628 06/16/25  0558   MG 2.05 2.14 2.16      ________________________________________________________________________    Current medications:  Amiodarone 400 mg twice daily  Eliquis 2.5 mg p.o. twice daily  Aspirin 81 mg daily  Lipitor 80 mg nightly  Metoprolol 12.5 mg twice daily    Tylenol  Ancef 
CVTS Cardiothoracic Progress Note:                                CC:  Post op follow up     Surgery: 6/9/2025  CORONARY ARTERY BYPASS GRAFT X2 USING RIGHT SAPHENOUS VEIN AND LIMA; LEFT EVH SAPHENOUS VEIN; TAKEDOWN OF LEFT INTRAMAMMARY ARTERY; PLACEMENT OF TEMPORARY ATRIAL AND VENTRICLE PACING WIRES; CPB; SCAR  (CABG x 2 (LIMA to LAD, reverse saphenous vein graft to a large intramyocardial ramus)     Surgeon(s):  Ronaldo Duke MD    Subjective:   Overall doing extremely well  Ambulating without difficulty once up out of the chair or bed although nursing staff tells me that he continues to be a two-person assist to get upright  Vital Signs:       -142 systolic  Normal sinus rhythm  Room air  Admission Weight: Weight - Scale: 80 kg (176 lb 5.9 oz)      Patient Vitals for the past 96 hrs (Last 3 readings):   Weight   06/15/25 0636 82.3 kg (181 lb 7 oz)   06/14/25 0500 81.8 kg (180 lb 5.4 oz)   06/13/25 1738 82.5 kg (181 lb 14.1 oz)     Intake/Output:   Intake/Output Summary (Last 24 hours) at 6/15/2025 1643  Last data filed at 6/15/2025 1557  Gross per 24 hour   Intake 1340 ml   Output 280 ml   Net 1060 ml      Urine output 200 (ES RD)  Dialysis day 2130 negative  Extubation Time: 6/10/25 at 1 AM; admission time 13: 30      LABORATORY DATA:     CBC:   Recent Labs     06/13/25  0515 06/14/25  0446 06/15/25  0628   WBC 5.9 5.5 6.0   HGB 7.6* 7.2* 7.9*   HCT 22.6* 21.8* 23.0*   MCV 89.7 90.6 89.0   * 140 201     BMP:   Recent Labs     06/13/25  0515 06/14/25  0446 06/15/25  0628    136 134*   K 4.3 4.4 4.3   CL 99 97* 94*   CO2 25 26 26   PHOS 3.3  --   --    BUN 38* 25* 39*   CREATININE 5.5* 4.3* 6.5*     MG:    Recent Labs     06/13/25  0515 06/14/25  0446 06/15/25  0628   MG 2.22 2.05 2.14      ________________________________________________________________________    Current medications:  Amiodarone 400 mg twice daily  Eliquis 2.5 mg p.o. twice daily  Aspirin 81 mg daily  Lipitor 80 mg 
CVTS Cardiothoracic Progress Note:                                CC:  Post op follow up     Surgery: 6/9/2025  Procedure(s):  CORONARY ARTERY BYPASS GRAFT X2 USING RIGHT SAPHENOUS VEIN AND LIMA; LEFT EVH SAPHENOUS VEIN; TAKEDOWN OF LEFT INTRAMAMMARY ARTERY; PLACEMENT OF TEMPORARY ATRIAL AND VENTRICLE PACING WIRES; CPB; SCAR  (CABG x 2 (LIMA to LAD, reverse saphenous vein graft to a large intramyocardial ramus))     Surgeon(s):  Ronaldo Duke MD    Subjective:   6/10: On Bipap. C/o sternal pain      Vital Signs: BP (!) 126/49   Pulse 63   Temp (!) 96.4 °F (35.8 °C) (Bladder)   Resp 15   Ht 1.829 m (6' 0.01\")   Wt 81.5 kg (179 lb 10.8 oz)   SpO2 93%   BMI 24.36 kg/m²  O2 Flow Rate (L/min): (S) 5 L/min     Admission Weight: Weight - Scale: 80 kg (176 lb 5.9 oz)    Weight on 6/9 (71.7 kg) Pre-op   Weight on 6/10 (81.5 kg)    Intake/Output:   Intake/Output Summary (Last 24 hours) at 6/10/2025 0754  Last data filed at 6/10/2025 0744  Gross per 24 hour   Intake 636.75 ml   Output 1230 ml   Net -593.25 ml        GTTS: Levo @ 0.02 mcg/kg/min, Vaso @ 0.03 units/min, Insulin @ 0.3 units/hr.  Argyle Venkat Catheter: PAP33/14 (20), CVP 9, CO 5.3, CI 2.7, SVR 1955      Extubation Time: 6/10/25 at 00:57  Transition Time:    LABORATORY DATA:     CBC:   Recent Labs     06/09/25  0405 06/09/25  0835 06/09/25  1340 06/09/25  1341 06/09/25  2010 06/10/25  0004 06/10/25  0215 06/10/25  0217 06/10/25  0505   WBC 10.8  --  4.8  --   --   --   --   --  4.2   HGB 14.5   < > 9.9*   < > 8.5*   < > 7.9* 6.7* 7.5*   HCT 42.6  --  29.2*  --  24.9*  --  23.7*  --  22.4*   MCV 89.6  --  88.8  --   --   --   --   --  89.4   *  --  69*  --   --   --   --   --  64*    < > = values in this interval not displayed.     BMP:   Recent Labs     06/07/25  1604 06/08/25  0425 06/09/25  0405 06/09/25  1340 06/10/25  0505   * 133* 137 139 143   K 4.9 4.9 4.6 4.2 5.3*   CL 94* 96* 94* 101 105   CO2 22 23 25 23 22   PHOS 3.7 4.4 6.0*  --   
CVTS Cardiothoracic Progress Note:          HPI:  Colby Ovalle is a 74 y.o. male with significant past medical history of ESRD, on HD MWF, HTN, DM II, non-hodgkins lymphoma (10-15 yrs ago), marijuana use, opiate dependence, alcohol dependence( in remission), and CAD with prior PCI/atherectomy/Shockwave to ostial /mid LAD who presents to [facility] ED with chest pain 6/1/25. Described as \"indigestion\". Took 2 tums actually before the pain occurred because he \"takes it as a binding agent\".  The pain was nonradiating and not associated with any shortness of breath dizziness nausea vomiting or diaphoresis.  It did not resolve thus he presented to the ER.  In the ER EKG demonstrated sinus rhythm, right bundle branch block with some J-point elevation new in leads V2, V3, V4.  Troponin actually tripled on the delta and thus was started on heparin drip.  He was admitted with an NSTEMI.  Left heart angiogram performed 6/2/25 found severe two-vessel coronary artery disease with 99% ostial circumflex disease and a 99% in-stent restenosis of the LAD.  Left ventricular ejection fraction noted to be 30% with mid to distal anterior wall/apical hypokinesis.  Intra-aortic balloon pump was placed.  Patient was transferred here to [facility] for consideration for coronary bypass grafting       Subj:   6/6: No chest pain or sob    Obj:    Blood pressure (!) 122/52, pulse 53, temperature 97 °F (36.1 °C), temperature source Temporal, resp. rate 11, weight 80 kg (176 lb 5.9 oz), SpO2 94%.              A&O             RRR. No murmur. IABP 1:1   Lungs CTA   Abdomen soft, nt +bs             No edema    Diagnostics:     Recent Labs     06/04/25  0628 06/06/25  0500   WBC 7.4 5.5   HGB 14.9 12.8*   HCT 43.7 38.7*    94*                                                                  Recent Labs     06/04/25  0628 06/05/25  0625 06/06/25  0500   * 134* 133*   K 4.3 4.5 5.5*   CL 94* 96* 98*   CO2 23 24 25   BUN 39* 27* 
Chest tubes meet criteria to remove per open heart protocol. If chest tubes do not meet criteria, a specific order has been entered to remove by Dr Duke.  no air leak and no crepitus noted.  Pt instructed on procedure.  Dressings removed.  Incision within normal limits.  Site cleansed and prepped per protocol.  Chest tubes X 3 removed without difficulty.  Vaseline gauze and dry sterile dressing applied.  Bilateral breath sounds audible.  O2Sats 94% on 5l. Patient tolerated well.      
Colby Ovalle  6/16/2025  [id number]    Chief Complaint: NSTEMI (non-ST elevated myocardial infarction) (HCC)    Subjective   Since last seen, medical updates:  - Remains overall medically stable  - He has been restarted on his home dose of methadone.    Patient reports that he is doing well today.  Denies any fevers, chills, chest pain, palpitations, dyspnea.  Appetite is fair.  He is looking forward to starting therapy on inpatient rehab. Last BM (including prior to admit): 06/15/25.          Objective   Objective:  Patient Vitals for the past 24 hrs:   BP Temp Temp src Pulse Resp SpO2 Height Weight   06/16/25 1106 -- -- -- -- -- -- 1.829 m (6' 0.01\") --   06/16/25 0926 -- -- -- 96 18 95 % -- --   06/16/25 0900 112/62 98 °F (36.7 °C) Temporal 98 18 95 % -- --   06/16/25 0830 -- 98 °F (36.7 °C) -- -- 19 -- -- --   06/16/25 0645 (!) 107/59 -- -- 86 -- -- -- --   06/16/25 0630 105/88 -- -- 97 18 (!) 77 % -- --   06/16/25 0615 (!) 115/51 98.2 °F (36.8 °C) -- 83 16 (!) 84 % -- 82.4 kg (181 lb 10.5 oz)   06/16/25 0600 124/64 -- -- 84 -- 97 % -- --   06/16/25 0544 -- -- -- -- -- -- -- 82.4 kg (181 lb 10.5 oz)   06/16/25 0535 (!) 116/55 -- -- 80 -- 95 % -- --   06/16/25 0500 (!) 88/45 -- -- 80 -- -- -- --   06/16/25 0400 (!) 92/50 98.4 °F (36.9 °C) Temporal 76 20 94 % -- --   06/16/25 0300 (!) 94/50 -- -- 76 -- 97 % -- --   06/16/25 0200 (!) 95/59 -- -- 74 -- -- -- --   06/16/25 0100 101/62 -- -- 71 -- 97 % -- --   06/16/25 0000 107/60 98.2 °F (36.8 °C) Temporal 73 20 98 % -- --   06/15/25 2300 (!) 87/55 -- -- 75 -- 100 % -- --   06/15/25 2200 107/69 -- -- 82 -- 95 % -- --   06/15/25 2100 (!) 92/58 -- -- 76 -- 95 % -- --   06/15/25 2020 -- -- -- -- 18 -- -- --   06/15/25 2000 100/60 98.3 °F (36.8 °C) Temporal 80 (!) 8 91 % -- --   06/15/25 1644 125/64 98.1 °F (36.7 °C) Temporal 82 -- 95 % -- --   06/15/25 1618 -- -- -- -- 18 -- -- --     Gen: No distress.   HEENT: Normocephalic, atraumatic.   CV: Extremities warm, 
Criteria met per clinical pathway to remove epicardial pacing wires & MD order received. Procedure explained to patient.   Pacing wire site within normal limits. Cleansed site with Chloroprep. Atrial and Ventricular pacing wires removed per policy without difficulty.  Dry sterile dressing applied.  Vital signs will be monitored and recorded per open heart protocol (every 15 minutes X 2, every 30 minutes X 1, and every hour X 1).  Patient tolerated procedure well, heart tones easily auscultated, oxygen saturation within normal limits.  Signs/symtoms for cardiac tamponade will be monitored closely.         BOBBI    
[facility]   Cardiology Progress Note     Admit Date: 2025     Reason for follow up: CAD    HPI and Interval History: Vasquez balloon pump in place. S/s withdrawal overnight, requiring ativan. Resting comfortably in bed this morning. Denies chest pain overnight. Hopeful CVOR on Monday.   IABP 1:1.     Has some brief episodes of bradycardia  Patient seen and examined. Clinical notes reviewed.   Telemetry reviewed. No new complaint today.   No major events overnight.   Denies having chest pain, shortness of breath, dyspnea on exertion, Orthopnea, PND at the time of this visit.  Awaiting CABG.  He has been having temperatures of 101.  Blood cultures have been ordered  Review of System:  All other systems reviewed and are negative except for that noted above. Pertinent negatives are:     General: negative for fever, chills   Ophthalmic ROS: negative for - eye pain or loss of vision  ENT ROS: negative for - headaches, sore throat   Respiratory: negative for - cough, sputum  Cardiovascular: Reviewed in HPI  Gastrointestinal: negative for - abdominal pain, diarrhea, N/V  Hematology: negative for - bleeding, blood clots, bruising or jaundice  Genito-Urinary:  negative for - Dysuria or incontinence  Musculoskeletal: negative for - Joint swelling, muscle pain  Neurological: negative for - confusion, dizziness, headaches   Psychiatric: No anxiety, no depression.  Dermatological: negative for - rash      Physical Examination:  Vitals:    25 0600   BP:    Pulse: 73   Resp: 22   Temp:    SpO2:       In: 1074.5 [P.O.:420; I.V.:654.5]  Out: 1080    Wt Readings from Last 3 Encounters:   25 72.8 kg (160 lb 7.9 oz)   25 77 kg (169 lb 12.1 oz)   25 77.6 kg (171 lb)     Temp  Av.4 °F (37.4 °C)  Min: 97 °F (36.1 °C)  Max: 101.4 °F (38.6 °C)  Pulse  Av.2  Min: 63  Max: 100  BP  Min: 107/57  Max: 181/156  SpO2  Av.2 %  Min: 71 %  Max: 96 %    Intake/Output Summary (Last 24 hours) at 
Incentive Spirometry education and demonstration completed by Respiratory Therapy Yes      Response to education: Fair     Teaching Time: 5 minutes    Minimum Predicted Vital Capacity - 776 mL.  Patient's Actual Vital Capacity - 750 mL. Turning over to Nursing for routine follow-up No.    Comments: pt. Requires more education on IS    Electronically signed by ANJU BEYER RCP on 6/11/2025 at 1:33 AM   
NAME:  Colby Ovalle  YOB: 1950  MEDICAL RECORD NUMBER:  [id number]    Shift Summary:   - Soft BP's   - pt verbalized wanting to go home vs ARU and he has RN's in his family [place] that can help   - He also asked for any suggestions for medical equipment such as shower chair, or walker what would be best set up after Open heart?  - He is a  and wants to know if any services are available to assist him. I placed a social work consult?   - no complaints from patient overnight doing very well     - Had to hold methadone this AM per dialysis RN it is dialyzed out  - be began having w/d symptoms; tremor/cold/clammy about 1H post due time    Family updated: No    Rhythm: Normal Sinus Rhythm     Most recent vitals:   Visit Vitals  /60   Pulse 73   Temp 98.2 °F (36.8 °C) (Temporal)   Resp 20   Ht 1.829 m (6' 0.01\")   Wt 82.3 kg (181 lb 7 oz)   SpO2 98%   BMI 24.60 kg/m²      ABP (Arterial line BP): 122/52  ABP Mean (Arterial Line Mean): 67 mmHg      Respiratory support needed (if any):  - RA    Admission weight Weight - Scale: 80 kg (176 lb 5.9 oz) (06/06/25 0600)    Today's weight    Wt Readings from Last 1 Encounters:   06/15/25 82.3 kg (181 lb 7 oz)        Rucker need assessed each shift: N/A - no rucker present  UOP >30ml/hr: NO - ESRD - HDU  Last documented BM (in last 48 hrs):  Patient Vitals for the past 48 hrs:   Last BM (including prior to admit) Stool Occurrence   06/14/25 2000 06/14/25 --   06/15/25 0800 06/13/25 --   06/15/25 1325 06/15/25 1   06/15/25 1557 06/15/25 --   06/15/25 2000 06/15/25 --       Lines/Drains reviewed @ bedside.  Peripheral IV 06/07/25 Right;Ventral Forearm (Active)   Number of days: 8       Midline 06/08/25 Right Brachial (Active)   Number of days: 7       Hemodialysis Fistula/Graft Arteriovenous fistula Left Forearm (Active)   Number of days: 13         Drip rates at handoff:    sodium chloride      DOBUTamine      milrinone      norepinephrine Stopped 
NAME:  Colby Ovalle  YOB: 1950  MEDICAL RECORD NUMBER:  [id number]    Shift Summary:   - positional mid line   - up to chair this AM, tolerated well   - no complaints overnight     Family updated: No    Rhythm: Normal Sinus Rhythm     Most recent vitals:   Visit Vitals  /71   Pulse 83   Temp 98.6 °F (37 °C) (Temporal)   Resp 18   Ht 1.829 m (6' 0.01\")   Wt 82.3 kg (181 lb 7 oz)   SpO2 98%   BMI 24.60 kg/m²      ABP (Arterial line BP): 122/52  ABP Mean (Arterial Line Mean): 67 mmHg    No data found.    No data found.      Respiratory support needed (if any):  - RA    Admission weight Weight - Scale: 80 kg (176 lb 5.9 oz) (06/06/25 0600)    Today's weight    Wt Readings from Last 1 Encounters:   06/15/25 82.3 kg (181 lb 7 oz)        Rucker need assessed each shift: N/A - no rucker present  UOP >30ml/hr: ESRD - oliguric   Last documented BM (in last 48 hrs):  Patient Vitals for the past 48 hrs:   Last BM (including prior to admit) Stool Occurrence   06/13/25 1000 06/13/25 1 06/14/25 2000 06/14/25 --       Lines/Drains reviewed @ bedside.  Peripheral IV 06/07/25 Right;Ventral Forearm (Active)   Number of days: 7       Midline 06/08/25 Right Brachial (Active)   Number of days: 6       Hemodialysis Fistula/Graft Arteriovenous fistula Left Forearm (Active)   Number of days: 12         Drip rates at handoff:    sodium chloride      DOBUTamine      milrinone      norepinephrine Stopped (06/11/25 0112)    VASOpressin Stopped (06/11/25 0223)    niCARdipine      dextrose      sodium chloride Stopped (06/09/25 2047)    dexmedeTOMIDine Stopped (06/11/25 1941)       Lab Data:   CBC:   Recent Labs     06/14/25  0446 06/15/25  0628   WBC 5.5 6.0   HGB 7.2* 7.9*   HCT 21.8* 23.0*   MCV 90.6 89.0    201     BMP:    Recent Labs     06/14/25  0446 06/15/25  0628    134*   K 4.4 4.3   CO2 26 26   BUN 25* 39*   CREATININE 4.3* 6.5*     ABG: No results for input(s): \"PHART\", \"VDR7YTT\", \"PO2ART\" in 
Nutrition Note    RECOMMENDATIONS  PO Diet: Added 5 CCC modifier  ONS: Ordered Nepro BID    ASSESSMENT   Pt triggered for follow-up. On cardiac, 1500 mL fluid restricted diet with variable documented meal intakes. Upon visiting, pt reported decreased appetite and dislikes food here causing poor po intake at times. Encouraged pt to have family bring in food. Occasionally consumes ONS at home, willing to receive while here to offer additional nutrition. Plan to discharge to ARU today.     Malnutrition Status: No malnutrition    NUTRITION DIAGNOSIS   Inadequate oral intake related to decreased appetite as evidenced by variable po intake    Goals: PO intake 50% or greater     NUTRITION RELATED FINDINGS  Objective: Na+ 135. Glucose 160. LBM 6/15.  Wounds: Multiple, Surgical Incision    CURRENT NUTRITION THERAPIES  ADULT DIET; Regular; Low Fat/Low Chol/High Fiber/2 gm Na; 1500 ml       PO Intake: Unable to assess   PO Supplement Intake:None Ordered    ANTHROPOMETRICS  Current Height: 182.9 cm (6' 0.01\")  Current Weight - Scale: 82.4 kg (181 lb 10.5 oz)    Admission weight: 80 kg (176 lb 5.9 oz)    COMPARATIVE STANDARDS  Total Energy Requirements (kcals/day): 5036-2919     Protein (g):         Fluid (mL/day):  1116-0451    EDUCATION  Education/Counseling completed (cardiac diet education with spouse 6/13)     The patient will be monitored per nutrition standards of care. Consult dietitian if additional nutrition interventions are needed prior to RD reassessment.     Adela Lambert MS, RD, LD    Contact: [phone]   
Nutrition Note    RECOMMENDATIONS  PO Diet: Continue current diet  ONS: If po intakes of meals are consistently less than 50%, please order Nepro BID     ASSESSMENT   Pt is s/p CABG x2 yesterday. Reported no issues with appetite/po intake or unintentional wt loss upon admission. Wt hx in EMR indicates wt fluctuations as expected with hx of ESRD on HD but overall no recent significant wt changes. Documented meal intakes averging greater than 50% prior to OHS. RD will monitor for adequate po intake vs need for ONS and will provide cardiac diet education prior to discharge.     Malnutrition Status: No malnutrition    NUTRITION DIAGNOSIS   Increased nutrient needs related to increase demand for energy/nutrients as evidenced by wounds    Goals: PO intake 50% or greater, by next RD assessment     NUTRITION RELATED FINDINGS  Objective: K+ 5.3, Mg 2.83. No BM documented.  Wounds: Multiple, Surgical Incision    CURRENT NUTRITION THERAPIES  ADULT DIET; Regular; Low Fat/Low Chol/High Fiber/2 gm Na; 1500 ml       PO Intake: %, 51-75%   PO Supplement Intake:None Ordered    ANTHROPOMETRICS  Current Height: 182.9 cm (6' 0.01\")  Current Weight - Scale: 81.5 kg (179 lb 10.8 oz)    Admission weight: 80 kg (176 lb 5.9 oz)    COMPARATIVE STANDARDS  Total Energy Requirements (kcals/day): 2514-9592     Protein (g):         Fluid (mL/day):  4626-7241    EDUCATION  Education/Counseling needed     The patient will be monitored per nutrition standards of care. Consult dietitian if additional nutrition interventions are needed prior to RD reassessment.     Adela Lambert MS, RD, LD    Contact: [phone]   
Occupational/Physcial Therapy  Colby Ovalle    Attempted to see pt for initial OT/PT evaluation.  RN just pulled pacing wires, and pt on hold until 3 pm. Will attempt again later, as schedule allows, or tomorrow.  Will attempt again later as schedule allows.   Thank you. STEPHEN Benoit Ed., OTR/L, [id number]          
Ok per renal Dr. Ponce to place a mid line on the right  
PM RN reported that anti xa from the AM might not be a valid result, and suggested redrawing the lab. Day RN called phlebotomy and patient refused to be stuck for labs, after much deliberation patient agreed for lab to stick him once. Unfortunately lab was unable to collect the specimen. MD notified per Dr. Freed leave Heparin drip at current rate 1310 units/hr and try to draw lab later if able.          BRIANM  
Patient admitted to CVU from CVOR and attached to ventilator and monitors.  Report received from anesthesiologist.  Chest x-ray ordered.  Labs drawn and sent.  Assessment complete.  Continue monitoring hemodynamics.  Family let back to see patient.  Visiting hours reviewed and all questions answered. Family aware of discharge class. Primary RN Mali Mooney/ Viola Umanzor.    RECOVERY PERIOD BEGINS  0164   
Patient awake and following commands.  Patient placed on CPAP per open heart protocol.  ABG drawn 30 minutes later and WNL.  Respiratory called for weaning parameters.  NIF - 25.  Patient suctioned and extubated to 4 liters nasal cannula.  Patient tolerated well.  Oxygen saturation 97 on 4 liters nasal cannula.  Patient alert and oriented X 3.       Electronically signed by Josephine Orellana RN on 6/10/2025 at 12:57 AM    
Patient being placed back on BiPAP after oral medication administration.   
Patient can be very restless at times and continues to move r leg, some blood backed up into the IABP line that is wrapped with plastic, there was a tad at the start of shift now slightly more, but not in the IABP line. + pulses with doppler, patient was very cold earlier, RN turned up heat and placed a lot of blankets on patient, oral temp at 1600, 100.7, Tylenol supp given, heat turned down and some blankets   removed,  per CVTS it is ok to place IJ at this time, due to limited access, spoke with hospitalist and the day one said he could do in am, DIVA called to place a midline.        WCM  
Patient's rucker catheter removed per protocol. Patient tolerated well.     Electronically signed by Bethany Ramirez RN on 6/12/2025 at 1:02 AM   
Physical Therapy/Occupational Therapy    Attempted to see pt for PT/OT however pt on dialysis for PM. Will re-attempt as schedule permits.    Thanks,  Capri Villalba, PT, DPT    
Physical/Occupational Therapy  Colby Ovalle    PT/OT orders in place. Pt a hold in AM as he was on bedrest after RN pulled his balloon pump. Pt now on HD for the afternoon. Will follow-up tomorrow and pt status and schedule allow.   No charge.   Carlita Tracy, PT, DPT, CNS #[id number]  EDEL Benoit.Ed, OTR/L, [id number]    
Placed pt on bipap 10/5/40% per order from Dr Duke. Pt tolerating well at this time, no acute distress noted upon exiting room.   
Pt called RN to room stating that he was experiencing withdrawal symptoms. Pt experiencing upper body shaking, vomiting, and anxiety. PRN PO Dilaudid and Zofran given.  
Pt continues to complain about burning with urination. Spoke with hospitalist about ordering pyridium; however per pharmacy, contraindicated for dialysis patients. No alternative meds available. Also, hospitalist would not like a rucker placed at this time as that may exacerbate burning sensation.    Electronically signed by Bethany Singh RN on 6/6/2025 at 1:44 PM    
Pt discharged to ARU at this time. Transported via wheelchair. Report called to ARU charge nurse  
Pt had two instances of sinus billie, 32 & 36. Cardiology called and made aware. No new orders at this time.    1320 - spoke with Dr Duke regarding HR. Orders to d/c metoprolol and add IV hydralazine prn for SBP > 140.    Electronically signed by Bethany Singh RN on 6/6/2025 at 11:43 AM    
Pt into Afib @ 2147, HR 80s-90s, SBP 110s. Order to start amio protocol and to replace K if <4. After amio bolus finished, SBP into 70s. Levo and vaso restarted. Call placed to Dr. Duke. Order to give 1 amp calcium and to hold amio gtt for the night.  
Pt reported his cellphone was missing.   Stated that he and the nurse from last night   Looked everywhere and couldn't find it.   Pt claims he may have left it on his dinner tray last night.   His dinner tray still in the dirty room, no cellphone was found.     Son at bedside able to track the phone,   Location stated that it was near the back entrance   By the employee parking. Called security and made aware.   Security spoke with the son and attempted to locate the phone.  Per security they didn't see any cellphone.   Per son Head security was aware and will give them a call for any other information.   Charge nurse pennie made aware of the incident.      
Pt slowly awakening. Now moving all extremities to command and breathing spontaneously, but RASS is currently -3. Will continue working toward extubation as pt continues to more fully awaken.  
Pt verified information regarding surgery, name, birth date, surgeon, procedure and allergies: NKA. Patient transferred to [facility] for surgery. Appropriate antibiotics ordered: Ancef . Beta blocker: Metoprolol last dose 6/7 at 2110 . DVT prophyaxis: Heparin 4000 units last given on 6/8/25 at 0247. Lab work within normal limits, 2 units of RBC's on call to OR, vital signs stable, Mepilex sacral border applied and 2% chlorhexidine gluconate skin prep given. Patient and family educated about surgery and pain management.     
Pt with continued anxiety and agitation and pt stating no relief from PRN Dilaudid. PRN Ativan given.  
RECOVERY PERIOD ENDS 1935.    Patient is still intubated, awaiting patient to become more alert to trial weaning the ventilator for extubation. Not following commands or opening eyes at this time. Pupils are equal, responsive, and reactive.     Electronically signed by Viola Umanzor RN on 6/9/2025 at 7:46 PM    
SBP dropped into 80s. Levo turned back on, up to 0.06 with no improvement. Dr. Gibson called. Verbal order to start vaso @ 0.03. Most recent ABG reviewed, order to give bicarb.  
Shift: 11a-11p    Procedure: CABG x2, RSVG, LIMA to LAD    Admit from OR (time and date): 6/9/2025 1335    Transition Time (Date @ Time)    Most recent vitals: BP (!) 119/44   Pulse 69   Temp 99.5 °F (37.5 °C) (Bladder)   Resp 17   Ht 1.829 m (6' 0.01\")   Wt 71.7 kg (158 lb 1.1 oz)   SpO2 97%   BMI 21.43 kg/m²      Pre-Op Weight Weight - Scale: 80 kg (176 lb 5.9 oz)  Today's weight   Wt Readings from Last 1 Encounters:   06/09/25 71.7 kg (158 lb 1.1 oz)         EF: Pre:30%      Rhythm:    [x] Normal Sinus Rhythm   [] Atrial Fibrillation    [] Sinus Tach   [] Sinus Adam    [] (adverse rhythms)     Pacing Wires Removed:  [] Yes  [x] No   Date Removed:    [] Pulled    [] Clipped     Current O2:   [] Room Air   [] NC  L   [] BiPaP    [x] Vented  Fi02: 40%  Peep:5    Shift Outputs;  Clinton: 480 mL  Chest tubes:  MS: 285 mL  LP: 0 mL      Air Leak [] Yes  [x] No   If Yes,    Hospital Course:  POD# 0 (6/9/2025)  Pt slow to extubate. Still intubated at this time on spontaneous mode. IABP @ 1:2 still in place. On Levo and insulin gtts. 10 mEq K+ replaced. Calcium replaced 1x. X4 albumins given.   
Shift: 11p-7am    Procedure: CABG x2, RSVG, LIMA to LAD    Admit from OR (time and date): 6/9/2025 1335    Transition Time (Date @ Time)    Most recent vitals: BP (!) 126/49   Pulse 59   Temp (!) 96.4 °F (35.8 °C) (Bladder)   Resp 17   Ht 1.829 m (6' 0.01\")   Wt 81.5 kg (179 lb 10.8 oz)   SpO2 100%   BMI 24.36 kg/m²      Pre-Op Weight Weight - Scale: 80 kg (176 lb 5.9 oz)  Today's weight   Wt Readings from Last 1 Encounters:   06/10/25 81.5 kg (179 lb 10.8 oz)         EF: Pre:30%      Rhythm:    [x] Normal Sinus Rhythm   [] Atrial Fibrillation    [] Sinus Tach   [] Sinus Adam    [] (adverse rhythms)     Pacing Wires Removed:  [] Yes  [x] No   Date Removed:    [] Pulled    [] Clipped     Current O2:   [] Room Air   [] NC  L   [x] BiPaP, FiO2: 40%   [] Vented    Shift Outputs;  Clinton: 350 mL  Chest tubes:  MS: 110 mL  LP: 5 mL      Air Leak [] Yes  [x] No   If Yes,    Hospital Course:  POD# 0 (6/9/2025)  RR @ 1330. Pt slow to extubate. Albumin x4. Extubated @ 0100, A&Ox4. 60 mg methadone given. BP dropped, vaso added. IABP to 1:3. BiPAP for CO2 retention.  
Skin tear on right arm, and small skin tear on right lower quadrant. Dialysis fissula WNL  
Speech Language Pathology  Attempt     Colby ELADIO Ovalle   1950     SLP evaluation orders received d/t new difficulty swallowing medications. RN reported pt on BiPAP and HD at this time. Will hold evaluation and re-attempt to complete evaluation tomorrow 6/11/2025.     Thanks   Farheen Duval MA CCC-SLP #[id number]  Speech Language Pathologist    
T/S confirmation drawn and sent to lab  
Treatment time: 3 Hours and 30 minutes, Patient insisted to be 3 hours only. Doctor Francisco was aware of it.     Net UF: 2 liters    Pre weight: 82.4 kg  Post weight: 81.4 kg    Access used: Left AVF  Access function: Access site located on the Left upper arm had good bruit and thrill. No signs of infection noted. No redness, hematoma nor swelling was observed. No discharges were seen. Cleaned site aseptically and cannulation done without any problem    Medications or blood products given: none    Regular outpatient schedule: M,W,F    Summary of response to treatment: 06:30: Treatment set at 2 liters for 3 hours and 30 minutes, but patient keep on insisting to be run for only 3 hours via left AVF Informed Doctor Francisco. Access site located on the Left upper arm had good bruit and thrill. No signs of infection noted. No redness, hematoma nor swelling was observed. No discharges were seen. Cleaned site aseptically and cannulation done without any problem. Parameters set accordingly. Hooked to Hd machine. Monitored from time to time. 08:15: Patient wanted to take off early. he said he has having withdrawal and having anxiety attack. Patient education done but still. Informed Doctor Boogie. WCM. Patient was agitated wanted to take off. Patient terminated Doctor Boogie agreed. Returned blood aseptically.     Copy of dialysis treatment record placed in chart, to be scanned into EMR.  
Treatment time: 3 hours  Net UF: 1000 ml     Pre weight: 82.1 kg  Post weight:81.1 kg      Access used: LAVF    Access function: well with  ml/min        Regular outpatient schedule: MWF     Summary of response to treatment: Patient tolerated treatment well and without any complications. Patient remained stable throughout entire treatment.     Report given to Karri Brandon RN and copy of dialysis treatment record placed in chart, to be scanned into EMR.  
Treatment time: 3 hours 6 mins, system clotted last 27 mins          Net UF: 1.6      Pre weight: 80 kg  Post weight: 78.4 kg      Access used: Left upper arm fistula   Access function: tolerated 350 BFR 15     Medications or blood products given: NA     Regular outpatient schedule: MWF     Summary of response to treatment: tolerated well, no issues with UF. 2k bath per SSK.    Copy of dialysis treatment record placed in chart, to be scanned into EMR.  
Treatment time: 3.5  hours  Net UF: 1000 ml     Pre weight: 81.5 kg  Post weight:80.5 kg  EDW: TBD    Access used: Left upper arm AVF    Access function: well with  ml/min     Medications or blood products given: N/A     Regular outpatient schedule: TBD     Summary of response to treatment: Patient tolerated treatment well, with periods of low BP, better stabilized in last hour of treatment. Report given to Marilou.  Copy of dialysis treatment record placed in chart, to be scanned into EMR.    
and precautions via education/demonstration with occasional prompting   GOAL MET   If clinical s/s of aspiration/penetration continue to be noted, Pt will participate in Modified Barium Swallow Study (MBS)   GOAL MET       Above goals reviewed on 6/13/2025.  All goals are ongoing at this time unless indicated above.       POC/Education     Dysphagia Therapeutic Intervention:  Diet Tolerance Monitoring , Patient/Family Education , Therapeutic Trials with SLP     Plan of care: No further follow-up indicated     Education:  Provided education regarding role of SLP, results of assessment, recommendations and general speech pathology plan of care:  Pt verbalized understanding and agreement     If patient discharges prior to next visit, this note will serve as discharge.     Treatment time:  Timed Code Treatment Minutes: 0  Total Treatment Time Minutes: 12    Electronically signed by:    Rosana Kenny [facility] [place] [id number]  Speech-Language Pathologist   6/13/2025 11:11 AM         
hours.    Urinalysis:      Lab Results   Component Value Date/Time    NITRU Negative 06/08/2025 10:30 AM    WBCUA 7 06/08/2025 10:30 AM    BACTERIA None Seen 06/08/2025 10:30 AM    RBCUA 19 06/08/2025 10:30 AM    BLOODU MODERATE 06/08/2025 10:30 AM    GLUCOSEU 250 06/08/2025 10:30 AM       Radiology:  XR CHEST PORTABLE   Final Result   1. No acute cardiopulmonary disease.   2. Intra-arterial balloon pump seen on CT of the descending thoracic aorta is   not appreciated on x-ray.         CT CHEST WO CONTRAST   Final Result   1. No acute abnormality.   2. Coronary artery calcifications.            XR CHEST PORTABLE   Final Result   Negative portable chest.         Vascular duplex vein mapping lower bilateral   Final Result      Vascular duplex carotid bilateral   Final Result              Assessment/Plan:    MDM: (High requires combination of any 2 (excluding time))    A. Problems (any 1 for high)  [] Acute/Chronic Illness/injury posing threat to life or bodily function:   [] Severe exacerbation of chronic illness:   ---------------------------------------------------------------------    B. Risk of Treatment (any 1 for high)   [] Drugs/treatments that require intensive monitoring for toxicity    [] Change in code status  [] Decision to escalate care  [] Major surgery/procedure with associated risk factors  [] Prescription drug management  ----------------------------------------------------------------------    C. Data (any 2 for high)  [] Discussed management of the case with   [] With case management at multi-disciplinary rounds    []  who recommended   [] Imaging personally reviewed and interpreted, includes:    [] EKG or telemetry (rhythm) strip    [] Other:   [] Data Review (any 3)  [] Collateral history obtained from:    [] All available consultant notes from yesterday/today were reviewed  [] All current labs were reviewed and interpreted for clinical significance   [] Appropriate follow-up labs were 
sucroferric oxyhydroxide  500 mg Oral TID     atorvastatin  80 mg Oral Nightly    citric acid-sodium citrate  10 mL Oral BID     Continuous Infusions:   heparin (PORCINE) Infusion 1,310 Units/hr (06/06/25 1740)    sodium chloride      dextrose       PRN Meds:hydrALAZINE, heparin (porcine), heparin (porcine), sodium chloride flush, sodium chloride, ondansetron **OR** ondansetron, polyethylene glycol, acetaminophen **OR** acetaminophen, dextrose bolus **OR** dextrose bolus, glucagon (rDNA), dextrose, HYDROmorphone, LORazepam     Patient Active Problem List    Diagnosis Date Noted    Uncontrolled type 2 diabetes mellitus with hyperglycemia ([facility]) 06/02/2025    Coronary artery disease involving native coronary artery of native heart without angina pectoris 10/23/2024    Essential hypertension 10/23/2024    Hyperlipidemia LDL goal <70 10/23/2024    ACS (acute coronary syndrome) ([facility]) 10/03/2024    ESRD (end stage renal disease) ([facility]) 10/03/2024    Chest pain 10/02/2024    NSTEMI (non-ST elevated myocardial infarction) ([facility]) 10/02/2024    Post-op pain 03/01/2022      Active Hospital Problems    Diagnosis Date Noted    Uncontrolled type 2 diabetes mellitus with hyperglycemia ([facility]) [E11.65] 06/02/2025    Essential hypertension [I10] 10/23/2024    Hyperlipidemia LDL goal <70 [E78.5] 10/23/2024    ACS (acute coronary syndrome) ([facility]) [I24.9] 10/03/2024    ESRD (end stage renal disease) ([facility]) [N18.6] 10/03/2024    NSTEMI (non-ST elevated myocardial infarction) ([facility]) [I21.4] 10/02/2024       Assessment:     NSTEMI, CAD awaiting CABG     History of LV dysfunction cardiomyopathy with EF of 35- 40%  ESRD  Type 2 diabetes  Anemia  Hyperkalemia  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  Right bundle branch block  Plan:   Continue balloon pump.  Remains in critical condition.    Hemodynamically stable.  Continue heparin drip.  Will start guideline directed medical therapy after bypass.  Blood pressure is acceptable.  Continue 
circumflex disease and 99% in-stent restenosis of LAD  -s/p IABP placed on 6/2  -s/p CABG x 2 on 6/9  -Intubated on 6/9 for CABG procedure  -Sedated; wean as able  -Continue Levophed; wean as able; keep MAP above 65  -Daily electrolyte panel and CBC ordered and monitoring; replace electrolytes as needed   - Cardiology consulted  -CTS consulted     Hypertension  -Continue home antihypertensives    Hyperlipidemia  -Continue home statin    Type 2 diabetes  -SSI ordered     ESRD on dialysis  -Dialysis MWF  -Avoid nephrotoxins  -Nephrology consulted        Drugs that require monitoring for toxicity include: Heparin and the method of monitoring was/is Anti-Xa    DVT ppx: Heparin  GI ppx: Diet/Tube Feeds  Diet: Diet NPO  ADULT DIET; Regular; Low Fat/Low Chol/High Fiber/2 gm Na; 1500 ml  Code Status: Prior    PT/OT Eval Status: will order for after CABG    Disposition:  Intubated and sedated.. CABG on 6/9. Will hopefully DC home when stable and after cardiac procedures.  CTS will likely become primary team.      I personally spent 33 minutes in providing critical care. Total critical care time includes caring for direct patient contact, management of life support systems, review of data including imaging and labs, discussions with other team members and physicians, but excludes procedures.      Brian Freed,   6/9/2025  3:38 PM

## 2025-06-16 NOTE — CARE COORDINATION
Case Management Assessment  Initial Evaluation    Date/Time of Evaluation: 6/16/2025 4:27 PM  Assessment Completed by: Heydi BERMUDEZ RN    If patient is discharged prior to next notation, then this note serves as note for discharge by case management.    Patient Name: Colby Ovalle                   YOB: 1950  Diagnosis: Disease of cardiovascular system [I25.10]                   Date / Time: 6/16/2025  3:22 PM    Patient Admission Status: REHAB IP   Readmission Risk (Low < 19, Mod (19-27), High > 27): Readmission Risk Score: 25.8    Current PCP: No primary care provider on file.  PCP verified by CM? No (PT STATED THAT HE DOES NOT HAVE A PCP)    Chart Reviewed: Yes      History Provided by: Patient  Patient Orientation: Alert and Oriented, Person, Place, Situation, Self    Patient Cognition: Alert    Hospitalization in the last 30 days (Readmission):  No    If yes, Readmission Assessment in  Navigator will be completed.    Advance Directives:      Code Status: Full Code   Patient's Primary Decision Maker is: Legal Next of Kin (SPOUSE  UNA)    Primary Decision Maker: Jazmyn Bernabe - Other - [phone]    Secondary Decision Maker: santosh bernabe - Child - [phone]    Supplemental (Other) Decision Maker: Anthony Bernabe - Child - [phone]    Discharge Planning:    Patient lives with: Spouse/Significant Other Type of Home: House  Primary Care Giver: Self  Patient Support Systems include: Spouse/Significant Other, Children   Current [place] resources[place]  Current community resources: None  Current services prior to admission: None            Current DME: Other (Comment) (NONE)            Type of Home Care services:  None    ADLS  Prior functional level: Independent in ADLs/IADLs, Bathing, Dressing, Toileting, Feeding, Shopping, Mobility, Housework, Cooking  Current functional level: Assistance with the following:, Bathing, Dressing, Toileting, Shopping,  choice of provider and agrees with the discharge plan. Freedom of choice list with basic dialogue that supports the patient's individualized plan of care/goals and shares the quality data associated with the providers was provided to: Patient   Patient Representative Name:       The Patient and/or Patient Representative Agree with the Discharge Plan? Yes    Heydi BERMUDEZ RN  Case Management Department  Ph: [phone]    Electronically signed by Heydi BERMUDEZ RN on 6/17/25 at 4:23 PM EDT

## 2025-06-16 NOTE — PLAN OF CARE
[facility] PATIENT TREATMENT PLAN  [address] [address]  [phone]      Colby Ovalle    : 1950  Acct #: [id number]  MRN: [id number]  PHYSICIAN:  Higinio Callahan MD  Primary Problem    Patient Active Problem List   Diagnosis    Post-op pain    Chest pain    NSTEMI (non-ST elevated myocardial infarction) ([facility])    ACS (acute coronary syndrome) ([facility])    ESRD (end stage renal disease) ([facility])    Coronary artery disease involving native coronary artery of native heart without angina pectoris    Essential hypertension    Hyperlipidemia LDL goal <70    Uncontrolled type 2 diabetes mellitus with hyperglycemia ([facility])    Prolonged Q-T interval on ECG    Ischemic cardiomyopathy    Hyperkalemia    Fever    Disease of cardiovascular system       Rehabilitation Diagnosis:      #.  NSTEMI  #.  Multivessel CAD s/p CABG x 2  - Continue aspirin, statin, beta-blocker  - Cardiac sternal precautions until 2025  - Continue PT/OT     #. A-fib  - Continue amiodarone taper  - Continue metoprolol, Eliquis     #. ESRD  - Nephrology following for assistance with management:              - Continue HD MWF     #.  Staph cherinsis bacteremia  - ID consulted:              - Continue IV cefazolin 2 g MWF with dialysis              - Midline removal ordered due to concern for contamination     #. T2DM  - Last A1c: 7.1%  - Home meds: Onglyza  - Continue alogliptin (formulary substitute for home med), as well as high intensity sliding scale     #.  Acute on chronic anemia  -Continue Retacrit  - Monitor CBC     Chronic Conditions:  - Hx of opioid dependence: Continue home methadone 95 units daily.     ADMIT DATE:2025    Patient Goals: To get stronger and return home.  Admitting Impairments: Cardiac - 09 - Cardiac  Activities: Impaired Eating, Hygiene, Toileting, Bathing, Dressing, Bed Mobility, Transfers, Ambulation, Stairs, and Endurance.  Participation: Prior to admission patient

## 2025-06-16 NOTE — DISCHARGE INSTR - COC
Continuity of Care Form    Patient Name: Colby Ovalle   :  1950  MRN:  [id number]    Admit date:  2025  Discharge date:  ***    Code Status Order: Prior   Advance Directives:    Date/Time Healthcare Directive Type of Healthcare Directive Copy in Chart Healthcare Agent Appointed Healthcare Agent's Name Healthcare Agent's Phone Number    25 0612 No, patient does not have an advance directive for healthcare treatment  --  --  --  --  --             Admitting Physician:  Aries Pino MD  PCP: No primary care provider on file.    Discharging Nurse: ***  Discharging Hospital Unit/Room#: CVU-2905/2905-01  Discharging Unit Phone Number: ***    Emergency Contact:   Extended Emergency Contact Information  Primary Emergency Contact: Jazmyn Bernabe  Address: [address] [place] [address]  Home Phone: [phone]  Work Phone: [phone]  Relation: Other  Secondary Emergency Contact: santosh bernabe  Address: [address] [place] [address]            [place] [address]  Mobile Phone: [phone]  Relation: Child    Past Surgical History:  Past Surgical History:   Procedure Laterality Date    BACK SURGERY      CARDIAC PROCEDURE N/A 10/3/2024    Left heart cath / coronary angiography performed by Bindu Barboza DO at [facility] CARDIAC CATH LAB    CARDIAC PROCEDURE N/A 10/3/2024    Intravascular ultrasound performed by Bindu Barboza DO at [facility] CARDIAC CATH LAB    CARDIAC PROCEDURE N/A 10/3/2024    Atherectomy coronary performed by Bindu Barboza DO at [facility] CARDIAC CATH LAB    CARDIAC PROCEDURE N/A 2025    Left heart cath / coronary angiography performed by Modesto Pérez MD at [facility] CARDIAC CATH LAB    CARDIAC PROCEDURE N/A 2025    Intra-aortic balloon pump insertion performed by Modesto Pérez MD at [facility] CARDIAC CATH LAB    COLONOSCOPY      CORONARY ARTERY BYPASS GRAFT N/A 2025    CORONARY ARTERY BYPASS GRAFT X2 USING RIGHT SAPHENOUS VEIN AND LIMA; LEFT EVH

## 2025-06-16 NOTE — PLAN OF CARE
Problem: Chronic Conditions and Co-morbidities  Goal: Patient's chronic conditions and co-morbidity symptoms are monitored and maintained or improved  6/15/2025 2113 by Anna Natarajan RN  Outcome: Progressing  Flowsheets  Taken 6/15/2025 2113  Care Plan - Patient's Chronic Conditions and Co-Morbidity Symptoms are Monitored and Maintained or Improved:   Monitor and assess patient's chronic conditions and comorbid symptoms for stability, deterioration, or improvement   Collaborate with multidisciplinary team to address chronic and comorbid conditions and prevent exacerbation or deterioration   Update acute care plan with appropriate goals if chronic or comorbid symptoms are exacerbated and prevent overall improvement and discharge  Taken 6/15/2025 2000  Care Plan - Patient's Chronic Conditions and Co-Morbidity Symptoms are Monitored and Maintained or Improved:   Monitor and assess patient's chronic conditions and comorbid symptoms for stability, deterioration, or improvement   Collaborate with multidisciplinary team to address chronic and comorbid conditions and prevent exacerbation or deterioration   Update acute care plan with appropriate goals if chronic or comorbid symptoms are exacerbated and prevent overall improvement and discharge  6/15/2025 1659 by Edwin Lakhani RN  Outcome: Progressing     Problem: Safety - Adult  Goal: Free from fall injury  6/15/2025 2113 by Anna Natarajan RN  Outcome: Progressing  Flowsheets (Taken 6/15/2025 2113)  Free From Fall Injury:   Instruct family/caregiver on patient safety   Based on caregiver fall risk screen, instruct family/caregiver to ask for assistance with transferring infant if caregiver noted to have fall risk factors  6/15/2025 1659 by Edwin Lakhani RN  Outcome: Progressing     Problem: Skin/Tissue Integrity  Goal: Skin integrity remains intact  Description: 1.  Monitor for areas of redness and/or skin breakdown2.  Assess vascular access sites hourly3.

## 2025-06-16 NOTE — CARE COORDINATION
CM received message from [facility] HOFFMAN liaison that pt is approved for ARU and can admit today.    CM updated pt's RN who will notify CVTS NP.      Agueda Sunshine RN, BSN  [phone]

## 2025-06-16 NOTE — PROGRESS NOTES
[facility] - Inpatient Rehabilitation Department   Phone: [phone]    Physical Therapy    [] Initial Evaluation            [x] Daily Treatment Note         [] Discharge Summary      Patient: Colby Ovalle   : 1950   MRN: [id number]   Date of Service:  2025  Admitting Diagnosis: Disease of cardiovascular system  Current Admission Summary: per H&P , \"74 y.o. male with PMHx of DM II, ESRD, HTN, CAD, HLD who presented to [facility] with complaints of chest pain.     Patient originally presented to [facility] on  for chest pain.  Cardiology at that time was consulted and patient was found to have an NSTEMI.  Troponin elevated to 638.  Patient underwent cardiac cath on  and was found to have severe two-vessel disease with 95% ostial circumflex disease and 99% in-stent restenosis of LAD.  At that time, LVEF was measured at 30% and an intra-aortic balloon pump placed.  Patient placed on heparin drip.     Patient was transferred to [facility] on  for consideration of CABG.     Patient currently lying in bed.  No longer admits to chest pain.  Denies shortness of breath, nausea, vomiting, GI/ symptoms, edema, fever, or chills.     Denies tobacco, alcohol, or illicit drug use.\"     CABG x2  6/10 IABP removal     Past Medical History:  has a past medical history of Alcohol dependence in remission (HCC), Cancer (HCC), Chronic back pain, COVID, End-stage renal disease on hemodialysis (HCC), Erectile dysfunction, Full dentures, Hepatitis C, Hx of non-Hodgkin's lymphoma, Hyperlipidemia, Hypertension, Kidney stone, Lumbago, Opioid dependence (HCC), Thrombocytopenia, Tobacco dependency, and Type 2 diabetes mellitus without complication (HCC).  Past Surgical History:  has a past surgical history that includes Port Surgery; Cystoscopy; back surgery; Colonoscopy; Dialysis fistula creation (Left, 3/1/2022); Cardiac procedure (N/A, 10/3/2024); Cardiac procedure (N/A, 10/3/2024);

## 2025-06-16 NOTE — PROGRESS NOTES
4 Eyes Skin Assessment     NAME:  Colby Ovalle  YOB: 1950  MEDICAL RECORD NUMBER:  [id number]    The patient is being assessed for  Admission    I agree that at least one RN has performed a thorough Head to Toe Skin Assessment on the patient. ALL assessment sites listed below have been assessed.      Areas assessed by both nurses:    Head, Face, Ears, Shoulders, Back, Chest, Arms, Elbows, Hands, Sacrum. Buttock, Coccyx, Ischium, and Legs. Feet and Heels        Does the Patient have a Wound? No noted wound(s)       Nicolás Prevention initiated by RN: Yes  Wound Care Orders initiated by RN: No    Pressure Injury (Stage 3,4, Unstageable, DTI, NWPT, and Complex wounds) if present, place Wound referral order by RN under : No    New Ostomies, if present place, Ostomy referral order under : No     Nurse 1 eSignature: Electronically signed by Josephine Gale RN on 6/16/25 at 4:05 PM EDT    **SHARE this note so that the co-signing nurse can place an eSignature**    Nurse 2 eSignature: Electronically signed by Silvia Olguin RN on 6/16/25 at 4:11 PM EDT

## 2025-06-16 NOTE — CONSULTS
Infectious Diseases   Consult Note        Admission Date: 6/16/2025  Hospital Day: Hospital Day: 1   Attending: Higinio Callahan MD  Date of service: 6/16/25     Reason for admission: Disease of cardiovascular system [I25.10]    Chief complaint/ Reason for consult: Staphylococcus bacteremia    Microbiology:        I have reviewed allavailable micro lab data and cultures    Results       Procedure Component Value Units Date/Time    Culture, Tip [[id number]] Collected: 06/14/25 1739    Order Status: Completed Specimen: Catheter Tip Updated: 06/16/25 0845     Culture Catheter Tip --     No growth to date  No growth 36 to 48 hours      Narrative:      ORDER#: [id number]                          ORDERED BY: ANJU RAIN  SOURCE: Catheter Tip                       COLLECTED:  06/14/25 17:39  ANTIBIOTICS AT MAGUI.:                      RECEIVED :  06/15/25 01:45    Culture, Blood 1 [[id number]] Collected: 06/14/25 1730    Order Status: Completed Specimen: Blood Updated: 06/15/25 1815     Blood Culture, Routine No Growth to date.  Any change in status will be called.    Narrative:      ORDER#: [id number]                          ORDERED BY: ANJU RAIN  SOURCE: Blood RIJ                          COLLECTED:  06/14/25 17:30  ANTIBIOTICS AT MAGUI.:                      RECEIVED :  06/15/25 01:44  If child <=2 yrs old please draw pediatric bottle.~Blood Culture 1    Culture, Blood 1 [[id number]]     Order Status: Canceled Specimen: Blood     Culture, Blood 2 [[id number]]  (Abnormal) Collected: 06/08/25 1625    Order Status: Completed Specimen: Blood Updated: 06/12/25 0855     Organism Staphylococcus lugdunensis     Culture, Blood 2 --     POSITIVE for  No further workup  Isolated two of two sets  Refer to culture [id number] for sensitivity results      Narrative:      ORDER#: [id number]                          ORDERED BY: LENNY IBANEZ  SOURCE: Blood No site given                COLLECTED:  06/08/25 16:25  ANTIBIOTICS AT MAGUI.:     history. He has never used smokeless tobacco.  Alcohol use:   reports that he does not currently use alcohol.  Currently lives in: [facility] [id number]   reports current drug use. Drug: Marijuana (Weed).     COVID VACCINATION AND LAB RESULT RECORDS:     Internal Administration   First Dose      Second Dose           Last COVID Lab No results found for: \"SARS-COV-2\"         Assessment:     The patient is a 75 y.o. old male who  has a past medical history of Alcohol dependence in remission (HCC), Cancer (HCC), Chronic back pain, COVID (01/12/2022), End-stage renal disease on hemodialysis (HCC), Erectile dysfunction, Full dentures, Hepatitis C, non-Hodgkin's lymphoma, Hyperlipidemia, Hypertension, Kidney stone, Lumbago, Opioid dependence (HCC), Thrombocytopenia, Tobacco dependency, and Type 2 diabetes mellitus without complication (HCC). with following problems:    Staphylococcus lugdunensis bacteremia  End-stage renal disease on hemodialysis  AV graft in place  Coronary artery disease, status post CABG  Chronic hepatitis C  Essential hypertension  Mixed hyperlipidemia  Type 2 diabetes mellitus  History of opioid dependence      Discussion:      The patient has a serum creatinine of 6.0 today.  White cell count is 5600 with hemoglobin of 7.4 and platelet count of 218,000.    He was noted to have a midline that was placed on the same day he had 2 sets of blood cultures done.  The blood cultures were done at least 4 hours apart and both sets came back positive for Staphylococcus lugdunensis.    Staphylococcus dialysis is a highly virulent coagulase-negative Staphylococcus that critically behaves like Staph aureus and is usually methicillin-susceptible.    The patient is currently on IV cefazolin which will be covering Staphylococcus    However, the existing midline is considered contaminated as it was placed on the day the patient had a positive blood cultures and hence it will have to be removed      Plan:     Diagnostic  the patient for above mentioned reason(s).    The patient has multiple medical comorbidities, and presented to the acute rehabilitation unit for inpatient rehabilitation.  The patient was recently admitted at [facility] on June 2 with chest pain and was found to have NSTEMI.  He underwent left heart cath which showed severe two-vessel coronary artery disease.  He was subsequently transferred to [facility] on June 5, 2025 and underwent CABG x 2 on June 9.  His hospital course was complicated by atrial fibrillation.  The patient had blood cultures done on June negative which grew staph lugdunensis.    He has ESRD and has an AV graft in place.  He had a midline placed on wound 8 and was started on IV cefazolin.  The patient has remained on IV cefazolin.    I have been asked for my opinion for management for this patient.                   Past Medical History: All past medical history reviewed today.    Past Medical History:   Diagnosis Date    Alcohol dependence in remission (HCC)     information from [facility]    Cancer ([facility])     over 10 years ago     Chronic back pain     COVID 01/12/2022    End-stage renal disease on hemodialysis ([facility])     Erectile dysfunction     Full dentures     Hepatitis C     information from [facility]    Hx of non-Hodgkin's lymphoma     Information from [facility]    Hyperlipidemia     Hypertension     Kidney stone     Lumbago     information from [facility]    Opioid dependence (HCC)     information from [facility]    Thrombocytopenia     Tobacco dependency     Type 2 diabetes mellitus without complication ([facility])          Past Surgical History: All pastsurgical history was reviewed today.    Past Surgical History:   Procedure Laterality Date    BACK SURGERY      CARDIAC PROCEDURE N/A 10/3/2024    Left heart cath / coronary angiography performed by Bindu Barboza DO at [facility] CARDIAC CATH LAB    CARDIAC PROCEDURE N/A 10/3/2024    Intravascular ultrasound performed by

## 2025-06-17 LAB
GLUCOSE BLD-MCNC: 154 MG/DL (ref 70–99)
GLUCOSE BLD-MCNC: 189 MG/DL (ref 70–99)
GLUCOSE BLD-MCNC: 339 MG/DL (ref 70–99)
GLUCOSE BLD-MCNC: 71 MG/DL (ref 70–99)
HBV SURFACE AB SERPL IA-ACNC: <3.5 MIU/ML
PERFORMED ON: ABNORMAL
PERFORMED ON: NORMAL

## 2025-06-17 PROCEDURE — 87390 HIV-1 AG IA: CPT

## 2025-06-17 PROCEDURE — 84443 ASSAY THYROID STIM HORMONE: CPT

## 2025-06-17 PROCEDURE — 86706 HEP B SURFACE ANTIBODY: CPT

## 2025-06-17 PROCEDURE — 97535 SELF CARE MNGMENT TRAINING: CPT

## 2025-06-17 PROCEDURE — 97161 PT EVAL LOW COMPLEX 20 MIN: CPT

## 2025-06-17 PROCEDURE — 86702 HIV-2 ANTIBODY: CPT

## 2025-06-17 PROCEDURE — 86701 HIV-1ANTIBODY: CPT

## 2025-06-17 PROCEDURE — 6370000000 HC RX 637 (ALT 250 FOR IP): Performed by: STUDENT IN AN ORGANIZED HEALTH CARE EDUCATION/TRAINING PROGRAM

## 2025-06-17 PROCEDURE — 1280000000 HC REHAB R&B

## 2025-06-17 PROCEDURE — 97530 THERAPEUTIC ACTIVITIES: CPT

## 2025-06-17 PROCEDURE — 36415 COLL VENOUS BLD VENIPUNCTURE: CPT

## 2025-06-17 PROCEDURE — 97166 OT EVAL MOD COMPLEX 45 MIN: CPT

## 2025-06-17 PROCEDURE — 82607 VITAMIN B-12: CPT

## 2025-06-17 PROCEDURE — 87522 HEPATITIS C REVRS TRNSCRPJ: CPT

## 2025-06-17 PROCEDURE — 84439 ASSAY OF FREE THYROXINE: CPT

## 2025-06-17 PROCEDURE — 83036 HEMOGLOBIN GLYCOSYLATED A1C: CPT

## 2025-06-17 PROCEDURE — 82746 ASSAY OF FOLIC ACID SERUM: CPT

## 2025-06-17 RX ADMIN — LINACLOTIDE 145 MCG: 145 CAPSULE, GELATIN COATED ORAL at 06:44

## 2025-06-17 RX ADMIN — AMIODARONE HYDROCHLORIDE 400 MG: 200 TABLET ORAL at 20:13

## 2025-06-17 RX ADMIN — METOPROLOL TARTRATE 12.5 MG: 25 TABLET, FILM COATED ORAL at 08:25

## 2025-06-17 RX ADMIN — AMIODARONE HYDROCHLORIDE 400 MG: 200 TABLET ORAL at 08:24

## 2025-06-17 RX ADMIN — INSULIN LISPRO 12 UNITS: 100 INJECTION, SOLUTION INTRAVENOUS; SUBCUTANEOUS at 11:57

## 2025-06-17 RX ADMIN — ASPIRIN 81 MG: 81 TABLET, DELAYED RELEASE ORAL at 08:25

## 2025-06-17 RX ADMIN — METOPROLOL TARTRATE 12.5 MG: 25 TABLET, FILM COATED ORAL at 20:13

## 2025-06-17 RX ADMIN — STANDARDIZED SENNA CONCENTRATE AND DOCUSATE SODIUM 1 TABLET: 8.6; 5 TABLET ORAL at 20:12

## 2025-06-17 RX ADMIN — METHADONE HYDROCHLORIDE 95 MG: 10 CONCENTRATE ORAL at 06:44

## 2025-06-17 RX ADMIN — ALOGLIPTIN 6.25 MG: 6.25 TABLET, FILM COATED ORAL at 08:24

## 2025-06-17 RX ADMIN — APIXABAN 2.5 MG: 2.5 TABLET, FILM COATED ORAL at 20:13

## 2025-06-17 RX ADMIN — INSULIN LISPRO 4 UNITS: 100 INJECTION, SOLUTION INTRAVENOUS; SUBCUTANEOUS at 20:24

## 2025-06-17 RX ADMIN — ATORVASTATIN CALCIUM 80 MG: 80 TABLET, FILM COATED ORAL at 20:13

## 2025-06-17 RX ADMIN — APIXABAN 2.5 MG: 2.5 TABLET, FILM COATED ORAL at 08:24

## 2025-06-17 RX ADMIN — HYDROCODONE BITARTRATE AND ACETAMINOPHEN 1 TABLET: 5; 325 TABLET ORAL at 20:18

## 2025-06-17 RX ADMIN — PANTOPRAZOLE SODIUM 40 MG: 40 TABLET, DELAYED RELEASE ORAL at 06:44

## 2025-06-17 RX ADMIN — POLYETHYLENE GLYCOL 3350 17 G: 17 POWDER, FOR SOLUTION ORAL at 20:11

## 2025-06-17 RX ADMIN — STANDARDIZED SENNA CONCENTRATE AND DOCUSATE SODIUM 1 TABLET: 8.6; 5 TABLET ORAL at 08:24

## 2025-06-17 ASSESSMENT — ENCOUNTER SYMPTOMS
CONSTIPATION: 0
EYE REDNESS: 0
BACK PAIN: 0
RHINORRHEA: 0
ABDOMINAL PAIN: 0
SHORTNESS OF BREATH: 0
NAUSEA: 0
COUGH: 0
SINUS PAIN: 0
WHEEZING: 0
DIARRHEA: 0
SINUS PRESSURE: 0
EYE DISCHARGE: 0
SORE THROAT: 0

## 2025-06-17 ASSESSMENT — PAIN DESCRIPTION - LOCATION
LOCATION: OTHER (COMMENT)
LOCATION: CHEST

## 2025-06-17 ASSESSMENT — PAIN DESCRIPTION - ORIENTATION
ORIENTATION: MID
ORIENTATION: OTHER (COMMENT)

## 2025-06-17 ASSESSMENT — PAIN SCALES - GENERAL
PAINLEVEL_OUTOF10: 7
PAINLEVEL_OUTOF10: 0
PAINLEVEL_OUTOF10: 0

## 2025-06-17 ASSESSMENT — PAIN SCALES - WONG BAKER: WONGBAKER_NUMERICALRESPONSE: NO HURT

## 2025-06-17 ASSESSMENT — PAIN - FUNCTIONAL ASSESSMENT
PAIN_FUNCTIONAL_ASSESSMENT: ACTIVITIES ARE NOT PREVENTED
PAIN_FUNCTIONAL_ASSESSMENT: ACTIVITIES ARE NOT PREVENTED

## 2025-06-17 ASSESSMENT — PAIN DESCRIPTION - DESCRIPTORS
DESCRIPTORS: ACHING
DESCRIPTORS: OTHER (COMMENT)

## 2025-06-17 NOTE — CARE COORDINATION
Discharge Planning:     (CM) reviewed patient's chart. Patient has active insurance [employer]   [facility]  [phone]  [phone] ; to make sure she was aware that patient transferred from medical unit to ARU on 06/16/2025 ;  Pt does not have a PCP ;  kira stated he is agreeable to RN AGTO helping obtain an appointment [facility] at time of discharge.      Electronically signed by Heydi BERMUDEZ RN on 6/17/25 at 4:22 PM EDT

## 2025-06-17 NOTE — PROGRESS NOTES
[facility] - Inpatient Rehabilitation Department   Phone: [phone]    Physical Therapy    [x] Initial Evaluation            [] Daily Treatment Note         [] Discharge Summary      Patient: Colby Ovalle   : 1950   MRN: [id number]   Date of Service:  2025  Admitting Diagnosis: Disease of cardiovascular system  Current Admission Summary: Colby Ovalle is a 75 y.o. with PMHx notable for HTN, HLD, ESRD who presented to [facility] on 25 with chest pain, found to have NSTEMI, underwent cardiac catheterization on  showing severe 2-vessel CAD, and was transferred to [facility] on 2025 for CVTS evaluation. He underwent CABG x2 on . Hospital course complicated by: A-fib, ESRD, staph bacteremia, constipation.   Past Medical History:  has a past medical history of Alcohol dependence in remission (HCC), Cancer (HCC), Chronic back pain, COVID, End-stage renal disease on hemodialysis (HCC), Erectile dysfunction, Full dentures, Hepatitis C, Hx of non-Hodgkin's lymphoma, Hyperlipidemia, Hypertension, Kidney stone, Lumbago, Opioid dependence (HCC), Thrombocytopenia, Tobacco dependency, and Type 2 diabetes mellitus without complication ([facility]).  Past Surgical History:  has a past surgical history that includes Port Surgery; Cystoscopy; back surgery; Colonoscopy; Dialysis fistula creation (Left, 3/1/2022); Cardiac procedure (N/A, 10/3/2024); Cardiac procedure (N/A, 10/3/2024); Cardiac procedure (N/A, 10/3/2024); invasive vascular (N/A, 10/8/2024); Cardiac procedure (N/A, 2025); Cardiac procedure (N/A, 2025); and Coronary artery bypass graft (N/A, 2025).  Discharge Recommendations: home with home health PT  DME Required For Discharge: DME to be determined pending patient progress  Precautions/Restrictions: high fall risk  Weight Bearing Restrictions: weight bearing as tolerated  [] Right Upper Extremity  [] Left Upper Extremity [] Right Lower Extremity  [] Left Lower

## 2025-06-17 NOTE — H&P
Patient: Colby Ovalle  [id number]  Date: 6/17/2025    Chief Complaint: Debility due to NSTEMI    History of Present Illness/Hospital Course:  Colby Ovalle is a 75 y.o. with PMHx notable for HTN, HLD, ESRD who presented to [facility] on 6/2/25 with chest pain, found to have NSTEMI, underwent cardiac catheterization on 6/2 showing severe 2-vessel CAD, and was transferred to [facility] on 6/5/2025 for CVTS evaluation. He underwent CABG x2 on 6/9. Hospital course complicated by: A-fib, ESRD, staph bacteremia, constipation.     Currently, patient reports that he is doing well.  He denies any chest pain, dyspnea.  He is having some mild intermittent lightheadedness.  Therapy notes some scant drainage from his lower incision.  Denies any fevers or chills.  Appetite is adequate. Last BM (including prior to admit): 06/15/25.     Prior Level of Function:  Independent for mobility, independent for self care, independent for IADLs.   Lives with spouse in one level house with 1 LUZ MARIA, and 0 to bed/bath.     Current Level of Function:      Functional Deficits:  Generalized weakness, decreased functional endurance limiting independence with functional mobility and ADLs.      has a past medical history of Alcohol dependence in remission (HCC), Cancer (HCC), Chronic back pain, COVID, End-stage renal disease on hemodialysis (HCC), Erectile dysfunction, Full dentures, Hepatitis C, Hx of non-Hodgkin's lymphoma, Hyperlipidemia, Hypertension, Kidney stone, Lumbago, Opioid dependence (HCC), Thrombocytopenia, Tobacco dependency, and Type 2 diabetes mellitus without complication (HCC).     has a past surgical history that includes Port Surgery; Cystoscopy; back surgery; Colonoscopy; Dialysis fistula creation (Left, 3/1/2022); Cardiac procedure (N/A, 10/3/2024); Cardiac procedure (N/A, 10/3/2024); Cardiac procedure (N/A, 10/3/2024); invasive vascular (N/A, 10/8/2024); Cardiac procedure (N/A, 6/2/2025); Cardiac procedure (N/A,  MD        insulin lispro (HUMALOG,ADMELOG) injection vial 0-16 Units  0-16 Units SubCUTAneous 4x Daily AC & HS Higinio Callahan MD   8 Units at 06/16/25 2055    metoprolol tartrate (LOPRESSOR) tablet 12.5 mg  12.5 mg Oral BID Higinio Callahan MD   12.5 mg at 06/17/25 0825    HYDROcodone-acetaminophen (NORCO) 5-325 MG per tablet 1 tablet  1 tablet Oral Q6H PRN Higinio Callahan MD        methadone (DOLOPHINE) 10 MG/ML solution 95 mg  95 mg Oral Daily Higinio Callahan MD   95 mg at 06/17/25 0644    lidocaine-prilocaine (EMLA) cream   Topical Q [facility] Higinio Callahan MD        epoetin lonnie-epbx (RETACRIT) injection 10,000 Units  10,000 Units SubCUTAneous Once per day on Monday Wednesday Friday Higinio Callahan MD   10,000 Units at 06/16/25 2057    linaclotide (LINZESS) capsule 145 mcg  145 mcg Oral QAM  Higinio Callahan MD   145 mcg at 06/17/25 0644    atorvastatin (LIPITOR) tablet 80 mg  80 mg Oral Nightly Higinio Callahan MD   80 mg at 06/16/25 2054    pantoprazole (PROTONIX) tablet 40 mg  40 mg Oral QAM  Higinio Callahan MD   40 mg at 06/17/25 0644    [START ON 6/18/2025] ceFAZolin (ANCEF) 2,000 mg in sterile water 20 mL IV syringe  2,000 mg IntraVENous Q [facility] Clyde Collins MD           REVIEW OF SYSTEMS:   CONSTITUTIONAL: negative for fevers, chills, diaphoresis, appetite change, night sweats and unexpected weight change.   EYES: negative for blurred vision, eye discharge, visual disturbance and icterus.   HEENT: negative for hearing loss, tinnitus, ear drainage, sinus pressure, nasal congestion, and epistaxis.   RESPIRATORY: Negative for hemoptysis, cough, sputum production.   CARDIOVASCULAR: negative for chest pain, palpitations, exertional chest pressure/discomfort, edema, syncope.   GASTROINTESTINAL: negative for nausea, vomiting, diarrhea, constipation, blood in stool and abdominal pain.   GENITOURINARY: negative for frequency, dysuria, urinary incontinence, decreased urine volume, and hematuria.  as well as high intensity sliding scale    #.  Acute on chronic anemia  -Continue Retacrit  - Monitor CBC    Chronic Conditions:  - Hx of opioid dependence: Continue home methadone 95 units daily.     CODE: Full Code  Diet: ADULT DIET; Regular; 5 carb choices (75 gm/meal); Low Fat/Low Chol/High Fiber/2 gm Na; 1500 ml  ADULT ORAL NUTRITION SUPPLEMENT; Breakfast, Dinner; Renal Oral Supplement  Bowels: Per ARU protocol  Bladder: Bladder scans per ARU protocol  Sleep: Melatonin PRN  Pain: Tylenol PRN, Norco PRN (wean as able)  DVT PPx: Eliquis  Dispo: [facility] for home, date TBD  Services: TBD  DME: TBD  Follow-ups: Cardiology, CVTS, PCP    Higinio Callahan MD 6/17/2025, 9:41 AM     * This document was created using dictation software.  While all precautions were taken to ensure accuracy, errors may have occurred.  Please disregard any typographical errors.

## 2025-06-17 NOTE — DISCHARGE SUMMARY
Discharge Summary      Date of Admission:  6/5/2025 12:07 PM    Date of Discharge:  6/16/25  Discharge to: [facility] ARU    Condition at Discharge: Stable     Surgeon: Dr. Duke  Cards: Dr. Barboza   PCP:  No primary care provider on file.        Diagnosis: CAD    Hospital Course: Colby Ovalle is a 74 y.o. male with significant past medical history of ESRD (on HD MWF) HTN, DM II, non-hodgkins lymphoma (10-15 yrs ago), marijuana use, opiate dependence, alcohol dependence (in remission), and CAD with prior PCI/atherectomy/Shockwave to ostial /mid LAD who presented to [facility] ED on  6/1/25 with complaints of chest pain described as \"indigestion\".  He took 2 tums prior to the pain.  Pain was described as non-radiating and was not associated with any shortness of breath, dizziness, nausea, vomiting or diaphoresis.  It did not resolve thus he presented to the ER. While in the ED,  EKG demonstrated sinus rhythm, right bundle branch block with some J-point elevation new in leads V2, V3, V4.  Troponin tripled on the delta and thus patient was started on heparin drip and admitted with NSTEMI. He was consulted by Cardiology and was advised to undergo LHC.  Left heart angiogram (6/2/25) showed severe two-vessel coronary artery disease with 99% ostial circumflex disease and a 99% in-stent restenosis of the LAD.  Left ventricular ejection fraction noted to be 30% with mid to distal anterior wall/apical hypokinesis.  Intra-aortic balloon pump was placed and pateint was transferred here to [facility] for consideration for coronary bypass grafting.     Patient was medically managed and eventually underwent CABG x2 on 6/9/25 with Dr. Duke.    Findings: Small left thigh endoscopic vein harvested, right thigh vein harvested with skip incisions, circumflex system small OM's not able to graft the AV groove circumflex.      Patient had a complicated recovery course with the patient

## 2025-06-17 NOTE — PROGRESS NOTES
Infectious Diseases   Progress Note      Admission Date: 6/16/2025  Hospital Day: Hospital Day: 2   Attending: Higinio Callahan MD  Date of service: 6/17/2025     Chief complaint/ Reason for consult:     Staphylococcus lugdunensis bacteremia  End-stage renal disease on hemodialysis  AV graft in place  Coronary artery disease, status post CABG  Chronic hepatitis C    Microbiology:      I have reviewed allavailable micro lab data and cultures    Results       Procedure Component Value Units Date/Time    Culture, Tip [[id number]] Collected: 06/14/25 1739    Order Status: Completed Specimen: Catheter Tip Updated: 06/17/25 0710     Culture Catheter Tip --     No growth to date  No growth 36 to 48 hours  No growth 60-72 hours      Narrative:      ORDER#: [id number]                          ORDERED BY: ANJU RAIN  SOURCE: Catheter Tip                       COLLECTED:  06/14/25 17:39  ANTIBIOTICS AT MAGUI.:                      RECEIVED :  06/15/25 01:45    Culture, Blood 1 [[id number]] Collected: 06/14/25 1730    Order Status: Completed Specimen: Blood Updated: 06/15/25 1815     Blood Culture, Routine No Growth to date.  Any change in status will be called.    Narrative:      ORDER#: [id number]                          ORDERED BY: ANJU RAIN  SOURCE: Blood RIJ                          COLLECTED:  06/14/25 17:30  ANTIBIOTICS AT MAGUI.:                      RECEIVED :  06/15/25 01:44  If child <=2 yrs old please draw pediatric bottle.~Blood Culture 1    Culture, Blood 1 [[id number]]     Order Status: Canceled Specimen: Blood     Culture, Blood 2 [[id number]]  (Abnormal) Collected: 06/08/25 1625    Order Status: Completed Specimen: Blood Updated: 06/12/25 0855     Organism Staphylococcus lugdunensis     Culture, Blood 2 --     POSITIVE for  No further workup  Isolated two of two sets  Refer to culture [id number] for sensitivity results      Narrative:      ORDER#: [id number]                          ORDERED BY: SHAMAR  (HCC) I21.4    ACS (acute coronary syndrome) (HCC) I24.9    ESRD on hemodialysis (HCC) N18.6, Z99.2    Coronary artery disease involving native coronary artery of native heart without angina pectoris I25.10    Essential hypertension I10    Mixed hyperlipidemia E78.2    Type 2 diabetes mellitus with other specified complication (HCC) E11.69    Prolonged Q-T interval on ECG R94.31    Ischemic cardiomyopathy I25.5    Hyperkalemia E87.5    Fever R50.9    Disease of cardiovascular system I25.10    Coagulase negative Staphylococcus bacteremia R78.81, B95.7    Hemodialysis access, AV graft Z99.2    Chronic hepatitis C without hepatic coma (HCC) B18.2       Please note that this chart was generated using Dragon dictation software. Although every effort was made to ensure the accuracy of this automated transcription, some errors in transcription may have occurred inadvertently. If you may need any clarification, please do not hesitate to contact me through EPIC or at the phone number provided below with my electronic signature.  Any pictures or media included in this note were obtained after taking informed verbal consent from the patient and with their approval to include those in the patient's medical record.        Clyde Collins MD, MPH, FACP, FIDSA  6/17/2025, 2:39 PM  Central Office Phone: [phone]  Central Office Fax: [phone]    [facility] Infectious Disease   [address], [address] ([address])  [place] [address]  [facility] days:  Tuesday & Thursday AM    [facility] Infectious Disease  [address] , [address] (Medical office Building)  [place], [place], [address]  [facility] days: Wednesday AM

## 2025-06-17 NOTE — PROGRESS NOTES
Nephrology Progress Note  [facility]  [phone]  [email or web address]        Subjective:   Colby Ovalle: 75 y.o.  male who we are seeing in consultation for ESRD Management.    Brief HPI:  Colby Ovalle is a 75 y.o. male with PMH significant for hypertension, hyperlipidemia, DM II, non-Hodgkin's lymphoma, chronic pain syndrome, chronic alcohol dependence, ESRD on maintenance hemodialysis , nephrolithiasis presented to [facility] with chest pain.  He has known history of CAD with prior PCI/arthrectomy to ostial-mid LAD.  He was taken to cardiac cath lab on 2025 and angiogram showed  severe two-vessel disease with 95% ostial circumflex disease and 99% in-stent restenosis of LAD.  At that time, LVEF was measured at 30% and an intra-aortic balloon pump placed.  He was transferred to [facility] on  for consideration of CABG.He Underwent CABG x 2 on .  Returned to the ICU intubated and sedated. Eventually extubated.   Transferred to ARU on .       Interval History / Subjective:  In ARU, Seen and examined this am/   No complaints.      Objective:   VITALS:    Vitals:    25 0815   BP: 128/74   Pulse: 89   Resp: 18   Temp: 98.3 °F (36.8 °C)   SpO2: 99%                                                                                    Temp (24hrs), Av.3 °F (36.8 °C), Min:97.9 °F (36.6 °C), Max:99 °F (37.2 °C)     BP  Min: 95/56  Max: 128/74                                  Pulse  Av.8  Min: 72  Max: 99    24HR INTAKE/OUTPUT:    Intake/Output Summary (Last 24 hours) at 2025 1110  Last data filed at 2025 1720  Gross per 24 hour   Intake 100 ml   Output --   Net 100 ml     Wt Readings from Last 3 Encounters:   25 83.2 kg (183 lb 6.4 oz)   25 82.4 kg (181 lb 10.5 oz)   25 77 kg (169 lb 12.1 oz)       Physical Exam:    Gen:  not in acute distress, on RA  Lungs:  CTAB  Cardiovascular: RRR   Edema:  + edema  Abd:

## 2025-06-17 NOTE — PROGRESS NOTES
Admission Period/Goal QM Codes for Colby Ovalle.    QM Admit Code Goal Code   Eating 5 6   Oral Hygiene 3 6   Toileting Hygiene 1 6   Shower/Bathing 3 6   UB Dressing 3 6   LB Dressing 2 6   Putting on/off Footwear 1 6   Rolling Left and Right 3 6   Sit To Lying 3 6   Lying to Sitting on Bedside 1 6   Sit to Stand 1 6   Chair/Bed to Chair Transfer 3 6   Toilet Transfers 1 6   Car Transfers 3 6   Walk 10 Feet 3 6   Walk 50 Feet with Two Turns 3 6   Walk 150 Feet 88 6   Walk 10 Feet on Uneven Surfaces 3 6   1 Step (Curb) 4 6   4 Steps 4 6   12 Steps 88 6   Picking up Object from Floor 4 6   Wheel 50 Feet with 2 Turns 9 -   Type - -   Wheel 150 Feet 9 -   Type - -       The above codes were determined by the treatment team to be the patient's accurate admission assessment codes based on assessment performed soon after the patient's admission and prior to the benefit of services provided by staff, or if appropriate, the patient's usual performance during the admission assessment period.    OT:  SANDOVAL Evangelista OTR/L [id number] 6/19/25 1526     PT:  Daryl Bhatti, CHIVO, DPT [id number] 6/19/2025 1520     RN:  Josephine Gale, 6/19/25, 1558     ST:  Rosana Kenny MA CCC-SLP 6/20/2025 1008     :

## 2025-06-17 NOTE — PROGRESS NOTES
[facility] - Inpatient Rehabilitation Department   Phone: [phone]    Occupational Therapy    [x] Initial Evaluation            [] Daily Treatment Note         [] Discharge Summary      Patient: Colby Ovalle   : 1950   MRN: [id number]   Date of Service:  2025    Admitting Diagnosis:  Disease of cardiovascular system  Current Admission Summary:   Colby Ovalle is a 75 y.o. with PMHx notable for HTN, HLD, ESRD who presented to [facility] on 25 with chest pain, found to have NSTEMI, underwent cardiac catheterization on  showing severe 2-vessel CAD, and was transferred to [facility] on 2025 for CVTS evaluation. He underwent CABG x2 on . Hospital course complicated by: A-fib, ESRD, staph bacteremia, constipation.   Past Medical History:  has a past medical history of Alcohol dependence in remission ([facility]), Cancer (HCC), Chronic back pain, COVID, End-stage renal disease on hemodialysis (HCC), Erectile dysfunction, Full dentures, Hepatitis C, Hx of non-Hodgkin's lymphoma, Hyperlipidemia, Hypertension, Kidney stone, Lumbago, Opioid dependence (HCC), Thrombocytopenia, Tobacco dependency, and Type 2 diabetes mellitus without complication ([facility]).  Past Surgical History:  has a past surgical history that includes Port Surgery; Cystoscopy; back surgery; Colonoscopy; Dialysis fistula creation (Left, 3/1/2022); Cardiac procedure (N/A, 10/3/2024); Cardiac procedure (N/A, 10/3/2024); Cardiac procedure (N/A, 10/3/2024); invasive vascular (N/A, 10/8/2024); Cardiac procedure (N/A, 2025); Cardiac procedure (N/A, 2025); and Coronary artery bypass graft (N/A, 2025).    Discharge Recommendations: Home with HHOT    DME Required For Discharge: DME to be determined pending patient progress    Precautions/Restrictions: high fall risk, 1500 ml fluid restriction, LUE limb restriction   Weight Bearing Restrictions: no restrictions    Required Braces/Orthotics: no braces

## 2025-06-17 NOTE — PROGRESS NOTES
Nutrition Note    RECOMMENDATIONS  PO Diet: Cardiac, CC, 1500 ml/day fluids  ONS: Nepro BID     ASSESSMENT   Nutrition intervention triggered for new ARU admission s/p CABG.  Pt receives a cardiac, CCC diet with variable po intake.  Pt does not care for hospital food.  Family encouraged to bring food in from outside to promote better po intake.  Pt has taken ONS prior to admission, & is willing to receive while in hospital. Will follow or improved po & acceptance of ONS.       Malnutrition Status: No malnutrition     NUTRITION DIAGNOSIS   Inadequate oral intake related to decreased appetite as evidenced by variable po intake  Increased nutrient needs related to increase demand for energy/nutrients as evidenced by s/p surgery, rehab for strength and conditioning    Goals: PO intake 50% or greater     NUTRITION RELATED FINDINGS  Objective: LBM 6/15; trace generalized & BLE edema; POCG 154; HD for ESRD  Wounds: Multiple, Surgical Incision    CURRENT NUTRITION THERAPIES  ADULT DIET; Regular; 5 carb choices (75 gm/meal); Low Fat/Low Chol/High Fiber/2 gm Na; 1500 ml  ADULT ORAL NUTRITION SUPPLEMENT; Breakfast, Dinner; Renal Oral Supplement       PO Intake: 1-25% (x1 meal in ARU)   PO Supplement Intake:%    ANTHROPOMETRICS  Current Height: 189 cm (6' 2.41\")  Current Weight - Scale: 83.2 kg (183 lb 6.4 oz)    Admission weight: 79.8 kg (176 lb)    COMPARATIVE STANDARDS  Total Energy Requirements (kcals/day): 1617- 2264     Protein (g):         Fluid (mL/day):  1500 ml/day per CVTS    EDUCATION  Education/Counseling completed (6/13)     The patient will be monitored per nutrition standards of care. Consult dietitian if additional nutrition interventions are needed prior to RD reassessment.     Orly Sepulveda, RD, LD    Contact: [phone]

## 2025-06-18 LAB
ANION GAP SERPL CALCULATED.3IONS-SCNC: 19 MMOL/L (ref 3–16)
BACTERIA BLD CULT: NORMAL
BACTERIA CATH TIP CULT: NORMAL
BASOPHILS # BLD: 0 K/UL (ref 0–0.2)
BASOPHILS NFR BLD: 0.6 %
BUN SERPL-MCNC: 59 MG/DL (ref 7–20)
CALCIUM SERPL-MCNC: 9.4 MG/DL (ref 8.3–10.6)
CHLORIDE SERPL-SCNC: 95 MMOL/L (ref 99–110)
CO2 SERPL-SCNC: 19 MMOL/L (ref 21–32)
CREAT SERPL-MCNC: 8.7 MG/DL (ref 0.8–1.3)
DEPRECATED RDW RBC AUTO: 15.4 % (ref 12.4–15.4)
EOSINOPHIL # BLD: 0.1 K/UL (ref 0–0.6)
EOSINOPHIL NFR BLD: 1.7 %
EST. AVERAGE GLUCOSE BLD GHB EST-MCNC: 168.6 MG/DL
GFR SERPLBLD CREATININE-BSD FMLA CKD-EPI: 6 ML/MIN/{1.73_M2}
GLUCOSE BLD-MCNC: 105 MG/DL (ref 70–99)
GLUCOSE BLD-MCNC: 166 MG/DL (ref 70–99)
GLUCOSE BLD-MCNC: 300 MG/DL (ref 70–99)
GLUCOSE BLD-MCNC: 63 MG/DL (ref 70–99)
GLUCOSE SERPL-MCNC: 142 MG/DL (ref 70–99)
HBA1C MFR BLD: 7.5 %
HCT VFR BLD AUTO: 23.6 % (ref 40.5–52.5)
HGB BLD-MCNC: 7.9 G/DL (ref 13.5–17.5)
HIV 1+2 AB+HIV1 P24 AG SERPL QL IA: NORMAL
HIV 2 AB SERPL QL IA: NORMAL
HIV1 AB SERPL QL IA: NORMAL
HIV1 P24 AG SERPL QL IA: NORMAL
LYMPHOCYTES # BLD: 1 K/UL (ref 1–5.1)
LYMPHOCYTES NFR BLD: 13.1 %
MCH RBC QN AUTO: 30.1 PG (ref 26–34)
MCHC RBC AUTO-ENTMCNC: 33.3 G/DL (ref 31–36)
MCV RBC AUTO: 90.4 FL (ref 80–100)
MONOCYTES # BLD: 0.5 K/UL (ref 0–1.3)
MONOCYTES NFR BLD: 7.1 %
NEUTROPHILS # BLD: 6 K/UL (ref 1.7–7.7)
NEUTROPHILS NFR BLD: 77.5 %
PERFORMED ON: ABNORMAL
PHOSPHATE SERPL-MCNC: 3.9 MG/DL (ref 2.5–4.9)
PLATELET # BLD AUTO: 233 K/UL (ref 135–450)
PMV BLD AUTO: 8.8 FL (ref 5–10.5)
POTASSIUM SERPL-SCNC: 5.2 MMOL/L (ref 3.5–5.1)
RBC # BLD AUTO: 2.61 M/UL (ref 4.2–5.9)
SODIUM SERPL-SCNC: 133 MMOL/L (ref 136–145)
WBC # BLD AUTO: 7.7 K/UL (ref 4–11)

## 2025-06-18 PROCEDURE — 6370000000 HC RX 637 (ALT 250 FOR IP): Performed by: STUDENT IN AN ORGANIZED HEALTH CARE EDUCATION/TRAINING PROGRAM

## 2025-06-18 PROCEDURE — 6360000002 HC RX W HCPCS

## 2025-06-18 PROCEDURE — P9047 ALBUMIN (HUMAN), 25%, 50ML: HCPCS

## 2025-06-18 PROCEDURE — 97535 SELF CARE MNGMENT TRAINING: CPT

## 2025-06-18 PROCEDURE — 1280000000 HC REHAB R&B

## 2025-06-18 PROCEDURE — 97530 THERAPEUTIC ACTIVITIES: CPT

## 2025-06-18 PROCEDURE — 90935 HEMODIALYSIS ONE EVALUATION: CPT

## 2025-06-18 PROCEDURE — 80048 BASIC METABOLIC PNL TOTAL CA: CPT

## 2025-06-18 PROCEDURE — 5A1D70Z PERFORMANCE OF URINARY FILTRATION, INTERMITTENT, LESS THAN 6 HOURS PER DAY: ICD-10-PCS | Performed by: STUDENT IN AN ORGANIZED HEALTH CARE EDUCATION/TRAINING PROGRAM

## 2025-06-18 PROCEDURE — 36415 COLL VENOUS BLD VENIPUNCTURE: CPT

## 2025-06-18 PROCEDURE — 84100 ASSAY OF PHOSPHORUS: CPT

## 2025-06-18 PROCEDURE — 97116 GAIT TRAINING THERAPY: CPT

## 2025-06-18 PROCEDURE — 6360000002 HC RX W HCPCS: Performed by: STUDENT IN AN ORGANIZED HEALTH CARE EDUCATION/TRAINING PROGRAM

## 2025-06-18 PROCEDURE — 85025 COMPLETE CBC W/AUTO DIFF WBC: CPT

## 2025-06-18 PROCEDURE — 97110 THERAPEUTIC EXERCISES: CPT

## 2025-06-18 RX ORDER — LINEZOLID 600 MG/1
600 TABLET, FILM COATED ORAL ONCE
Status: COMPLETED | OUTPATIENT
Start: 2025-06-19 | End: 2025-06-19

## 2025-06-18 RX ORDER — LINEZOLID 600 MG/1
600 TABLET, FILM COATED ORAL EVERY 12 HOURS SCHEDULED
Status: DISCONTINUED | OUTPATIENT
Start: 2025-06-19 | End: 2025-06-19

## 2025-06-18 RX ORDER — ALBUMIN (HUMAN) 12.5 G/50ML
25 SOLUTION INTRAVENOUS AS NEEDED
Status: DISCONTINUED | OUTPATIENT
Start: 2025-06-18 | End: 2025-06-28 | Stop reason: HOSPADM

## 2025-06-18 RX ORDER — ALBUMIN (HUMAN) 12.5 G/50ML
SOLUTION INTRAVENOUS
Status: COMPLETED
Start: 2025-06-18 | End: 2025-06-18

## 2025-06-18 RX ORDER — MIDODRINE HYDROCHLORIDE 5 MG/1
10 TABLET ORAL AS NEEDED
Status: DISCONTINUED | OUTPATIENT
Start: 2025-06-18 | End: 2025-06-28 | Stop reason: HOSPADM

## 2025-06-18 RX ORDER — MIDODRINE HYDROCHLORIDE 5 MG/1
10 TABLET ORAL
Status: DISCONTINUED | OUTPATIENT
Start: 2025-06-18 | End: 2025-06-28 | Stop reason: HOSPADM

## 2025-06-18 RX ADMIN — APIXABAN 2.5 MG: 2.5 TABLET, FILM COATED ORAL at 10:15

## 2025-06-18 RX ADMIN — APIXABAN 2.5 MG: 2.5 TABLET, FILM COATED ORAL at 21:19

## 2025-06-18 RX ADMIN — STANDARDIZED SENNA CONCENTRATE AND DOCUSATE SODIUM 1 TABLET: 8.6; 5 TABLET ORAL at 10:15

## 2025-06-18 RX ADMIN — AMIODARONE HYDROCHLORIDE 400 MG: 200 TABLET ORAL at 21:19

## 2025-06-18 RX ADMIN — PANTOPRAZOLE SODIUM 40 MG: 40 TABLET, DELAYED RELEASE ORAL at 06:12

## 2025-06-18 RX ADMIN — AMIODARONE HYDROCHLORIDE 400 MG: 200 TABLET ORAL at 10:15

## 2025-06-18 RX ADMIN — MIDODRINE HYDROCHLORIDE 10 MG: 5 TABLET ORAL at 10:15

## 2025-06-18 RX ADMIN — METHADONE HYDROCHLORIDE 95 MG: 10 CONCENTRATE ORAL at 06:12

## 2025-06-18 RX ADMIN — ALBUMIN (HUMAN) 25 G: 12.5 SOLUTION INTRAVENOUS at 15:30

## 2025-06-18 RX ADMIN — STANDARDIZED SENNA CONCENTRATE AND DOCUSATE SODIUM 1 TABLET: 8.6; 5 TABLET ORAL at 21:20

## 2025-06-18 RX ADMIN — INSULIN LISPRO 12 UNITS: 100 INJECTION, SOLUTION INTRAVENOUS; SUBCUTANEOUS at 12:18

## 2025-06-18 RX ADMIN — POLYETHYLENE GLYCOL 3350 17 G: 17 POWDER, FOR SOLUTION ORAL at 21:21

## 2025-06-18 RX ADMIN — ALBUMIN (HUMAN) 25 G: 0.25 INJECTION, SOLUTION INTRAVENOUS at 15:30

## 2025-06-18 RX ADMIN — LINACLOTIDE 145 MCG: 145 CAPSULE, GELATIN COATED ORAL at 06:29

## 2025-06-18 RX ADMIN — MIDODRINE HYDROCHLORIDE 10 MG: 5 TABLET ORAL at 15:30

## 2025-06-18 RX ADMIN — ATORVASTATIN CALCIUM 80 MG: 80 TABLET, FILM COATED ORAL at 21:20

## 2025-06-18 RX ADMIN — ASPIRIN 81 MG: 81 TABLET, DELAYED RELEASE ORAL at 10:15

## 2025-06-18 RX ADMIN — EPOETIN ALFA-EPBX 10000 UNITS: 10000 INJECTION, SOLUTION INTRAVENOUS; SUBCUTANEOUS at 18:42

## 2025-06-18 RX ADMIN — ALOGLIPTIN 6.25 MG: 6.25 TABLET, FILM COATED ORAL at 10:19

## 2025-06-18 ASSESSMENT — ENCOUNTER SYMPTOMS
SINUS PRESSURE: 0
SHORTNESS OF BREATH: 0
NAUSEA: 0
SORE THROAT: 0
EYE DISCHARGE: 0
EYE REDNESS: 0
CONSTIPATION: 0
SINUS PAIN: 0
RHINORRHEA: 0
ABDOMINAL PAIN: 0
COUGH: 0
BACK PAIN: 0
WHEEZING: 0
DIARRHEA: 0

## 2025-06-18 ASSESSMENT — PAIN DESCRIPTION - LOCATION: LOCATION: OTHER (COMMENT)

## 2025-06-18 ASSESSMENT — PAIN DESCRIPTION - DESCRIPTORS: DESCRIPTORS: OTHER (COMMENT)

## 2025-06-18 ASSESSMENT — PAIN SCALES - GENERAL: PAINLEVEL_OUTOF10: 0

## 2025-06-18 ASSESSMENT — PAIN DESCRIPTION - ORIENTATION: ORIENTATION: OTHER (COMMENT)

## 2025-06-18 NOTE — PATIENT CARE CONFERENCE
[facility] Inpatient Rehabilitation Department  Weekly Team Conference Note    Patient Name: Colby Ovalle      MRN: [id number]  : 1950  (75 y.o.) Gender: male     The team conference for this patient was held on 2025 at 11 am and was led by:  Higinio Callahan MD     CASE MANAGEMENT:  Assessment: Pt came from medical unit here at [facility] ; Pt was at home prior to the this admission. Patient goes to [facility]  M/W/F Chair time 11 am. Pt here for NSTEMI>CABG on   Patients insurance does not have SNF benefits   Pt CM will continue to follow therapy & team for recommendations for discharge. Pt does not have a  current PCP ; insurance is current ; A & O x4 .    PHYSICAL THERAPY    Current Status:  Bed Mobility:   Supine to Sit: contact guard assistance  Comments: HOB elevated, vc for sequencing and sternal precautions  Transfers:  Sit to stand transfer: maximum assistance, progressing to Karl  Stand to sit transfer: moderate assistance  Stand pivot transfer: minimal assistance  Comments: intermittent R lean  Ambulation:  Surface:level surface  Assistive Device: no device  Assistance: minimal assistance  Distance: 94'  Gait Mechanics: narrow OBI, moderate path deviation, R lean, L shoulder on L wall without awareness, slow jamarcus  Comments:    Stair Mobility:   Number of Steps: 6  Step Height: 6 inch  Hand Rails: (B) handrail  Assistance: contact guard assistance  Comments:  Goals:                  Short Term Goals:  Time Frame: 2 weeks  Patient will complete bed mobility at modified independent   Patient will complete transfers at [facility]   Patient will ambulate 150 ft with use of LRAD at [facility]  Patient will ascend/descend 12 stairs with (B) handrail at modified independent  Patient will complete car transfer at modified independent    Above goals reviewed today.  All goals are ongoing at this time unless indicated above.     These goals were

## 2025-06-18 NOTE — DISCHARGE INSTR - COC
Continuity of Care Form    Patient Name: Colby Ovalle   :  1950  MRN:  [id number]    Admit date:  2025  Discharge date:  ***    Code Status Order: Full Code   Advance Directives:     Admitting Physician:  Higinio Callahan MD  PCP: No primary care provider on file.    Discharging Nurse: ***  Discharging Hospital Unit/Room#: ARU-4913/4913-01  Discharging Unit Phone Number: ***    Emergency Contact:   Extended Emergency Contact Information  Primary Emergency Contact: Jazmyn Bernabe  Address: [address] [place] [address]  Home Phone: [phone]  Work Phone: [phone]  Relation: Other  Secondary Emergency Contact: santosh bernabe  Address: [address] [place] [address]            [place] [address]  Mobile Phone: [phone]  Relation: Child    Past Surgical History:  Past Surgical History:   Procedure Laterality Date    BACK SURGERY      CARDIAC PROCEDURE N/A 10/3/2024    Left heart cath / coronary angiography performed by Bindu Barboza DO at [facility] CARDIAC CATH LAB    CARDIAC PROCEDURE N/A 10/3/2024    Intravascular ultrasound performed by Bindu Barboza DO at [facility] CARDIAC CATH LAB    CARDIAC PROCEDURE N/A 10/3/2024    Atherectomy coronary performed by Bindu Barboza DO at [facility] CARDIAC CATH LAB    CARDIAC PROCEDURE N/A 2025    Left heart cath / coronary angiography performed by Modesto Pérez MD at [facility] CARDIAC CATH LAB    CARDIAC PROCEDURE N/A 2025    Intra-aortic balloon pump insertion performed by Modesto Pérez MD at [facility] CARDIAC CATH LAB    COLONOSCOPY      CORONARY ARTERY BYPASS GRAFT N/A 2025    CORONARY ARTERY BYPASS GRAFT X2 USING RIGHT SAPHENOUS VEIN AND LIMA; LEFT EVH SAPHENOUS VEIN; TAKEDOWN OF LEFT INTRAMAMMARY ARTERY; PLACEMENT OF TEMPORARY ATRIAL AND VENTRICLE PACING WIRES; CPB; SCAR performed by Ronaldo Duke MD at [facility] CVOR    CYSTOSCOPY      with stone removal    DIALYSIS FISTULA CREATION Left 3/1/2022    LEFT ARM STAGED  LEFT ARM STAGED CREATION OF A BRACHIO-BASILIC FISTULA AT THE ELBOW performed by Fabián Matt DO at [facility] OR    INVASIVE VASCULAR N/A 10/8/2024    Angioplasty fistula/dialysis circuit performed by Ab Paris MD at [facility] CARDIAC CATH LAB    PORT SURGERY         Immunization History:     There is no immunization history on file for this patient.    Active Problems:  Patient Active Problem List   Diagnosis Code    Post-op pain G89.18    Chest pain R07.9    NSTEMI (non-ST elevated myocardial infarction) ([facility]) I21.4    ACS (acute coronary syndrome) (HCC) I24.9    ESRD on hemodialysis (HCC) N18.6, Z99.2    Coronary artery disease involving native coronary artery of native heart without angina pectoris I25.10    Essential hypertension I10    Mixed hyperlipidemia E78.2    Type 2 diabetes mellitus with other specified complication (HCC) E11.69    Prolonged Q-T interval on ECG R94.31    Ischemic cardiomyopathy I25.5    Hyperkalemia E87.5    Fever R50.9    Disease of cardiovascular system I25.10    Coagulase negative Staphylococcus bacteremia R78.81, B95.7    Hemodialysis access, AV graft Z99.2    Chronic hepatitis C without hepatic coma (HCC) B18.2       Isolation/Infection:   Isolation            No Isolation          Patient Infection Status    None to display         Nurse Assessment:  Last Vital Signs: BP (!) 108/54   Pulse 66   Temp 97.8 °F (36.6 °C) (Temporal)   Resp 18   Ht 1.89 m (6' 2.41\")   Wt 82 kg (180 lb 12.4 oz)   SpO2 94%   BMI 22.96 kg/m²     Last documented pain score (0-10 scale): Pain Level: 0  Last Weight:   Wt Readings from Last 1 Encounters:   06/18/25 82 kg (180 lb 12.4 oz)     Mental Status:  oriented and alert    IV Access:  - None    Nursing Mobility/ADLs:  Walking   Assisted  Transfer  Assisted  Bathing  Assisted  Dressing  Assisted  Toileting  Assisted  Feeding  Independent  Med Admin  Independent  Med Delivery   whole    Wound Care Documentation and Therapy:  Puncture 06/10/25 Femoral

## 2025-06-18 NOTE — PROGRESS NOTES
Colby Ovalle  6/18/2025  [id number]    Chief Complaint: Disease of cardiovascular system    Subjective    No acute events overnight.     Patient reports that he is doing well. Discussed concerns noted by staff w/ regard to cognition, and swallowing. He is agreeable to work with SLP. He reports sternal pain. Discussed possibly adding lidocaine patch, but he says these haven't worked well for him in the past and caused \"burning\". He does not want any additional narcotics, given his history of opioid use disorder. Last Bowel Movement:  06/15/25          Objective    Patient Vitals for the past 24 hrs:   BP Temp Temp src Pulse Resp SpO2 Height   06/18/25 0642 -- -- -- -- 18 -- --   06/17/25 2048 -- -- -- -- 18 -- --   06/17/25 2018 -- -- -- -- 18 -- --   06/17/25 2000 104/65 98.9 °F (37.2 °C) Oral 70 18 94 % --   06/17/25 1200 -- -- -- -- -- 93 % --   06/17/25 1045 -- -- -- -- -- -- 1.89 m (6' 2.41\")     Patient Vitals for the past 96 hrs (Last 3 readings):   Weight   06/16/25 1600 83.2 kg (183 lb 6.4 oz)      Gen: No distress.  HEENT: Normocephalic, atraumatic.   CV: Regular rate and rhythm. Extremities warm, well perfused.   Resp: No respiratory distress. CTAB.  Abd: Soft, nontender.  Ext: No edema.  Neuro: Alert, oriented, appropriately interactive.     Laboratory data:   Na/K/Cl/CO2:  133/5.2/95/19 (06/18 0727)   BUN/Cr/glu/ALT/AST/amyl/lip:  59/8.7/--/--/--/--/-- (06/18 0727)    WBC/Hgb/Hct/Plts:  7.7/7.9/23.6/233 (06/18 0727)     Therapy progress:       PT    Supine to Sit: Partial/moderate assistance  Sit to Supine: Partial/moderate assistance   Sit to Stand: Partial/moderate assistance  Chair/Bed to Chair Transfer: Partial/moderate assistance  Car Transfer: Partial/moderate assistance  Ambulation 10 ft: Partial/moderate assistance  Ambulation 50 ft: Partial/moderate assistance  Ambulation 150 ft:    Stairs - 1 Step: Supervision or touching assistance  Stairs - 4 Step: Supervision or touching

## 2025-06-18 NOTE — PROGRESS NOTES
[facility] - Inpatient Rehabilitation Department   Phone: [phone]    Occupational Therapy    [] Initial Evaluation            [x] Daily Treatment Note         [] Discharge Summary      Patient: Colby Ovalle   : 1950   MRN: [id number]   Date of Service:  2025    Admitting Diagnosis:  Disease of cardiovascular system  Current Admission Summary:   Colby Ovalle is a 75 y.o. with PMHx notable for HTN, HLD, ESRD who presented to [facility] on 25 with chest pain, found to have NSTEMI, underwent cardiac catheterization on  showing severe 2-vessel CAD, and was transferred to [facility] on 2025 for CVTS evaluation. He underwent CABG x2 on . Hospital course complicated by: A-fib, ESRD, staph bacteremia, constipation.   Past Medical History:  has a past medical history of Alcohol dependence in remission ([facility]), Cancer (HCC), Chronic back pain, COVID, End-stage renal disease on hemodialysis (HCC), Erectile dysfunction, Full dentures, Hepatitis C, Hx of non-Hodgkin's lymphoma, Hyperlipidemia, Hypertension, Kidney stone, Lumbago, Opioid dependence (HCC), Thrombocytopenia, Tobacco dependency, and Type 2 diabetes mellitus without complication ([facility]).  Past Surgical History:  has a past surgical history that includes Port Surgery; Cystoscopy; back surgery; Colonoscopy; Dialysis fistula creation (Left, 3/1/2022); Cardiac procedure (N/A, 10/3/2024); Cardiac procedure (N/A, 10/3/2024); Cardiac procedure (N/A, 10/3/2024); invasive vascular (N/A, 10/8/2024); Cardiac procedure (N/A, 2025); Cardiac procedure (N/A, 2025); and Coronary artery bypass graft (N/A, 2025).    Discharge Recommendations: Home with HHOT    DME Required For Discharge: DME to be determined pending patient progress    Precautions/Restrictions: high fall risk, 1500 ml fluid restriction, LUE limb restriction   Weight Bearing Restrictions: no restrictions    Required Braces/Orthotics: no braces  maintain balance  Comments:    Other Therapeutic Interventions  Cognitive Assessment  Patient participated in the completion of the SLUMS cognitive exam during session. The UMS ([employer] Mental Status Exam) is an eleven question standardized assessment that measures attention, orientation, immediate recall, delayed recall with interference, numeric calculation and registration, visual spatial skills and executive functioning. Items include clock drawing, animal naming, digit span, figure recognition and size differentiation.     Exam is scored out of 30 points with 27-30 indicating normal cognition, 21-26 indicating mild neurocognitive deficits and 1-20 indicating severe neurocognitive deficits/dementia.   Patient scored 15/30. Patient demonstrated deficits in delayed recall with interference, visual spatial skills and executive functioning. Pt required increased time to complete task. Re-direction provided throughout, demonstrating decreased attention to task.         Second Session   Pt in recliner upon arrival, agreeable to OT session. Nurse in room upon arrival and took over care. Pt mentioned urge to use restroom. No reports of pain.     Pt required max A + modA  for sit to stand transfer from recliner. Pt completed mobility into bathroom with CGA-Karl and no AD. Pt required min A for stand to sit onto [facility] overtop toilet.     Pt completed LB dressing while seated on BS over toilet. Pt required min A with reacher to thread BLE through new underwear and hospital pants. Pt required  occasional verbal cues to maintain sternal precautions. Pt required visual demonstration to thread BLE through pants. Pt completed elizabeth-care while seated on BS through weight shifting.   Pt required mod A to sit to stand transfer from BSC. Pt demonstrated one LOB while standing but able to self correct.    Pt completed hand washing and washing dentures in stance at sink with CGA. Pt required intermittent verbal cues

## 2025-06-18 NOTE — PROGRESS NOTES
Infectious Diseases   Progress Note      Admission Date: 6/16/2025  Hospital Day: Hospital Day: 3   Attending: Higinio Callahan MD  Date of service: 6/18/2025     Chief complaint/ Reason for consult:     Staphylococcus lugdunensis bacteremia  End-stage renal disease on hemodialysis  AV graft in place  Coronary artery disease, status post CABG  Chronic hepatitis C    Microbiology:      I have reviewed allavailable micro lab data and cultures    Results       Procedure Component Value Units Date/Time    Culture, Tip [[id number]] Collected: 06/14/25 1739    Order Status: Completed Specimen: Catheter Tip Updated: 06/18/25 1314     Culture Catheter Tip No growth at 96 hours    Narrative:      ORDER#: [id number]                          ORDERED BY: ANJU RAIN  SOURCE: Catheter Tip                       COLLECTED:  06/14/25 17:39  ANTIBIOTICS AT MAGUI.:                      RECEIVED :  06/15/25 01:45    Culture, Blood 1 [[id number]] Collected: 06/14/25 1730    Order Status: Completed Specimen: Blood Updated: 06/15/25 1815     Blood Culture, Routine No Growth to date.  Any change in status will be called.    Narrative:      ORDER#: [id number]                          ORDERED BY: ANJU RAIN  SOURCE: Blood RIJ                          COLLECTED:  06/14/25 17:30  ANTIBIOTICS AT MAGUI.:                      RECEIVED :  06/15/25 01:44  If child <=2 yrs old please draw pediatric bottle.~Blood Culture 1    Culture, Blood 1 [[id number]]     Order Status: Canceled Specimen: Blood     Culture, Blood 2 [[id number]]  (Abnormal) Collected: 06/08/25 1625    Order Status: Completed Specimen: Blood Updated: 06/12/25 0855     Organism Staphylococcus lugdunensis     Culture, Blood 2 --     POSITIVE for  No further workup  Isolated two of two sets  Refer to culture [id number] for sensitivity results      Narrative:      ORDER#: [id number]                          ORDERED BY: LENNY IBANEZ  SOURCE: Blood No site given                COLLECTED:   never used smokeless tobacco.  Alcohol use:   reports that he does not currently use alcohol.  Currently lives in: [place] [id number]   reports current drug use. Drug: Marijuana (Weed).     COVID VACCINATION AND LAB RESULT RECORDS:     Internal Administration   First Dose      Second Dose           Last COVID Lab No results found for: \"SARS-COV-2\"         Assessment:     The patient is a 75 y.o. old male who  has a past medical history of Alcohol dependence in remission (HCC), Cancer (HCC), Chronic back pain, COVID (01/12/2022), End-stage renal disease on hemodialysis (HCC), Erectile dysfunction, Full dentures, Hepatitis C, non-Hodgkin's lymphoma, Hyperlipidemia, Hypertension, Kidney stone, Lumbago, Opioid dependence (HCC), Thrombocytopenia, Tobacco dependency, and Type 2 diabetes mellitus without complication (HCC). with following problems:    Staphylococcus lugdunensis bacteremia-remains on cefazolin  End-stage renal disease on hemodialysis  AV graft in place  Coronary artery disease, status post CABG-stable  Chronic hepatitis C-follow-up on hepatitis C PCR  Essential hypertension-blood pressure okay  Mixed hyperlipidemia  Type 2 diabetes mellitus  History of opioid dependence      Discussion:    He is afebrile.  He is on IV cefazolin for Staphylococcus lugdunensis bacteremia.  He is tolerating the antibiotic well.    He was seen and examined in the hemodialysis unit.    White cell count 7700 today.    Serum creatinine 0.7 today.    Hemoglobin is 7.9 and the platelet count is 233,000 today    HIV screen done yesterday came back negative    Hepatitis B surface antibody was negative on June 17 indicating nonimmunity to hepatitis B.  Interestingly he previously had positive hepatitis B surface antibody      Plan:     Diagnostic Workup:    He will likely need immunization against hepatitis B  Follow-up on hepatitis C quantitative PCR  Continue to follow  fever curve, WBC count and blood cultures.  Continue to monitor  are equal, round, and reactive to light.   Neck:      Thyroid: No thyromegaly.   Cardiovascular:      Rate and Rhythm: Normal rate and regular rhythm.      Heart sounds: Normal heart sounds. No murmur heard.     No friction rub.   Pulmonary:      Effort: No respiratory distress.      Breath sounds: No stridor. No wheezing or rales.   Abdominal:      General: Bowel sounds are normal.      Palpations: Abdomen is soft.      Tenderness: There is no abdominal tenderness. There is no guarding or rebound.   Musculoskeletal:         General: No swelling, tenderness or deformity. Normal range of motion.      Cervical back: Normal range of motion and neck supple.      Right lower leg: No edema.      Left lower leg: No edema.   Lymphadenopathy:      Cervical: No cervical adenopathy.   Skin:     General: Skin is warm and dry.      Coloration: Skin is not jaundiced.      Findings: No bruising, erythema or rash.   Neurological:      General: No focal deficit present.      Mental Status: He is alert and oriented to person, place, and time. Mental status is at baseline.      Motor: No abnormal muscle tone.   Psychiatric:         Mood and Affect: Mood normal.         Behavior: Behavior normal.          Lines and drains: All vascular access sites are healthy with no local erythema, discharge or tenderness.      Intake and output:    No intake/output data recorded.    Lab Data:   All available labs and old records have been reviewed by me.    CBC:  Recent Labs     06/16/25  0558 06/18/25  0727   WBC 5.6 7.7   RBC 2.42* 2.61*   HGB 7.4* 7.9*   HCT 21.6* 23.6*    233   MCV 89.4 90.4   MCH 30.7 30.1   MCHC 34.3 33.3   RDW 15.1 15.4        BMP:  Recent Labs     06/16/25  0558 06/16/25  1145 06/18/25  0727   * 139 133*   K 4.5 4.5 5.2*   CL 96* 101 95*   CO2 24 28 19*   BUN 50* 34* 59*   CREATININE 7.9* 6.0* 8.7*   CALCIUM 8.7 9.6 9.4   GLUCOSE 160* 160* 142*        Hepatic Function Panel:   Lab Results   Component Value

## 2025-06-18 NOTE — PROGRESS NOTES
[facility] - Inpatient Rehabilitation Department   Phone: [phone]    Physical Therapy    [] Initial Evaluation            [x] Daily Treatment Note         [] Discharge Summary      Patient: Colby Ovalle   : 1950   MRN: [id number]   Date of Service:  2025  Admitting Diagnosis: Disease of cardiovascular system  Current Admission Summary: Colby Ovalle is a 75 y.o. with PMHx notable for HTN, HLD, ESRD who presented to [facility] on 25 with chest pain, found to have NSTEMI, underwent cardiac catheterization on  showing severe 2-vessel CAD, and was transferred to [facility] on 2025 for CVTS evaluation. He underwent CABG x2 on . Hospital course complicated by: A-fib, ESRD, staph bacteremia, constipation.   Past Medical History:  has a past medical history of Alcohol dependence in remission (HCC), Cancer (HCC), Chronic back pain, COVID, End-stage renal disease on hemodialysis (HCC), Erectile dysfunction, Full dentures, Hepatitis C, Hx of non-Hodgkin's lymphoma, Hyperlipidemia, Hypertension, Kidney stone, Lumbago, Opioid dependence (HCC), Thrombocytopenia, Tobacco dependency, and Type 2 diabetes mellitus without complication ([facility]).  Past Surgical History:  has a past surgical history that includes Port Surgery; Cystoscopy; back surgery; Colonoscopy; Dialysis fistula creation (Left, 3/1/2022); Cardiac procedure (N/A, 10/3/2024); Cardiac procedure (N/A, 10/3/2024); Cardiac procedure (N/A, 10/3/2024); invasive vascular (N/A, 10/8/2024); Cardiac procedure (N/A, 2025); Cardiac procedure (N/A, 2025); and Coronary artery bypass graft (N/A, 2025).  Discharge Recommendations: home with home health PT  DME Required For Discharge: DME to be determined pending patient progress  Precautions/Restrictions: high fall risk  Weight Bearing Restrictions: weight bearing as tolerated  [] Right Upper Extremity  [] Left Upper Extremity [] Right Lower Extremity  [] Left Lower

## 2025-06-18 NOTE — PROGRESS NOTES
Nephrology Progress Note  [facility]  [phone]  [email or web address]        Subjective:   Colby Ovalle: 75 y.o.  male who we are seeing in consultation for ESRD Management.    Brief HPI:  Colby Ovalle is a 75 y.o. male with PMH significant for hypertension, hyperlipidemia, DM II, non-Hodgkin's lymphoma, chronic pain syndrome, chronic alcohol dependence, ESRD on maintenance hemodialysis , nephrolithiasis presented to [facility] with chest pain.  He has known history of CAD with prior PCI/arthrectomy to ostial-mid LAD.  He was taken to cardiac cath lab on 2025 and angiogram showed  severe two-vessel disease with 95% ostial circumflex disease and 99% in-stent restenosis of LAD.  At that time, LVEF was measured at 30% and an intra-aortic balloon pump placed.  He was transferred to [facility] on  for consideration of CABG.He Underwent CABG x 2 on .  Returned to the ICU intubated and sedated. Eventually extubated.   Transferred to ARU on .       Interval History / Subjective:  In ARU, Seen and examined this am/   No complaints. Due for HD today      Objective:   VITALS:    Vitals:    25 1016   BP: (!) 85/54   Pulse: 70   Resp:    Temp:    SpO2:                                                                                     Temp (24hrs), Av.8 °F (37.1 °C), Min:98.6 °F (37 °C), Max:98.9 °F (37.2 °C)     BP  Min: 85/54  Max: 126/84                                  Pulse  Av  Min: 70  Max: 73    24HR INTAKE/OUTPUT:  No intake or output data in the 24 hours ending 25 1221    Wt Readings from Last 3 Encounters:   25 83.2 kg (183 lb 6.4 oz)   25 82.4 kg (181 lb 10.5 oz)   25 77 kg (169 lb 12.1 oz)       Physical Exam:    Gen:  not in acute distress, on RA  Lungs:  CTAB  Cardiovascular: RRR   Edema:  + edema  Abd: soft, non tender, positive bowel sounds  Neuro: awake and alert       Access : Left upper arm

## 2025-06-18 NOTE — CONSULTS
CARDIOLOGY PROGRESS NOTE        Patient Name:  Colby Ovalle  Date of admission:       6/5/2025 12:07 PM  Admission Dx: NSTEMI (non-ST elevated myocardial infarction) (HCC) [I21.4]  Two-vessel coronary artery disease [I25.10]  Reason for Consult: Non-STEMI  Requesting Physician: Darryl Moreno MD  Primary Care physician: No primary care provider on file.     Subjective:      Colby Ovalle is a 74 y.o. patient with pmh of alcohol dependence, ESRD and CAD with prior PCI/atherectomy/Shockwave to ostial/mid LAD who presented to [facility] ED with chest pain.      Patient transferred from [facility] to [facility] for CABG consult after LHC with IABP.      Patient denies chest pain at this time, he is resting in bed, dinner plate.         Home Medications:  Were reviewed and are listed in nursing record and/or below  Home Medications           Prior to Admission medications    Medication Sig Start Date End Date Taking? Authorizing Provider   HYDROmorphone (DILAUDID) 2 MG tablet Take 1 tablet by mouth every 4 hours as needed (for withdrawal symptoms) for up to 5 days. Max Daily Amount: 12 mg 6/5/25 6/10/25   Cortez Sierra MD   methadone (DOLOPHINE) 10 MG tablet Take 6 tablets by mouth daily for 30 days. Max Daily Amount: 60 mg 6/5/25 7/5/25   Cortez Sierra MD   Heparin Sod, Porcine, in D5W (HEPARIN, PORCINE,) 100 UNIT/ML SOLN infusion Infuse 0-4,000 Units/hr intravenously continuous 6/4/25     Cortez Sierra MD   atorvastatin (LIPITOR) 40 MG tablet Take 0.5 tablets by mouth at bedtime       Nikolas Joshi MD   lidocaine (LIDODERM) 5 % Place 1 patch onto the skin daily To lower back  12 hours on, 12 hours off  .       Nikolas Joshi MD   aspirin 81 MG EC tablet Take 1 tablet by mouth daily 10/23/24     Bindu Barboza DO   pantoprazole (PROTONIX) 20 MG tablet Take 1 tablet by mouth daily       Nikolas Joshi MD   sAXagliptin (ONGLYZA) 2.5 MG TABS tablet Take 1 tablet by mouth daily

## 2025-06-18 NOTE — PROGRESS NOTES
required  Positional Restrictions:Sternal Precautions - No Pushing, Pulling, Lifting > 5 lbs (until 8/9/25)   \"Wash incisions daily with mild soap and water, dry well. Do not wash your incisions after you have cleansed other parts of your body. \"    Pre-Admission Information   Lives With: spouse, daughter, 3 small dogs                           Type of Home: house  Home Layout: one level  Home Access:  1 small step to enter without rails   Bathroom Layout: walker accessible, tub/shower unit, handicap height toilet, (high toilet in his bathroom, getting a higher toilet in other bathroom)  Bathroom Equipment: grab bars around toilet, (suction cup--uses for balance)  Toilet Height: elevated height  Home Equipment: no prior equipment  Transfer Assistance: Independent without use of device  Ambulation Assistance:Independent without use of device  ADL Assistance: independent with all ADL's  IADL Assistance: independent with homemaking tasks  Active :        [x] Yes                 [] No  Hand Dominance: [] Left                 [x] Right  Current Employment: retired.  Occupation: Was injured on the job and couldn't go back to work  Hobbies: Projects, such as building a [employer], [employer], has exercise equipment, stationary bike  Recent Falls: None     Available Assistance at Discharge: 24 hr physical assistance available, (dtr works at home)    Comment: Pt's wife is a PT at [employer], typically drives self to diaylsis     Examination   Vision:   Vision Corrective Device: wears glasses for reading  Denies double or blurry vision   Hearing:   hard of hearing- hearing aides at home   Observation:   General Observation:  sternal incision clean dry and intact-open to air, on RA, LUE dialysis fistula, EVH site on LLE clean dry and intact- open to air    Posture:   Fair- forward head   Sensation:   reports numbness and tingling in all extremities- intermittently in hands and feet. Baseline neuropathy   ROM:    (B) UE AROM WFL  Strength:   (B) UE strength grossly WFL  Formal MMT held secondary to sternal precautions     Therapist Clinical Decision Making (Complexity): medium complexity  Clinical Presentation: stable      Subjective  General: Pt in recliner upon arrival and agreeable to SLUMS assessment and OT treatment.   Pain: 0/10  Pain Interventions: not applicable        Activities of Daily Living  Basic Activities of Daily Living  Feeding Comments: Pt eating breakfast upon arrival. Pt had food still in mouth before transfer and spit it out before beginning. Pt denied issues with swallowing/choking. MD aware and will follow up to see speech if needed.   Grooming: minimal assistance Increased time to complete task  Grooming Comments: Pt completed washing face in stance at sink with min A for balance and verbal cueing to maintain sternal precautions. Pt set up with mouthwash and dentures at recliner.  General Comments: Pt declined other ADLs this session.   Instrumental Activities of Daily Living  No IADL completed on this date.    Functional Mobility  Bed Mobility:  Bed mobility not completed on this date.  Comments:   Transfers:  Sit to stand transfer:moderate assistance  Stand to sit transfer: moderate assistance  Comments: from recliner to sink w/o device. Pt demonstrates good awareness of using heart pillow during transfers.  Functional Mobility  Functional Mobility Activity: to/from bathroom  Device Use: no device  Required Assistance: minimal assistance  Comment: Pt benefits from increased time during functional mobility. No LOB noted.  Balance:  Static Sitting Balance: fair (-): maintains balance at SBA with use of UE support  Dynamic Sitting Balance: fair: maintains balance at CGA without use of UE support  Static Standing Balance: fair (-): maintains balance at CGA with use of UE support  Dynamic Standing Balance: poor (+): requires min (A) to maintain balance  Comments:    Other Therapeutic  Interventions  Cognitive Assessment  Patient participated in the completion of the [facility] cognitive exam during session. The UMS ([employer] Mental Status Exam) is an eleven question standardized assessment that measures attention, orientation, immediate recall, delayed recall with interference, numeric calculation and registration, visual spatial skills and executive functioning. Items include clock drawing, animal naming, digit span, figure recognition and size differentiation.     Exam is scored out of 30 points with 27-30 indicating normal cognition, 21-26 indicating mild neurocognitive deficits and 1-20 indicating severe neurocognitive deficits/dementia.   Patient scored 15/30. Patient demonstrated deficits in delayed recall with interference, visual spatial skills and executive functioning.         Second Session     Functional Outcomes                                       Cognition  Overall Cognitive Status: Impaired  Following Commands: follows one step commands consistently  Safety Judgement: decreased awareness of need for assistance, decreased awareness of need for safety  Problem Solving: assistance required to generate solutions, decreased awareness of errors  Insights: decreased awareness of deficits  Sequencing: requires cues for some  Comments: continue to assess   Orientation:    oriented to person, oriented to place, and oriented to situation  June 17th 1924- able to be cued to 2025   Command Following:   accurately follows one step commands     Education  Barriers To Learning: hearing  Patient Education: patient educated on goals, OT role and benefits, plan of care, precautions, ADL adaptive strategies, proper use of assistive device/equipment, disease specific education, transfer training, discharge recommendations  Learning Assessment:  patient verbalizes understanding, would benefit from continued reinforcement    Assessment  Activity Tolerance: pt fatigued with shower but no

## 2025-06-18 NOTE — PROGRESS NOTES
Treatment time: 3.5 hrs    Net UF: 2000 ml     Pre weight: 82 kg  Post weight: 80 kg     Access used: DOMINIC AVF  Access function:  tolerated well,  BFR 350ml/min     Medications or blood products given: albumin 25g, midodrine 10mg, retacrit 10kiu     Regular outpatient schedule: MWF     Summary of response to treatment: Pt tolerated well. Pt remained stable throughout entire treatment and upon exiting the hemodialysis suite.      Copy of dialysis treatment record placed in chart, to be scanned into EMR.

## 2025-06-19 LAB
FOLATE SERPL-MCNC: 6.66 NG/ML (ref 4.78–24.2)
GLUCOSE BLD-MCNC: 154 MG/DL (ref 70–99)
GLUCOSE BLD-MCNC: 206 MG/DL (ref 70–99)
GLUCOSE BLD-MCNC: 358 MG/DL (ref 70–99)
GLUCOSE BLD-MCNC: 42 MG/DL (ref 70–99)
GLUCOSE BLD-MCNC: 47 MG/DL (ref 70–99)
GLUCOSE BLD-MCNC: 63 MG/DL (ref 70–99)
GLUCOSE BLD-MCNC: 94 MG/DL (ref 70–99)
HCV RNA SERPL NAA+PROBE-ACNC: NOT DETECTED IU/ML
HCV RNA SERPL NAA+PROBE-LOG IU: NOT DETECTED LOG IU/ML
HCV RNA SERPL QL NAA+PROBE: NOT DETECTED
PERFORMED ON: ABNORMAL
PERFORMED ON: NORMAL
T4 FREE SERPL-MCNC: 1.2 NG/DL (ref 0.9–1.8)
TSH SERPL DL<=0.005 MIU/L-ACNC: 4.25 UIU/ML (ref 0.27–4.2)
VIT B12 SERPL-MCNC: 832 PG/ML (ref 211–911)

## 2025-06-19 PROCEDURE — 97110 THERAPEUTIC EXERCISES: CPT

## 2025-06-19 PROCEDURE — 1280000000 HC REHAB R&B

## 2025-06-19 PROCEDURE — 90935 HEMODIALYSIS ONE EVALUATION: CPT

## 2025-06-19 PROCEDURE — 6370000000 HC RX 637 (ALT 250 FOR IP): Performed by: INTERNAL MEDICINE

## 2025-06-19 PROCEDURE — 97116 GAIT TRAINING THERAPY: CPT

## 2025-06-19 PROCEDURE — 92507 TX SP LANG VOICE COMM INDIV: CPT

## 2025-06-19 PROCEDURE — 97530 THERAPEUTIC ACTIVITIES: CPT

## 2025-06-19 PROCEDURE — 2500000003 HC RX 250 WO HCPCS: Performed by: INTERNAL MEDICINE

## 2025-06-19 PROCEDURE — 97535 SELF CARE MNGMENT TRAINING: CPT

## 2025-06-19 PROCEDURE — P9047 ALBUMIN (HUMAN), 25%, 50ML: HCPCS

## 2025-06-19 PROCEDURE — 97112 NEUROMUSCULAR REEDUCATION: CPT

## 2025-06-19 PROCEDURE — 6370000000 HC RX 637 (ALT 250 FOR IP): Performed by: STUDENT IN AN ORGANIZED HEALTH CARE EDUCATION/TRAINING PROGRAM

## 2025-06-19 PROCEDURE — 6360000002 HC RX W HCPCS: Performed by: INTERNAL MEDICINE

## 2025-06-19 PROCEDURE — 6360000002 HC RX W HCPCS

## 2025-06-19 PROCEDURE — 92523 SPEECH SOUND LANG COMPREHEN: CPT

## 2025-06-19 PROCEDURE — 92610 EVALUATE SWALLOWING FUNCTION: CPT

## 2025-06-19 RX ORDER — INSULIN LISPRO 100 [IU]/ML
0-8 INJECTION, SOLUTION INTRAVENOUS; SUBCUTANEOUS
Status: DISCONTINUED | OUTPATIENT
Start: 2025-06-19 | End: 2025-06-27

## 2025-06-19 RX ORDER — INSULIN GLARGINE 100 [IU]/ML
6 INJECTION, SOLUTION SUBCUTANEOUS NIGHTLY
Status: DISCONTINUED | OUTPATIENT
Start: 2025-06-19 | End: 2025-06-24

## 2025-06-19 RX ORDER — ALBUMIN (HUMAN) 12.5 G/50ML
SOLUTION INTRAVENOUS
Status: COMPLETED
Start: 2025-06-19 | End: 2025-06-19

## 2025-06-19 RX ADMIN — APIXABAN 2.5 MG: 2.5 TABLET, FILM COATED ORAL at 08:53

## 2025-06-19 RX ADMIN — ALBUMIN (HUMAN) 25 G: 0.25 INJECTION, SOLUTION INTRAVENOUS at 18:31

## 2025-06-19 RX ADMIN — INSULIN LISPRO 16 UNITS: 100 INJECTION, SOLUTION INTRAVENOUS; SUBCUTANEOUS at 11:58

## 2025-06-19 RX ADMIN — LINEZOLID 600 MG: 600 TABLET, FILM COATED ORAL at 00:23

## 2025-06-19 RX ADMIN — POLYETHYLENE GLYCOL 3350 17 G: 17 POWDER, FOR SOLUTION ORAL at 21:54

## 2025-06-19 RX ADMIN — ALOGLIPTIN 6.25 MG: 6.25 TABLET, FILM COATED ORAL at 08:55

## 2025-06-19 RX ADMIN — METOPROLOL TARTRATE 12.5 MG: 25 TABLET, FILM COATED ORAL at 08:54

## 2025-06-19 RX ADMIN — WATER 2000 MG: 1 INJECTION INTRAMUSCULAR; INTRAVENOUS; SUBCUTANEOUS at 19:38

## 2025-06-19 RX ADMIN — LINEZOLID 600 MG: 600 TABLET, FILM COATED ORAL at 08:53

## 2025-06-19 RX ADMIN — INSULIN LISPRO 2 UNITS: 100 INJECTION, SOLUTION INTRAVENOUS; SUBCUTANEOUS at 21:52

## 2025-06-19 RX ADMIN — METHADONE HYDROCHLORIDE 95 MG: 10 CONCENTRATE ORAL at 06:28

## 2025-06-19 RX ADMIN — MIDODRINE HYDROCHLORIDE 10 MG: 5 TABLET ORAL at 18:51

## 2025-06-19 RX ADMIN — ACETAMINOPHEN 650 MG: 325 TABLET ORAL at 15:25

## 2025-06-19 RX ADMIN — LINACLOTIDE 145 MCG: 145 CAPSULE, GELATIN COATED ORAL at 06:26

## 2025-06-19 RX ADMIN — PANTOPRAZOLE SODIUM 40 MG: 40 TABLET, DELAYED RELEASE ORAL at 06:25

## 2025-06-19 RX ADMIN — APIXABAN 2.5 MG: 2.5 TABLET, FILM COATED ORAL at 21:54

## 2025-06-19 RX ADMIN — STANDARDIZED SENNA CONCENTRATE AND DOCUSATE SODIUM 1 TABLET: 8.6; 5 TABLET ORAL at 08:53

## 2025-06-19 RX ADMIN — STANDARDIZED SENNA CONCENTRATE AND DOCUSATE SODIUM 1 TABLET: 8.6; 5 TABLET ORAL at 21:53

## 2025-06-19 RX ADMIN — AMIODARONE HYDROCHLORIDE 400 MG: 200 TABLET ORAL at 08:52

## 2025-06-19 RX ADMIN — ASPIRIN 81 MG: 81 TABLET, DELAYED RELEASE ORAL at 08:52

## 2025-06-19 RX ADMIN — ALBUMIN (HUMAN) 25 G: 12.5 SOLUTION INTRAVENOUS at 18:31

## 2025-06-19 RX ADMIN — ATORVASTATIN CALCIUM 80 MG: 80 TABLET, FILM COATED ORAL at 21:53

## 2025-06-19 ASSESSMENT — PAIN SCALES - GENERAL
PAINLEVEL_OUTOF10: 5
PAINLEVEL_OUTOF10: 7
PAINLEVEL_OUTOF10: 0
PAINLEVEL_OUTOF10: 0

## 2025-06-19 ASSESSMENT — ENCOUNTER SYMPTOMS
EYE DISCHARGE: 0
SORE THROAT: 0
BACK PAIN: 0
RHINORRHEA: 0
SINUS PRESSURE: 0
SHORTNESS OF BREATH: 0
SINUS PAIN: 0
DIARRHEA: 0
ABDOMINAL PAIN: 0
COUGH: 0
CONSTIPATION: 0
EYE REDNESS: 0
WHEEZING: 0
NAUSEA: 0

## 2025-06-19 ASSESSMENT — PAIN DESCRIPTION - DESCRIPTORS
DESCRIPTORS: ACHING;THROBBING
DESCRIPTORS: OTHER (COMMENT)

## 2025-06-19 ASSESSMENT — PAIN DESCRIPTION - LOCATION: LOCATION: OTHER (COMMENT)

## 2025-06-19 ASSESSMENT — PAIN DESCRIPTION - ORIENTATION: ORIENTATION: RIGHT;LEFT

## 2025-06-19 NOTE — PROGRESS NOTES
Colby Ovalle  6/19/2025  [id number]    Chief Complaint: Disease of cardiovascular system    Subjective    Patient did not receive his IV antibiotic with hemodialysis yesterday for unclear reasons.  Overnight RN had discussions with myself, as well as on-call physicians from Nephrology and Infectious Disease to determine plan which ultimately was to discontinue Ancef and give linezolid 600 mg twice daily with first dose given last night.    Patient reports that he is doing well. Pleased with his progress so far. Denies fevers/chills, chest pain, dyspnea. Pain adequately controlled. Last Bowel Movement:  06/15/25          Objective    Patient Vitals for the past 24 hrs:   BP Temp Temp src Pulse Resp SpO2 Weight   06/19/25 0845 131/64 98.7 °F (37.1 °C) Axillary 92 16 93 % --   06/18/25 2100 101/62 98.3 °F (36.8 °C) Axillary 60 18 95 % --   06/18/25 1825 (!) 131/52 97.6 °F (36.4 °C) Temporal 61 18 -- 80 kg (176 lb 5.9 oz)   06/18/25 1430 (!) 108/54 97.8 °F (36.6 °C) Temporal 66 18 -- 82 kg (180 lb 12.4 oz)   06/18/25 1016 (!) 85/54 -- -- 70 -- -- --   06/18/25 1015 126/84 98.6 °F (37 °C) -- 73 18 -- --     Patient Vitals for the past 96 hrs (Last 3 readings):   Weight   06/18/25 1825 80 kg (176 lb 5.9 oz)   06/18/25 1430 82 kg (180 lb 12.4 oz)   06/16/25 1600 83.2 kg (183 lb 6.4 oz)      Gen: No distress.  HEENT: Normocephalic, atraumatic.   CV: Regular rate and rhythm. Extremities warm, well perfused.   Resp: No respiratory distress. CTAB.  Abd: Soft, nontender.  Ext: No edema.  Neuro: Alert, oriented, appropriately interactive.     Laboratory data:   Na/K/Cl/CO2:  133/5.2/95/19 (06/18 0727)   BUN/Cr/glu/ALT/AST/amyl/lip:  59/8.7/--/--/--/--/-- (06/18 0727)    WBC/Hgb/Hct/Plts:  7.7/7.9/23.6/233 (06/18 0727)     Therapy progress:       PT    Supine to Sit: Partial/moderate assistance  Sit to Supine: Partial/moderate assistance   Sit to Stand: Partial/moderate assistance  Chair/Bed to Chair Transfer:  Partial/moderate assistance  Car Transfer: Partial/moderate assistance  Ambulation 10 ft: Partial/moderate assistance  Ambulation 50 ft: Partial/moderate assistance  Ambulation 150 ft:    Stairs - 1 Step: Supervision or touching assistance  Stairs - 4 Step: Supervision or touching assistance  Stairs - 12 Step:      OT    Eating:    Oral Hygiene: Partial/moderate assistance  Bathing: Partial/moderate assistance  Upper Body Dressing: Partial/moderate assistance  Lower Body Dressing: Substantial/maximal assistance  Toilet Transfer: Substantial/maximal assistance  Toilet Hygiene: Substantial/maximal assistance    Speech Therapy         Body mass index is 22.4 kg/m².        Assessment/Plan:  Functional progress: See today's team conference note  This patient continues to require an ARU level of care from all disciplines to address the following issues:    #.  NSTEMI  #.  Multivessel CAD s/p CABG x 2  - Continue aspirin, statin, beta-blocker  - Cardiac sternal precautions until 8/9/2025  - Continue PT/OT     #. Cognitive impairments  - Likely multifactorial in setting of recent surgical procedure, post-operative pain, medical co-morbidities  - Check B12, TSH: pending  - SLP cog eval    #. Oropharyngeal dysphagia  - SLP following    #. A-fib  - Continue amiodarone taper  - Continue metoprolol, Eliquis     #. ESRD  - Nephrology following for assistance with management:              - Continue HD MWF     #.  Staph lugdenensis bacteremia  - ID consulted:              - Midline removal ordered due to concern for contamination   - Started on linezolid 600 mg BID due to missed Ancef dose with dialysis. Resume Ancef per ID recs.      #. T2DM  - Last A1c: 7.1%  - Home meds: Onglyza  - Continue alogliptin (formulary substitute for home med), as well as high intensity sliding scale. Add basal insulin Lantus 6 units nightly.      #.  Acute on chronic anemia  -Continue Retacrit  - Monitor CBC     Chronic Conditions:  - Hx of opioid

## 2025-06-19 NOTE — PROGRESS NOTES
[facility] - Inpatient Rehabilitation Department   Phone: [phone]    Physical Therapy    [] Initial Evaluation            [x] Daily Treatment Note         [] Discharge Summary      Patient: Colby Ovalle   : 1950   MRN: [id number]   Date of Service:  2025  Admitting Diagnosis: Disease of cardiovascular system  Current Admission Summary: Colby Ovalle is a 75 y.o. with PMHx notable for HTN, HLD, ESRD who presented to [facility] on 25 with chest pain, found to have NSTEMI, underwent cardiac catheterization on  showing severe 2-vessel CAD, and was transferred to [facility] on 2025 for CVTS evaluation. He underwent CABG x2 on . Hospital course complicated by: A-fib, ESRD, staph bacteremia, constipation.   Past Medical History:  has a past medical history of Alcohol dependence in remission (HCC), Cancer (HCC), Chronic back pain, COVID, End-stage renal disease on hemodialysis (HCC), Erectile dysfunction, Full dentures, Hepatitis C, Hx of non-Hodgkin's lymphoma, Hyperlipidemia, Hypertension, Kidney stone, Lumbago, Opioid dependence (HCC), Thrombocytopenia, Tobacco dependency, and Type 2 diabetes mellitus without complication ([facility]).  Past Surgical History:  has a past surgical history that includes Port Surgery; Cystoscopy; back surgery; Colonoscopy; Dialysis fistula creation (Left, 3/1/2022); Cardiac procedure (N/A, 10/3/2024); Cardiac procedure (N/A, 10/3/2024); Cardiac procedure (N/A, 10/3/2024); invasive vascular (N/A, 10/8/2024); Cardiac procedure (N/A, 2025); Cardiac procedure (N/A, 2025); and Coronary artery bypass graft (N/A, 2025).  Discharge Recommendations: home with home health PT  DME Required For Discharge: no DME required at discharge  Precautions/Restrictions: high fall risk  Weight Bearing Restrictions: weight bearing as tolerated  [] Right Upper Extremity  [] Left Upper Extremity [] Right Lower Extremity  [] Left Lower Extremity    notified.    Functional Outcomes                 Cognition  Following Commands: follows multi step commands with increased time  Attention Span: attends with cues to redirect, difficulty dividing attention  Memory: decreased short term memory  Safety Judgement: decreased awareness of need for assistance  Problem Solving: assistance required to implement solutions, decreased awareness of errors, assistance required to correct errors made  Insights: decreased awareness of deficits  Sequencing: requires cues for some  Orientation:    alert and oriented x 4  Command Following:   WFL    Education  Barriers To Learning: cognition and emotional - pt tangential and verbose with decreased short term memory  Patient Education: patient educated on goals, PT role and benefits, plan of care, precautions, general safety, functional mobility training, energy conservation, disease specific education, transfer training  Learning Assessment:  patient verbalizes understanding, would benefit from continued reinforcement    Assessment  Activity Tolerance: decreased endurance  Impairments Requiring Therapeutic Intervention: decreased functional mobility, decreased strength, decreased safety awareness, decreased endurance, decreased sensation, decreased balance  Prognosis: good  Clinical Assessment: Pt continues to require extensive assistance for sit to stand transitions, although improving.  Pt with decreased balance of RLE, limiting stability of gait.  Pt will benefit from skilled PT services to address deficits and promote safe return to the home environment.  Safety Interventions: patient left in chair, chair alarm in place, call light within reach, gait belt, and patient at risk for falls    Plan  Frequency: 5 x/week, 90 min/day  Current Treatment Recommendations: strengthening, ROM, balance training, functional mobility training, transfer training, gait training, stair training, endurance training, neuromuscular re-education,

## 2025-06-19 NOTE — PLAN OF CARE
ARU PATIENT TREATMENT PLAN  [address] [address]  [phone]      Colby Ovalle    : 1950  [id number]  MRN: [id number]  PHYSICIAN:  Higinio Callahan MD  Primary Problem    Patient Active Problem List   Diagnosis    Post-op pain    Chest pain    NSTEMI (non-ST elevated myocardial infarction) (HCC)    ACS (acute coronary syndrome) (HCC)    ESRD on hemodialysis (HCC)    Coronary artery disease involving native coronary artery of native heart without angina pectoris    Essential hypertension    Mixed hyperlipidemia    Type 2 diabetes mellitus with other specified complication (HCC)    Prolonged Q-T interval on ECG    Ischemic cardiomyopathy    Hyperkalemia    Fever    Disease of cardiovascular system    Coagulase negative Staphylococcus bacteremia    Hemodialysis access, AV graft    Chronic hepatitis C without hepatic coma (HCC)       Rehabilitation Diagnosis:  ***    ADMIT DATE:2025    Patient Goals: ***  Admitting Impairments: ***  Activities: ***  Participation: Prior to admission patient was living at home ***   CARE PLAN     NURSING:  Colby Ovalle while on this unit will:  [x] Be continent of bowel and bladder     [x] Have an adequate number of bowel movements  [x] Urinate with no urinary retention >300ml in bladder  [x] Complete bladder protocol with rucker removal  [x] Maintain O2 SATs at 90___%  [x] Have pain managed while on ARU       [x] Be pain free by discharge   [x] Have no skin breakdown while on ARU  [x] Have improved skin integrity via wound measurements  [x] Have no signs/symptoms of infection at the wound site  [x] Be free from injury during hospitalization   [x] Complete education with patient/family with understanding demonstrated for:  [x] Adjustment   [] Other:     Nursing Interventions will include:  [x] bowel/bladder training   [x] education for medical assistive devices   [x] medication education   [x] O2

## 2025-06-19 NOTE — PROGRESS NOTES
Nephrology Progress Note  [facility]  [phone]  [email or web address]        Subjective:   Colby Ovalle: 75 y.o.  male who we are seeing in consultation for ESRD Management.    Brief HPI:  Colby Ovalle is a 75 y.o. male with PMH significant for hypertension, hyperlipidemia, DM II, non-Hodgkin's lymphoma, chronic pain syndrome, chronic alcohol dependence, ESRD on maintenance hemodialysis , nephrolithiasis presented to [facility] with chest pain.  He has known history of CAD with prior PCI/arthrectomy to ostial-mid LAD.  He was taken to cardiac cath lab on 2025 and angiogram showed  severe two-vessel disease with 95% ostial circumflex disease and 99% in-stent restenosis of LAD.  At that time, LVEF was measured at 30% and an intra-aortic balloon pump placed.  He was transferred to [facility] on  for consideration of CABG.He Underwent CABG x 2 on .  Returned to the ICU intubated and sedated. Eventually extubated.   Transferred to ARU on .       Interval History / Subjective:  In ARU, Seen and examined this am/   No complaints.    Objective:   VITALS:    Vitals:    25 0845   BP: 131/64   Pulse: 92   Resp: 16   Temp: 98.7 °F (37.1 °C)   SpO2: 93%                                                                                    Temp (24hrs), Av.1 °F (36.7 °C), Min:97.6 °F (36.4 °C), Max:98.7 °F (37.1 °C)     BP  Min: 101/62  Max: 131/64                                  Pulse  Av.8  Min: 60  Max: 92    24HR INTAKE/OUTPUT:    Intake/Output Summary (Last 24 hours) at 2025 1125  Last data filed at 2025 0959  Gross per 24 hour   Intake 360 ml   Output 100 ml   Net 260 ml       Wt Readings from Last 3 Encounters:   25 80 kg (176 lb 5.9 oz)   25 82.4 kg (181 lb 10.5 oz)   25 77 kg (169 lb 12.1 oz)       Physical Exam:    Gen:  not in acute distress, on RA  Lungs:  CTAB  Cardiovascular: RRR   Edema:  +

## 2025-06-19 NOTE — PROGRESS NOTES
Was reported that patient's BS was 47. Observed patient up in bed with some tremors. No other s/s observed. Patient alert and able to interact with staff. Milk and sandwich offered. Rechecked up to 63 and then to 94. No complaints voiced. Set up in bed and eating supper.

## 2025-06-19 NOTE — PROGRESS NOTES
[facility] - Inpatient Rehabilitation Department   Phone: [phone]    Occupational Therapy    [] Initial Evaluation            [x] Daily Treatment Note         [] Discharge Summary      Patient: Colby Ovalle   : 1950   MRN: [id number]   Date of Service:  2025    Admitting Diagnosis:  Disease of cardiovascular system  Current Admission Summary:   Colby Ovalle is a 75 y.o. with PMHx notable for HTN, HLD, ESRD who presented to [facility] on 25 with chest pain, found to have NSTEMI, underwent cardiac catheterization on  showing severe 2-vessel CAD, and was transferred to [facility] on 2025 for CVTS evaluation. He underwent CABG x2 on . Hospital course complicated by: A-fib, ESRD, staph bacteremia, constipation.   Past Medical History:  has a past medical history of Alcohol dependence in remission ([facility]), Cancer (HCC), Chronic back pain, COVID, End-stage renal disease on hemodialysis (HCC), Erectile dysfunction, Full dentures, Hepatitis C, Hx of non-Hodgkin's lymphoma, Hyperlipidemia, Hypertension, Kidney stone, Lumbago, Opioid dependence (HCC), Thrombocytopenia, Tobacco dependency, and Type 2 diabetes mellitus without complication ([facility]).  Past Surgical History:  has a past surgical history that includes Port Surgery; Cystoscopy; back surgery; Colonoscopy; Dialysis fistula creation (Left, 3/1/2022); Cardiac procedure (N/A, 10/3/2024); Cardiac procedure (N/A, 10/3/2024); Cardiac procedure (N/A, 10/3/2024); invasive vascular (N/A, 10/8/2024); Cardiac procedure (N/A, 2025); Cardiac procedure (N/A, 2025); and Coronary artery bypass graft (N/A, 2025).    Discharge Recommendations: Home with HHOT    DME Required For Discharge: DME to be determined pending patient progress    Precautions/Restrictions: high fall risk, 1500 ml fluid restriction, LUE limb restriction   Weight Bearing Restrictions: no restrictions    Required Braces/Orthotics: no braces

## 2025-06-19 NOTE — PLAN OF CARE
Problem: Chronic Conditions and Co-morbidities  Goal: Patient's chronic conditions and co-morbidity symptoms are monitored and maintained or improved  Outcome: Progressing  Flowsheets (Taken 6/19/2025 0900)  Care Plan - Patient's Chronic Conditions and Co-Morbidity Symptoms are Monitored and Maintained or Improved: Monitor and assess patient's chronic conditions and comorbid symptoms for stability, deterioration, or improvement     Problem: Discharge Planning  Goal: Discharge to home or other facility with appropriate resources  Outcome: Progressing  Flowsheets (Taken 6/19/2025 0900)  Discharge to home or other facility with appropriate resources:   Identify barriers to discharge with patient and caregiver   Identify discharge learning needs (meds, wound care, etc)     Problem: Safety - Adult  Goal: Free from fall injury  Outcome: Progressing     Problem: ABCDS Injury Assessment  Goal: Absence of physical injury  Outcome: Progressing     Problem: Pain  Goal: Verbalizes/displays adequate comfort level or baseline comfort level  Outcome: Progressing  Flowsheets (Taken 6/19/2025 0845)  Verbalizes/displays adequate comfort level or baseline comfort level:   Encourage patient to monitor pain and request assistance   Assess pain using appropriate pain scale   Administer analgesics based on type and severity of pain and evaluate response   Implement non-pharmacological measures as appropriate and evaluate response   Consider cultural and social influences on pain and pain management     Problem: Nutrition Deficit:  Goal: Optimize nutritional status  Outcome: Progressing

## 2025-06-19 NOTE — PROGRESS NOTES
[facility] - Inpatient Rehabilitation Department   Phone: [phone]    Speech Therapy   Initial Evaluation     Patient: Colby Ovalle   : 1950   MRN: [id number]   Date of Service:  2025  Admitting Diagnosis: Disease of cardiovascular system  Current Admission Summary: Colby Ovalle is a 75 y.o. with PMHx notable for HTN, HLD, ESRD who presented to [facility] on 25 with chest pain, found to have NSTEMI, underwent cardiac catheterization on  showing severe 2-vessel CAD, and was transferred to [facility] on 2025 for CVTS evaluation. He underwent CABG x2 on . Hospital course complicated by: A-fib, ESRD, staph bacteremia, constipation.   Past Medical History:  has a past medical history of Alcohol dependence in remission (HCC), Cancer (HCC), Chronic back pain, COVID, End-stage renal disease on hemodialysis (HCC), Erectile dysfunction, Full dentures, Hepatitis C, Hx of non-Hodgkin's lymphoma, Hyperlipidemia, Hypertension, Kidney stone, Lumbago, Opioid dependence (HCC), Thrombocytopenia, Tobacco dependency, and Type 2 diabetes mellitus without complication (HCC).  Past Surgical History:  has a past surgical history that includes Port Surgery; Cystoscopy; back surgery; Colonoscopy; Dialysis fistula creation (Left, 3/1/2022); Cardiac procedure (N/A, 10/3/2024); Cardiac procedure (N/A, 10/3/2024); Cardiac procedure (N/A, 10/3/2024); invasive vascular (N/A, 10/8/2024); Cardiac procedure (N/A, 2025); Cardiac procedure (N/A, 2025); and Coronary artery bypass graft (N/A, 2025).  Recent MRI Brain/Head CT:  No orders to display     Recent Chest xray/Chest CT:   IMPRESSION:  Stable appearance of subtle multifocal airspace opacities compatible with  multifocal pneumonia.  Precautions/Restrictions: high fall risk, Sternal precautions    Pre-Admission Information   Living Status: Lives at home with wife and daughter  Occupation/School: Completed 12th grade, worked  deficits (IADLs), but ADLs generally intact; marked deficits in memory and executive functions; behavior and psychological symptoms are common; requires significant residential support. Severe (lower end of the range) - Needs assistance in ADLs/IADLs; pervasive cognitive deficits; requires complex care     * Of note, Pt does not have a formal dementia diagnosis. The above score and cognitive stage is not indicative of diagnosis rather used to direct treatment tasks and establish goals.       Education  Barriers To Learning: cognition  Patient Education: Provided education regarding role of SLP, results of assessment, recommendations and general speech pathology plan of care.   Learning Assessment: Pt verbalized understanding   Pt requires ongoing learning     Assessment  Impairments Requiring Therapeutic Intervention: Cognitive-Linguistic Deficits  and Oropharyngeal Dysphagia   Prognosis: good    Clinical Assessment:  Pt presents with moderate-severe cognitive deficits characterized by deficits in attention, thought organization, tangential speech, short term memory, and executive functioning skills. Pt with no observed apraxia or dysarthria. Pt required frequent re-direction to presented task and provided conflicting information throughout evaluation. Pt with poor insight to deficits stating it \"will go back to normal\" when he goes home. SLP discussed recommendation for speech therapy treatment while admitted to ARU. Pt required encouragement and was hesitant although agreeable for tx after thorough education re: benefits of speech therapy. Pt reports he is an active  and provided conflicting information regarding if he manages finances/medication or if spouse does this. Pt would benefit from skilled speech therapy in the area of cognition to return to PLOF.     Diet Solids Recommendation:   Liquid Consistency Recommendation:   Recommended Form of Meds:   Regular texture diet     Thin liquids     Meds whole

## 2025-06-19 NOTE — PROGRESS NOTES
2155: IV antibiotic was not given today during HD. Unsuccessful attempt made to place peripheral IV this evening for antibiotic administration. Patient refused to let staff make more than one attempt to obtain peripheral IV access. Message sent to notify Dr. Callahan. Per JENNIFER Miranda to contact PICC team to see if they can establish IV access.    2255: Patient known to have limited peripheral access; recently required placement of midline. Outreach call to on-call Nephrologist (Dr. STEPHEN Nicole) for clearance to place PICC or midline, if necessary.    2330: Per Nephrologist, OK to place PICC or midline, if necessary. Per Nephrologist, ID's approval must also be obtained as old midline in RUE had been removed 6/16 due to possible contamination.    2340: Outreach call to on-call ID provider (Dr. ONEYDA Watt) for clearance to place PICC or midline in RUE, if necessary. Per ID, discontinue Ancef; give linezolid 600 mg PO now and BID; no line tonight.    0023: Ancef discontinued. Given dose of linezolid by primary RN.

## 2025-06-19 NOTE — PROGRESS NOTES
Infectious Diseases   Progress Note      Admission Date: 6/16/2025  Hospital Day: Hospital Day: 4   Attending: Higinio Callahan MD  Date of service: 6/19/2025     Chief complaint/ Reason for consult:     Staphylococcus lugdunensis bacteremia  End-stage renal disease on hemodialysis  AV graft in place  Coronary artery disease, status post CABG  Chronic hepatitis C    Microbiology:      I have reviewed allavailable micro lab data and cultures    Results       Procedure Component Value Units Date/Time    Culture, Tip [[id number]] Collected: 06/14/25 1739    Order Status: Completed Specimen: Catheter Tip Updated: 06/18/25 1314     Culture Catheter Tip No growth at 96 hours    Narrative:      ORDER#: [id number]                          ORDERED BY: ANJU RAIN  SOURCE: Catheter Tip                       COLLECTED:  06/14/25 17:39  ANTIBIOTICS AT MAGUI.:                      RECEIVED :  06/15/25 01:45    Culture, Blood 1 [[id number]] Collected: 06/14/25 1730    Order Status: Completed Specimen: Blood Updated: 06/18/25 1815     Blood Culture, Routine No Growth after 4 days of incubation.    Narrative:      ORDER#: [id number]                          ORDERED BY: ANJU RAIN  SOURCE: Blood RIJ                          COLLECTED:  06/14/25 17:30  ANTIBIOTICS AT MAGUI.:                      RECEIVED :  06/15/25 01:44  If child <=2 yrs old please draw pediatric bottle.~Blood Culture 1    Culture, Blood 1 [[id number]]     Order Status: Canceled Specimen: Blood     Culture, Blood 2 [[id number]]  (Abnormal) Collected: 06/08/25 1625    Order Status: Completed Specimen: Blood Updated: 06/12/25 0855     Organism Staphylococcus lugdunensis     Culture, Blood 2 --     POSITIVE for  No further workup  Isolated two of two sets  Refer to culture [id number] for sensitivity results      Narrative:      ORDER#: [id number]                          ORDERED BY: LENNY IBANEZ  SOURCE: Blood No site given                COLLECTED:  06/08/25  06/09/25 0906     Report SEE IMAGE    Narrative:      CALL  [place]  [facility] [place]. [phone],  Microbiology results called to and read back by ANNALISA Kessler,  06/09/2025 09:04, by MILLER    MRSA DNA Probe, Nasal [[id number]] Collected: 06/05/25 1652    Order Status: Completed Specimen: Nares Updated: 06/06/25 0252     MRSA SCREEN RT-PCR --     Negative  MRSA DNA not detected.  Normal Range: Not detected      Narrative:      ORDER#: [id number]                          ORDERED BY: LEAH NAVARRO  SOURCE: Nares                              COLLECTED:  06/05/25 16:52  ANTIBIOTICS AT MAGUI.:                      RECEIVED :  06/05/25 20:58             Susceptibility data from last 90 days.  Collected Specimen Info Organism Amoxicillin + Clavulanate Ampicillin + Sulbactam Cefazolin Clindamycin Erythromycin Levofloxacin Linezolid Oxacillin Sodium Tetracycline Trimethoprim + Sulfamethoxazole   06/08/25 Blood Staphylococcus lugdunensis              06/08/25 Blood Staphylococcus lugdunensis  S  S  S  S  S  S  S  S  S  S  S     Staphylococcus lugdunensis dna detected                     Antibiotics and immunizations:       Current antibiotics: All antibiotics and their doses were reviewed by me    Recent Abx Admin                     linezolid (ZYVOX) tablet 600 mg (mg) 600 mg Given 06/19/25 0853    linezolid (ZYVOX) tablet 600 mg (mg) 600 mg Given 06/19/25 0023                      Immunization History: All immunization history was reviewed by me today.      There is no immunization history on file for this patient.    Known drug allergies:     All allergies were reviewed and updated    Allergies   Allergen Reactions    Lisinopril Hives and Shortness Of Breath    Rosiglitazone      Not sure of reaction       Social history:     Social History:  All social andepidemiologic history was reviewed and updated by me today as needed.     Tobacco use:   reports that he quit smoking about 7 years ago. His smoking use included cigarettes.  cefazolin with additional session of hemodialysis today  Discussed with RN.  Cefazolin doses should be given as ordered.  Also discussed with pharmacy  Will discontinue linezolid that was started late yesterday  Continue close vitals monitoring.  Maintain good glycemic control.  Fall precautions.  Aspiration precautions.  Continue to watch for new fever or diarrhea.  DVT prophylaxis.  I reviewed the notes from other physicians and consultants involved in the patient's treatment teams in  detail today  Discussed all above with patient and RN.    Current Isolation:    No active isolations     Drug Monitoring:    Continue monitoring for antibiotic related nephrotoxicity, hepatotoxicity and other toxicities as follows: CBC, CMP   Continue to watch for following: new or worsening fever, new hypotension, hives, lip swelling and redness or purulence at vascular access sites.     I/v access Management:    Continue to monitor i.v access sites for erythema, induration, discharge or tenderness.   As always, continue efforts to minimize tubes/lines/drains as clinically appropriate to reduce chances of line associated infections.    Patient education and counseling:       The patient was educated in detail about the side-effects of various antibiotics and things to watch for like new rashes, lip swelling, severe reaction, worsening diarrhea, break through fever etc.  Discussed patient's condition and what to expect. All of the patient's questions were addressed in a satisfactory manner and patient verbalized understanding all instructions.      Level of complexity of visit and medical decision making: High     Risk of Complications/Morbidity: High     Illness(es)/ Infection present that pose threat to life/bodily function.   There is potential for severe exacerbation of infection/side effects of treatment.  Therapy requires intensive monitoring for antimicrobial agent toxicity.   I did an In-depth patient chart review that

## 2025-06-20 PROBLEM — Z71.89 COMPLEX CARE COORDINATION: Status: ACTIVE | Noted: 2025-06-20

## 2025-06-20 PROBLEM — Z79.2 RECEIVING INTRAVENOUS ANTIBIOTIC TREATMENT AS OUTPATIENT: Status: ACTIVE | Noted: 2025-06-20

## 2025-06-20 LAB
ANION GAP SERPL CALCULATED.3IONS-SCNC: 16 MMOL/L (ref 3–16)
BASOPHILS # BLD: 0.1 K/UL (ref 0–0.2)
BASOPHILS NFR BLD: 0.7 %
BUN SERPL-MCNC: 49 MG/DL (ref 7–20)
CALCIUM SERPL-MCNC: 9.6 MG/DL (ref 8.3–10.6)
CHLORIDE SERPL-SCNC: 98 MMOL/L (ref 99–110)
CO2 SERPL-SCNC: 23 MMOL/L (ref 21–32)
CREAT SERPL-MCNC: 7.6 MG/DL (ref 0.8–1.3)
DEPRECATED RDW RBC AUTO: 15.6 % (ref 12.4–15.4)
EOSINOPHIL # BLD: 0.2 K/UL (ref 0–0.6)
EOSINOPHIL NFR BLD: 2.1 %
GFR SERPLBLD CREATININE-BSD FMLA CKD-EPI: 7 ML/MIN/{1.73_M2}
GLUCOSE BLD-MCNC: 162 MG/DL (ref 70–99)
GLUCOSE BLD-MCNC: 313 MG/DL (ref 70–99)
GLUCOSE BLD-MCNC: 80 MG/DL (ref 70–99)
GLUCOSE BLD-MCNC: 93 MG/DL (ref 70–99)
GLUCOSE BLD-MCNC: 97 MG/DL (ref 70–99)
GLUCOSE SERPL-MCNC: 150 MG/DL (ref 70–99)
HCT VFR BLD AUTO: 21.6 % (ref 40.5–52.5)
HGB BLD-MCNC: 7.2 G/DL (ref 13.5–17.5)
LYMPHOCYTES # BLD: 1 K/UL (ref 1–5.1)
LYMPHOCYTES NFR BLD: 10.9 %
MCH RBC QN AUTO: 30.3 PG (ref 26–34)
MCHC RBC AUTO-ENTMCNC: 33.2 G/DL (ref 31–36)
MCV RBC AUTO: 91.5 FL (ref 80–100)
MONOCYTES # BLD: 0.7 K/UL (ref 0–1.3)
MONOCYTES NFR BLD: 8.5 %
NEUTROPHILS # BLD: 6.9 K/UL (ref 1.7–7.7)
NEUTROPHILS NFR BLD: 77.8 %
PERFORMED ON: ABNORMAL
PERFORMED ON: ABNORMAL
PERFORMED ON: NORMAL
PLATELET # BLD AUTO: 232 K/UL (ref 135–450)
PMV BLD AUTO: 8.7 FL (ref 5–10.5)
POTASSIUM SERPL-SCNC: 6 MMOL/L (ref 3.5–5.1)
RBC # BLD AUTO: 2.36 M/UL (ref 4.2–5.9)
SODIUM SERPL-SCNC: 137 MMOL/L (ref 136–145)
WBC # BLD AUTO: 8.9 K/UL (ref 4–11)

## 2025-06-20 PROCEDURE — 2580000003 HC RX 258: Performed by: INTERNAL MEDICINE

## 2025-06-20 PROCEDURE — 92526 ORAL FUNCTION THERAPY: CPT

## 2025-06-20 PROCEDURE — 6360000002 HC RX W HCPCS: Performed by: STUDENT IN AN ORGANIZED HEALTH CARE EDUCATION/TRAINING PROGRAM

## 2025-06-20 PROCEDURE — 97110 THERAPEUTIC EXERCISES: CPT

## 2025-06-20 PROCEDURE — 97116 GAIT TRAINING THERAPY: CPT

## 2025-06-20 PROCEDURE — 97129 THER IVNTJ 1ST 15 MIN: CPT

## 2025-06-20 PROCEDURE — 97530 THERAPEUTIC ACTIVITIES: CPT

## 2025-06-20 PROCEDURE — 6370000000 HC RX 637 (ALT 250 FOR IP): Performed by: STUDENT IN AN ORGANIZED HEALTH CARE EDUCATION/TRAINING PROGRAM

## 2025-06-20 PROCEDURE — 80048 BASIC METABOLIC PNL TOTAL CA: CPT

## 2025-06-20 PROCEDURE — 1280000000 HC REHAB R&B

## 2025-06-20 PROCEDURE — 85025 COMPLETE CBC W/AUTO DIFF WBC: CPT

## 2025-06-20 PROCEDURE — 6360000002 HC RX W HCPCS: Performed by: INTERNAL MEDICINE

## 2025-06-20 PROCEDURE — 36415 COLL VENOUS BLD VENIPUNCTURE: CPT

## 2025-06-20 PROCEDURE — 90935 HEMODIALYSIS ONE EVALUATION: CPT

## 2025-06-20 RX ADMIN — ALOGLIPTIN 6.25 MG: 6.25 TABLET, FILM COATED ORAL at 08:40

## 2025-06-20 RX ADMIN — INSULIN LISPRO 6 UNITS: 100 INJECTION, SOLUTION INTRAVENOUS; SUBCUTANEOUS at 12:07

## 2025-06-20 RX ADMIN — PANTOPRAZOLE SODIUM 40 MG: 40 TABLET, DELAYED RELEASE ORAL at 06:17

## 2025-06-20 RX ADMIN — MIDODRINE HYDROCHLORIDE 10 MG: 5 TABLET ORAL at 08:38

## 2025-06-20 RX ADMIN — METOPROLOL TARTRATE 12.5 MG: 25 TABLET, FILM COATED ORAL at 21:46

## 2025-06-20 RX ADMIN — STANDARDIZED SENNA CONCENTRATE AND DOCUSATE SODIUM 1 TABLET: 8.6; 5 TABLET ORAL at 08:40

## 2025-06-20 RX ADMIN — APIXABAN 2.5 MG: 2.5 TABLET, FILM COATED ORAL at 21:46

## 2025-06-20 RX ADMIN — APIXABAN 2.5 MG: 2.5 TABLET, FILM COATED ORAL at 08:40

## 2025-06-20 RX ADMIN — ASPIRIN 81 MG: 81 TABLET, DELAYED RELEASE ORAL at 08:40

## 2025-06-20 RX ADMIN — POLYETHYLENE GLYCOL 3350 17 G: 17 POWDER, FOR SOLUTION ORAL at 21:45

## 2025-06-20 RX ADMIN — EPOETIN ALFA-EPBX 10000 UNITS: 10000 INJECTION, SOLUTION INTRAVENOUS; SUBCUTANEOUS at 19:30

## 2025-06-20 RX ADMIN — SODIUM CHLORIDE 3000 MG: 900 INJECTION INTRAVENOUS at 19:00

## 2025-06-20 RX ADMIN — LINACLOTIDE 145 MCG: 145 CAPSULE, GELATIN COATED ORAL at 06:17

## 2025-06-20 RX ADMIN — SODIUM ZIRCONIUM CYCLOSILICATE 10 G: 10 POWDER, FOR SUSPENSION ORAL at 08:47

## 2025-06-20 RX ADMIN — AMIODARONE HYDROCHLORIDE 400 MG: 200 TABLET ORAL at 08:40

## 2025-06-20 RX ADMIN — METHADONE HYDROCHLORIDE 95 MG: 10 CONCENTRATE ORAL at 06:17

## 2025-06-20 RX ADMIN — METOPROLOL TARTRATE 12.5 MG: 25 TABLET, FILM COATED ORAL at 08:39

## 2025-06-20 RX ADMIN — ATORVASTATIN CALCIUM 80 MG: 80 TABLET, FILM COATED ORAL at 21:46

## 2025-06-20 RX ADMIN — LIDOCAINE AND PRILOCAINE: 25; 25 CREAM TOPICAL at 21:50

## 2025-06-20 RX ADMIN — STANDARDIZED SENNA CONCENTRATE AND DOCUSATE SODIUM 1 TABLET: 8.6; 5 TABLET ORAL at 21:46

## 2025-06-20 RX ADMIN — ACETAMINOPHEN 650 MG: 325 TABLET ORAL at 22:00

## 2025-06-20 ASSESSMENT — ENCOUNTER SYMPTOMS
ABDOMINAL PAIN: 0
WHEEZING: 0
RHINORRHEA: 0
SINUS PAIN: 0
DIARRHEA: 0
COUGH: 0
EYE REDNESS: 0
NAUSEA: 0
SINUS PRESSURE: 0
EYE DISCHARGE: 0
BACK PAIN: 0
SORE THROAT: 0
CONSTIPATION: 0
SHORTNESS OF BREATH: 0

## 2025-06-20 ASSESSMENT — PAIN DESCRIPTION - FREQUENCY: FREQUENCY: INTERMITTENT

## 2025-06-20 ASSESSMENT — PAIN SCALES - WONG BAKER: WONGBAKER_NUMERICALRESPONSE: NO HURT

## 2025-06-20 ASSESSMENT — PAIN DESCRIPTION - ORIENTATION: ORIENTATION: LEFT

## 2025-06-20 ASSESSMENT — PAIN DESCRIPTION - LOCATION: LOCATION: OTHER (COMMENT)

## 2025-06-20 ASSESSMENT — PAIN SCALES - GENERAL
PAINLEVEL_OUTOF10: 4
PAINLEVEL_OUTOF10: 0
PAINLEVEL_OUTOF10: 0
PAINLEVEL_OUTOF10: 4

## 2025-06-20 ASSESSMENT — PAIN - FUNCTIONAL ASSESSMENT: PAIN_FUNCTIONAL_ASSESSMENT: ACTIVITIES ARE NOT PREVENTED

## 2025-06-20 NOTE — PROGRESS NOTES
Infectious Diseases   Progress Note      Admission Date: 6/16/2025  Hospital Day: Hospital Day: 5   Attending: Higinio Callahan MD  Date of service: 6/20/2025     Chief complaint/ Reason for consult:     Staphylococcus lugdunensis bacteremia  End-stage renal disease on hemodialysis  AV graft in place  Coronary artery disease, status post CABG  Chronic hepatitis C    Microbiology:      I have reviewed allavailable micro lab data and cultures    Results       Procedure Component Value Units Date/Time    Culture, Tip [[id number]] Collected: 06/14/25 1739    Order Status: Completed Specimen: Catheter Tip Updated: 06/18/25 1314     Culture Catheter Tip No growth at 96 hours    Narrative:      ORDER#: [id number]                          ORDERED BY: ANJU RAIN  SOURCE: Catheter Tip                       COLLECTED:  06/14/25 17:39  ANTIBIOTICS AT MAGUI.:                      RECEIVED :  06/15/25 01:45    Culture, Blood 1 [[id number]] Collected: 06/14/25 1730    Order Status: Completed Specimen: Blood Updated: 06/18/25 1815     Blood Culture, Routine No Growth after 4 days of incubation.    Narrative:      ORDER#: [id number]                          ORDERED BY: ANJU RAIN  SOURCE: Blood RIJ                          COLLECTED:  06/14/25 17:30  ANTIBIOTICS AT MAGUI.:                      RECEIVED :  06/15/25 01:44  If child <=2 yrs old please draw pediatric bottle.~Blood Culture 1    Culture, Blood 1 [[id number]]     Order Status: Canceled Specimen: Blood     Culture, Blood 2 [[id number]]  (Abnormal) Collected: 06/08/25 1625    Order Status: Completed Specimen: Blood Updated: 06/12/25 0855     Organism Staphylococcus lugdunensis     Culture, Blood 2 --     POSITIVE for  No further workup  Isolated two of two sets  Refer to culture [id number] for sensitivity results      Narrative:      ORDER#: [id number]                          ORDERED BY: LENNY IBANEZ  SOURCE: Blood No site given                COLLECTED:  06/08/25  for agitation, hallucinations and suicidal ideas. The patient is not nervous/anxious.    All other systems reviewed and are negative.        Past Medical History: All past medical history reviewed today.    Past Medical History:   Diagnosis Date    Alcohol dependence in remission (HCC)     information from [facility]    Cancer (HCC)     over 10 years ago     Chronic back pain     COVID 01/12/2022    End-stage renal disease on hemodialysis (HCC)     Erectile dysfunction     Full dentures     Hepatitis C     information from [facility]    Hx of non-Hodgkin's lymphoma     Information from [facility]    Hyperlipidemia     Hypertension     Kidney stone     Lumbago     information from [facility]    Opioid dependence (HCC)     information from [facility]    Thrombocytopenia     Tobacco dependency     Type 2 diabetes mellitus without complication ([facility])        Past Surgical History: All past surgical history was reviewed today.    Past Surgical History:   Procedure Laterality Date    BACK SURGERY      CARDIAC PROCEDURE N/A 10/3/2024    Left heart cath / coronary angiography performed by Bindu Barboza DO at [facility] CARDIAC CATH LAB    CARDIAC PROCEDURE N/A 10/3/2024    Intravascular ultrasound performed by Bindu Barboza DO at [facility] CARDIAC CATH LAB    CARDIAC PROCEDURE N/A 10/3/2024    Atherectomy coronary performed by Bindu Barboza DO at [facility] CARDIAC CATH LAB    CARDIAC PROCEDURE N/A 6/2/2025    Left heart cath / coronary angiography performed by Modesto Pérez MD at [facility] CARDIAC CATH LAB    CARDIAC PROCEDURE N/A 6/2/2025    Intra-aortic balloon pump insertion performed by Modesto Pérez MD at [facility] CARDIAC CATH LAB    COLONOSCOPY      CORONARY ARTERY BYPASS GRAFT N/A 6/9/2025    CORONARY ARTERY BYPASS GRAFT X2 USING RIGHT SAPHENOUS VEIN AND LIMA; LEFT EVH SAPHENOUS VEIN; TAKEDOWN OF LEFT INTRAMAMMARY ARTERY; PLACEMENT OF TEMPORARY ATRIAL AND VENTRICLE PACING WIRES; CPB; SCAR

## 2025-06-20 NOTE — CARE COORDINATION
06/19/25 2153   /Social Work Whiteboard Notes   /Social Work Whiteboard 6/19 D/c date 6/28.  PT need PCP for HHC services before discharge. dialysis pt. PT will need IV ABX at discharge until 7/1.  KENDRA     Follow up:  Get PCP and HHC choices.   Maria Elena Jones MSN, RN, Case Management  Office: [phone]  Electronically signed by Maria Elena Jones on 6/19/2025 at 9:55 PM

## 2025-06-20 NOTE — PLAN OF CARE
Problem: Chronic Conditions and Co-morbidities  Goal: Patient's chronic conditions and co-morbidity symptoms are monitored and maintained or improved  6/20/2025 1017 by Josephine Gale RN  Outcome: Progressing  Flowsheets (Taken 6/20/2025 0854)  Care Plan - Patient's Chronic Conditions and Co-Morbidity Symptoms are Monitored and Maintained or Improved: Monitor and assess patient's chronic conditions and comorbid symptoms for stability, deterioration, or improvement  6/19/2025 2344 by Miguel Nice RN  Outcome: Progressing     Problem: Discharge Planning  Goal: Discharge to home or other facility with appropriate resources  6/20/2025 1017 by Josephine Gale RN  Outcome: Progressing  Flowsheets (Taken 6/20/2025 0854)  Discharge to home or other facility with appropriate resources:   Identify barriers to discharge with patient and caregiver   Identify discharge learning needs (meds, wound care, etc)  6/19/2025 2344 by Miguel Nice RN  Outcome: Progressing     Problem: Safety - Adult  Goal: Free from fall injury  6/20/2025 1017 by Josephine Gale RN  Outcome: Progressing  6/19/2025 2344 by Miguel Nice RN  Outcome: Progressing     Problem: ABCDS Injury Assessment  Goal: Absence of physical injury  6/20/2025 1017 by Josephine Gale RN  Outcome: Progressing  6/19/2025 2344 by Miguel Nice RN  Outcome: Progressing     Problem: Pain  Goal: Verbalizes/displays adequate comfort level or baseline comfort level  6/20/2025 1017 by Josephine Gale RN  Outcome: Progressing  Flowsheets (Taken 6/20/2025 0835)  Verbalizes/displays adequate comfort level or baseline comfort level:   Encourage patient to monitor pain and request assistance   Assess pain using appropriate pain scale   Administer analgesics based on type and severity of pain and evaluate response   Implement non-pharmacological measures as appropriate and evaluate response   Consider cultural and social influences on pain and pain

## 2025-06-20 NOTE — PROGRESS NOTES
Treatment time: 3 hrs    Net UF: 1500 ml     Pre weight: 78.5kg  Post weight: 77 kg     Access used: DOMINIC AVF  Access function:  tolerated well,   ml/min     Medications or blood products given: cefazolin, retacrit 10kiu      Regular outpatient schedule: MWF     Summary of response to treatment: Pt tolerated well. Pt remained stable throughout entire treatment and upon exiting the hemodialysis suite.      Copy of dialysis treatment record placed in chart, to be scanned into EMR.

## 2025-06-20 NOTE — PROGRESS NOTES
[facility] - Inpatient Rehabilitation Department   Phone: [phone]    Physical Therapy    [] Initial Evaluation            [x] Daily Treatment Note         [] Discharge Summary      Patient: Colby Ovalle   : 1950   MRN: [id number]   Date of Service:  2025  Admitting Diagnosis: Disease of cardiovascular system  Current Admission Summary: Colby Ovalle is a 75 y.o. with PMHx notable for HTN, HLD, ESRD who presented to [facility] on 25 with chest pain, found to have NSTEMI, underwent cardiac catheterization on  showing severe 2-vessel CAD, and was transferred to [facility] on 2025 for CVTS evaluation. He underwent CABG x2 on . Hospital course complicated by: A-fib, ESRD, staph bacteremia, constipation.   Past Medical History:  has a past medical history of Alcohol dependence in remission (HCC), Cancer (HCC), Chronic back pain, COVID, End-stage renal disease on hemodialysis (HCC), Erectile dysfunction, Full dentures, Hepatitis C, Hx of non-Hodgkin's lymphoma, Hyperlipidemia, Hypertension, Kidney stone, Lumbago, Opioid dependence (HCC), Thrombocytopenia, Tobacco dependency, and Type 2 diabetes mellitus without complication ([facility]).  Past Surgical History:  has a past surgical history that includes Port Surgery; Cystoscopy; back surgery; Colonoscopy; Dialysis fistula creation (Left, 3/1/2022); Cardiac procedure (N/A, 10/3/2024); Cardiac procedure (N/A, 10/3/2024); Cardiac procedure (N/A, 10/3/2024); invasive vascular (N/A, 10/8/2024); Cardiac procedure (N/A, 2025); Cardiac procedure (N/A, 2025); and Coronary artery bypass graft (N/A, 2025).  Discharge Recommendations: home with home health PT  DME Required For Discharge: no DME required at discharge  Precautions/Restrictions: high fall risk  Weight Bearing Restrictions: weight bearing as tolerated  [] Right Upper Extremity  [] Left Upper Extremity [] Right Lower Extremity  [] Left Lower Extremity    patient/caregiver education, and equipment evaluation/education    Goals  Patient Goals: to get stronger  Short Term Goals:  Time Frame: 2 weeks  Patient will complete bed mobility at modified independent   Patient will complete transfers at [facility]   Patient will ambulate 150 ft with use of LRAD at [facility]  Patient will ascend/descend 12 stairs with (B) handrail at modified independent  Patient will complete car transfer at modified independent    Above goals reviewed on 6/20/2025.  All goals are ongoing at this time unless indicated above.      Therapy Session Time      Individual Group Co-treatment   Time In  1030       Time Out 1100       Minutes  30       Second Session Therapy Time:   Individual Concurrent Group Co-treatment   Time In  1245         Time Out  1330         Minutes  45           Timed Code Treatment Minutes:   45+30  Total Treatment Minutes:  75       Electronically Signed By: Daryl Bhatti PT, DPT [id number]

## 2025-06-20 NOTE — PLAN OF CARE
Problem: Chronic Conditions and Co-morbidities  Goal: Patient's chronic conditions and co-morbidity symptoms are monitored and maintained or improved  6/19/2025 2344 by Miguel Nice RN  Outcome: Progressing  6/19/2025 1041 by Josephine Gale RN  Outcome: Progressing  Flowsheets (Taken 6/19/2025 0900)  Care Plan - Patient's Chronic Conditions and Co-Morbidity Symptoms are Monitored and Maintained or Improved: Monitor and assess patient's chronic conditions and comorbid symptoms for stability, deterioration, or improvement     Problem: Discharge Planning  Goal: Discharge to home or other facility with appropriate resources  6/19/2025 2344 by Migeul Nice RN  Outcome: Progressing  6/19/2025 1041 by Josephine Gale RN  Outcome: Progressing  Flowsheets (Taken 6/19/2025 0900)  Discharge to home or other facility with appropriate resources:   Identify barriers to discharge with patient and caregiver   Identify discharge learning needs (meds, wound care, etc)     Problem: Safety - Adult  Goal: Free from fall injury  6/19/2025 2344 by Miguel Nice RN  Outcome: Progressing  6/19/2025 1041 by Josephine Gale RN  Outcome: Progressing     Problem: ABCDS Injury Assessment  Goal: Absence of physical injury  6/19/2025 2344 by Miguel Nice RN  Outcome: Progressing  6/19/2025 1041 by Josephine Gale RN  Outcome: Progressing     Problem: Pain  Goal: Verbalizes/displays adequate comfort level or baseline comfort level  6/19/2025 2344 by Miguel Nice RN  Outcome: Progressing  6/19/2025 1041 by Josephine Gale RN  Outcome: Progressing  Flowsheets (Taken 6/19/2025 0845)  Verbalizes/displays adequate comfort level or baseline comfort level:   Encourage patient to monitor pain and request assistance   Assess pain using appropriate pain scale   Administer analgesics based on type and severity of pain and evaluate response   Implement non-pharmacological measures as appropriate and evaluate response   Consider

## 2025-06-20 NOTE — DIALYSIS
Treatment time: 1.5 hours  Net UF: 1451 ml     Pre weight: 80 kg  Post weight:78.5 kg    Access used: L AVF    Access function: well with  ml/min     Medications or blood products given: Albumin and Midodrine      Regular outpatient schedule: MWF     Summary of response to treatment: Patient tolerated treatment poorly with drop in BP. UF decreased and albumin and midodrine given. Patient remained stable throughout entire treatment and upon the exiting the dialysis suite via transport.     Unable to reach floor RN. Copy of dialysis treatment record placed in chart, to be scanned into EMR.

## 2025-06-20 NOTE — PROGRESS NOTES
[facility] - Inpatient Rehabilitation Department   Phone: [phone]    Occupational Therapy    [] Initial Evaluation            [x] Daily Treatment Note         [] Discharge Summary      Patient: Colby Ovalle   : 1950   MRN: [id number]   Date of Service:  2025    Admitting Diagnosis:  Disease of cardiovascular system  Current Admission Summary:   Colby Ovalle is a 75 y.o. with PMHx notable for HTN, HLD, ESRD who presented to [facility] on 25 with chest pain, found to have NSTEMI, underwent cardiac catheterization on  showing severe 2-vessel CAD, and was transferred to [facility] on 2025 for CVTS evaluation. He underwent CABG x2 on . Hospital course complicated by: A-fib, ESRD, staph bacteremia, constipation.   Past Medical History:  has a past medical history of Alcohol dependence in remission (HCC), Cancer (HCC), Chronic back pain, COVID, End-stage renal disease on hemodialysis (HCC), Erectile dysfunction, Full dentures, Hepatitis C, Hx of non-Hodgkin's lymphoma, Hyperlipidemia, Hypertension, Kidney stone, Lumbago, Opioid dependence (HCC), Thrombocytopenia, Tobacco dependency, and Type 2 diabetes mellitus without complication ([facility]  Past Surgical History:  has a past surgical history that includes Port Surgery; Cystoscopy; back surgery; Colonoscopy; Dialysis fistula creation (Left, 3/1/2022); Cardiac procedure (N/A, 10/3/2024); Cardiac procedure (N/A, 10/3/2024); Cardiac procedure (N/A, 10/3/2024); invasive vascular (N/A, 10/8/2024); Cardiac procedure (N/A, 2025); Cardiac procedure (N/A, 2025); and Coronary artery bypass graft (N/A, 2025).    Discharge Recommendations: Home with HHOT. Anticipate initial 24 hour supervision     DME Required For Discharge: DME to be determined pending patient progress    Precautions/Restrictions: high fall risk, 1500 ml fluid restriction, LUE limb restriction   Weight Bearing Restrictions: no restrictions   supervision  Dressing Comments: donned shoes seated in recliner with increased time. Education provided on different techniques/strategies to make task easier while also maintaining precautions   General Comments: pt declining other ADLs   Instrumental Activities of Daily Living  No IADL completed on this date.    Functional Mobility  Bed Mobility:  Bed mobility not completed on this date.  Comments:   Transfers:  Sit to stand transfer:varies mod-maxA   Stand to sit transfer: minimal assistance  Comments:Pt demonstrates good awareness of precautions and heart pillow. Cueing for postioning prior to transfer and rocking technique    modA from recliner to no AD. maxA from chair in dining room progressing to modA. Pt completed 3 total transfers from high back chair in dining room   Functional Mobility  Functional Mobility Activity: to/from therapy room  Device Use: no device  Required Assistance: CGA-Karl   Comment: improved arm swing in hallway. Mild path deviation. No major LOB   Balance:  Static Sitting Balance: fair (-): maintains balance at SBA with use of UE support  Dynamic Sitting Balance: fair: maintains balance at CGA without use of UE support  Static Standing Balance: fair (-): maintains balance at CGA with use of UE support  Dynamic Standing Balance: poor (+): requires min (A) to maintain balance  Comments:    Other Therapeutic Interventions  Pt participated in item retrieval task in kitchen. Task completed to challenge dynamic balance, functional reaching, short term recall and endurance. Task required patient to locate and retreive items from various locations. Pt unable to recall the 6 items but was able to manage cabinets and appliances to locate them. Pt retrieved items and transported them to table with CGA-Karl and no AD. Pt dropping 1 item when transporting items.     Pt completed 20 bicep curls and 20 around the back passes (10 each direction) with 2.2# weighted ball while in stance. Task completed

## 2025-06-20 NOTE — PROGRESS NOTES
[facility] - Inpatient Rehabilitation Department   Phone: [phone]    Speech Therapy   Daily Treatment Note     Patient: Colby Ovalle   : 1950   MRN: [id number]   Date of Service:  2025  Admitting Diagnosis: Disease of cardiovascular system  Current Admission Summary: Colby Ovalle is a 75 y.o. with PMHx notable for HTN, HLD, ESRD who presented to [facility] on 25 with chest pain, found to have NSTEMI, underwent cardiac catheterization on  showing severe 2-vessel CAD, and was transferred to [facility] on 2025 for CVTS evaluation. He underwent CABG x2 on . Hospital course complicated by: A-fib, ESRD, staph bacteremia, constipation.   Past Medical History:  has a past medical history of Alcohol dependence in remission (HCC), Cancer (HCC), Chronic back pain, COVID, End-stage renal disease on hemodialysis (HCC), Erectile dysfunction, Full dentures, Hepatitis C, Hx of non-Hodgkin's lymphoma, Hyperlipidemia, Hypertension, Kidney stone, Lumbago, Opioid dependence (HCC), Thrombocytopenia, Tobacco dependency, and Type 2 diabetes mellitus without complication (HCC).  Past Surgical History:  has a past surgical history that includes Port Surgery; Cystoscopy; back surgery; Colonoscopy; Dialysis fistula creation (Left, 3/1/2022); Cardiac procedure (N/A, 10/3/2024); Cardiac procedure (N/A, 10/3/2024); Cardiac procedure (N/A, 10/3/2024); invasive vascular (N/A, 10/8/2024); Cardiac procedure (N/A, 2025); Cardiac procedure (N/A, 2025); and Coronary artery bypass graft (N/A, 2025).  Recent MRI Brain/Head CT:  No orders to display     Recent Chest xray/Chest CT:   IMPRESSION:  Stable appearance of subtle multifocal airspace opacities compatible with  multifocal pneumonia.  Precautions/Restrictions: high fall risk, Sternal precautions    Pre-Admission Information   Living Status: Lives at home with wife (Asya) and daughter  Occupation/School: Completed 12th  grade, worked as a . Active with [employer] and [employer] volunteering   Medication Management: :  []Primary   []Secondary []No - will clarify with family, pt providing conflicting information  Finance Management: []Primary   []Secondary []No - will clarify with family, pt providing conflicting information  Active :   [x]Yes         []No  Hearing:    hard of hearing, has hearing aids at home   Vision:    Wears Bifocals      Subjective  General: Pt upright in chair. Pleasantly talkative throughout session. Agreeable to participate.   Pain: Did not state    Safety Interventions: patient left in chair, chair alarm in place, call light within reach, and patient at risk for falls      Therapeutic Interventions:     Session 1:   Dysphagia: Severity: Within functional limits  and Mild    Diet tolerance   - Pt consuming breakfast meal during session, consisted of regular solid and thin liquids   - Demonstrated prolonged mastication and reduced bolus cohesion related to lose fitting dentures. Pt using adhesive but reported not the right type.   - Tolerated all consistencies without overt s/s of aspiration. Denied globus sensation.     Suspected mastication pattern related to dentition. Pt defers diet modifications at this time due to already not liking hospital foods. Willing to modify by giving himself extra time, using alternating consistencies, and completing oral care after meals.   GOAL MET - will continue to monitor as needed     Cognition: Severity: Moderate   Orientation, functional recall and insight   - Oriented to all basic time concepts   - Recalled situation, description of what he has been told about his surgery   - Provided description of personal history WFL (names of family members, descriptions of work and demographic information), all consistent per chart review   - Provided evaluation of his current cognitive state, feels he is getting better but not at his baseline     Problem solving via

## 2025-06-20 NOTE — PROGRESS NOTES
Nephrology Progress Note  [facility]  [phone]  [email or web address]        Subjective:   Colby Ovalle: 75 y.o.  male who we are seeing in consultation for ESRD Management.    Brief HPI:  Colby Ovalle is a 75 y.o. male with PMH significant for hypertension, hyperlipidemia, DM II, non-Hodgkin's lymphoma, chronic pain syndrome, chronic alcohol dependence, ESRD on maintenance hemodialysis , nephrolithiasis presented to [facility] with chest pain.  He has known history of CAD with prior PCI/arthrectomy to ostial-mid LAD.  He was taken to cardiac cath lab on 2025 and angiogram showed  severe two-vessel disease with 95% ostial circumflex disease and 99% in-stent restenosis of LAD.  At that time, LVEF was measured at 30% and an intra-aortic balloon pump placed.  He was transferred to [facility] on  for consideration of CABG.He Underwent CABG x 2 on .  Returned to the ICU intubated and sedated. Eventually extubated.   Transferred to ARU on .       Interval History / Subjective:  In ARU, Seen and examined this am/   No complaints.      Objective:   VITALS:    Vitals:    25 0835   BP: (!) 111/55   Pulse: 67   Resp: 17   Temp: 98.1 °F (36.7 °C)   SpO2: 94%                                                                                    Temp (24hrs), Av.4 °F (36.9 °C), Min:98.1 °F (36.7 °C), Max:98.7 °F (37.1 °C)     BP  Min: 91/48  Max: 125/49                                  Pulse  Av.8  Min: 58  Max: 71    24HR INTAKE/OUTPUT:  No intake or output data in the 24 hours ending 25 1618      Wt Readings from Last 3 Encounters:   25 78.5 kg (173 lb 1 oz)   25 82.4 kg (181 lb 10.5 oz)   25 77 kg (169 lb 12.1 oz)       Physical Exam:    Gen:  not in acute distress, on RA  Lungs:  CTAB  Cardiovascular: RRR   Edema:  + edema  Abd: soft, non tender, positive bowel sounds  Neuro: awake and alert       Access : Left  upper arm brachial artery to basilic AV fistula. Good thrill , no signs of infection     Laboratory Data :     CBC:   Lab Results   Component Value Date/Time    WBC 8.9 06/20/2025 06:14 AM    RBC 2.36 06/20/2025 06:14 AM    HGB 7.2 06/20/2025 06:14 AM    HCT 21.6 06/20/2025 06:14 AM    MCV 91.5 06/20/2025 06:14 AM    MCH 30.3 06/20/2025 06:14 AM    MCHC 33.2 06/20/2025 06:14 AM    RDW 15.6 06/20/2025 06:14 AM     06/20/2025 06:14 AM    MPV 8.7 06/20/2025 06:14 AM     BMP:    Lab Results   Component Value Date/Time     06/20/2025 06:14 AM    K 6.0 06/20/2025 06:14 AM    CL 98 06/20/2025 06:14 AM    CO2 23 06/20/2025 06:14 AM    BUN 49 06/20/2025 06:14 AM    CREATININE 7.6 06/20/2025 06:14 AM    CALCIUM 9.6 06/20/2025 06:14 AM    GFRAA 12 03/01/2022 07:15 AM    LABGLOM 7 06/20/2025 06:14 AM    GLUCOSE 150 06/20/2025 06:14 AM     Phosphorus:    Lab Results   Component Value Date/Time    PHOS 3.9 06/18/2025 07:27 AM           Assessment and Plan      ESRD on maintenance hemodialysis  HD MWF  Access left upper arm brachiobasilic AV fistula  He did cut short two of his treatments on 6/13 and 6/16, but staying for full sessions now.   Midodrine added before HD session given intradialytic hypotension   Given volume overload, Additional isolated UF session on 6/19 with 1.5L off, drop in BP noted during session  Plan for Hemodialysis today 6/20.     Hyperkalemia   K 6 today but specimen hemolyzed.   Lokelma given   Plan for HD today 6/20    NSTEMI, CAD  Left heart cath at [facility] showed multivessel disease with occlusion of previously placed stents.  Transferred to [facility] for evaluation of CABG which took place on 6/9/2025.     Afib with RVR  Now in NSR, rate controlled.   On amiodarone po , metoprolol     Hypertension  Was on Levo and vaso briefly post op, now both off.   BP soft/stable.   On metoprolol     DM II - per primary team     Anemia of chronic kidney disease  Acute drop post op, now stable.

## 2025-06-20 NOTE — PROGRESS NOTES
Colby Ovalle  6/20/2025  [id number]    Chief Complaint: Disease of cardiovascular system    Subjective    Hypoglycemic last evening.    Patient reports that he is doing well. Sternal pain improving. Denies any new concerns. Tolerating therapy well. Last Bowel Movement:  06/15/25          Objective    Patient Vitals for the past 24 hrs:   BP Temp Temp src Pulse Resp SpO2 Weight   06/20/25 0835 (!) 111/55 98.1 °F (36.7 °C) Oral 67 17 94 % --   06/20/25 0647 -- -- -- -- 16 -- --   06/20/25 0000 116/70 -- -- 58 -- -- --   06/19/25 2134 (!) 125/49 98.3 °F (36.8 °C) Oral 59 18 97 % --   06/1950 (!) 107/41 98.5 °F (36.9 °C) -- 71 18 -- 78.5 kg (173 lb 1 oz)   06/19/25 1809 (!) 91/48 98.7 °F (37.1 °C) -- 64 18 -- 80 kg (176 lb 5.9 oz)     Patient Vitals for the past 96 hrs (Last 3 readings):   Weight   06/1950 78.5 kg (173 lb 1 oz)   06/19/25 1809 80 kg (176 lb 5.9 oz)   06/18/25 1825 80 kg (176 lb 5.9 oz)      Gen: No distress.  HEENT: Normocephalic, atraumatic.   CV: Regular rate and rhythm. Extremities warm, well perfused.   Resp: No respiratory distress. CTAB.  Abd: Soft, nontender.  Ext: No edema.  Neuro: Alert, oriented, appropriately interactive.     Laboratory data:   Na/K/Cl/CO2:  137/6.0/98/23 (06/20 0614)   BUN/Cr/glu/ALT/AST/amyl/lip:  49/7.6/--/--/--/--/-- (06/20 0614)    WBC/Hgb/Hct/Plts:  8.9/7.2/21.6/232 (06/20 0614)     Therapy progress:       PT    Supine to Sit: Partial/moderate assistance  Sit to Supine: Partial/moderate assistance   Sit to Stand: Partial/moderate assistance  Chair/Bed to Chair Transfer: Partial/moderate assistance  Car Transfer: Partial/moderate assistance  Ambulation 10 ft: Partial/moderate assistance  Ambulation 50 ft: Partial/moderate assistance  Ambulation 150 ft:    Stairs - 1 Step: Supervision or touching assistance  Stairs - 4 Step: Supervision or touching assistance  Stairs - 12 Step:      OT    Eating: Setup or clean-up assistance  Oral Hygiene:

## 2025-06-20 NOTE — PROGRESS NOTES
Night shift assessment completed. Routine vitals have been obtained. Scheduled medications given. Patient is awake, alert and oriented/ talking to staff. Respirations are easy and unlabored with no c/o SOB. Skin has been assessed per writer. Patient does not appear to be in distress. Call light within reach. Standard safety measures are in place. All needs have been met at this time.

## 2025-06-21 LAB
GLUCOSE BLD-MCNC: 139 MG/DL (ref 70–99)
GLUCOSE BLD-MCNC: 141 MG/DL (ref 70–99)
GLUCOSE BLD-MCNC: 194 MG/DL (ref 70–99)
GLUCOSE BLD-MCNC: 226 MG/DL (ref 70–99)
PERFORMED ON: ABNORMAL

## 2025-06-21 PROCEDURE — 1280000000 HC REHAB R&B

## 2025-06-21 PROCEDURE — 97530 THERAPEUTIC ACTIVITIES: CPT

## 2025-06-21 PROCEDURE — 97116 GAIT TRAINING THERAPY: CPT

## 2025-06-21 PROCEDURE — 92526 ORAL FUNCTION THERAPY: CPT

## 2025-06-21 PROCEDURE — 97129 THER IVNTJ 1ST 15 MIN: CPT

## 2025-06-21 PROCEDURE — 97535 SELF CARE MNGMENT TRAINING: CPT

## 2025-06-21 PROCEDURE — 6370000000 HC RX 637 (ALT 250 FOR IP): Performed by: STUDENT IN AN ORGANIZED HEALTH CARE EDUCATION/TRAINING PROGRAM

## 2025-06-21 RX ADMIN — INSULIN LISPRO 2 UNITS: 100 INJECTION, SOLUTION INTRAVENOUS; SUBCUTANEOUS at 08:43

## 2025-06-21 RX ADMIN — LINACLOTIDE 145 MCG: 145 CAPSULE, GELATIN COATED ORAL at 05:17

## 2025-06-21 RX ADMIN — APIXABAN 2.5 MG: 2.5 TABLET, FILM COATED ORAL at 08:42

## 2025-06-21 RX ADMIN — POLYETHYLENE GLYCOL 3350 17 G: 17 POWDER, FOR SOLUTION ORAL at 21:30

## 2025-06-21 RX ADMIN — ASPIRIN 81 MG: 81 TABLET, DELAYED RELEASE ORAL at 08:42

## 2025-06-21 RX ADMIN — APIXABAN 2.5 MG: 2.5 TABLET, FILM COATED ORAL at 21:31

## 2025-06-21 RX ADMIN — METOPROLOL TARTRATE 12.5 MG: 25 TABLET, FILM COATED ORAL at 08:42

## 2025-06-21 RX ADMIN — ATORVASTATIN CALCIUM 80 MG: 80 TABLET, FILM COATED ORAL at 21:30

## 2025-06-21 RX ADMIN — STANDARDIZED SENNA CONCENTRATE AND DOCUSATE SODIUM 1 TABLET: 8.6; 5 TABLET ORAL at 08:45

## 2025-06-21 RX ADMIN — INSULIN LISPRO 2 UNITS: 100 INJECTION, SOLUTION INTRAVENOUS; SUBCUTANEOUS at 12:16

## 2025-06-21 RX ADMIN — BISACODYL 10 MG: 10 SUPPOSITORY RECTAL at 05:17

## 2025-06-21 RX ADMIN — STANDARDIZED SENNA CONCENTRATE AND DOCUSATE SODIUM 1 TABLET: 8.6; 5 TABLET ORAL at 21:31

## 2025-06-21 RX ADMIN — AMIODARONE HYDROCHLORIDE 200 MG: 200 TABLET ORAL at 09:19

## 2025-06-21 RX ADMIN — PANTOPRAZOLE SODIUM 40 MG: 40 TABLET, DELAYED RELEASE ORAL at 05:17

## 2025-06-21 RX ADMIN — METHADONE HYDROCHLORIDE 95 MG: 10 CONCENTRATE ORAL at 06:34

## 2025-06-21 RX ADMIN — ACETAMINOPHEN 650 MG: 325 TABLET ORAL at 08:42

## 2025-06-21 RX ADMIN — ALOGLIPTIN 6.25 MG: 6.25 TABLET, FILM COATED ORAL at 08:43

## 2025-06-21 ASSESSMENT — PAIN SCALES - GENERAL
PAINLEVEL_OUTOF10: 0

## 2025-06-21 NOTE — PROGRESS NOTES
Nephrology Progress Note  [facility]  [phone]  [email or web address]        Subjective:   Colby Ovalle: 75 y.o.  male who we are seeing in consultation for ESRD Management.    Brief HPI:  Colby Ovalle is a 75 y.o. male with PMH significant for hypertension, hyperlipidemia, DM II, non-Hodgkin's lymphoma, chronic pain syndrome, chronic alcohol dependence, ESRD on maintenance hemodialysis , nephrolithiasis presented to [facility] with chest pain.  He has known history of CAD with prior PCI/arthrectomy to ostial-mid LAD.  He was taken to cardiac cath lab on 2025 and angiogram showed  severe two-vessel disease with 95% ostial circumflex disease and 99% in-stent restenosis of LAD.  At that time, LVEF was measured at 30% and an intra-aortic balloon pump placed.  He was transferred to [facility] on  for consideration of CABG.He Underwent CABG x 2 on .  Returned to the ICU intubated and sedated. Eventually extubated.   Transferred to ARU on .       Interval History / Subjective:  In ARU, Seen and examined this am/   No complaints.      Objective:   VITALS:    Vitals:    25 0900   BP: 97/61   Pulse: 70   Resp: 16   Temp: 98.6 °F (37 °C)   SpO2: 96%                                                                                    Temp (24hrs), Av.2 °F (36.8 °C), Min:97.8 °F (36.6 °C), Max:98.6 °F (37 °C)     BP  Min: 97/61  Max: 131/59                                  Pulse  Av  Min: 52  Max: 70    24HR INTAKE/OUTPUT:  No intake or output data in the 24 hours ending 25 1537      Wt Readings from Last 3 Encounters:   25 77 kg (169 lb 12.1 oz)   25 82.4 kg (181 lb 10.5 oz)   25 77 kg (169 lb 12.1 oz)       Physical Exam:    Gen:  not in acute distress, on RA  Lungs:  CTAB  Cardiovascular: RRR   Edema:  + edema  Abd: soft, non tender, positive bowel sounds  Neuro: awake and alert       Access : Left upper arm    Monitor Phosphorus levels     MSSA Staphylococcus lugdunensis bacteremia .   ID team now following. Getting IV Cefazolin   Had a midline that was removed.   Plan for IV Cefazolin  2 g with hemodialysis Mondays and Wednesdays and 3 g on Fridays, end date July 1st, 2025.         Thank you for allowing me to participate in the care of this patient. I will continue to follow along.   Please contact via [employer] if any questions or concerns.     Obdulia Moreno MD   [facility] ([facility])  6/21/2025

## 2025-06-21 NOTE — PROGRESS NOTES
[facility] - Inpatient Rehabilitation Department   Phone: [phone]    Occupational Therapy    [] Initial Evaluation            [x] Daily Treatment Note         [] Discharge Summary      Patient: Colby Ovalle   : 1950   MRN: [id number]   Date of Service:  2025    Admitting Diagnosis:  Disease of cardiovascular system  Current Admission Summary:   Colby Ovalle is a 75 y.o. with PMHx notable for HTN, HLD, ESRD who presented to [facility] on 25 with chest pain, found to have NSTEMI, underwent cardiac catheterization on  showing severe 2-vessel CAD, and was transferred to [facility] on 2025 for CVTS evaluation. He underwent CABG x2 on . Hospital course complicated by: A-fib, ESRD, staph bacteremia, constipation.   Past Medical History:  has a past medical history of Alcohol dependence in remission (HCC), Cancer (HCC), Chronic back pain, COVID, End-stage renal disease on hemodialysis (HCC), Erectile dysfunction, Full dentures, Hepatitis C, Hx of non-Hodgkin's lymphoma, Hyperlipidemia, Hypertension, Kidney stone, Lumbago, Opioid dependence (HCC), Thrombocytopenia, Tobacco dependency, and Type 2 diabetes mellitus without complication (formerly [facility]).  Past Surgical History:  has a past surgical history that includes Port Surgery; Cystoscopy; back surgery; Colonoscopy; Dialysis fistula creation (Left, 3/1/2022); Cardiac procedure (N/A, 10/3/2024); Cardiac procedure (N/A, 10/3/2024); Cardiac procedure (N/A, 10/3/2024); invasive vascular (N/A, 10/8/2024); Cardiac procedure (N/A, 2025); Cardiac procedure (N/A, 2025); and Coronary artery bypass graft (N/A, 2025).    Discharge Recommendations: Home with HHOT. Anticipate initial 24 hour supervision     DME Required For Discharge: DME to be determined pending patient progress    Precautions/Restrictions: high fall risk, 1500 ml fluid restriction, LUE limb restriction   Weight Bearing Restrictions: no restrictions

## 2025-06-21 NOTE — PROGRESS NOTES
[facility] - Inpatient Rehabilitation Department   Phone: [phone]    Physical Therapy    [] Initial Evaluation            [x] Daily Treatment Note         [] Discharge Summary      Patient: Colby Ovalle   : 1950   MRN: [id number]   Date of Service:  2025  Admitting Diagnosis: Disease of cardiovascular system  Current Admission Summary: Colby Ovalle is a 75 y.o. with PMHx notable for HTN, HLD, ESRD who presented to [facility] on 25 with chest pain, found to have NSTEMI, underwent cardiac catheterization on  showing severe 2-vessel CAD, and was transferred to [facility] on 2025 for CVTS evaluation. He underwent CABG x2 on . Hospital course complicated by: A-fib, ESRD, staph bacteremia, constipation.   Past Medical History:  has a past medical history of Alcohol dependence in remission (HCC), Cancer (HCC), Chronic back pain, COVID, End-stage renal disease on hemodialysis (HCC), Erectile dysfunction, Full dentures, Hepatitis C, Hx of non-Hodgkin's lymphoma, Hyperlipidemia, Hypertension, Kidney stone, Lumbago, Opioid dependence (HCC), Thrombocytopenia, Tobacco dependency, and Type 2 diabetes mellitus without complication ([facility]).  Past Surgical History:  has a past surgical history that includes Port Surgery; Cystoscopy; back surgery; Colonoscopy; Dialysis fistula creation (Left, 3/1/2022); Cardiac procedure (N/A, 10/3/2024); Cardiac procedure (N/A, 10/3/2024); Cardiac procedure (N/A, 10/3/2024); invasive vascular (N/A, 10/8/2024); Cardiac procedure (N/A, 2025); Cardiac procedure (N/A, 2025); and Coronary artery bypass graft (N/A, 2025).  Discharge Recommendations: home with home health PT  DME Required For Discharge: no DME required at discharge  Precautions/Restrictions: high fall risk  Weight Bearing Restrictions: weight bearing as tolerated  [] Right Upper Extremity  [] Left Upper Extremity [] Right Lower Extremity  [] Left Lower Extremity    requiring frequent rest breaks throughout session d/t fatigue. Pt will benefit from skilled PT services to address deficits and promote safe return to the home environment.  Safety Interventions: patient left in chair, chair alarm in place, call light within reach, gait belt, and patient at risk for falls    Plan  Frequency: 5 x/week, 75 min/day  Current Treatment Recommendations: strengthening, ROM, balance training, functional mobility training, transfer training, gait training, stair training, endurance training, neuromuscular re-education, manual therapy - soft tissue massage, patient/caregiver education, and equipment evaluation/education    Goals  Patient Goals: to get stronger  Short Term Goals:  Time Frame: 2 weeks  Patient will complete bed mobility at modified independent   Patient will complete transfers at [facility]   Patient will ambulate 150 ft with use of LRAD at [facility]  Patient will ascend/descend 12 stairs with (B) handrail at modified independent  Patient will complete car transfer at modified independent    Above goals reviewed on 6/21/2025.  All goals are ongoing at this time unless indicated above.      Therapy Session Time      Individual Group Co-treatment   Time In  1045       Time Out 1130       Minutes  45       Second Session Therapy Time:   Individual Concurrent Group Co-treatment   Time In  1345         Time Out  1430         Minutes  45           Timed Code Treatment Minutes:   45+45  Total Treatment Minutes:  90 minutes       Electronically Signed By: Blanche Blackman, PT, Blanche Blackman PT, DPT [id number]

## 2025-06-21 NOTE — PLAN OF CARE
Problem: Discharge Planning  Goal: Discharge to home or other facility with appropriate resources  6/21/2025 1321 by Silvia Olguin, RN  Outcome: Progressing     Problem: Safety - Adult  Goal: Free from fall injury  6/21/2025 1321 by Silvia Olguin, RN  Outcome: Progressing     Problem: Pain  Goal: Verbalizes/displays adequate comfort level or baseline comfort level  6/21/2025 1321 by Silvia Olguin, RN  Outcome: Progressing

## 2025-06-21 NOTE — PLAN OF CARE
Problem: Chronic Conditions and Co-morbidities  Goal: Patient's chronic conditions and co-morbidity symptoms are monitored and maintained or improved  6/20/2025 2344 by Sherie Chi RN  Outcome: Progressing     Problem: Discharge Planning  Goal: Discharge to home or other facility with appropriate resources  6/20/2025 2344 by Sherie Chi RN  Outcome: Progressing     Problem: Safety - Adult  Goal: Free from fall injury  6/20/2025 2344 by Sherie Chi RN  Outcome: Progressing     Problem: ABCDS Injury Assessment  Goal: Absence of physical injury  6/20/2025 2344 by Sherie Chi RN  Outcome: Progressing     Problem: Pain  Goal: Verbalizes/displays adequate comfort level or baseline comfort level  6/20/2025 2344 by Sherie Chi RN  Outcome: Progressing

## 2025-06-21 NOTE — PROGRESS NOTES
[facility] - Inpatient Rehabilitation Department   Phone: [phone]    Speech Therapy   Daily Treatment Note     Patient: Colby Ovalle   : 1950   MRN: [id number]   Date of Service:  2025  Admitting Diagnosis: Disease of cardiovascular system  Current Admission Summary: Colby Ovalle is a 75 y.o. with PMHx notable for HTN, HLD, ESRD who presented to [facility] on 25 with chest pain, found to have NSTEMI, underwent cardiac catheterization on  showing severe 2-vessel CAD, and was transferred to [facility] on 2025 for CVTS evaluation. He underwent CABG x2 on . Hospital course complicated by: A-fib, ESRD, staph bacteremia, constipation.   Past Medical History:  has a past medical history of Alcohol dependence in remission (HCC), Cancer (HCC), Chronic back pain, COVID, End-stage renal disease on hemodialysis (HCC), Erectile dysfunction, Full dentures, Hepatitis C, Hx of non-Hodgkin's lymphoma, Hyperlipidemia, Hypertension, Kidney stone, Lumbago, Opioid dependence (HCC), Thrombocytopenia, Tobacco dependency, and Type 2 diabetes mellitus without complication (HCC).  Past Surgical History:  has a past surgical history that includes Port Surgery; Cystoscopy; back surgery; Colonoscopy; Dialysis fistula creation (Left, 3/1/2022); Cardiac procedure (N/A, 10/3/2024); Cardiac procedure (N/A, 10/3/2024); Cardiac procedure (N/A, 10/3/2024); invasive vascular (N/A, 10/8/2024); Cardiac procedure (N/A, 2025); Cardiac procedure (N/A, 2025); and Coronary artery bypass graft (N/A, 2025).  Recent MRI Brain/Head CT:  No orders to display     Recent Chest xray/Chest CT:   IMPRESSION:  Stable appearance of subtle multifocal airspace opacities compatible with  multifocal pneumonia.  Precautions/Restrictions: high fall risk, Sternal precautions    Pre-Admission Information   Living Status: Lives at home with wife (Asya) and daughter  Occupation/School: Completed 12th  recommended diet and advanced trials with no overt s/s of aspiration.   GOAL MET   Pt will improve executive function for multifactor task completion via graded tasks to 75% accuracy  Patient will demonstrate insight into limitations and impact on daily functioning with min cues.  Pt will improve attention to detail for graded complex information to 70%, for improved recall and retention of newly learned information   Pt will verbally sequence functional task with 80% accuracy given min cueing    Above goals reviewed on 6/21/2025. All goals are ongoing at this time unless indicated above.       Therapy Session Time      Session 1 Session 2   Time In 0800    Time Out 0830    Time Code Minutes 20    Individual Minutes 30      Timed Code Treatment Minutes:  20  Total Treatment Minutes:  30    Electronically Signed By:   Ronaldo Peralta M.S. CF-SLP [id number]  Speech-Language Pathologist   6/21/2025

## 2025-06-22 VITALS
HEIGHT: 74 IN | SYSTOLIC BLOOD PRESSURE: 124 MMHG | WEIGHT: 179.68 LBS | TEMPERATURE: 97.8 F | HEART RATE: 57 BPM | RESPIRATION RATE: 16 BRPM | DIASTOLIC BLOOD PRESSURE: 63 MMHG | OXYGEN SATURATION: 95 % | BODY MASS INDEX: 23.06 KG/M2

## 2025-06-22 LAB
GLUCOSE BLD-MCNC: 152 MG/DL (ref 70–99)
GLUCOSE BLD-MCNC: 179 MG/DL (ref 70–99)
GLUCOSE BLD-MCNC: 189 MG/DL (ref 70–99)
GLUCOSE BLD-MCNC: 275 MG/DL (ref 70–99)
PERFORMED ON: ABNORMAL

## 2025-06-22 PROCEDURE — 6370000000 HC RX 637 (ALT 250 FOR IP): Performed by: STUDENT IN AN ORGANIZED HEALTH CARE EDUCATION/TRAINING PROGRAM

## 2025-06-22 PROCEDURE — 1280000000 HC REHAB R&B

## 2025-06-22 RX ADMIN — APIXABAN 2.5 MG: 2.5 TABLET, FILM COATED ORAL at 20:59

## 2025-06-22 RX ADMIN — STANDARDIZED SENNA CONCENTRATE AND DOCUSATE SODIUM 1 TABLET: 8.6; 5 TABLET ORAL at 07:52

## 2025-06-22 RX ADMIN — ASPIRIN 81 MG: 81 TABLET, DELAYED RELEASE ORAL at 07:52

## 2025-06-22 RX ADMIN — ALOGLIPTIN 6.25 MG: 6.25 TABLET, FILM COATED ORAL at 07:53

## 2025-06-22 RX ADMIN — METOPROLOL TARTRATE 12.5 MG: 25 TABLET, FILM COATED ORAL at 07:52

## 2025-06-22 RX ADMIN — AMIODARONE HYDROCHLORIDE 200 MG: 200 TABLET ORAL at 07:52

## 2025-06-22 RX ADMIN — PANTOPRAZOLE SODIUM 40 MG: 40 TABLET, DELAYED RELEASE ORAL at 05:42

## 2025-06-22 RX ADMIN — METOPROLOL TARTRATE 12.5 MG: 25 TABLET, FILM COATED ORAL at 20:59

## 2025-06-22 RX ADMIN — STANDARDIZED SENNA CONCENTRATE AND DOCUSATE SODIUM 1 TABLET: 8.6; 5 TABLET ORAL at 20:59

## 2025-06-22 RX ADMIN — LINACLOTIDE 145 MCG: 145 CAPSULE, GELATIN COATED ORAL at 05:42

## 2025-06-22 RX ADMIN — ATORVASTATIN CALCIUM 80 MG: 80 TABLET, FILM COATED ORAL at 20:59

## 2025-06-22 RX ADMIN — POLYETHYLENE GLYCOL 3350 17 G: 17 POWDER, FOR SOLUTION ORAL at 20:58

## 2025-06-22 RX ADMIN — INSULIN LISPRO 2 UNITS: 100 INJECTION, SOLUTION INTRAVENOUS; SUBCUTANEOUS at 12:43

## 2025-06-22 RX ADMIN — INSULIN LISPRO 2 UNITS: 100 INJECTION, SOLUTION INTRAVENOUS; SUBCUTANEOUS at 21:01

## 2025-06-22 RX ADMIN — METHADONE HYDROCHLORIDE 95 MG: 10 CONCENTRATE ORAL at 06:09

## 2025-06-22 RX ADMIN — APIXABAN 2.5 MG: 2.5 TABLET, FILM COATED ORAL at 07:52

## 2025-06-22 ASSESSMENT — PAIN SCALES - GENERAL: PAINLEVEL_OUTOF10: 0

## 2025-06-22 NOTE — PLAN OF CARE
Problem: Chronic Conditions and Co-morbidities  Goal: Patient's chronic conditions and co-morbidity symptoms are monitored and maintained or improved  Outcome: Progressing     Problem: Discharge Planning  Goal: Discharge to home or other facility with appropriate resources  6/21/2025 2308 by Sherie Chi RN  Outcome: Progressing     Problem: Safety - Adult  Goal: Free from fall injury  6/21/2025 2308 by Sherie Chi RN  Outcome: Progressing     Problem: ABCDS Injury Assessment  Goal: Absence of physical injury  Outcome: Progressing     Problem: Pain  Goal: Verbalizes/displays adequate comfort level or baseline comfort level  6/21/2025 2308 by Sherie Chi RN  Outcome: Progressing

## 2025-06-22 NOTE — PROGRESS NOTES
Nephrology Progress Note  [facility]  [phone]  [email or web address]        Subjective:   Colby Ovalle: 75 y.o.  male who we are seeing in consultation for ESRD Management.    Brief HPI:  Colby Ovalle is a 75 y.o. male with PMH significant for hypertension, hyperlipidemia, DM II, non-Hodgkin's lymphoma, chronic pain syndrome, chronic alcohol dependence, ESRD on maintenance hemodialysis , nephrolithiasis presented to [facility] with chest pain.  He has known history of CAD with prior PCI/arthrectomy to ostial-mid LAD.  He was taken to cardiac cath lab on 2025 and angiogram showed  severe two-vessel disease with 95% ostial circumflex disease and 99% in-stent restenosis of LAD.  At that time, LVEF was measured at 30% and an intra-aortic balloon pump placed.  He was transferred to [facility] on  for consideration of CABG.He Underwent CABG x 2 on .  Returned to the ICU intubated and sedated. Eventually extubated.   Transferred to ARU on .       Interval History / Subjective:  In ARU      Objective:   VITALS:    Vitals:    25 0745   BP: 127/66   Pulse: 66   Resp: 16   Temp: 98.6 °F (37 °C)   SpO2: 95%                                                                                    Temp (24hrs), Av.6 °F (37 °C), Min:98.5 °F (36.9 °C), Max:98.6 °F (37 °C)     BP  Min: 103/55  Max: 127/66                                  Pulse  Av  Min: 52  Max: 66    24HR INTAKE/OUTPUT:    Intake/Output Summary (Last 24 hours) at 2025 1528  Last data filed at 2025 0845  Gross per 24 hour   Intake 480 ml   Output --   Net 480 ml         Wt Readings from Last 3 Encounters:   25 81.5 kg (179 lb 10.8 oz)   25 82.4 kg (181 lb 10.5 oz)   25 77 kg (169 lb 12.1 oz)       Physical Exam:    Gen:  not in acute distress, on RA  Lungs:  CTAB  Cardiovascular: RRR   Edema:  + edema  Abd: soft, non tender, positive bowel sounds  Neuro:

## 2025-06-23 LAB
ANION GAP SERPL CALCULATED.3IONS-SCNC: 15 MMOL/L (ref 3–16)
BASOPHILS # BLD: 0 K/UL (ref 0–0.2)
BASOPHILS NFR BLD: 0.4 %
BUN SERPL-MCNC: 56 MG/DL (ref 7–20)
CALCIUM SERPL-MCNC: 9 MG/DL (ref 8.3–10.6)
CHLORIDE SERPL-SCNC: 96 MMOL/L (ref 99–110)
CO2 SERPL-SCNC: 25 MMOL/L (ref 21–32)
CREAT SERPL-MCNC: 9 MG/DL (ref 0.8–1.3)
DEPRECATED RDW RBC AUTO: 16.3 % (ref 12.4–15.4)
EOSINOPHIL # BLD: 0.1 K/UL (ref 0–0.6)
EOSINOPHIL NFR BLD: 1.2 %
GFR SERPLBLD CREATININE-BSD FMLA CKD-EPI: 6 ML/MIN/{1.73_M2}
GLUCOSE BLD-MCNC: 158 MG/DL (ref 70–99)
GLUCOSE BLD-MCNC: 189 MG/DL (ref 70–99)
GLUCOSE BLD-MCNC: 243 MG/DL (ref 70–99)
GLUCOSE BLD-MCNC: 94 MG/DL (ref 70–99)
GLUCOSE SERPL-MCNC: 182 MG/DL (ref 70–99)
HBV SURFACE AG SERPL QL IA: NORMAL
HCT VFR BLD AUTO: 21.5 % (ref 40.5–52.5)
HGB BLD-MCNC: 7.1 G/DL (ref 13.5–17.5)
LYMPHOCYTES # BLD: 0.9 K/UL (ref 1–5.1)
LYMPHOCYTES NFR BLD: 14.1 %
MCH RBC QN AUTO: 30.5 PG (ref 26–34)
MCHC RBC AUTO-ENTMCNC: 32.9 G/DL (ref 31–36)
MCV RBC AUTO: 92.5 FL (ref 80–100)
MONOCYTES # BLD: 0.5 K/UL (ref 0–1.3)
MONOCYTES NFR BLD: 8 %
NEUTROPHILS # BLD: 5.1 K/UL (ref 1.7–7.7)
NEUTROPHILS NFR BLD: 76.3 %
PERFORMED ON: ABNORMAL
PERFORMED ON: NORMAL
PLATELET # BLD AUTO: 214 K/UL (ref 135–450)
PMV BLD AUTO: 8.4 FL (ref 5–10.5)
POTASSIUM SERPL-SCNC: 4.9 MMOL/L (ref 3.5–5.1)
RBC # BLD AUTO: 2.33 M/UL (ref 4.2–5.9)
SODIUM SERPL-SCNC: 136 MMOL/L (ref 136–145)
WBC # BLD AUTO: 6.7 K/UL (ref 4–11)

## 2025-06-23 PROCEDURE — 80048 BASIC METABOLIC PNL TOTAL CA: CPT

## 2025-06-23 PROCEDURE — 85025 COMPLETE CBC W/AUTO DIFF WBC: CPT

## 2025-06-23 PROCEDURE — 97530 THERAPEUTIC ACTIVITIES: CPT

## 2025-06-23 PROCEDURE — 36415 COLL VENOUS BLD VENIPUNCTURE: CPT

## 2025-06-23 PROCEDURE — 6360000002 HC RX W HCPCS: Performed by: INTERNAL MEDICINE

## 2025-06-23 PROCEDURE — 97116 GAIT TRAINING THERAPY: CPT

## 2025-06-23 PROCEDURE — 2500000003 HC RX 250 WO HCPCS: Performed by: INTERNAL MEDICINE

## 2025-06-23 PROCEDURE — 6370000000 HC RX 637 (ALT 250 FOR IP): Performed by: STUDENT IN AN ORGANIZED HEALTH CARE EDUCATION/TRAINING PROGRAM

## 2025-06-23 PROCEDURE — 97535 SELF CARE MNGMENT TRAINING: CPT

## 2025-06-23 PROCEDURE — 97129 THER IVNTJ 1ST 15 MIN: CPT

## 2025-06-23 PROCEDURE — 1280000000 HC REHAB R&B

## 2025-06-23 PROCEDURE — 87340 HEPATITIS B SURFACE AG IA: CPT

## 2025-06-23 PROCEDURE — 97110 THERAPEUTIC EXERCISES: CPT

## 2025-06-23 PROCEDURE — 97130 THER IVNTJ EA ADDL 15 MIN: CPT

## 2025-06-23 PROCEDURE — 90935 HEMODIALYSIS ONE EVALUATION: CPT

## 2025-06-23 PROCEDURE — 6360000002 HC RX W HCPCS: Performed by: STUDENT IN AN ORGANIZED HEALTH CARE EDUCATION/TRAINING PROGRAM

## 2025-06-23 RX ADMIN — STANDARDIZED SENNA CONCENTRATE AND DOCUSATE SODIUM 1 TABLET: 8.6; 5 TABLET ORAL at 08:21

## 2025-06-23 RX ADMIN — PANTOPRAZOLE SODIUM 40 MG: 40 TABLET, DELAYED RELEASE ORAL at 05:56

## 2025-06-23 RX ADMIN — ACETAMINOPHEN 650 MG: 325 TABLET ORAL at 13:07

## 2025-06-23 RX ADMIN — METHADONE HYDROCHLORIDE 95 MG: 10 CONCENTRATE ORAL at 05:56

## 2025-06-23 RX ADMIN — INSULIN LISPRO 2 UNITS: 100 INJECTION, SOLUTION INTRAVENOUS; SUBCUTANEOUS at 12:01

## 2025-06-23 RX ADMIN — INSULIN LISPRO 2 UNITS: 100 INJECTION, SOLUTION INTRAVENOUS; SUBCUTANEOUS at 08:21

## 2025-06-23 RX ADMIN — EPOETIN ALFA-EPBX 10000 UNITS: 10000 INJECTION, SOLUTION INTRAVENOUS; SUBCUTANEOUS at 19:39

## 2025-06-23 RX ADMIN — ASPIRIN 81 MG: 81 TABLET, DELAYED RELEASE ORAL at 08:21

## 2025-06-23 RX ADMIN — APIXABAN 2.5 MG: 2.5 TABLET, FILM COATED ORAL at 08:21

## 2025-06-23 RX ADMIN — CEFAZOLIN 2000 MG: 2 INJECTION, POWDER, FOR SOLUTION INTRAMUSCULAR; INTRAVENOUS at 20:40

## 2025-06-23 RX ADMIN — ALOGLIPTIN 6.25 MG: 6.25 TABLET, FILM COATED ORAL at 08:20

## 2025-06-23 RX ADMIN — LINACLOTIDE 145 MCG: 145 CAPSULE, GELATIN COATED ORAL at 05:56

## 2025-06-23 RX ADMIN — APIXABAN 2.5 MG: 2.5 TABLET, FILM COATED ORAL at 22:30

## 2025-06-23 RX ADMIN — ATORVASTATIN CALCIUM 80 MG: 80 TABLET, FILM COATED ORAL at 22:30

## 2025-06-23 RX ADMIN — AMIODARONE HYDROCHLORIDE 200 MG: 200 TABLET ORAL at 08:21

## 2025-06-23 RX ADMIN — MIDODRINE HYDROCHLORIDE 10 MG: 5 TABLET ORAL at 18:41

## 2025-06-23 RX ADMIN — LIDOCAINE AND PRILOCAINE: 25; 25 CREAM TOPICAL at 16:56

## 2025-06-23 RX ADMIN — STANDARDIZED SENNA CONCENTRATE AND DOCUSATE SODIUM 1 TABLET: 8.6; 5 TABLET ORAL at 22:30

## 2025-06-23 RX ADMIN — POLYETHYLENE GLYCOL 3350 17 G: 17 POWDER, FOR SOLUTION ORAL at 22:30

## 2025-06-23 RX ADMIN — METOPROLOL TARTRATE 12.5 MG: 25 TABLET, FILM COATED ORAL at 08:21

## 2025-06-23 ASSESSMENT — ENCOUNTER SYMPTOMS
SHORTNESS OF BREATH: 0
DIARRHEA: 0
EYE DISCHARGE: 0
NAUSEA: 0
SORE THROAT: 0
ABDOMINAL PAIN: 0
BACK PAIN: 0
WHEEZING: 0
COUGH: 0
SINUS PAIN: 0
CONSTIPATION: 0
EYE REDNESS: 0
SINUS PRESSURE: 0
RHINORRHEA: 0

## 2025-06-23 ASSESSMENT — PAIN - FUNCTIONAL ASSESSMENT: PAIN_FUNCTIONAL_ASSESSMENT: ACTIVITIES ARE NOT PREVENTED

## 2025-06-23 ASSESSMENT — PAIN DESCRIPTION - LOCATION: LOCATION: CHEST;LEG

## 2025-06-23 ASSESSMENT — PAIN SCALES - GENERAL
PAINLEVEL_OUTOF10: 5
PAINLEVEL_OUTOF10: 0
PAINLEVEL_OUTOF10: 7

## 2025-06-23 ASSESSMENT — PAIN DESCRIPTION - DESCRIPTORS: DESCRIPTORS: ACHING;SORE

## 2025-06-23 ASSESSMENT — PAIN DESCRIPTION - ORIENTATION: ORIENTATION: RIGHT;LEFT;MID

## 2025-06-23 NOTE — PROGRESS NOTES
[facility] - Inpatient Rehabilitation Department   Phone: [phone]    Occupational Therapy    [] Initial Evaluation            [x] Daily Treatment Note         [] Discharge Summary      Patient: Colby Ovalle   : 1950   MRN: [id number]   Date of Service:  2025    Admitting Diagnosis:  Disease of cardiovascular system  Current Admission Summary:   Colby Ovalle is a 75 y.o. with PMHx notable for HTN, HLD, ESRD who presented to [facility] on 25 with chest pain, found to have NSTEMI, underwent cardiac catheterization on  showing severe 2-vessel CAD, and was transferred to [facility] on 2025 for CVTS evaluation. He underwent CABG x2 on . Hospital course complicated by: A-fib, ESRD, staph bacteremia, constipation.   Past Medical History:  has a past medical history of Alcohol dependence in remission (HCC), Cancer (HCC), Chronic back pain, COVID, End-stage renal disease on hemodialysis (HCC), Erectile dysfunction, Full dentures, Hepatitis C, Hx of non-Hodgkin's lymphoma, Hyperlipidemia, Hypertension, Kidney stone, Lumbago, Opioid dependence (HCC), Thrombocytopenia, Tobacco dependency, and Type 2 diabetes mellitus without complication ([facility]).  Past Surgical History:  has a past surgical history that includes Port Surgery; Cystoscopy; back surgery; Colonoscopy; Dialysis fistula creation (Left, 3/1/2022); Cardiac procedure (N/A, 10/3/2024); Cardiac procedure (N/A, 10/3/2024); Cardiac procedure (N/A, 10/3/2024); invasive vascular (N/A, 10/8/2024); Cardiac procedure (N/A, 2025); Cardiac procedure (N/A, 2025); and Coronary artery bypass graft (N/A, 2025).    Discharge Recommendations: Home with HHOT. Anticipate initial 24 hour supervision     DME Required For Discharge: DME to be determined pending patient progress    Precautions/Restrictions: high fall risk, 1500 ml fluid restriction, LUE limb restriction   Weight Bearing Restrictions: no restrictions

## 2025-06-23 NOTE — PLAN OF CARE
Problem: Chronic Conditions and Co-morbidities  Goal: Patient's chronic conditions and co-morbidity symptoms are monitored and maintained or improved  Outcome: Progressing  Flowsheets (Taken 6/22/2025 2030 by Jenna Friedman, RN)  Care Plan - Patient's Chronic Conditions and Co-Morbidity Symptoms are Monitored and Maintained or Improved: Monitor and assess patient's chronic conditions and comorbid symptoms for stability, deterioration, or improvement     Problem: Discharge Planning  Goal: Discharge to home or other facility with appropriate resources  6/22/2025 2307 by Ursula Alcazar RN  Outcome: Progressing  Flowsheets (Taken 6/22/2025 2030 by Jenna Friedman, RN)  Discharge to home or other facility with appropriate resources: Identify barriers to discharge with patient and caregiver     Problem: Safety - Adult  Goal: Free from fall injury  6/22/2025 2307 by Ursula Alcazar RN  Outcome: Progressing     Problem: ABCDS Injury Assessment  Goal: Absence of physical injury  Outcome: Progressing  Flowsheets (Taken 6/22/2025 2305)  Absence of Physical Injury: Implement safety measures based on patient assessment     Problem: Pain  Goal: Verbalizes/displays adequate comfort level or baseline comfort level  6/22/2025 2307 by Ursula Alcazar RN  Outcome: Progressing     Problem: Nutrition Deficit:  Goal: Optimize nutritional status  Outcome: Progressing

## 2025-06-23 NOTE — PROGRESS NOTES
[facility] - Inpatient Rehabilitation Department   Phone: [phone]    Speech Therapy   Daily Treatment Note     Patient: Colby Ovalle   : 1950   MRN: [id number]   Date of Service:  2025  Admitting Diagnosis: Disease of cardiovascular system  Current Admission Summary: Colby Ovalle is a 75 y.o. with PMHx notable for HTN, HLD, ESRD who presented to [facility] on 25 with chest pain, found to have NSTEMI, underwent cardiac catheterization on  showing severe 2-vessel CAD, and was transferred to [facility] on 2025 for CVTS evaluation. He underwent CABG x2 on . Hospital course complicated by: A-fib, ESRD, staph bacteremia, constipation.   Past Medical History:  has a past medical history of Alcohol dependence in remission (HCC), Cancer (HCC), Chronic back pain, COVID, End-stage renal disease on hemodialysis (HCC), Erectile dysfunction, Full dentures, Hepatitis C, Hx of non-Hodgkin's lymphoma, Hyperlipidemia, Hypertension, Kidney stone, Lumbago, Opioid dependence (HCC), Thrombocytopenia, Tobacco dependency, and Type 2 diabetes mellitus without complication (HCC).  Past Surgical History:  has a past surgical history that includes Port Surgery; Cystoscopy; back surgery; Colonoscopy; Dialysis fistula creation (Left, 3/1/2022); Cardiac procedure (N/A, 10/3/2024); Cardiac procedure (N/A, 10/3/2024); Cardiac procedure (N/A, 10/3/2024); invasive vascular (N/A, 10/8/2024); Cardiac procedure (N/A, 2025); Cardiac procedure (N/A, 2025); and Coronary artery bypass graft (N/A, 2025).  Recent MRI Brain/Head CT:  No orders to display     Recent Chest xray/Chest CT:   IMPRESSION:  Stable appearance of subtle multifocal airspace opacities compatible with  multifocal pneumonia.  Precautions/Restrictions: high fall risk, Sternal precautions    Pre-Admission Information   Living Status: Lives at home with wife (Asya) and daughter  Occupation/School: Completed 12th

## 2025-06-23 NOTE — PROGRESS NOTES
tender, positive bowel sounds  Neuro: awake and alert       Access : Left upper arm brachial artery to basilic AV fistula. Good thrill , no signs of infection     Laboratory Data :     CBC:   Lab Results   Component Value Date/Time    WBC 6.7 06/23/2025 05:50 AM    RBC 2.33 06/23/2025 05:50 AM    HGB 7.1 06/23/2025 05:50 AM    HCT 21.5 06/23/2025 05:50 AM    MCV 92.5 06/23/2025 05:50 AM    MCH 30.5 06/23/2025 05:50 AM    MCHC 32.9 06/23/2025 05:50 AM    RDW 16.3 06/23/2025 05:50 AM     06/23/2025 05:50 AM    MPV 8.4 06/23/2025 05:50 AM     BMP:    Lab Results   Component Value Date/Time     06/23/2025 05:50 AM    K 4.9 06/23/2025 05:50 AM    CL 96 06/23/2025 05:50 AM    CO2 25 06/23/2025 05:50 AM    BUN 56 06/23/2025 05:50 AM    CREATININE 9.0 06/23/2025 05:50 AM    CALCIUM 9.0 06/23/2025 05:50 AM    GFRAA 12 03/01/2022 07:15 AM    LABGLOM 6 06/23/2025 05:50 AM    GLUCOSE 182 06/23/2025 05:50 AM     Phosphorus:    Lab Results   Component Value Date/Time    PHOS 3.9 06/18/2025 07:27 AM           Assessment and Plan      ESRD on maintenance hemodialysis  HD MWF  Access left upper arm brachiobasilic AV fistula  He did cut short two of his treatments on 6/13 and 6/16, but staying for full sessions now.   Midodrine added before HD session given intradialytic hypotension   Plan for Hemodialysis 6/23    Hyperkalemia   Treated with HD   Lokelma as needed    NSTEMI, CAD  Left heart cath at [facility] showed multivessel disease with occlusion of previously placed stents.  Transferred to [facility] for evaluation of CABG which took place on 6/9/2025.     Afib with RVR  Now in NSR, rate controlled.   On amiodarone po , metoprolol     Hypertension  Was on Levo and vaso briefly post op, now both off.   BP soft/stable.   On metoprolol     DM II - per primary team     Anemia of chronic kidney disease  Acute drop post op, now stable.   URBANO with dialysis     CKD-MBD  On Velphoro.   Monitor Phosphorus levels     [facility]  Staphylococcus lugdunensis bacteremia .   ID team now following. Getting IV Cefazolin   Had a midline that was removed.   Plan for IV Cefazolin  2 g with hemodialysis Mondays and Wednesdays and 3 g on Fridays, end date July 1st, 2025.         Thank you for allowing me to participate in the care of this patient. I will continue to follow along.   Please contact via [employer] if any questions or concerns.     Brad Singh, DO   [facility]  6/23/2025

## 2025-06-23 NOTE — PROGRESS NOTES
Colby Ovalle  6/23/2025  [id number]    Chief Complaint: Disease of cardiovascular system    Subjective Seen in his room this morning.  Patient states that he was \"whooped\" after therapy today.  He has no pain but is slightly tired.  He is planning on continuing therapy later today.  Patient is looking forward to discharge this weekend.  Last Bowel Movement:  06/23/25          Objective    Patient Vitals for the past 24 hrs:   BP Temp Temp src Pulse Resp SpO2 Weight   06/23/25 0816 (!) 155/66 98.4 °F (36.9 °C) Oral 76 16 94 % --   06/23/25 0626 -- -- -- -- 16 -- --   06/23/25 0600 -- -- -- -- -- -- 83.6 kg (184 lb 4.9 oz)   06/22/25 2059 124/63 -- -- 57 -- -- --   06/22/25 1945 124/63 97.8 °F (36.6 °C) Oral 57 16 95 % --     Patient Vitals for the past 96 hrs (Last 3 readings):   Weight   06/23/25 0600 83.6 kg (184 lb 4.9 oz)   06/22/25 0530 81.5 kg (179 lb 10.8 oz)   06/20/25 1940 77 kg (169 lb 12.1 oz)      Gen: No distress.  HEENT: Normocephalic, atraumatic.   CV: Regular rate and rhythm. Extremities warm, well perfused.   Resp: No respiratory distress. CTAB.  Abd: Soft, nontender.  Ext: No edema.  Neuro: Alert, oriented, appropriately interactive.     Laboratory data:   Na/K/Cl/CO2:  136/4.9/96/25 (06/23 0550)   BUN/Cr/glu/ALT/AST/amyl/lip:  56/9.0/--/--/--/--/-- (06/23 0550)    WBC/Hgb/Hct/Plts:  6.7/7.1/21.5/214 (06/23 0550)     Therapy progress:       PT    Supine to Sit: Partial/moderate assistance  Sit to Supine: Partial/moderate assistance   Sit to Stand: Partial/moderate assistance  Chair/Bed to Chair Transfer: Partial/moderate assistance  Car Transfer: Partial/moderate assistance  Ambulation 10 ft: Partial/moderate assistance  Ambulation 50 ft: Partial/moderate assistance  Ambulation 150 ft:    Stairs - 1 Step: Supervision or touching assistance  Stairs - 4 Step: Supervision or touching assistance  Stairs - 12 Step:      OT    Eating: Setup or clean-up assistance  Oral Hygiene: Partial/moderate

## 2025-06-23 NOTE — PROGRESS NOTES
Infectious Diseases   Progress Note      Admission Date: 6/16/2025  Hospital Day: Hospital Day: 8   Attending: Higinio Callahan MD  Date of service: 6/23/2025     Chief complaint/ Reason for consult:     Staphylococcus lugdunensis bacteremia  End-stage renal disease on hemodialysis  AV graft in place  Coronary artery disease, status post CABG  Chronic hepatitis C    Microbiology:      I have reviewed allavailable micro lab data and cultures    Results       Procedure Component Value Units Date/Time    Culture, Tip [[id number]] Collected: 06/14/25 1739    Order Status: Completed Specimen: Catheter Tip Updated: 06/18/25 1314     Culture Catheter Tip No growth at 96 hours    Narrative:      ORDER#: [id number]                          ORDERED BY: ANJU RAIN  SOURCE: Catheter Tip                       COLLECTED:  06/14/25 17:39  ANTIBIOTICS AT MAGUI.:                      RECEIVED :  06/15/25 01:45    Culture, Blood 1 [[id number]] Collected: 06/14/25 1730    Order Status: Completed Specimen: Blood Updated: 06/18/25 1815     Blood Culture, Routine No Growth after 4 days of incubation.    Narrative:      ORDER#: [id number]                          ORDERED BY: ANJU RAIN  SOURCE: Blood RIJ                          COLLECTED:  06/14/25 17:30  ANTIBIOTICS AT MAGUI.:                      RECEIVED :  06/15/25 01:44  If child <=2 yrs old please draw pediatric bottle.~Blood Culture 1    Culture, Blood 1 [[id number]]     Order Status: Canceled Specimen: Blood              Susceptibility data from last 90 days.  Collected Specimen Info Organism Amoxicillin + Clavulanate Ampicillin + Sulbactam Cefazolin Clindamycin Erythromycin Levofloxacin Linezolid Oxacillin Sodium Tetracycline Trimethoprim + Sulfamethoxazole   06/08/25 Blood Staphylococcus lugdunensis              06/08/25 Blood Staphylococcus lugdunensis  S  S  S  S  S  S  S  S  S  S  S     Staphylococcus lugdunensis dna detected                     Antibiotics and immunizations:    Height:           Physical Exam  Vitals and nursing note reviewed.   Constitutional:       Appearance: He is well-developed. He is not diaphoretic.      Comments: The patient was seen earlier today.   HENT:      Head: Normocephalic and atraumatic.      Right Ear: External ear normal. There is no impacted cerumen.      Left Ear: External ear normal. There is no impacted cerumen.      Nose: Nose normal.      Mouth/Throat:      Mouth: Mucous membranes are moist.      Pharynx: Oropharynx is clear. No oropharyngeal exudate.   Eyes:      General: No scleral icterus.        Right eye: No discharge.         Left eye: No discharge.      Conjunctiva/sclera: Conjunctivae normal.      Pupils: Pupils are equal, round, and reactive to light.   Neck:      Thyroid: No thyromegaly.   Cardiovascular:      Rate and Rhythm: Normal rate and regular rhythm.      Heart sounds: Normal heart sounds. No murmur heard.     No friction rub.   Pulmonary:      Effort: No respiratory distress.      Breath sounds: No stridor. No wheezing or rales.   Abdominal:      General: Bowel sounds are normal.      Palpations: Abdomen is soft.      Tenderness: There is no abdominal tenderness. There is no guarding or rebound.   Musculoskeletal:         General: No swelling, tenderness or deformity. Normal range of motion.      Cervical back: Normal range of motion and neck supple.      Right lower leg: No edema.      Left lower leg: No edema.   Lymphadenopathy:      Cervical: No cervical adenopathy.   Skin:     General: Skin is warm and dry.      Coloration: Skin is not jaundiced.      Findings: No bruising, erythema or rash.   Neurological:      General: No focal deficit present.      Mental Status: He is alert and oriented to person, place, and time. Mental status is at baseline.      Motor: No abnormal muscle tone.   Psychiatric:         Mood and Affect: Mood normal.         Behavior: Behavior normal.      *    Lines and drains: All vascular access sites

## 2025-06-23 NOTE — PROGRESS NOTES
[facility] - Inpatient Rehabilitation Department   Phone: [phone]    Physical Therapy    [] Initial Evaluation            [x] Daily Treatment Note         [] Discharge Summary      Patient: Colby Ovalle   : 1950   MRN: [id number]   Date of Service:  2025  Admitting Diagnosis: Disease of cardiovascular system  Current Admission Summary: Colby Ovlale is a 75 y.o. with PMHx notable for HTN, HLD, ESRD who presented to [facility] on 25 with chest pain, found to have NSTEMI, underwent cardiac catheterization on  showing severe 2-vessel CAD, and was transferred to [facility] on 2025 for CVTS evaluation. He underwent CABG x2 on . Hospital course complicated by: A-fib, ESRD, staph bacteremia, constipation.   Past Medical History:  has a past medical history of Alcohol dependence in remission (HCC), Cancer (HCC), Chronic back pain, COVID, End-stage renal disease on hemodialysis (HCC), Erectile dysfunction, Full dentures, Hepatitis C, Hx of non-Hodgkin's lymphoma, Hyperlipidemia, Hypertension, Kidney stone, Lumbago, Opioid dependence (HCC), Thrombocytopenia, Tobacco dependency, and Type 2 diabetes mellitus without complication ([facility]).  Past Surgical History:  has a past surgical history that includes Port Surgery; Cystoscopy; back surgery; Colonoscopy; Dialysis fistula creation (Left, 3/1/2022); Cardiac procedure (N/A, 10/3/2024); Cardiac procedure (N/A, 10/3/2024); Cardiac procedure (N/A, 10/3/2024); invasive vascular (N/A, 10/8/2024); Cardiac procedure (N/A, 2025); Cardiac procedure (N/A, 2025); and Coronary artery bypass graft (N/A, 2025).  Discharge Recommendations: home with home health PT  DME Required For Discharge: no DME required at discharge  Precautions/Restrictions: high fall risk  Weight Bearing Restrictions: weight bearing as tolerated  [] Right Upper Extremity  [] Left Upper Extremity [] Right Lower Extremity  [] Left Lower Extremity

## 2025-06-24 LAB
GLUCOSE BLD-MCNC: 101 MG/DL (ref 70–99)
GLUCOSE BLD-MCNC: 167 MG/DL (ref 70–99)
GLUCOSE BLD-MCNC: 179 MG/DL (ref 70–99)
GLUCOSE BLD-MCNC: 274 MG/DL (ref 70–99)
PERFORMED ON: ABNORMAL

## 2025-06-24 PROCEDURE — 97530 THERAPEUTIC ACTIVITIES: CPT

## 2025-06-24 PROCEDURE — 6370000000 HC RX 637 (ALT 250 FOR IP): Performed by: STUDENT IN AN ORGANIZED HEALTH CARE EDUCATION/TRAINING PROGRAM

## 2025-06-24 PROCEDURE — 1280000000 HC REHAB R&B

## 2025-06-24 PROCEDURE — 97535 SELF CARE MNGMENT TRAINING: CPT

## 2025-06-24 PROCEDURE — 97130 THER IVNTJ EA ADDL 15 MIN: CPT

## 2025-06-24 PROCEDURE — 97110 THERAPEUTIC EXERCISES: CPT

## 2025-06-24 PROCEDURE — 97129 THER IVNTJ 1ST 15 MIN: CPT

## 2025-06-24 RX ADMIN — METOPROLOL TARTRATE 12.5 MG: 25 TABLET, FILM COATED ORAL at 21:41

## 2025-06-24 RX ADMIN — ASPIRIN 81 MG: 81 TABLET, DELAYED RELEASE ORAL at 07:50

## 2025-06-24 RX ADMIN — APIXABAN 2.5 MG: 2.5 TABLET, FILM COATED ORAL at 21:41

## 2025-06-24 RX ADMIN — STANDARDIZED SENNA CONCENTRATE AND DOCUSATE SODIUM 1 TABLET: 8.6; 5 TABLET ORAL at 21:42

## 2025-06-24 RX ADMIN — METHADONE HYDROCHLORIDE 95 MG: 10 CONCENTRATE ORAL at 06:05

## 2025-06-24 RX ADMIN — ATORVASTATIN CALCIUM 80 MG: 80 TABLET, FILM COATED ORAL at 21:40

## 2025-06-24 RX ADMIN — ALOGLIPTIN 6.25 MG: 6.25 TABLET, FILM COATED ORAL at 07:51

## 2025-06-24 RX ADMIN — LINACLOTIDE 145 MCG: 145 CAPSULE, GELATIN COATED ORAL at 06:05

## 2025-06-24 RX ADMIN — APIXABAN 2.5 MG: 2.5 TABLET, FILM COATED ORAL at 07:50

## 2025-06-24 RX ADMIN — AMIODARONE HYDROCHLORIDE 200 MG: 200 TABLET ORAL at 07:50

## 2025-06-24 RX ADMIN — STANDARDIZED SENNA CONCENTRATE AND DOCUSATE SODIUM 1 TABLET: 8.6; 5 TABLET ORAL at 07:50

## 2025-06-24 RX ADMIN — PANTOPRAZOLE SODIUM 40 MG: 40 TABLET, DELAYED RELEASE ORAL at 06:05

## 2025-06-24 RX ADMIN — INSULIN LISPRO 4 UNITS: 100 INJECTION, SOLUTION INTRAVENOUS; SUBCUTANEOUS at 12:31

## 2025-06-24 RX ADMIN — METOPROLOL TARTRATE 12.5 MG: 25 TABLET, FILM COATED ORAL at 07:50

## 2025-06-24 ASSESSMENT — ENCOUNTER SYMPTOMS
BACK PAIN: 0
SORE THROAT: 0
SINUS PRESSURE: 0
RHINORRHEA: 0
WHEEZING: 0
DIARRHEA: 0
SINUS PAIN: 0
ABDOMINAL PAIN: 0
SHORTNESS OF BREATH: 0
NAUSEA: 0
EYE REDNESS: 0
CONSTIPATION: 0
EYE DISCHARGE: 0
COUGH: 0

## 2025-06-24 ASSESSMENT — PAIN SCALES - GENERAL
PAINLEVEL_OUTOF10: 0
PAINLEVEL_OUTOF10: 0

## 2025-06-24 NOTE — PROGRESS NOTES
Colby Ovalle  6/24/2025  [id number]    Chief Complaint: Disease of cardiovascular system    Subjective   No acute events overnight .     Patient reports that he is doing well today. Sternal incisional pain continues to improve. Denies any dyspnea, palpitations. Appetite is adequate.     Last Bowel Movement:  06/23/25          Objective    Patient Vitals for the past 24 hrs:   BP Temp Temp src Pulse Resp SpO2 Weight   06/24/25 0747 120/60 98.5 °F (36.9 °C) Oral 65 16 93 % --   06/24/25 0635 -- -- -- -- 16 -- --   06/23/25 2230 (!) 114/58 97.6 °F (36.4 °C) Oral 58 18 98 % --   06/23/25 2120 (!) 117/57 97.4 °F (36.3 °C) Temporal 54 18 -- 80.9 kg (178 lb 5.6 oz)   06/23/25 1711 127/64 97.6 °F (36.4 °C) Temporal 65 16 -- 82.1 kg (181 lb)     Patient Vitals for the past 96 hrs (Last 3 readings):   Weight   06/23/25 2120 80.9 kg (178 lb 5.6 oz)   06/23/25 1711 82.1 kg (181 lb)   06/23/25 0600 83.6 kg (184 lb 4.9 oz)      Gen: No distress.  HEENT: Normocephalic, atraumatic.   CV: Regular rate and rhythm. Extremities warm, well perfused.   Resp: No respiratory distress. CTAB.  Abd: Soft, nontender.  Ext: No edema.  Neuro: Alert, oriented, appropriately interactive.     Laboratory data:   Na/K/Cl/CO2:  136/4.9/96/25 (06/23 0550)   BUN/Cr/glu/ALT/AST/amyl/lip:  56/9.0/--/--/--/--/-- (06/23 0550)    WBC/Hgb/Hct/Plts:  6.7/7.1/21.5/214 (06/23 0550)     Therapy progress:       PT    Supine to Sit: Partial/moderate assistance  Sit to Supine: Partial/moderate assistance   Sit to Stand: Partial/moderate assistance  Chair/Bed to Chair Transfer: Partial/moderate assistance  Car Transfer: Partial/moderate assistance  Ambulation 10 ft: Partial/moderate assistance  Ambulation 50 ft: Partial/moderate assistance  Ambulation 150 ft:    Stairs - 1 Step: Supervision or touching assistance  Stairs - 4 Step: Supervision or touching assistance  Stairs - 12 Step:      OT    Eating: Setup or clean-up assistance  Oral Hygiene: Partial/moderate

## 2025-06-24 NOTE — FLOWSHEET NOTE
06/23/25 1711 06/23/25 2120   Vital Signs   /64 (!) 117/57   Temp 97.6 °F (36.4 °C) 97.4 °F (36.3 °C)   Pulse 65 54   Respirations 16 18   Weight - Scale 82.1 kg (181 lb) 80.9 kg (178 lb 5.6 oz)   Weight Method Standing scale Estimated  (1200mL net UF deducted from pre-wt.)       Treatment time: 210 minutes    Net UF: 1200 mL    Pre weight: 82.1 kg  Post weight: 80.9 kg  EDW: TBD    Access used: DOMINIC AVF  Access function: Good: 350 BFR    Medications or blood products given: Midodrine 10 mg; Retacrit 10,000 units; Cefazolin 2000 mg    Regular outpatient schedule: Tito URBINA    Summary of response to treatment: PT tolerated HD fair: early hypotension addressed with a combination of fluids, 10mg midodrine, and UF goal reduction, net UF 1200mL. Hemostasis achieved in less than 10 minutes per site.    Copy of dialysis treatment record placed in chart, to be scanned into EMR.

## 2025-06-24 NOTE — PLAN OF CARE
Problem: Chronic Conditions and Co-morbidities  Goal: Patient's chronic conditions and co-morbidity symptoms are monitored and maintained or improved  6/24/2025 0927 by Josephine Gale RN  Outcome: Progressing  Flowsheets (Taken 6/24/2025 0754)  Care Plan - Patient's Chronic Conditions and Co-Morbidity Symptoms are Monitored and Maintained or Improved: Monitor and assess patient's chronic conditions and comorbid symptoms for stability, deterioration, or improvement  6/24/2025 0031 by Ursula Alcazar RN  Outcome: Progressing     Problem: Discharge Planning  Goal: Discharge to home or other facility with appropriate resources  6/24/2025 0927 by Josephine Gale RN  Outcome: Progressing  Flowsheets (Taken 6/24/2025 0754)  Discharge to home or other facility with appropriate resources:   Identify barriers to discharge with patient and caregiver   Identify discharge learning needs (meds, wound care, etc)  6/24/2025 0031 by Ursula Alcazar RN  Outcome: Progressing     Problem: Safety - Adult  Goal: Free from fall injury  6/24/2025 0927 by Josephine Gale RN  Outcome: Progressing  6/24/2025 0031 by Ursula Alcazar RN  Outcome: Progressing     Problem: ABCDS Injury Assessment  Goal: Absence of physical injury  6/24/2025 0927 by Josephine Gale RN  Outcome: Progressing  6/24/2025 0031 by Ursula Alcazar RN  Outcome: Progressing  Flowsheets (Taken 6/24/2025 0030)  Absence of Physical Injury: Implement safety measures based on patient assessment     Problem: Pain  Goal: Verbalizes/displays adequate comfort level or baseline comfort level  6/24/2025 0927 by Josephine Gale RN  Outcome: Progressing  Flowsheets (Taken 6/24/2025 0747)  Verbalizes/displays adequate comfort level or baseline comfort level:   Encourage patient to monitor pain and request assistance   Assess pain using appropriate pain scale   Administer analgesics based on type and severity of pain and evaluate response   Implement

## 2025-06-24 NOTE — PROGRESS NOTES
[facility] - Inpatient Rehabilitation Department   Phone: [phone]    Occupational Therapy    [] Initial Evaluation            [x] Daily Treatment Note         [] Discharge Summary      Patient: Colby Ovalle   : 1950   MRN: [id number]   Date of Service:  2025    Admitting Diagnosis:  Disease of cardiovascular system  Current Admission Summary:   Colby Ovalle is a 75 y.o. with PMHx notable for HTN, HLD, ESRD who presented to [facility] on 25 with chest pain, found to have NSTEMI, underwent cardiac catheterization on  showing severe 2-vessel CAD, and was transferred to [facility] on 2025 for CVTS evaluation. He underwent CABG x2 on . Hospital course complicated by: A-fib, ESRD, staph bacteremia, constipation.   Past Medical History:  has a past medical history of Alcohol dependence in remission (HCC), Cancer (HCC), Chronic back pain, COVID, End-stage renal disease on hemodialysis (HCC), Erectile dysfunction, Full dentures, Hepatitis C, Hx of non-Hodgkin's lymphoma, Hyperlipidemia, Hypertension, Kidney stone, Lumbago, Opioid dependence (HCC), Thrombocytopenia, Tobacco dependency, and Type 2 diabetes mellitus without complication ([facility]).  Past Surgical History:  has a past surgical history that includes Port Surgery; Cystoscopy; back surgery; Colonoscopy; Dialysis fistula creation (Left, 3/1/2022); Cardiac procedure (N/A, 10/3/2024); Cardiac procedure (N/A, 10/3/2024); Cardiac procedure (N/A, 10/3/2024); invasive vascular (N/A, 10/8/2024); Cardiac procedure (N/A, 2025); Cardiac procedure (N/A, 2025); and Coronary artery bypass graft (N/A, 2025).    Discharge Recommendations: Home with HHOT. Anticipate initial 24 hour supervision     DME Required For Discharge: tub transfer bench (if family does not renovate bathroom)     Precautions/Restrictions: high fall risk, 1500 ml fluid restriction, LUE limb restriction   Weight Bearing Restrictions: no

## 2025-06-24 NOTE — PROGRESS NOTES
[facility] - Inpatient Rehabilitation Department   Phone: [phone]    Physical Therapy    [] Initial Evaluation            [x] Daily Treatment Note         [] Discharge Summary      Patient: Colby Ovalle   : 1950   MRN: [id number]   Date of Service:  2025  Admitting Diagnosis: Disease of cardiovascular system  Current Admission Summary: Colby Ovalle is a 75 y.o. with PMHx notable for HTN, HLD, ESRD who presented to [facility] on 25 with chest pain, found to have NSTEMI, underwent cardiac catheterization on  showing severe 2-vessel CAD, and was transferred to [facility] on 2025 for CVTS evaluation. He underwent CABG x2 on . Hospital course complicated by: A-fib, ESRD, staph bacteremia, constipation.   Past Medical History:  has a past medical history of Alcohol dependence in remission (HCC), Cancer (HCC), Chronic back pain, COVID, End-stage renal disease on hemodialysis (HCC), Erectile dysfunction, Full dentures, Hepatitis C, Hx of non-Hodgkin's lymphoma, Hyperlipidemia, Hypertension, Kidney stone, Lumbago, Opioid dependence (HCC), Thrombocytopenia, Tobacco dependency, and Type 2 diabetes mellitus without complication ([facility]).  Past Surgical History:  has a past surgical history that includes Port Surgery; Cystoscopy; back surgery; Colonoscopy; Dialysis fistula creation (Left, 3/1/2022); Cardiac procedure (N/A, 10/3/2024); Cardiac procedure (N/A, 10/3/2024); Cardiac procedure (N/A, 10/3/2024); invasive vascular (N/A, 10/8/2024); Cardiac procedure (N/A, 2025); Cardiac procedure (N/A, 2025); and Coronary artery bypass graft (N/A, 2025).  Discharge Recommendations: home with home health PT  DME Required For Discharge: no DME required at discharge  Precautions/Restrictions: high fall risk  Weight Bearing Restrictions: weight bearing as tolerated  [] Right Upper Extremity  [] Left Upper Extremity [] Right Lower Extremity  [] Left Lower Extremity    awareness of need for assistance  Problem Solving: assistance required to implement solutions, decreased awareness of errors, assistance required to correct errors made  Insights: decreased awareness of deficits  Sequencing: requires cues for some  Orientation:    alert and oriented x 4  Command Following:   WFL    Education  Barriers To Learning: cognition and emotional - pt tangential and verbose with decreased short term memory  Patient Education: patient educated on goals, PT role and benefits, plan of care, precautions, general safety, functional mobility training, energy conservation, disease specific education, transfer training  Learning Assessment:  patient verbalizes understanding, would benefit from continued reinforcement    Assessment  Activity Tolerance: decreased endurance  Impairments Requiring Therapeutic Intervention: decreased functional mobility, decreased strength, decreased safety awareness, decreased endurance, decreased sensation, decreased balance  Prognosis: good  Clinical Assessment: Pt still demonstrates consistent difficulty with STS transitions.  Balance is improving once in standing position as well as improvement in endurance.  Pt remains limited by cognition and decreased sequencing for transitional movements.  Pt will benefit from skilled PT services to address deficits and promote safe return to the home environment.  Safety Interventions: patient left in chair, chair alarm in place, call light within reach, gait belt, and patient at risk for falls    Plan  Frequency: 5 x/week, 75 min/day  Current Treatment Recommendations: strengthening, ROM, balance training, functional mobility training, transfer training, gait training, stair training, endurance training, neuromuscular re-education, manual therapy - soft tissue massage, patient/caregiver education, and equipment evaluation/education    Goals  Patient Goals: to get stronger  Short Term Goals:  Time Frame: 2 weeks  Patient will

## 2025-06-24 NOTE — PROGRESS NOTES
Nephrology Progress Note  [facility]  [phone]  [email or web address]        Subjective:   Colby Ovalle: 75 y.o.  male who we are seeing in consultation for ESRD Management.    Brief HPI:  Colby Ovalle is a 75 y.o. male with PMH significant for hypertension, hyperlipidemia, DM II, non-Hodgkin's lymphoma, chronic pain syndrome, chronic alcohol dependence, ESRD on maintenance hemodialysis , nephrolithiasis presented to [facility] with chest pain.  He has known history of CAD with prior PCI/arthrectomy to ostial-mid LAD.  He was taken to cardiac cath lab on 2025 and angiogram showed  severe two-vessel disease with 95% ostial circumflex disease and 99% in-stent restenosis of LAD.  At that time, LVEF was measured at 30% and an intra-aortic balloon pump placed.  He was transferred to [facility] on  for consideration of CABG.He Underwent CABG x 2 on .  Returned to the ICU intubated and sedated. Eventually extubated.   Transferred to ARU on .       Interval History / Subjective:  In ARU.  No complaints.      Objective:   VITALS:    Vitals:    25 0747   BP: 120/60   Pulse: 65   Resp: 16   Temp: 98.5 °F (36.9 °C)   SpO2: 93%                                                                                    Temp (24hrs), Av.8 °F (36.6 °C), Min:97.4 °F (36.3 °C), Max:98.5 °F (36.9 °C)     BP  Min: 114/58  Max: 127/64                                  Pulse  Av.5  Min: 54  Max: 65    24HR INTAKE/OUTPUT:    Intake/Output Summary (Last 24 hours) at 2025 1309  Last data filed at 2025 0754  Gross per 24 hour   Intake 1170 ml   Output 1950 ml   Net -780 ml         Wt Readings from Last 3 Encounters:   25 80.9 kg (178 lb 5.6 oz)   25 82.4 kg (181 lb 10.5 oz)   25 77 kg (169 lb 12.1 oz)       Physical Exam:    Gen:  not in acute distress, on RA  Lungs:  CTAB  Cardiovascular: RRR   Edema:  + edema  Abd: soft, non

## 2025-06-24 NOTE — CARE COORDINATION
RN GATO spoke to patient and to Spouse     Pt goes to [facility]  Address: [address] [place] [address], [place] [address]  Phone: [phone]  Spoke with Silvia she stated that they need dicharge summery and current labs at time of discharge ;  his chair is still active with them for him to return after discharge.     DCP: to  return to home with Spouse and daughter;      [facility]  ; [facility] DC planner  [phone]  [phone] ; [facility] not [facility]CP with [facility] ;  informed patient and spouse that they need to get him set up with [facility] to have access     Pt has been set up with  [facility] ;  [phone]  ;  appointment is for  July 9th  2 pm  with Dr Waylon Romero [facility]  Phone: [phone]  Fax: [phone]     Electronically signed by Heydi BERMUDEZ RN on 6/24/25 at 3:29 PM EDT

## 2025-06-24 NOTE — PROGRESS NOTES
[facility] - Inpatient Rehabilitation Department   Phone: [phone]    Speech Therapy   Daily Treatment Note     Patient: Colby Ovalle   : 1950   MRN: [id number]   Date of Service:  2025  Admitting Diagnosis: Disease of cardiovascular system  Current Admission Summary: Colby Ovalle is a 75 y.o. with PMHx notable for HTN, HLD, ESRD who presented to [facility] on 25 with chest pain, found to have NSTEMI, underwent cardiac catheterization on  showing severe 2-vessel CAD, and was transferred to [facility] on 2025 for CVTS evaluation. He underwent CABG x2 on . Hospital course complicated by: A-fib, ESRD, staph bacteremia, constipation.   Past Medical History:  has a past medical history of Alcohol dependence in remission (HCC), Cancer (HCC), Chronic back pain, COVID, End-stage renal disease on hemodialysis (HCC), Erectile dysfunction, Full dentures, Hepatitis C, Hx of non-Hodgkin's lymphoma, Hyperlipidemia, Hypertension, Kidney stone, Lumbago, Opioid dependence (HCC), Thrombocytopenia, Tobacco dependency, and Type 2 diabetes mellitus without complication (HCC).  Past Surgical History:  has a past surgical history that includes Port Surgery; Cystoscopy; back surgery; Colonoscopy; Dialysis fistula creation (Left, 3/1/2022); Cardiac procedure (N/A, 10/3/2024); Cardiac procedure (N/A, 10/3/2024); Cardiac procedure (N/A, 10/3/2024); invasive vascular (N/A, 10/8/2024); Cardiac procedure (N/A, 2025); Cardiac procedure (N/A, 2025); and Coronary artery bypass graft (N/A, 2025).  Recent MRI Brain/Head CT:  No orders to display     Recent Chest xray/Chest CT:   IMPRESSION:  Stable appearance of subtle multifocal airspace opacities compatible with  multifocal pneumonia.  Precautions/Restrictions: high fall risk, Sternal precautions    Pre-Admission Information   Living Status: Lives at home with wife (Asya) and daughter  Occupation/School: Completed 12th

## 2025-06-24 NOTE — PROGRESS NOTES
Patient return from dialysis at this time. Alert and oriented, mood is pleasant and cooperative. Vitals and blood glucose obtained. Patient requested snack and drinks and was provided with such. In bed with call light in reach, bed in low position with alarm active.

## 2025-06-24 NOTE — PROGRESS NOTES
Nutrition Note    RECOMMENDATIONS  PO Diet: CCC, cardiac, 1500 ml/day fludis  ONS: Nepro BID     ASSESSMENT   Nutrition intervention triggered for f/u.  Po intake starting to improve, with % po documented recently.  RN states pt drinks Nepro, but not consistently.  Will con't to monitor progress as nutrition status improving.       Malnutrition Status: No malnutrition     NUTRITION DIAGNOSIS   Inadequate oral intake related to decreased appetite as evidenced by variable po intake  Increased nutrient needs related to increase demand for energy/nutrients as evidenced by s/p surgery, rehab for strength and conditioning    Goals: PO intake 50% or greater     NUTRITION RELATED FINDINGS  Objective: POCG 179; LBM 6/23; trace edema: generalized/extremities; HD for ESRD  Wounds: Multiple, Surgical Incision    CURRENT NUTRITION THERAPIES  ADULT ORAL NUTRITION SUPPLEMENT; Breakfast, Dinner; Renal Oral Supplement  ADULT DIET; Regular; 5 carb choices (75 gm/meal); Low Fat/Low Chol/High Fiber/2 gm Na; 1500 ml       PO Intake: 51-75%, %   PO Supplement Intake:0%, %    ANTHROPOMETRICS  Current Height: 189 cm (6' 2.41\")  Current Weight - Scale: 80.9 kg (178 lb 5.6 oz)    Admission weight: 79.8 kg (176 lb)    COMPARATIVE STANDARDS  Total Energy Requirements (kcals/day): 1617- 2264     Protein (g):         Fluid (mL/day):  1500 ml/day per CVTS    EDUCATION  Education/Counseling completed (6/13)     The patient will be monitored per nutrition standards of care. Consult dietitian if additional nutrition interventions are needed prior to RD reassessment.     Orly Sepulveda, BISI, LD    Contact: [phone]

## 2025-06-24 NOTE — PROGRESS NOTES
Infectious Diseases   Progress Note      Admission Date: 6/16/2025  Hospital Day: Hospital Day: 9   Attending: Higinio Callahan MD  Date of service: 6/24/2025     Chief complaint/ Reason for consult:     Staphylococcus lugdunensis bacteremia  End-stage renal disease on hemodialysis  AV graft in place  Coronary artery disease, status post CABG  Chronic hepatitis C    Microbiology:      I have reviewed allavailable micro lab data and cultures    Results       Procedure Component Value Units Date/Time    Culture, Tip [[id number]] Collected: 06/14/25 1739    Order Status: Completed Specimen: Catheter Tip Updated: 06/18/25 1314     Culture Catheter Tip No growth at 96 hours    Narrative:      ORDER#: [id number]                          ORDERED BY: ANJU RAIN  SOURCE: Catheter Tip                       COLLECTED:  06/14/25 17:39  ANTIBIOTICS AT MAGUI.:                      RECEIVED :  06/15/25 01:45    Culture, Blood 1 [[id number]] Collected: 06/14/25 1730    Order Status: Completed Specimen: Blood Updated: 06/18/25 1815     Blood Culture, Routine No Growth after 4 days of incubation.    Narrative:      ORDER#: [id number]                          ORDERED BY: ANJU RAIN  SOURCE: Blood RIJ                          COLLECTED:  06/14/25 17:30  ANTIBIOTICS AT MAGUI.:                      RECEIVED :  06/15/25 01:44  If child <=2 yrs old please draw pediatric bottle.~Blood Culture 1    Culture, Blood 1 [[id number]]     Order Status: Canceled Specimen: Blood              Susceptibility data from last 90 days.  Collected Specimen Info Organism Amoxicillin + Clavulanate Ampicillin + Sulbactam Cefazolin Clindamycin Erythromycin Levofloxacin Linezolid Oxacillin Sodium Tetracycline Trimethoprim + Sulfamethoxazole   06/08/25 Blood Staphylococcus lugdunensis              06/08/25 Blood Staphylococcus lugdunensis  S  S  S  S  S  S  S  S  S  S  S     Staphylococcus lugdunensis dna detected                     Antibiotics and immunizations:  No edema.   Lymphadenopathy:      Cervical: No cervical adenopathy.   Skin:     General: Skin is warm and dry.      Coloration: Skin is not jaundiced.      Findings: No bruising, erythema or rash.   Neurological:      General: No focal deficit present.      Mental Status: He is alert and oriented to person, place, and time. Mental status is at baseline.      Motor: No abnormal muscle tone.   Psychiatric:         Mood and Affect: Mood normal.         Behavior: Behavior normal.      *    Lines and drains: All vascular access sites are healthy with no local erythema, discharge or tenderness.      Intake and output:    I/O last 3 completed shifts:  In: 1890 [P.O.:1190]  Out: 1950 [Urine:50]    Lab Data:   All available labs and old records have been reviewed by me.    CBC:  Recent Labs     06/23/25  0550   WBC 6.7   RBC 2.33*   HGB 7.1*   HCT 21.5*      MCV 92.5   MCH 30.5   MCHC 32.9   RDW 16.3*        BMP:  Recent Labs     06/23/25  0550      K 4.9   CL 96*   CO2 25   BUN 56*   CREATININE 9.0*   CALCIUM 9.0   GLUCOSE 182*        Hepatic Function Panel:   Lab Results   Component Value Date/Time    ALKPHOS 86 06/08/2025 04:25 AM    ALT 42 06/08/2025 04:25 AM    AST 48 06/08/2025 04:25 AM    BILITOT 0.5 06/08/2025 04:25 AM    BILIDIR <0.1 06/05/2025 04:52 PM    IBILI 0.3 06/05/2025 04:52 PM       CPK: No results found for: \"CKTOTAL\"  ESR: No results found for: \"SEDRATE\"  CRP: No results found for: \"CRP\"        Imaging:    All pertinent images and reports for the current visit were reviewed by me during this visit.  I reviewed the chest x-ray/CT scan/MRI images today and independently interpreted the findings and results today.    No orders to display       Medications: All current and past medications were reviewed.     [START ON 6/25/2025] epoetin lonnie-epbx  10,000 Units IntraVENous Once per day on Monday Wednesday Friday    ceFAZolin (ANCEF) 2,000 mg in sterile water 20 mL IV syringe  2,000 mg IntraVENous

## 2025-06-24 NOTE — PLAN OF CARE
Problem: Chronic Conditions and Co-morbidities  Goal: Patient's chronic conditions and co-morbidity symptoms are monitored and maintained or improved  Outcome: Progressing     Problem: Discharge Planning  Goal: Discharge to home or other facility with appropriate resources  Outcome: Progressing     Problem: Safety - Adult  Goal: Free from fall injury  Outcome: Progressing     Problem: ABCDS Injury Assessment  Goal: Absence of physical injury  Outcome: Progressing  Flowsheets (Taken 6/24/2025 0030)  Absence of Physical Injury: Implement safety measures based on patient assessment     Problem: Pain  Goal: Verbalizes/displays adequate comfort level or baseline comfort level  Outcome: Progressing     Problem: Nutrition Deficit:  Goal: Optimize nutritional status  Outcome: Progressing

## 2025-06-25 LAB
ANION GAP SERPL CALCULATED.3IONS-SCNC: 19 MMOL/L (ref 3–16)
BASOPHILS # BLD: 0 K/UL (ref 0–0.2)
BASOPHILS NFR BLD: 0.6 %
BUN SERPL-MCNC: 45 MG/DL (ref 7–20)
CALCIUM SERPL-MCNC: 9.2 MG/DL (ref 8.3–10.6)
CHLORIDE SERPL-SCNC: 95 MMOL/L (ref 99–110)
CO2 SERPL-SCNC: 21 MMOL/L (ref 21–32)
CREAT SERPL-MCNC: 7.9 MG/DL (ref 0.8–1.3)
DEPRECATED RDW RBC AUTO: 17.8 % (ref 12.4–15.4)
EOSINOPHIL # BLD: 0.1 K/UL (ref 0–0.6)
EOSINOPHIL NFR BLD: 1 %
GFR SERPLBLD CREATININE-BSD FMLA CKD-EPI: 7 ML/MIN/{1.73_M2}
GLUCOSE BLD-MCNC: 129 MG/DL (ref 70–99)
GLUCOSE BLD-MCNC: 170 MG/DL (ref 70–99)
GLUCOSE BLD-MCNC: 181 MG/DL (ref 70–99)
GLUCOSE SERPL-MCNC: 229 MG/DL (ref 70–99)
HCT VFR BLD AUTO: 26.7 % (ref 40.5–52.5)
HGB BLD-MCNC: 8.8 G/DL (ref 13.5–17.5)
LYMPHOCYTES # BLD: 0.6 K/UL (ref 1–5.1)
LYMPHOCYTES NFR BLD: 10 %
MCH RBC QN AUTO: 31.1 PG (ref 26–34)
MCHC RBC AUTO-ENTMCNC: 33.1 G/DL (ref 31–36)
MCV RBC AUTO: 94.1 FL (ref 80–100)
MONOCYTES # BLD: 0.4 K/UL (ref 0–1.3)
MONOCYTES NFR BLD: 6.9 %
NEUTROPHILS # BLD: 5.1 K/UL (ref 1.7–7.7)
NEUTROPHILS NFR BLD: 81.5 %
PERFORMED ON: ABNORMAL
PLATELET # BLD AUTO: 168 K/UL (ref 135–450)
PMV BLD AUTO: 8.2 FL (ref 5–10.5)
POTASSIUM SERPL-SCNC: 4.8 MMOL/L (ref 3.5–5.1)
RBC # BLD AUTO: 2.84 M/UL (ref 4.2–5.9)
SODIUM SERPL-SCNC: 135 MMOL/L (ref 136–145)
WBC # BLD AUTO: 6.3 K/UL (ref 4–11)

## 2025-06-25 PROCEDURE — 6360000002 HC RX W HCPCS: Performed by: HOSPITALIST

## 2025-06-25 PROCEDURE — 97530 THERAPEUTIC ACTIVITIES: CPT

## 2025-06-25 PROCEDURE — 90935 HEMODIALYSIS ONE EVALUATION: CPT

## 2025-06-25 PROCEDURE — 97130 THER IVNTJ EA ADDL 15 MIN: CPT

## 2025-06-25 PROCEDURE — 85025 COMPLETE CBC W/AUTO DIFF WBC: CPT

## 2025-06-25 PROCEDURE — 1280000000 HC REHAB R&B

## 2025-06-25 PROCEDURE — 6370000000 HC RX 637 (ALT 250 FOR IP): Performed by: STUDENT IN AN ORGANIZED HEALTH CARE EDUCATION/TRAINING PROGRAM

## 2025-06-25 PROCEDURE — 80048 BASIC METABOLIC PNL TOTAL CA: CPT

## 2025-06-25 PROCEDURE — 97535 SELF CARE MNGMENT TRAINING: CPT

## 2025-06-25 PROCEDURE — 6360000002 HC RX W HCPCS: Performed by: INTERNAL MEDICINE

## 2025-06-25 PROCEDURE — 2500000003 HC RX 250 WO HCPCS: Performed by: INTERNAL MEDICINE

## 2025-06-25 PROCEDURE — 36415 COLL VENOUS BLD VENIPUNCTURE: CPT

## 2025-06-25 PROCEDURE — 97129 THER IVNTJ 1ST 15 MIN: CPT

## 2025-06-25 RX ADMIN — METOPROLOL TARTRATE 12.5 MG: 25 TABLET, FILM COATED ORAL at 20:17

## 2025-06-25 RX ADMIN — MIDODRINE HYDROCHLORIDE 10 MG: 5 TABLET ORAL at 10:28

## 2025-06-25 RX ADMIN — PANTOPRAZOLE SODIUM 40 MG: 40 TABLET, DELAYED RELEASE ORAL at 06:17

## 2025-06-25 RX ADMIN — APIXABAN 2.5 MG: 2.5 TABLET, FILM COATED ORAL at 10:28

## 2025-06-25 RX ADMIN — INSULIN LISPRO 2 UNITS: 100 INJECTION, SOLUTION INTRAVENOUS; SUBCUTANEOUS at 21:32

## 2025-06-25 RX ADMIN — ATORVASTATIN CALCIUM 80 MG: 80 TABLET, FILM COATED ORAL at 20:18

## 2025-06-25 RX ADMIN — EPOETIN ALFA-EPBX 10000 UNITS: 10000 INJECTION, SOLUTION INTRAVENOUS; SUBCUTANEOUS at 16:10

## 2025-06-25 RX ADMIN — STANDARDIZED SENNA CONCENTRATE AND DOCUSATE SODIUM 1 TABLET: 8.6; 5 TABLET ORAL at 20:18

## 2025-06-25 RX ADMIN — ASPIRIN 81 MG: 81 TABLET, DELAYED RELEASE ORAL at 10:28

## 2025-06-25 RX ADMIN — LIDOCAINE AND PRILOCAINE: 25; 25 CREAM TOPICAL at 13:25

## 2025-06-25 RX ADMIN — LINACLOTIDE 145 MCG: 145 CAPSULE, GELATIN COATED ORAL at 06:16

## 2025-06-25 RX ADMIN — AMIODARONE HYDROCHLORIDE 200 MG: 200 TABLET ORAL at 10:28

## 2025-06-25 RX ADMIN — METHADONE HYDROCHLORIDE 95 MG: 10 CONCENTRATE ORAL at 06:35

## 2025-06-25 RX ADMIN — APIXABAN 2.5 MG: 2.5 TABLET, FILM COATED ORAL at 20:18

## 2025-06-25 RX ADMIN — CEFAZOLIN 2000 MG: 2 INJECTION, POWDER, FOR SOLUTION INTRAMUSCULAR; INTRAVENOUS at 16:32

## 2025-06-25 NOTE — PROGRESS NOTES
Nutrition Note    Diet education requested per SLP as pt was concerned with what he was receiving on meal trays.  Pt states he does not really have education needs but more so questions about the diet restrictions/allowances for CCC &  cardiac menu.  Pt hadn't received a menu to review.  RD provided & pt expressed that this will make ordering much easier.  No further needs at this time.      Electronically signed by Orly Sepulveda RD, LD on 6/25/25 at 12:40 PM EDT    Contact: [phone]

## 2025-06-25 NOTE — PROGRESS NOTES
Nephrology Progress Note  [facility]  [phone]  [email or web address]        Subjective:   Colby Ovalle: 75 y.o.  male who we are seeing in consultation for ESRD Management.    Brief HPI:  Colby Ovalle is a 75 y.o. male with PMH significant for hypertension, hyperlipidemia, DM II, non-Hodgkin's lymphoma, chronic pain syndrome, chronic alcohol dependence, ESRD on maintenance hemodialysis , nephrolithiasis presented to [facility] with chest pain.  He has known history of CAD with prior PCI/arthrectomy to ostial-mid LAD.  He was taken to cardiac cath lab on 2025 and angiogram showed  severe two-vessel disease with 95% ostial circumflex disease and 99% in-stent restenosis of LAD.  At that time, LVEF was measured at 30% and an intra-aortic balloon pump placed.  He was transferred to [facility] on  for consideration of CABG.He Underwent CABG x 2 on .  Returned to the ICU intubated and sedated. Eventually extubated.   Transferred to ARU on .       Interval History / Subjective:  In ARU.  No complaints.      Objective:   VITALS:    Vitals:    25 1015   BP: (!) 119/57   Pulse: 62   Resp: 18   Temp: 98.2 °F (36.8 °C)   SpO2: 94%                                                                                    Temp (24hrs), Av.4 °F (36.9 °C), Min:98.2 °F (36.8 °C), Max:98.6 °F (37 °C)     BP  Min: 119/57  Max: 119/57                                  Pulse  Av.5  Min: 53  Max: 62    24HR INTAKE/OUTPUT:    Intake/Output Summary (Last 24 hours) at 2025 1206  Last data filed at 2025 1348  Gross per 24 hour   Intake 240 ml   Output --   Net 240 ml         Wt Readings from Last 3 Encounters:   25 81.6 kg (179 lb 14.4 oz)   25 82.4 kg (181 lb 10.5 oz)   25 77 kg (169 lb 12.1 oz)       Physical Exam:    Gen:  not in acute distress, on RA  Lungs:  CTAB  Cardiovascular: RRR   Edema:  + edema  Abd: soft, non tender,  Location: Foot  Wound Location Orientation: Right;Medial  Wound ...   Wound Image    Wound Etiology Surgical   Dressing Status Clean;Dry;Intact   Wound Cleansed Soap and water   Wound Length (cm) 0.9 cm   Wound Width (cm) 0.5 cm   Wound Depth (cm) 0.2 cm   Wound Surface Area (cm^2) 0.45 cm^2   Change in Wound Size % (l*w) -181.25   Wound Volume (cm^3) 0.09 cm^3   Wound Healing % -41   Wound Assessment Slough   Drainage Amount Moderate (25-50%)   Drainage Description Serosanguinous   Odor None   Emy-wound Assessment Fragile   Margins Attached edges   Wound Thickness Description not for Pressure Injury Full thickness   Wound 05/10/23 Leg Left;Lateral #1   Date First Assessed/Time First Assessed: 05/10/23 1316   Present on Original Admission: Yes  Wound Approximate Age at First Assessment (Weeks): 6 weeks  Primary Wound Type: Venous Ulcer  Location: Leg  Wound Location Orientation: Left;Lateral  Wound D...   Wound Image    Wound Etiology Venous   Dressing Status Clean;Dry;Intact   Wound Cleansed Soap and water   Wound Length (cm) 4.5 cm   Wound Width (cm) 4.2 cm   Wound Depth (cm) 0.1 cm   Wound Surface Area (cm^2) 18.9 cm^2   Change in Wound Size % (l*w) 97.43   Wound Volume (cm^3) 1.89 cm^3   Wound Healing % 99   Wound Assessment Slough;Granulation tissue   Drainage Amount Moderate (25-50%)   Drainage Description Serosanguinous   Odor None   Emy-wound Assessment Intact;Fragile   Margins Attached edges   Wound Thickness Description not for Pressure Injury Full thickness

## 2025-06-25 NOTE — PROGRESS NOTES
Colby Ovalle  6/25/2025  [id number]    Chief Complaint: Disease of cardiovascular system    Subjective   No acute events overnight .     Patient reports that he is doing well. Working on improving independence with transfers. Family will be in tomorrow for conference/training.     Last Bowel Movement:  06/25/25          Objective    Patient Vitals for the past 24 hrs:   BP Temp Temp src Pulse Resp SpO2 Weight   06/25/25 0615 -- -- -- -- -- -- 81.6 kg (179 lb 14.4 oz)   06/24/25 2130 (!) 119/55 98.6 °F (37 °C) Oral 53 17 96 % --     Patient Vitals for the past 96 hrs (Last 3 readings):   Weight   06/25/25 0615 81.6 kg (179 lb 14.4 oz)   06/23/25 2120 80.9 kg (178 lb 5.6 oz)   06/23/25 1711 82.1 kg (181 lb)      Gen: No distress.  HEENT: Normocephalic, atraumatic.   CV: Regular rate and rhythm. Extremities warm, well perfused.   Resp: No respiratory distress. CTAB.  Abd: Soft, nontender.  Ext: No edema.  Neuro: Alert, oriented, appropriately interactive.     Laboratory data:   Na/K/Cl/CO2:  136/4.9/96/25 (06/23 0550)   BUN/Cr/glu/ALT/AST/amyl/lip:  56/9.0/--/--/--/--/-- (06/23 0550)    WBC/Hgb/Hct/Plts:  6.7/7.1/21.5/214 (06/23 0550)     Therapy progress:       PT    Supine to Sit: Partial/moderate assistance  Sit to Supine: Partial/moderate assistance   Sit to Stand: Partial/moderate assistance  Chair/Bed to Chair Transfer: Partial/moderate assistance  Car Transfer: Partial/moderate assistance  Ambulation 10 ft: Partial/moderate assistance  Ambulation 50 ft: Partial/moderate assistance  Ambulation 150 ft:    Stairs - 1 Step: Supervision or touching assistance  Stairs - 4 Step: Supervision or touching assistance  Stairs - 12 Step:      OT    Eating: Setup or clean-up assistance  Oral Hygiene: Partial/moderate assistance  Bathing: Supervision or touching assistance  Upper Body Dressing: Supervision or touching assistance  Lower Body Dressing: Partial/moderate assistance  Toilet Transfer: Substantial/maximal

## 2025-06-25 NOTE — PROGRESS NOTES
[facility] - Inpatient Rehabilitation Department   Phone: [phone]    Occupational Therapy    [] Initial Evaluation            [x] Daily Treatment Note         [] Discharge Summary      Patient: Colby Ovalle   : 1950   MRN: [id number]   Date of Service:  2025    Admitting Diagnosis:  Disease of cardiovascular system  Current Admission Summary:   Colby Ovalle is a 75 y.o. with PMHx notable for HTN, HLD, ESRD who presented to [facility] on 25 with chest pain, found to have NSTEMI, underwent cardiac catheterization on  showing severe 2-vessel CAD, and was transferred to [facility] on 2025 for CVTS evaluation. He underwent CABG x2 on . Hospital course complicated by: A-fib, ESRD, staph bacteremia, constipation.   Past Medical History:  has a past medical history of Alcohol dependence in remission (HCC), Cancer (HCC), Chronic back pain, COVID, End-stage renal disease on hemodialysis (HCC), Erectile dysfunction, Full dentures, Hepatitis C, Hx of non-Hodgkin's lymphoma, Hyperlipidemia, Hypertension, Kidney stone, Lumbago, Opioid dependence (HCC), Thrombocytopenia, Tobacco dependency, and Type 2 diabetes mellitus without complication ([facility]).  Past Surgical History:  has a past surgical history that includes Port Surgery; Cystoscopy; back surgery; Colonoscopy; Dialysis fistula creation (Left, 3/1/2022); Cardiac procedure (N/A, 10/3/2024); Cardiac procedure (N/A, 10/3/2024); Cardiac procedure (N/A, 10/3/2024); invasive vascular (N/A, 10/8/2024); Cardiac procedure (N/A, 2025); Cardiac procedure (N/A, 2025); and Coronary artery bypass graft (N/A, 2025).    Discharge Recommendations: Home with HHOT. initial 24 hour supervision and assist     DME Required For Discharge: tub transfer bench (if family does not renovate bathroom)     Precautions/Restrictions: high fall risk, 1500 ml fluid restriction, LUE limb restriction   Weight Bearing Restrictions: no

## 2025-06-25 NOTE — PROGRESS NOTES
Treatment time: 3 hours    Net UF: 2.5 liters    Pre weight: 81.6 kg  Post weight: 78.6 kg    Access used: Left AVF  Access function: Access site located on the left upper arm had good bruit and thrill. NO signs of infection noted. No redness, hematoma nor swelling was observed. No discharges was seen. Cleaned site and cannulation done without any problem. Parameters set accordingly. Hooked to HD machine. Monitored from time to time.     Medications or blood products given: Retacrit 10, 000 units given    Regular outpatient schedule: M,W,F    Summary of response to treatment: 13:40:Treatment set at 2.5 liters for 3 hours via left AVF as ordered. Access site located on the left upper arm had good bruit and thrill. NO signs of infection noted. No redness, hematoma nor swelling was observed. No discharges was seen. Cleaned site and cannulation done without any problem. Parameters set accordingly. Hooked to HD machine. Monitored from time to time. 16:40: Treatment completed. Returned blood aseptically. HD tolerated well.     Copy of dialysis treatment record placed in chart, to be scanned into EMR.

## 2025-06-25 NOTE — PATIENT CARE CONFERENCE
[facility] Inpatient Rehabilitation Department  Weekly Team Conference Note    Patient Name: Colby Ovalle      MRN: [id number]  : 1950  (75 y.o.) Gender: male     The team conference for this patient was held on 25 at 11 am and was led by:  Higinio Callahan MD     CASE MANAGEMENT:  Assessment:  Pt is 75 y.o.  presented to [facility] on 25 with chest pain, found to have NSTEMI, underwent cardiac catheterization. Pt was admitted to the ARU on 25 ; discharge plan is for pt to return to home with spouse and daughter. Pt will  have HHC. Pt receives HD at [facility],  he has a chair at this facility for his HD to continue after discharge.     PHYSICAL THERAPY    Current Status:  Bed Mobility:   Supine to Sit: contact guard assistance  Comments: HOB elevated, vc for sequencing and sternal precautions  Transfers:  Sit to stand transfer: moderate assistance  Stand to sit transfer: contact guard assistance  Stand step transfer: contact guard assistance  Comments: Performed STS transfers from 24\" mat table with Karl and 22\" mat table with mod-maxA  Ambulation:  Surface:level surface  Assistive Device: no device  Assistance: contact guard assistance  Distance: 98 ft, 175 ft  Gait Mechanics: Narrow OBI, moderate path deviation, slow jamarcus, hugging L side of caldwell with LUE intermittently against wall  Comments: Patient demonstrates multiple turns and no LOB  Stair Mobility:   Number of Steps: 4  Step Height: 2 inch and 6 inch  Hand Rails: None  Assistance: minimal assistance  Comments:    Wheelchair Mobility:  No w/c mobility completed on this date.  Comments:    Goals:        Short Term Goals:  Time Frame: 2 weeks  Patient will complete bed mobility at modified independent   Patient will complete transfers at modified independent   Patient will ambulate 150 ft with use of LRAD at [facility] independent  Patient will ascend/descend 12 stairs with (B) handrail at modified

## 2025-06-25 NOTE — PROGRESS NOTES
[facility] - Inpatient Rehabilitation Department   Phone: [phone]    Speech Therapy   Daily Treatment Note     Patient: Colby Ovalle   : 1950   MRN: [id number]   Date of Service:  2025  Admitting Diagnosis: Disease of cardiovascular system  Current Admission Summary: Colby Ovalle is a 75 y.o. with PMHx notable for HTN, HLD, ESRD who presented to [facility] on 25 with chest pain, found to have NSTEMI, underwent cardiac catheterization on  showing severe 2-vessel CAD, and was transferred to [facility] on 2025 for CVTS evaluation. He underwent CABG x2 on . Hospital course complicated by: A-fib, ESRD, staph bacteremia, constipation.   Past Medical History:  has a past medical history of Alcohol dependence in remission (HCC), Cancer (HCC), Chronic back pain, COVID, End-stage renal disease on hemodialysis (HCC), Erectile dysfunction, Full dentures, Hepatitis C, Hx of non-Hodgkin's lymphoma, Hyperlipidemia, Hypertension, Kidney stone, Lumbago, Opioid dependence (HCC), Thrombocytopenia, Tobacco dependency, and Type 2 diabetes mellitus without complication (HCC).  Past Surgical History:  has a past surgical history that includes Port Surgery; Cystoscopy; back surgery; Colonoscopy; Dialysis fistula creation (Left, 3/1/2022); Cardiac procedure (N/A, 10/3/2024); Cardiac procedure (N/A, 10/3/2024); Cardiac procedure (N/A, 10/3/2024); invasive vascular (N/A, 10/8/2024); Cardiac procedure (N/A, 2025); Cardiac procedure (N/A, 2025); and Coronary artery bypass graft (N/A, 2025).  Recent MRI Brain/Head CT:  No orders to display     Recent Chest xray/Chest CT:   IMPRESSION:  Stable appearance of subtle multifocal airspace opacities compatible with  multifocal pneumonia.  Precautions/Restrictions: high fall risk, Sternal precautions    Pre-Admission Information   Living Status: Lives at home with wife (Asya) and daughter  Occupation/School: Completed 12th

## 2025-06-25 NOTE — PROGRESS NOTES
[facility] - Inpatient Rehabilitation Department   Phone: [phone]    Physical Therapy    [] Initial Evaluation            [x] Daily Treatment Note         [] Discharge Summary      Patient: Colby Ovalle   : 1950   MRN: [id number]   Date of Service:  2025  Admitting Diagnosis: Disease of cardiovascular system  Current Admission Summary: Colby Ovalle is a 75 y.o. with PMHx notable for HTN, HLD, ESRD who presented to [facility] on 25 with chest pain, found to have NSTEMI, underwent cardiac catheterization on  showing severe 2-vessel CAD, and was transferred to [facility] on 2025 for CVTS evaluation. He underwent CABG x2 on . Hospital course complicated by: A-fib, ESRD, staph bacteremia, constipation.   Past Medical History:  has a past medical history of Alcohol dependence in remission (HCC), Cancer (HCC), Chronic back pain, COVID, End-stage renal disease on hemodialysis (HCC), Erectile dysfunction, Full dentures, Hepatitis C, Hx of non-Hodgkin's lymphoma, Hyperlipidemia, Hypertension, Kidney stone, Lumbago, Opioid dependence (HCC), Thrombocytopenia, Tobacco dependency, and Type 2 diabetes mellitus without complication ([facility]).  Past Surgical History:  has a past surgical history that includes Port Surgery; Cystoscopy; back surgery; Colonoscopy; Dialysis fistula creation (Left, 3/1/2022); Cardiac procedure (N/A, 10/3/2024); Cardiac procedure (N/A, 10/3/2024); Cardiac procedure (N/A, 10/3/2024); invasive vascular (N/A, 10/8/2024); Cardiac procedure (N/A, 2025); Cardiac procedure (N/A, 2025); and Coronary artery bypass graft (N/A, 2025).  Discharge Recommendations: home with home health PT  DME Required For Discharge: no DME required at discharge  Precautions/Restrictions: high fall risk  Weight Bearing Restrictions: weight bearing as tolerated  [] Right Upper Extremity  [] Left Upper Extremity [] Right Lower Extremity  [] Left Lower Extremity

## 2025-06-25 NOTE — CARE COORDINATION
Discharge Planning:     (GATO) reviewed the patient's chart.    RN GATO spoke to Dr Callahan to clarify about patient IV ABX ;  was informed that patient will  receive last dose on  Monday 06/30  while receiving Dialysis     Pt goes to [facility]  Address: [address] [place], [place] [id number]  Phone: [phone]  FAX : [phone]      Spoke with Ana Laura she stated that they can provide this medication   Ancef   ceFAZolin (ANCEF) 2,000 mg in sterile water 20 mL IV with his Dialysis on Monday 06/30     Pt is scheduled to discharge to home on 06/28/25     Electronically signed by Heydi BERMUDEZ RN on 6/25/25 at 12:44 PM EDT

## 2025-06-26 LAB
GLUCOSE BLD-MCNC: 146 MG/DL (ref 70–99)
GLUCOSE BLD-MCNC: 219 MG/DL (ref 70–99)
GLUCOSE BLD-MCNC: 336 MG/DL (ref 70–99)
GLUCOSE BLD-MCNC: 54 MG/DL (ref 70–99)
GLUCOSE BLD-MCNC: 62 MG/DL (ref 70–99)
GLUCOSE BLD-MCNC: 64 MG/DL (ref 70–99)
GLUCOSE BLD-MCNC: 94 MG/DL (ref 70–99)
PERFORMED ON: ABNORMAL
PERFORMED ON: NORMAL

## 2025-06-26 PROCEDURE — 97129 THER IVNTJ 1ST 15 MIN: CPT

## 2025-06-26 PROCEDURE — 97535 SELF CARE MNGMENT TRAINING: CPT

## 2025-06-26 PROCEDURE — 6370000000 HC RX 637 (ALT 250 FOR IP): Performed by: STUDENT IN AN ORGANIZED HEALTH CARE EDUCATION/TRAINING PROGRAM

## 2025-06-26 PROCEDURE — 97110 THERAPEUTIC EXERCISES: CPT

## 2025-06-26 PROCEDURE — 97530 THERAPEUTIC ACTIVITIES: CPT

## 2025-06-26 PROCEDURE — 1280000000 HC REHAB R&B

## 2025-06-26 PROCEDURE — 97130 THER IVNTJ EA ADDL 15 MIN: CPT

## 2025-06-26 RX ADMIN — METHADONE HYDROCHLORIDE 95 MG: 10 CONCENTRATE ORAL at 06:46

## 2025-06-26 RX ADMIN — METOPROLOL TARTRATE 12.5 MG: 25 TABLET, FILM COATED ORAL at 10:25

## 2025-06-26 RX ADMIN — LINACLOTIDE 145 MCG: 145 CAPSULE, GELATIN COATED ORAL at 06:47

## 2025-06-26 RX ADMIN — AMIODARONE HYDROCHLORIDE 200 MG: 200 TABLET ORAL at 10:24

## 2025-06-26 RX ADMIN — APIXABAN 2.5 MG: 2.5 TABLET, FILM COATED ORAL at 10:24

## 2025-06-26 RX ADMIN — PANTOPRAZOLE SODIUM 40 MG: 40 TABLET, DELAYED RELEASE ORAL at 06:47

## 2025-06-26 RX ADMIN — ASPIRIN 81 MG: 81 TABLET, DELAYED RELEASE ORAL at 10:24

## 2025-06-26 RX ADMIN — APIXABAN 2.5 MG: 2.5 TABLET, FILM COATED ORAL at 20:20

## 2025-06-26 RX ADMIN — STANDARDIZED SENNA CONCENTRATE AND DOCUSATE SODIUM 1 TABLET: 8.6; 5 TABLET ORAL at 10:24

## 2025-06-26 RX ADMIN — INSULIN LISPRO 2 UNITS: 100 INJECTION, SOLUTION INTRAVENOUS; SUBCUTANEOUS at 20:30

## 2025-06-26 RX ADMIN — INSULIN LISPRO 6 UNITS: 100 INJECTION, SOLUTION INTRAVENOUS; SUBCUTANEOUS at 12:08

## 2025-06-26 RX ADMIN — ALOGLIPTIN 6.25 MG: 6.25 TABLET, FILM COATED ORAL at 10:24

## 2025-06-26 RX ADMIN — ATORVASTATIN CALCIUM 80 MG: 80 TABLET, FILM COATED ORAL at 20:20

## 2025-06-26 RX ADMIN — STANDARDIZED SENNA CONCENTRATE AND DOCUSATE SODIUM 1 TABLET: 8.6; 5 TABLET ORAL at 20:20

## 2025-06-26 NOTE — PROGRESS NOTES
Colby Ovalle  6/26/2025  [id number]    Chief Complaint: Disease of cardiovascular system    Subjective   No acute events overnight .     Patient reports that he is doing well.  Spoke with dietitian yesterday regarding some questions he had about his dietary restrictions. Denies any new pain complaints. Family here for training today after conference.    Last Bowel Movement:  06/25/25          Objective    Patient Vitals for the past 24 hrs:   BP Temp Temp src Pulse Resp SpO2 Weight   06/26/25 0630 -- -- -- -- -- -- 80.9 kg (178 lb 4.8 oz)   06/25/25 2000 (!) 109/53 98.4 °F (36.9 °C) Oral 60 17 97 % --   06/25/25 1635 132/64 98.1 °F (36.7 °C) -- 54 18 -- 79.1 kg (174 lb 6.1 oz)   06/25/25 1347 132/66 98 °F (36.7 °C) -- 62 18 -- 81.6 kg (179 lb 14.3 oz)   06/25/25 1015 (!) 119/57 98.2 °F (36.8 °C) -- 62 18 94 % --     Patient Vitals for the past 96 hrs (Last 3 readings):   Weight   06/26/25 0630 80.9 kg (178 lb 4.8 oz)   06/25/25 1635 79.1 kg (174 lb 6.1 oz)   06/25/25 1347 81.6 kg (179 lb 14.3 oz)      Gen: No distress.  HEENT: Normocephalic, atraumatic.   CV: Regular rate and rhythm. Extremities warm, well perfused.   Resp: No respiratory distress. CTAB.  Abd: Soft, nontender.  Ext: No edema.  Neuro: Alert, oriented, appropriately interactive.     Laboratory data:   Na/K/Cl/CO2:  135/4.8/95/21 (06/25 1026)   BUN/Cr/glu/ALT/AST/amyl/lip:  45/7.9/--/--/--/--/-- (06/25 1026)    WBC/Hgb/Hct/Plts:  6.3/8.8/26.7/168 (06/25 1026)     Therapy progress:       PT    Supine to Sit: Partial/moderate assistance  Sit to Supine: Partial/moderate assistance   Sit to Stand: Partial/moderate assistance  Chair/Bed to Chair Transfer: Partial/moderate assistance  Car Transfer: Partial/moderate assistance  Ambulation 10 ft: Partial/moderate assistance  Ambulation 50 ft: Partial/moderate assistance  Ambulation 150 ft:    Stairs - 1 Step: Supervision or touching assistance  Stairs - 4 Step: Supervision or touching assistance  Stairs -

## 2025-06-26 NOTE — PROGRESS NOTES
Braces/Orthotics: no braces required   [] Right  [] Left  Positional Restrictions:Sternal Precautions - No Pushing, Pulling, Lifting > 5 lbs    Pre-Admission Information   Lives With: spouse, daughter    Type of Home: house  Home Layout: one level  Home Access: level entry  Bathroom Layout: walker accessible, tub/shower unit  Bathroom Equipment: grab bars in shower  Toilet Height: elevated height  Home Equipment: no prior equipment  Transfer Assistance: Independent without use of device  Ambulation Assistance:Independent without use of device  ADL Assistance: independent with all ADL's  IADL Assistance: wife does finances and medications  Active :        [x] Yes  [] 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  Occupation:   Hobbies: household tasks  Recent Falls: no recent falls    Available Assistance at Discharge: 24 hr physical assistance available    Examination   Vision:   Vision Gross Assessment: Impaired  States he should wear glasses but does not  Hearing:   WFL  Observation:   General Observation:  sternal incision        Subjective  General: Patient sitting in recliner chair upon arrival. Patient is agreeable to PT.   Pain: 0/10  Pain Interventions: not applicable       Functional Mobility  Bed Mobility:   Bed mobility not completed on this date.  Comments:   Transfers:  Sit to stand transfer: moderate assistance  Stand to sit transfer: contact guard assistance  Stand step transfer: contact guard assistance  Comments: Performed STS transfers from recliner and tall back chair (Karl) Continues to have difficulty with fwd propulsion for STS.  Ambulation:  Surface:level surface  Assistive Device: rolling walker  Assistance: stand by assistance  Distance: 98 ft, 98 ft  Gait Mechanics: Narrow OBI, nela jamarcus, equal step length/height  Comments: Patient demonstrates multiple turns and no LOB  Ambulation Trial 2 (second session)  Surface:carpet  Assistive Device:  training, functional mobility training, transfer training, gait training, stair training, endurance training, neuromuscular re-education, manual therapy - soft tissue massage, patient/caregiver education, and equipment evaluation/education    Goals  Patient Goals: to get stronger  Short Term Goals:  Time Frame: 2 weeks  Patient will complete bed mobility at modified independent   Patient will complete transfers at [facility]   Patient will ambulate 150 ft with use of LRAD at [facility]  Patient will ascend/descend 12 stairs with (B) handrail at modified independent  Patient will complete car transfer at modified independent    Above goals reviewed on 6/26/2025.  All goals are ongoing at this time unless indicated above.      Therapy Session Time      Individual Group Co-treatment   Time In  0730       Time Out 0815       Minutes  45       Second Session Therapy Time:   Individual Concurrent Group Co-treatment   Time In  1030         Time Out  1100         Minutes  30           Timed Code Treatment Minutes:   45+30  Total Treatment Minutes:  75 minutes       Electronically Signed By: Capri Villalba, PT, DPT

## 2025-06-26 NOTE — CARE COORDINATION
Discharge Planning:      (CM) reviewed the patient's chart.     RN GATO spoke to Dr Callahan to clarify about patient IV ABX ; was informed that patient will receive last dose on Monday 06/30 while receiving Dialysis      Pt goes to [facility]  Address: [address] [place] [id number]  Phone: [phone]  FAX : [phone]      Spoke with Ana Laura she stated that they can provide this medication     FAXED  DIALYSIS RUN [facility] AND ORDERS FOR IV ABX TO DIALYSIS CENTER      Ancef ceFAZolin (ANCEF) 2,000 mg in sterile water 20 mL IV with his Dialysis on Monday 06/30      Pt is scheduled to discharge to home on 06/28/25     Electronically signed by Heydi BERMUDEZ RN on 6/26/25 at 3:58 PM EDT

## 2025-06-26 NOTE — PROGRESS NOTES
Pt's blood sugar this time was 54, recheck 64, gave pt 4oz OJ (pt didn't want to drink more than this bc he wants to eat his dinner), will recheck again in about 15 minutes. Pt is asymptomatic, AXOX4.     1635: Recheck blood sugar: 94, pt eating his dinner.

## 2025-06-26 NOTE — PROGRESS NOTES
[facility] - Inpatient Rehabilitation Department   Phone: [phone]    Speech Therapy   Daily Treatment Note     Patient: Colby Ovalle   : 1950   MRN: [id number]   Date of Service:  2025  Admitting Diagnosis: Disease of cardiovascular system  Current Admission Summary: Colby Ovalle is a 75 y.o. with PMHx notable for HTN, HLD, ESRD who presented to [facility] on 25 with chest pain, found to have NSTEMI, underwent cardiac catheterization on  showing severe 2-vessel CAD, and was transferred to [facility] on 2025 for CVTS evaluation. He underwent CABG x2 on . Hospital course complicated by: A-fib, ESRD, staph bacteremia, constipation.   Past Medical History:  has a past medical history of Alcohol dependence in remission (HCC), Cancer (HCC), Chronic back pain, COVID, End-stage renal disease on hemodialysis (HCC), Erectile dysfunction, Full dentures, Hepatitis C, Hx of non-Hodgkin's lymphoma, Hyperlipidemia, Hypertension, Kidney stone, Lumbago, Opioid dependence (HCC), Thrombocytopenia, Tobacco dependency, and Type 2 diabetes mellitus without complication (HCC).  Past Surgical History:  has a past surgical history that includes Port Surgery; Cystoscopy; back surgery; Colonoscopy; Dialysis fistula creation (Left, 3/1/2022); Cardiac procedure (N/A, 10/3/2024); Cardiac procedure (N/A, 10/3/2024); Cardiac procedure (N/A, 10/3/2024); invasive vascular (N/A, 10/8/2024); Cardiac procedure (N/A, 2025); Cardiac procedure (N/A, 2025); and Coronary artery bypass graft (N/A, 2025).  Recent MRI Brain/Head CT:  No orders to display     Recent Chest xray/Chest CT:   IMPRESSION:  Stable appearance of subtle multifocal airspace opacities compatible with  multifocal pneumonia.  Precautions/Restrictions: high fall risk, Sternal precautions    Pre-Admission Information   Living Status: Lives at home with wife (Asya) and daughter  Occupation/School: Completed 12[employer]

## 2025-06-26 NOTE — PROGRESS NOTES
Nephrology Progress Note  [facility]  [phone]  [email or web address]        Subjective:   Colby Ovalle: 75 y.o.  male who we are seeing in consultation for ESRD Management.    Brief HPI:  Colby Ovalle is a 75 y.o. male with PMH significant for hypertension, hyperlipidemia, DM II, non-Hodgkin's lymphoma, chronic pain syndrome, chronic alcohol dependence, ESRD on maintenance hemodialysis , nephrolithiasis presented to [facility] with chest pain.  He has known history of CAD with prior PCI/arthrectomy to ostial-mid LAD.  He was taken to cardiac cath lab on 2025 and angiogram showed  severe two-vessel disease with 95% ostial circumflex disease and 99% in-stent restenosis of LAD.  At that time, LVEF was measured at 30% and an intra-aortic balloon pump placed.  He was transferred to [facility] on  for consideration of CABG.He Underwent CABG x 2 on .  Returned to the ICU intubated and sedated. Eventually extubated.   Transferred to ARU on .       Interval History / Subjective:  In ARU.  No complaints.      Objective:   VITALS:    Vitals:    25 1015   BP: 130/65   Pulse: 67   Resp: 16   Temp: 98.3 °F (36.8 °C)   SpO2: 95%                                                                                    Temp (24hrs), Av.2 °F (36.8 °C), Min:98 °F (36.7 °C), Max:98.4 °F (36.9 °C)     BP  Min: 109/53  Max: 132/64                                  Pulse  Av.8  Min: 54  Max: 67    24HR INTAKE/OUTPUT:  No intake or output data in the 24 hours ending 25 1153        Wt Readings from Last 3 Encounters:   25 80.9 kg (178 lb 4.8 oz)   25 82.4 kg (181 lb 10.5 oz)   25 77 kg (169 lb 12.1 oz)       Physical Exam:    Gen:  not in acute distress, on RA  Lungs:  CTAB  Cardiovascular: RRR   Edema:  + edema  Abd: soft, non tender, positive bowel sounds  Neuro: awake and alert       Access : Left upper arm brachial artery to

## 2025-06-26 NOTE — PLAN OF CARE
Problem: Chronic Conditions and Co-morbidities  Goal: Patient's chronic conditions and co-morbidity symptoms are monitored and maintained or improved  Outcome: Progressing     Problem: Discharge Planning  Goal: Discharge to home or other facility with appropriate resources  6/25/2025 2235 by Ana Laura Doty RN  Outcome: Progressing  6/25/2025 1426 by Amor Thomas RN  Outcome: Progressing     Problem: Safety - Adult  Goal: Free from fall injury  6/25/2025 2235 by Ana Laura Doty RN  Outcome: Progressing  6/25/2025 1426 by Amor Thomas RN  Outcome: Progressing     Problem: ABCDS Injury Assessment  Goal: Absence of physical injury  Outcome: Progressing     Problem: Pain  Goal: Verbalizes/displays adequate comfort level or baseline comfort level  6/25/2025 2235 by Ana Laura Doty RN  Outcome: Progressing  6/25/2025 1426 by Amor Thomas RN  Outcome: Progressing     Problem: Nutrition Deficit:  Goal: Optimize nutritional status  Outcome: Progressing

## 2025-06-27 LAB
ANION GAP SERPL CALCULATED.3IONS-SCNC: 13 MMOL/L (ref 3–16)
BASOPHILS # BLD: 0 K/UL (ref 0–0.2)
BASOPHILS NFR BLD: 0.5 %
BUN SERPL-MCNC: 38 MG/DL (ref 7–20)
CALCIUM SERPL-MCNC: 9.2 MG/DL (ref 8.3–10.6)
CHLORIDE SERPL-SCNC: 98 MMOL/L (ref 99–110)
CO2 SERPL-SCNC: 26 MMOL/L (ref 21–32)
CREAT SERPL-MCNC: 7.3 MG/DL (ref 0.8–1.3)
DEPRECATED RDW RBC AUTO: 18.3 % (ref 12.4–15.4)
EOSINOPHIL # BLD: 0.1 K/UL (ref 0–0.6)
EOSINOPHIL NFR BLD: 1.9 %
GFR SERPLBLD CREATININE-BSD FMLA CKD-EPI: 7 ML/MIN/{1.73_M2}
GLUCOSE BLD-MCNC: 125 MG/DL (ref 70–99)
GLUCOSE BLD-MCNC: 150 MG/DL (ref 70–99)
GLUCOSE BLD-MCNC: 181 MG/DL (ref 70–99)
GLUCOSE BLD-MCNC: 195 MG/DL (ref 70–99)
GLUCOSE SERPL-MCNC: 173 MG/DL (ref 70–99)
HCT VFR BLD AUTO: 23.4 % (ref 40.5–52.5)
HGB BLD-MCNC: 7.7 G/DL (ref 13.5–17.5)
LYMPHOCYTES # BLD: 0.9 K/UL (ref 1–5.1)
LYMPHOCYTES NFR BLD: 19.1 %
MCH RBC QN AUTO: 31.2 PG (ref 26–34)
MCHC RBC AUTO-ENTMCNC: 33 G/DL (ref 31–36)
MCV RBC AUTO: 94.6 FL (ref 80–100)
MONOCYTES # BLD: 0.5 K/UL (ref 0–1.3)
MONOCYTES NFR BLD: 10.4 %
NEUTROPHILS # BLD: 3.1 K/UL (ref 1.7–7.7)
NEUTROPHILS NFR BLD: 68.1 %
PERFORMED ON: ABNORMAL
PLATELET # BLD AUTO: 148 K/UL (ref 135–450)
PMV BLD AUTO: 8.5 FL (ref 5–10.5)
POTASSIUM SERPL-SCNC: 5 MMOL/L (ref 3.5–5.1)
RBC # BLD AUTO: 2.47 M/UL (ref 4.2–5.9)
SODIUM SERPL-SCNC: 137 MMOL/L (ref 136–145)
WBC # BLD AUTO: 4.5 K/UL (ref 4–11)

## 2025-06-27 PROCEDURE — 97110 THERAPEUTIC EXERCISES: CPT

## 2025-06-27 PROCEDURE — 6360000002 HC RX W HCPCS: Performed by: HOSPITALIST

## 2025-06-27 PROCEDURE — 97130 THER IVNTJ EA ADDL 15 MIN: CPT

## 2025-06-27 PROCEDURE — 6370000000 HC RX 637 (ALT 250 FOR IP): Performed by: STUDENT IN AN ORGANIZED HEALTH CARE EDUCATION/TRAINING PROGRAM

## 2025-06-27 PROCEDURE — 2580000003 HC RX 258: Performed by: INTERNAL MEDICINE

## 2025-06-27 PROCEDURE — 97129 THER IVNTJ 1ST 15 MIN: CPT

## 2025-06-27 PROCEDURE — 6360000002 HC RX W HCPCS: Performed by: INTERNAL MEDICINE

## 2025-06-27 PROCEDURE — 97530 THERAPEUTIC ACTIVITIES: CPT

## 2025-06-27 PROCEDURE — 85025 COMPLETE CBC W/AUTO DIFF WBC: CPT

## 2025-06-27 PROCEDURE — 90935 HEMODIALYSIS ONE EVALUATION: CPT

## 2025-06-27 PROCEDURE — 80048 BASIC METABOLIC PNL TOTAL CA: CPT

## 2025-06-27 PROCEDURE — 1280000000 HC REHAB R&B

## 2025-06-27 PROCEDURE — 97535 SELF CARE MNGMENT TRAINING: CPT

## 2025-06-27 RX ORDER — PANTOPRAZOLE SODIUM 40 MG/1
40 TABLET, DELAYED RELEASE ORAL DAILY
Qty: 30 TABLET | Refills: 0 | Status: SHIPPED | OUTPATIENT
Start: 2025-06-27

## 2025-06-27 RX ORDER — SAXAGLIPTIN 2.5 MG/1
2.5 TABLET, FILM COATED ORAL DAILY
COMMUNITY
Start: 2025-06-29

## 2025-06-27 RX ORDER — MIDODRINE HYDROCHLORIDE 10 MG/1
10 TABLET ORAL
Qty: 12 TABLET | Refills: 0 | Status: SHIPPED | OUTPATIENT
Start: 2025-06-27

## 2025-06-27 RX ORDER — SENNA AND DOCUSATE SODIUM 50; 8.6 MG/1; MG/1
1 TABLET, FILM COATED ORAL DAILY PRN
Qty: 60 TABLET | Refills: 0 | Status: SHIPPED | OUTPATIENT
Start: 2025-06-27

## 2025-06-27 RX ORDER — AMIODARONE HYDROCHLORIDE 200 MG/1
200 TABLET ORAL DAILY
Qty: 30 TABLET | Refills: 0 | Status: SHIPPED | OUTPATIENT
Start: 2025-06-27

## 2025-06-27 RX ORDER — INSULIN LISPRO 100 [IU]/ML
0-4 INJECTION, SOLUTION INTRAVENOUS; SUBCUTANEOUS
Status: DISCONTINUED | OUTPATIENT
Start: 2025-06-27 | End: 2025-06-28 | Stop reason: HOSPADM

## 2025-06-27 RX ADMIN — METOPROLOL TARTRATE 12.5 MG: 25 TABLET, FILM COATED ORAL at 09:55

## 2025-06-27 RX ADMIN — INSULIN LISPRO 1 UNITS: 100 INJECTION, SOLUTION INTRAVENOUS; SUBCUTANEOUS at 09:57

## 2025-06-27 RX ADMIN — STANDARDIZED SENNA CONCENTRATE AND DOCUSATE SODIUM 1 TABLET: 8.6; 5 TABLET ORAL at 22:17

## 2025-06-27 RX ADMIN — ASPIRIN 81 MG: 81 TABLET, DELAYED RELEASE ORAL at 09:55

## 2025-06-27 RX ADMIN — EPOETIN ALFA-EPBX 10000 UNITS: 10000 INJECTION, SOLUTION INTRAVENOUS; SUBCUTANEOUS at 17:57

## 2025-06-27 RX ADMIN — ATORVASTATIN CALCIUM 80 MG: 80 TABLET, FILM COATED ORAL at 22:17

## 2025-06-27 RX ADMIN — PANTOPRAZOLE SODIUM 40 MG: 40 TABLET, DELAYED RELEASE ORAL at 06:20

## 2025-06-27 RX ADMIN — STANDARDIZED SENNA CONCENTRATE AND DOCUSATE SODIUM 1 TABLET: 8.6; 5 TABLET ORAL at 09:55

## 2025-06-27 RX ADMIN — AMIODARONE HYDROCHLORIDE 200 MG: 200 TABLET ORAL at 09:55

## 2025-06-27 RX ADMIN — MIDODRINE HYDROCHLORIDE 10 MG: 5 TABLET ORAL at 09:54

## 2025-06-27 RX ADMIN — APIXABAN 2.5 MG: 2.5 TABLET, FILM COATED ORAL at 22:17

## 2025-06-27 RX ADMIN — LIDOCAINE AND PRILOCAINE: 25; 25 CREAM TOPICAL at 14:10

## 2025-06-27 RX ADMIN — APIXABAN 2.5 MG: 2.5 TABLET, FILM COATED ORAL at 09:55

## 2025-06-27 RX ADMIN — SODIUM CHLORIDE 3000 MG: 900 INJECTION INTRAVENOUS at 17:17

## 2025-06-27 RX ADMIN — METHADONE HYDROCHLORIDE 95 MG: 10 CONCENTRATE ORAL at 06:20

## 2025-06-27 RX ADMIN — LINACLOTIDE 145 MCG: 145 CAPSULE, GELATIN COATED ORAL at 06:40

## 2025-06-27 RX ADMIN — ALOGLIPTIN 6.25 MG: 6.25 TABLET, FILM COATED ORAL at 09:57

## 2025-06-27 ASSESSMENT — PAIN - FUNCTIONAL ASSESSMENT: PAIN_FUNCTIONAL_ASSESSMENT: ACTIVITIES ARE NOT PREVENTED

## 2025-06-27 ASSESSMENT — PAIN DESCRIPTION - LOCATION: LOCATION: OTHER (COMMENT)

## 2025-06-27 ASSESSMENT — PAIN DESCRIPTION - ORIENTATION: ORIENTATION: OTHER (COMMENT)

## 2025-06-27 ASSESSMENT — PAIN DESCRIPTION - DESCRIPTORS: DESCRIPTORS: OTHER (COMMENT)

## 2025-06-27 ASSESSMENT — PAIN SCALES - GENERAL: PAINLEVEL_OUTOF10: 0

## 2025-06-27 NOTE — PROGRESS NOTES
Treatment time: 3 Hours and 30 minutes    Net UF: 2.5 liters    Pre weight: 80.7 kg  Post weight: 78.2 kg    Access used: Left AVF  Access function: Access site located on the left upper arm had good bruit and thrill. No signs of infection noted. No redness, hematoma nor swelling was observed. No discharges were seen. Cleaned site aseptically and cannulation done without any problem.    Medications or blood products given: Retacrit 10,000 units    Regular outpatient schedule: M,W,F    Summary of response to treatment: 14:30: Treatment set at 2.5 liters for 3 hours and 30 minutes via Left AVF. Access site located on the left upper arm had good bruit and thrill. No signs of infection noted. No redness, hematoma nor swelling was observed. No discharges were seen. Cleaned site aseptically and cannulation done without any problem. Parameters set accordingly. Hooked to HD machine. Monitored from time to time. 18:00: Treatment completed. Returned blood aseptically. Hd tolerated well.    Copy of dialysis treatment record placed in chart, to be scanned into EMR.

## 2025-06-27 NOTE — PROGRESS NOTES
[facility] - Inpatient Rehabilitation Department   Phone: [phone]    Occupational Therapy    [] Initial Evaluation            [x] Daily Treatment Note         [x] Discharge Summary      Patient: Colby Ovalle   : 1950   MRN: [id number]   Date of Service:  2025    Admitting Diagnosis:  Disease of cardiovascular system  Current Admission Summary:   Colby Ovalle is a 75 y.o. with PMHx notable for HTN, HLD, ESRD who presented to [facility] on 25 with chest pain, found to have NSTEMI, underwent cardiac catheterization on  showing severe 2-vessel CAD, and was transferred to [facility] on 2025 for CVTS evaluation. He underwent CABG x2 on . Hospital course complicated by: A-fib, ESRD, staph bacteremia, constipation.   Past Medical History:  has a past medical history of Alcohol dependence in remission (HCC), Cancer (HCC), Chronic back pain, COVID, End-stage renal disease on hemodialysis (HCC), Erectile dysfunction, Full dentures, Hepatitis C, Hx of non-Hodgkin's lymphoma, Hyperlipidemia, Hypertension, Kidney stone, Lumbago, Opioid dependence (HCC), Thrombocytopenia, Tobacco dependency, and Type 2 diabetes mellitus without complication ([facility]).  Past Surgical History:  has a past surgical history that includes Port Surgery; Cystoscopy; back surgery; Colonoscopy; Dialysis fistula creation (Left, 3/1/2022); Cardiac procedure (N/A, 10/3/2024); Cardiac procedure (N/A, 10/3/2024); Cardiac procedure (N/A, 10/3/2024); invasive vascular (N/A, 10/8/2024); Cardiac procedure (N/A, 2025); Cardiac procedure (N/A, 2025); and Coronary artery bypass graft (N/A, 2025).    Discharge Recommendations: Home with HHOT. initial 24 hour supervision and assist     DME Required For Discharge: tub transfer bench, bedside commode     Precautions/Restrictions: high fall risk, 1500 ml fluid restriction, LUE limb restriction   Weight Bearing Restrictions: no restrictions    Required  very eager to eat.   Lower Extremity Dressing: setup assistance  Dressing Comments: Pt doffed/donned socks while seated in recliner. No AE used. Pt demonstrated good awareness of sternal precautions while bending forward.   Toileting: stand by assistance.    Toileting Comments:  Pt required SBA for clothing mgmt in stance at grab bar. No LOB noted.   General Comments: Pt declined other ADLs  Instrumental Activities of Daily Living  No IADL completed on this date.     Functional Mobility  Bed Mobility:  Bed mobility not completed on this date.  Comments:   Transfers:  Sit to stand transfer:minimal assistance, moderate assistance  Stand to sit transfer: stand by assistance  Toilet transfer: minimal assistance  Toilet transfer equipment: bariatric bedside commode  Comments: pt used heart pillow to sit to stand from recliner.  modA for first sit to stand from recliner. Felipa from [facility] over toilet.   Pt requiring initial boost but with cueing able to achieve upright position  Functional Mobility  Functional Mobility Activity: to/from bathroom  Device Use: no device  Required Assistance: modified independent  Comment: no LOB noted.   Balance:  Static Sitting Balance: good: independent with functional balance in unsupported position  Dynamic Sitting Balance: fair (+): maintains balance at SBA/supervision without use of UE support  Static Standing Balance: fair (+): maintains balance at SBA/supervision without use of UE support  Dynamic Standing Balance: fair: maintains balance at CGA without use of UE support  Comments:    Other Therapeutic Interventions        Second Session   Pt seated in chair in gym upon arrival, no reports of pain, but mentions of feeling fatigued from previous session. Pt agreeable to OT session. Pt  declined ADL needs. Pt reports completing oral care with nursing.   Water intake form given to patient so pt can continue to monitor at home.    Pt completed forward, lateral, and backward steps with BLE

## 2025-06-27 NOTE — DISCHARGE INSTRUCTIONS
We hope your stay on rehab has exceeded your expectations. Once again the entire Acute Rehab Staff at [facility] wish to thank you for allowing us the privilege to care for you.         A few days after you are discharged from Rehab, you will receive a survey (Marty  Ganjoni) in the mail or through email.  This is a nationally distributed survey sent to thousands of rehab patients throughout the nation.              It is very important to everyone on the rehab unit that we receive feedback based on your experience.          Thank you, we wish you good health always,         Acute Rehab Team      Hospital Preference:     __[facility]        Medical Diagnosis/Conditions    _______________________ (free text)    Emergency Contact:    ________________________________________Phone#________________________      Advanced Directives:    Code Status: ?  []  Full Code  ?  []  DNR  ? []  DNRCC  ? []  DNRCC - Arrest    (as of date of discharge:  _________)      Medical POA: ?  []   Yes ______________________________ ?  []   No                                       (Name and phone number)                     Living Will:   ?   []   Yes    ?  []   No        Insurance Information:    _______________________ (free text)      Individual Responsible  for the coordination of the discharge/follow up:    ______________________________________________________    Functional Status:    VISUAL DEFICITS:    Yes [x]  No  []       If yes, assisted device:   Wears Glasses Yes [x]  No  []  Wears Contacts  Yes []  No  []  Legally Blind Yes []  No  []    HEARING DEFICITS:    Yes [x]  No  []       If yes, assisted device:   Wears Hearing Aids Yes [x] (Has HAs at home, not present when in hospital) No  []  Pocket Talker  Yes []  No  []       Physical Therapist & Contact #: Daryl Bhatti, PT, [phone]  OccupationalTherapist & Contact #: Leela Escobar MOT OTR/L [phone]   Speech Therapist & Contact #: Meghann Blanco,

## 2025-06-27 NOTE — PROGRESS NOTES
CLINICAL PHARMACY NOTE: MEDS TO BEDS    Total # of Prescriptions Filled: 5   The following medications were delivered to the patient:  Eliquis 2.5 mg   Amiodarone  mg   Stimulant laxative 8.6-5.0 mg   Midodrine HCL 10 mg   Pantoprazole sodium 40 mg     Additional Documentation: Silvia approved to deliver medication to patient room=signed. Per nurse request gave to her to give to patient.  Hamida Lang CPhT

## 2025-06-27 NOTE — CARE COORDINATION
Case Management -  Discharge Note      Patient Name: Colby Ovalle                   YOB: 1950  Room: [place] [address]            Readmission Risk (Low < 19, Mod (19-27), High > 27): Readmission Risk Score: 27.6    Current PCP: No primary care provider on file.        Has pt received appropriate compliance notices before being discharged if required: N/A  Compliance doc:  [] 2nd IMM; [] Code 44 [] Owen  Date Given: NA Given By: NA    PT AM-PAC: 16 /24  OT AM-PAC:   /24    Patient/patient representative has been educated on the benefits of HHC and DME equipment as well as the possible risks of declining recommended services. Patient/patient representative has acknowledged the information provided and decided on the following discharge plan. Patient/ patient representative has been provided freedom of choice regarding service provider, supported by basic dialogue that supports the patient's individualized plan of care/goals.    Home Care Information:   Is patient resuming current home health care services: Yes - new    Home Care Agency:    [facility]  Phone: [phone]  Fax: [phone]        Services: NSG OT PT an SpL  Confirmed with ARIA at [facility] .    Home Health Order Obtained: yes    Patient has been referred to [facility] Internal Medicine Residency clinic:    [facility]  [address]   [address], [facility] Center  [place] [id number]  Phone: [phone]  Patient to call: No  Appointment scheduled prior to d/c: Yes: Comment: placed on EMANUEL   Date of appt:  July 9th 2 pm with Dr Connors           [facility] agency notified of discharge:  [x] Yes [] No  [] NA    Family notified of discharge:  [x] Yes  [] No  [] NA    Facility notified of discharge:  [x] Yes  [] No  [] NA    Pt is being discharged with Outpt IV Antibiotics  [] Yes [x] No  [] NA  If yes, make sure EMANUEL is faxed to [facility] agency, and meds are

## 2025-06-27 NOTE — PROGRESS NOTES
[facility] - Inpatient Rehabilitation Department   Phone: [phone]    Physical Therapy    [] Initial Evaluation            [x] Daily Treatment Note         [x] Discharge Summary      Patient: Colby Ovalle   : 1950   MRN: [id number]   Date of Service:  2025  Admitting Diagnosis: Disease of cardiovascular system  Current Admission Summary: Colby Ovalle is a 75 y.o. with PMHx notable for HTN, HLD, ESRD who presented to [facility] on 25 with chest pain, found to have NSTEMI, underwent cardiac catheterization on  showing severe 2-vessel CAD, and was transferred to [facility] on 2025 for CVTS evaluation. He underwent CABG x2 on . Hospital course complicated by: A-fib, ESRD, staph bacteremia, constipation.   Past Medical History:  has a past medical history of Alcohol dependence in remission (HCC), Cancer (HCC), Chronic back pain, COVID, End-stage renal disease on hemodialysis (HCC), Erectile dysfunction, Full dentures, Hepatitis C, Hx of non-Hodgkin's lymphoma, Hyperlipidemia, Hypertension, Kidney stone, Lumbago, Opioid dependence (HCC), Thrombocytopenia, Tobacco dependency, and Type 2 diabetes mellitus without complication ([facility]).  Past Surgical History:  has a past surgical history that includes Port Surgery; Cystoscopy; back surgery; Colonoscopy; Dialysis fistula creation (Left, 3/1/2022); Cardiac procedure (N/A, 10/3/2024); Cardiac procedure (N/A, 10/3/2024); Cardiac procedure (N/A, 10/3/2024); invasive vascular (N/A, 10/8/2024); Cardiac procedure (N/A, 2025); Cardiac procedure (N/A, 2025); and Coronary artery bypass graft (N/A, 2025).  Discharge Recommendations: home with home health PT  DME Required For Discharge: rolling walker - pt will benefit from RW for unlevel/outside/long distance ambulation  Precautions/Restrictions: high fall risk  Weight Bearing Restrictions: weight bearing as tolerated  [] Right Upper Extremity  [] Left Upper

## 2025-06-27 NOTE — PROGRESS NOTES
[facility] - Inpatient Rehabilitation Department   Phone: [phone]    Speech Therapy   Daily Treatment Note   Discharge Summary     Patient: Colby Ovalle   : 1950   MRN: [id number]   Date of Service:  2025  Admitting Diagnosis: Disease of cardiovascular system  Current Admission Summary: Colby Ovalle is a 75 y.o. with PMHx notable for HTN, HLD, ESRD who presented to [facility] on 25 with chest pain, found to have NSTEMI, underwent cardiac catheterization on  showing severe 2-vessel CAD, and was transferred to [facility] on 2025 for CVTS evaluation. He underwent CABG x2 on . Hospital course complicated by: A-fib, ESRD, staph bacteremia, constipation.   Past Medical History:  has a past medical history of Alcohol dependence in remission (HCC), Cancer (HCC), Chronic back pain, COVID, End-stage renal disease on hemodialysis (HCC), Erectile dysfunction, Full dentures, Hepatitis C, Hx of non-Hodgkin's lymphoma, Hyperlipidemia, Hypertension, Kidney stone, Lumbago, Opioid dependence (HCC), Thrombocytopenia, Tobacco dependency, and Type 2 diabetes mellitus without complication (HCC).  Past Surgical History:  has a past surgical history that includes Port Surgery; Cystoscopy; back surgery; Colonoscopy; Dialysis fistula creation (Left, 3/1/2022); Cardiac procedure (N/A, 10/3/2024); Cardiac procedure (N/A, 10/3/2024); Cardiac procedure (N/A, 10/3/2024); invasive vascular (N/A, 10/8/2024); Cardiac procedure (N/A, 2025); Cardiac procedure (N/A, 2025); and Coronary artery bypass graft (N/A, 2025).  Recent MRI Brain/Head CT:  No orders to display     Recent Chest xray/Chest CT:   IMPRESSION:  Stable appearance of subtle multifocal airspace opacities compatible with  multifocal pneumonia.  Precautions/Restrictions: high fall risk, Sternal precautions    Pre-Admission Information   Living Status: Lives at home with wife (Asya) and daughter  Occupation/School:

## 2025-06-27 NOTE — DISCHARGE SUMMARY
Physical Medicine & Rehabilitation  Discharge Summary     Patient Identification:  Colby Ovalle  : 1950  Admit date: 2025  Discharge date: 25 ***  Attending provider: Higinio Callahan MD        Primary care provider: No primary care provider on file.     Discharge Diagnoses:   Patient Active Problem List   Diagnosis    Post-op pain    Chest pain    NSTEMI (non-ST elevated myocardial infarction) (HCC)    ACS (acute coronary syndrome) (HCC)    ESRD on hemodialysis (HCC)    Coronary artery disease involving native coronary artery of native heart without angina pectoris    Essential hypertension    Mixed hyperlipidemia    Type 2 diabetes mellitus with other specified complication (HCC)    Prolonged Q-T interval on ECG    Ischemic cardiomyopathy    Hyperkalemia    Fever    Disease of cardiovascular system    Coagulase negative Staphylococcus bacteremia    Hemodialysis access, AV graft    Chronic hepatitis C without hepatic coma (HCC)    Complex care coordination    Receiving intravenous antibiotic treatment as outpatient       Discharge Functional Status:      Physical therapy:  Supine to Sit: Supervision or touching assistance  Sit to Supine: Independent      Sit to Stand: Partial/moderate assistance  Chair/Bed to Chair Transfer: Independent  Car Transfer: Partial/moderate assistance     Ambulation 10 ft: Independent  Ambulation 50 ft: Independent  Ambulation 150 ft: Independent  Stairs - 1 Step: Independent  Stairs - 4 Step: Independent  Stairs - 12 Step: Independent    Occupational therapy:  Eating: Setup or clean-up assistance  Oral Hygiene: Partial/moderate assistance  Bathing: Supervision or touching assistance  Upper Body Dressing: Independent  Lower Body Dressing: Supervision or touching assistance     Toilet Transfer: Substantial/maximal assistance  Toilet Hygiene: Supervision or touching assistance    Speech therapy:    Pt continues with moderate cognitive deficits characterized by

## 2025-06-27 NOTE — PROGRESS NOTES
Spiritual Health History and Assessment/Progress Note  [facility]    (P) Spiritual/Emotional Needs,  , (P) Life Adjustments,      Name: Colby Ovalle MRN: [id number]    Age: 75 y.o.     Sex: male   Language: English   Anabaptist: Jehovah's witness   Disease of cardiovascular system     Date: 6/27/2025            Total Time Calculated: (P) 30 min              Spiritual Assessment began in [facility] ACUTE REHAB UNIT        Referral/Consult From: (P) Rounding   Encounter Overview/Reason: (P) Spiritual/Emotional Needs  Service Provided For: (P) Patient    Viola, Belief, Meaning:   Patient identifies as spiritual, is connected with a viola tradition or spiritual practice, and has beliefs or practices that help with coping during difficult times  Family/Friends No family/friends present      Importance and Influence:  Patient has spiritual/personal beliefs that influence decisions regarding their health  Family/Friends No family/friends present    Community:  Patient is connected with a spiritual community and feels well-supported. Support system includes: Spouse/Partner, Children, Friends, and Extended family  Family/Friends No family/friends present    Assessment and Plan of Care:     Patient Interventions include: Facilitated expression of thoughts and feelings, Explored spiritual coping/struggle/distress, Engaged in theological reflection, Affirmed coping skills/support systems, and Facilitated life review and/ or legacy  Family/Friends Interventions include: No family/friends present    Patient Plan of Care: No spiritual needs identified for follow-up and Spiritual Care available upon further referral  Family/Friends Plan of Care: No family/friends present    Electronically signed by Chaplain Brittany on 6/27/2025 at 12:01 PM

## 2025-06-27 NOTE — PROGRESS NOTES
Colby Ovalle  6/27/2025  [id number]    Chief Complaint: Disease of cardiovascular system    Subjective   No acute events overnight .     Patient reports that he is doing well.  Discussed plans for discharge tomorrow with patient.  He overall feels ready for discharge, denies any specific questions or concerns.     Last Bowel Movement:  06/25/25          Objective    Patient Vitals for the past 24 hrs:   BP Temp Temp src Pulse Resp SpO2 Weight   06/27/25 0600 -- -- -- -- -- -- 80.7 kg (178 lb)   06/26/25 2015 101/67 98.2 °F (36.8 °C) Oral 58 16 96 % --   06/26/25 1015 130/65 98.3 °F (36.8 °C) Oral 67 16 95 % --     Patient Vitals for the past 96 hrs (Last 3 readings):   Weight   06/27/25 0600 80.7 kg (178 lb)   06/26/25 0630 80.9 kg (178 lb 4.8 oz)   06/25/25 1635 79.1 kg (174 lb 6.1 oz)      Gen: No distress.  HEENT: Normocephalic, atraumatic.   CV: Regular rate and rhythm. Extremities warm, well perfused.   Resp: No respiratory distress. CTAB.  Abd: Soft, nontender.  Ext: No edema.  Neuro: Alert, oriented, appropriately interactive.     Laboratory data:   Na/K/Cl/CO2:  137/5.0/98/26 (06/27 0541)   BUN/Cr/glu/ALT/AST/amyl/lip:  38/7.3/--/--/--/--/-- (06/27 0541)    WBC/Hgb/Hct/Plts:  4.5/7.7/23.4/148 (06/27 0541)     Therapy progress:       PT    Supine to Sit: Supervision or touching assistance  Sit to Supine: Independent   Sit to Stand: Partial/moderate assistance  Chair/Bed to Chair Transfer: Independent  Car Transfer: Partial/moderate assistance  Ambulation 10 ft: Independent  Ambulation 50 ft: Independent  Ambulation 150 ft: Independent  Stairs - 1 Step: Independent  Stairs - 4 Step: Independent  Stairs - 12 Step: Independent    OT    Eating: Setup or clean-up assistance  Oral Hygiene: Partial/moderate assistance  Bathing: Supervision or touching assistance  Upper Body Dressing: Independent  Lower Body Dressing: Supervision or touching assistance  Toilet Transfer: Substantial/maximal assistance  Toilet

## 2025-06-27 NOTE — PROGRESS NOTES
Nephrology Progress Note  [facility]  [phone]  [email or web address]        Subjective:   Colby Ovalle: 75 y.o.  male who we are seeing in consultation for ESRD Management.    Brief HPI:  Colby Ovalle is a 75 y.o. male with PMH significant for hypertension, hyperlipidemia, DM II, non-Hodgkin's lymphoma, chronic pain syndrome, chronic alcohol dependence, ESRD on maintenance hemodialysis , nephrolithiasis presented to [facility] with chest pain.  He has known history of CAD with prior PCI/arthrectomy to ostial-mid LAD.  He was taken to cardiac cath lab on 2025 and angiogram showed  severe two-vessel disease with 95% ostial circumflex disease and 99% in-stent restenosis of LAD.  At that time, LVEF was measured at 30% and an intra-aortic balloon pump placed.  He was transferred to [facility] on  for consideration of CABG.He Underwent CABG x 2 on .  Returned to the ICU intubated and sedated. Eventually extubated.   Transferred to ARU on .       Interval History / Subjective:  In ARU.  No complaints.      Objective:   VITALS:    Vitals:    25 0945   BP: 105/63   Pulse: 65   Resp: 18   Temp: 98 °F (36.7 °C)   SpO2: 95%                                                                                    Temp (24hrs), Av.1 °F (36.7 °C), Min:98 °F (36.7 °C), Max:98.2 °F (36.8 °C)     BP  Min: 101/67  Max: 105/63                                  Pulse  Av.5  Min: 58  Max: 65    24HR INTAKE/OUTPUT:    Intake/Output Summary (Last 24 hours) at 2025 1220  Last data filed at 2025 1749  Gross per 24 hour   Intake 120 ml   Output --   Net 120 ml           Wt Readings from Last 3 Encounters:   25 80.7 kg (178 lb)   25 82.4 kg (181 lb 10.5 oz)   25 77 kg (169 lb 12.1 oz)       Physical Exam:    Gen:  not in acute distress, on RA  Lungs:  CTAB  Cardiovascular: RRR   Edema:  + edema  Abd: soft, non tender, positive bowel  lugdunensis bacteremia .   ID team now following. Getting IV Cefazolin   Had a midline that was removed.   Plan for IV Cefazolin  2 g with hemodialysis Mondays and Wednesdays and 3 g on Fridays, end date July 1st, 2025.         Thank you for allowing me to participate in the care of this patient. I will continue to follow along.   Please contact via [facility] if any questions or concerns.     Brad Singh, DO   [facility]  6/27/2025

## 2025-06-28 VITALS
RESPIRATION RATE: 16 BRPM | TEMPERATURE: 98.2 F | SYSTOLIC BLOOD PRESSURE: 103 MMHG | BODY MASS INDEX: 22.78 KG/M2 | HEART RATE: 65 BPM | WEIGHT: 177.5 LBS | HEIGHT: 74 IN | DIASTOLIC BLOOD PRESSURE: 61 MMHG | OXYGEN SATURATION: 97 %

## 2025-06-28 LAB
GLUCOSE BLD-MCNC: 148 MG/DL (ref 70–99)
PERFORMED ON: ABNORMAL

## 2025-06-28 PROCEDURE — 6370000000 HC RX 637 (ALT 250 FOR IP): Performed by: STUDENT IN AN ORGANIZED HEALTH CARE EDUCATION/TRAINING PROGRAM

## 2025-06-28 RX ADMIN — STANDARDIZED SENNA CONCENTRATE AND DOCUSATE SODIUM 1 TABLET: 8.6; 5 TABLET ORAL at 09:54

## 2025-06-28 RX ADMIN — LINACLOTIDE 145 MCG: 145 CAPSULE, GELATIN COATED ORAL at 06:01

## 2025-06-28 RX ADMIN — APIXABAN 2.5 MG: 2.5 TABLET, FILM COATED ORAL at 09:54

## 2025-06-28 RX ADMIN — AMIODARONE HYDROCHLORIDE 200 MG: 200 TABLET ORAL at 09:53

## 2025-06-28 RX ADMIN — METOPROLOL TARTRATE 12.5 MG: 25 TABLET, FILM COATED ORAL at 09:53

## 2025-06-28 RX ADMIN — ALOGLIPTIN 6.25 MG: 6.25 TABLET, FILM COATED ORAL at 09:53

## 2025-06-28 RX ADMIN — METHADONE HYDROCHLORIDE 95 MG: 10 CONCENTRATE ORAL at 06:02

## 2025-06-28 RX ADMIN — ASPIRIN 81 MG: 81 TABLET, DELAYED RELEASE ORAL at 09:53

## 2025-06-28 RX ADMIN — PANTOPRAZOLE SODIUM 40 MG: 40 TABLET, DELAYED RELEASE ORAL at 06:01

## 2025-06-28 ASSESSMENT — PAIN DESCRIPTION - ORIENTATION: ORIENTATION: OTHER (COMMENT)

## 2025-06-28 ASSESSMENT — PAIN SCALES - GENERAL: PAINLEVEL_OUTOF10: 0

## 2025-06-28 ASSESSMENT — PAIN DESCRIPTION - DESCRIPTORS: DESCRIPTORS: OTHER (COMMENT)

## 2025-06-28 ASSESSMENT — PAIN DESCRIPTION - LOCATION: LOCATION: OTHER (COMMENT)

## 2025-06-28 NOTE — PROGRESS NOTES
Total of five meds delivered to the pt. Pt is aware that his walker will be delivered to his home. Discharge instruction reviewed with pt and his wife. They are aware that pt needs to call CVTS and pcp to set up a f/u appointment. Cardiology appointment is on 7/16/2025 at 3pm, which pt is aware of. Pt's incision to the chest looks CDI, JENNIFER.

## 2025-06-28 NOTE — PROGRESS NOTES
Pt comfortably resting in chair. VSS. Denies any pain at this moment. Morning meds given per MAR. PWWW. AXOX4. Call light and bedside table in reach. Chair locked and alarm on. The care plan and education has been reviewed and mutually agreed upon with the patient.

## 2025-06-28 NOTE — PROGRESS NOTES
Pt discharged as per physician order. Pt's belonging packed and taken with pt while transferring. Sternal precaution while getting into the chair and up in the car. All questions and queries answered. Pt dropped to the car via wheelchair. Pt's wife picked the pt up.

## 2025-06-30 ENCOUNTER — TELEPHONE (OUTPATIENT)
Dept: INFECTIOUS DISEASES | Age: 75
End: 2025-06-30

## 2025-06-30 DIAGNOSIS — B95.7 COAGULASE NEGATIVE STAPHYLOCOCCUS BACTEREMIA: Primary | ICD-10-CM

## 2025-06-30 DIAGNOSIS — R78.81 COAGULASE NEGATIVE STAPHYLOCOCCUS BACTEREMIA: Primary | ICD-10-CM

## 2025-06-30 NOTE — TELEPHONE ENCOUNTER
Spoke with nurse Wolf at [facility] and verified OPAT orders as follows:    Name and dose of Antimicrobial: IV cefazolin 2 g with hemodialysis Mondays and Wednesdays and 3 g on Fridays   Antimicrobial start date: calculated from June 18, 2025   Antimicrobial completion date planned: July 1, 2025       OPAT End    She reports he is afebrile and no complaints. Will DC cefazolin after dose today

## 2025-07-07 ENCOUNTER — APPOINTMENT (OUTPATIENT)
Dept: VASCULAR LAB | Age: 75
End: 2025-07-07
Attending: EMERGENCY MEDICINE
Payer: OTHER GOVERNMENT

## 2025-07-07 ENCOUNTER — HOSPITAL ENCOUNTER (EMERGENCY)
Age: 75
Discharge: HOME | End: 2025-07-07
Attending: EMERGENCY MEDICINE
Payer: OTHER GOVERNMENT

## 2025-07-07 VITALS
TEMPERATURE: 98.4 F | DIASTOLIC BLOOD PRESSURE: 77 MMHG | OXYGEN SATURATION: 98 % | BODY MASS INDEX: 22.48 KG/M2 | WEIGHT: 177 LBS | HEART RATE: 81 BPM | SYSTOLIC BLOOD PRESSURE: 154 MMHG | RESPIRATION RATE: 15 BRPM

## 2025-07-07 DIAGNOSIS — Z95.1 S/P CABG (CORONARY ARTERY BYPASS GRAFT): ICD-10-CM

## 2025-07-07 DIAGNOSIS — Z79.01 CHRONIC ANTICOAGULATION: ICD-10-CM

## 2025-07-07 DIAGNOSIS — S80.11XA HEMATOMA OF LEG, RIGHT, INITIAL ENCOUNTER: Primary | ICD-10-CM

## 2025-07-07 LAB
ALBUMIN SERPL-MCNC: 3.7 G/DL (ref 3.4–5)
ALBUMIN/GLOB SERPL: 1.1 {RATIO} (ref 1.1–2.2)
ALP SERPL-CCNC: 81 U/L (ref 40–129)
ALT SERPL-CCNC: <5 U/L (ref 10–40)
ANION GAP SERPL CALCULATED.3IONS-SCNC: 15 MMOL/L (ref 3–16)
APTT BLD: 35.4 SEC (ref 22.8–35.8)
AST SERPL-CCNC: 23 U/L (ref 15–37)
BASOPHILS # BLD: 0 K/UL (ref 0–0.2)
BASOPHILS NFR BLD: 0.5 %
BILIRUB SERPL-MCNC: 0.5 MG/DL (ref 0–1)
BUN SERPL-MCNC: 45 MG/DL (ref 7–20)
CALCIUM SERPL-MCNC: 9.4 MG/DL (ref 8.3–10.6)
CHLORIDE SERPL-SCNC: 96 MMOL/L (ref 99–110)
CO2 SERPL-SCNC: 28 MMOL/L (ref 21–32)
CREAT SERPL-MCNC: 8.9 MG/DL (ref 0.8–1.3)
DEPRECATED RDW RBC AUTO: 19 % (ref 12.4–15.4)
EOSINOPHIL # BLD: 0.1 K/UL (ref 0–0.6)
EOSINOPHIL NFR BLD: 3.2 %
GFR SERPLBLD CREATININE-BSD FMLA CKD-EPI: 6 ML/MIN/{1.73_M2}
GLUCOSE SERPL-MCNC: 192 MG/DL (ref 70–99)
HCT VFR BLD AUTO: 26.3 % (ref 40.5–52.5)
HGB BLD-MCNC: 8.7 G/DL (ref 13.5–17.5)
INR PPP: 1.27 (ref 0.86–1.14)
LYMPHOCYTES # BLD: 0.7 K/UL (ref 1–5.1)
LYMPHOCYTES NFR BLD: 20.1 %
MCH RBC QN AUTO: 31 PG (ref 26–34)
MCHC RBC AUTO-ENTMCNC: 32.9 G/DL (ref 31–36)
MCV RBC AUTO: 94.3 FL (ref 80–100)
MONOCYTES # BLD: 0.4 K/UL (ref 0–1.3)
MONOCYTES NFR BLD: 10.1 %
NEUTROPHILS # BLD: 2.3 K/UL (ref 1.7–7.7)
NEUTROPHILS NFR BLD: 66.1 %
PLATELET # BLD AUTO: 166 K/UL (ref 135–450)
PMV BLD AUTO: 9 FL (ref 5–10.5)
POTASSIUM SERPL-SCNC: 4 MMOL/L (ref 3.5–5.1)
PROT SERPL-MCNC: 7 G/DL (ref 6.4–8.2)
PROTHROMBIN TIME: 16.1 SEC (ref 12.1–14.9)
RBC # BLD AUTO: 2.79 M/UL (ref 4.2–5.9)
SODIUM SERPL-SCNC: 139 MMOL/L (ref 136–145)
WBC # BLD AUTO: 3.5 K/UL (ref 4–11)

## 2025-07-07 PROCEDURE — 85025 COMPLETE CBC W/AUTO DIFF WBC: CPT

## 2025-07-07 PROCEDURE — 99284 EMERGENCY DEPT VISIT MOD MDM: CPT

## 2025-07-07 PROCEDURE — 36415 COLL VENOUS BLD VENIPUNCTURE: CPT

## 2025-07-07 PROCEDURE — 85730 THROMBOPLASTIN TIME PARTIAL: CPT

## 2025-07-07 PROCEDURE — 93971 EXTREMITY STUDY: CPT

## 2025-07-07 PROCEDURE — 85610 PROTHROMBIN TIME: CPT

## 2025-07-07 PROCEDURE — 80053 COMPREHEN METABOLIC PANEL: CPT

## 2025-07-07 NOTE — PROGRESS NOTES
CVTS Cardiothoracic Progress Interval Note:                                CC:  Bleeding from right leg incision    Recent history:   Mr. Ovalle is a 74-year-old pleasant gentleman who is well-known to us from having undergone two-vessel coronary bypass grafting on June 9, 2025 by Dr. Ronaldo Duke.  The patient was discharged from inpatient rehab approximately a week ago.  The patient presented to the ER today stating he started noticing some swelling from his incision several days ago and oozing of blood from his right leg vein harvest incision. He has not had any chest pain, shortness of breath, dizziness, lightheadedness or any other concerns.  He is on Eliquis for atrial fibrillation.       Vital Signs: BP (!) 154/77   Pulse 81   Temp 98.4 °F (36.9 °C) (Oral)   Resp 15   Wt 80.3 kg (177 lb)   SpO2 98%   BMI 22.48 kg/m²          LABORATORY DATA:     CBC:   Recent Labs     07/07/25 0818   WBC 3.5*   HGB 8.7*   HCT 26.3*   MCV 94.3        BMP:   Recent Labs     07/07/25 0818      K 4.0   CL 96*   CO2 28   BUN 45*   CREATININE 8.9*     PT/INR:   Recent Labs     07/07/25 0818   PROTIME 16.1*   INR 1.27*     APTT:   Recent Labs     07/07/25 0818   APTT 35.4         ________________________________________________________________________      Objective:   General appearance: NAD  Heart: S1S2   Chest: CTA. symmetrical expansion with inspiration and expirations; no rocking of sternum noted   Abdomen: soft and nt, +BS  Wound/Incisions: Midsternal incision with dressing CDI; Proximal right thigh incision has a small non-pulsatile hematoma with a small amount of oozing of dark blood. No wound dehiscence. Sternal incision is healing well.   Extremities: BLE pulses 2+; no swelling noted  Neurological: Intact        A/P:  Mr Ovalle is S/p two-vessel coronary bypass grafting on June 9.  Presents with a small area of bleeding from his right proximal thigh incision.  Ultrasound performed in the ER noted

## 2025-07-07 NOTE — ED PROVIDER NOTES
[facility] Emergency Department      Pt Name: Colby Ovalle  MRN: [id number]  Birthdate 1950  Date of evaluation: 7/7/2025  Provider: NICHOLAS ALEMAN MD  CHIEF COMPLAINT  Chief Complaint   Patient presents with    Coagulation Disorder     Pt via self from home, c/o bleeding from groin site, had heart procedures last month, bleeding controlled through bandage      HPI  Colby Ovalle is a 75 y.o. male who presents because of bleeding to his right leg.  He had CABG done last month on 6/9.  He had vessels harvested from his right and his left leg.  He had noticed swelling to his right leg at the area of his surgical site and he had assumed it was just bruising from his procedure.  He otherwise has not had significant problems since his operation.  He was released from rehabilitation about a week ago.  He was standing at the sink this morning and noticed liquid trickling down his right leg.  He checked and saw that his leg was bleeding.  He denies any injury to the area.  He denies any lightheadedness or weakness.  Denies any chest pain or shortness of breath.  He is anticoagulated with Eliquis.    REVIEW OF SYSTEMS:  No fever, no weakness Pertinent positives and negatives as per the HPI.  All other pertinent review of systems reviewed and negative.  Nursing notes reviewed.    PAST MEDICAL HISTORY  Past Medical History:   Diagnosis Date    Alcohol dependence in remission (HCC)     information from [facility]    Cancer ([facility])     over 10 years ago     Chronic back pain     COVID 01/12/2022    End-stage renal disease on hemodialysis (HCC)     Erectile dysfunction     Full dentures     Hepatitis C     information from [facility]    Hx of non-Hodgkin's lymphoma     Information from [facility]    Hyperlipidemia     Hypertension     Kidney stone     Lumbago     information from [facility]    Opioid dependence (HCC)     information from [facility]    Thrombocytopenia     Tobacco dependency     Type 2

## 2025-07-14 NOTE — PATIENT INSTRUCTIONS
Ask Dr. Duke about your restrictions following surgery    Stop Metoprolol Tartrate   Replace with Metoprolol Succinate (Toprol XL) 25 mg nightly   This will help recover your heart muscle     Eliquis should be 5 mg twice daily     You do not need to take Clopidogrel (Plavix)    Repeat echo in early September to recheck your heart strength

## 2025-07-14 NOTE — PROGRESS NOTES
ventricular systolic function with a visually estimated EF of 55 - 60%. Left ventricle size is normal. Normal wall thickness. Normal wall motion. Global longitudinal strain is normal with a value of -21.3%. Normal diastolic function.    Right Ventricle: Right ventricle size is normal. Normal systolic function.    Mitral Valve: Mild regurgitation.    Tricuspid Valve: Trace regurgitation.     TTE 6/3/25    Left Ventricle: Mildly reduced left ventricular systolic function with a visually estimated EF of 35 - 40%. Akinesis of the apex with preserved function of the base and mid ventricular wall segments. Indeterminate diastolic function.    Right Ventricle: Not assessed. Right ventricle size is normal. Normal systolic function. Normal wall motion.    There are no structural valve abnormalities appreciated in this study.    Impression/Recommendations    Mr. Colby Ovalle is a 75 y.o. male patient with:    CAD: rotational atherectomy/shockwave lithotripsy, DESx2-ostial to mid LAD 10/3/2024 (NSTEMI); severe LAD ISR (NSTEMI) S/P CABG x2 (LIMA-LAD, SVG-Ramus) 6/9/2025 Dr. Duke  Ischemic cardiomyopathy (LVEF 35-40% by TTE 6/2025), compensated   PAF, post op, on Amiodarone and OAC  Hypertension, controlled   Hyperlipidemia, on statin (6/2025:  TG 64 HDL 38 LDL 77)  Diabetes mellitus, noninsulin dependent, 6/2025 A1c 7.5  ESRD, HD since 2024, Left arm fistula  Anemia of chronic disease   Chronic pain/Opioid dependence, [facility]   Nicotine dependence, in remission since 2022   ETOH dependence, in remission  ACEi allergy (anaphylaxis)       Mr. Ovalle is progressing well since recent coronary bypass surgery.  Discussed below medication dosing recommendations.  Dialysis and need for Midodrine has limited uptitration of GDMT.   Repeat echo in close follow up.   Follow up with CT surgeon Dr. Duke tomorrow. Discuss right proximal thigh incision. Sternal precautions until 8/9/2025 listed.

## 2025-07-16 ENCOUNTER — OFFICE VISIT (OUTPATIENT)
Dept: CARDIOLOGY CLINIC | Age: 75
End: 2025-07-16
Payer: OTHER GOVERNMENT

## 2025-07-16 VITALS
HEIGHT: 74 IN | OXYGEN SATURATION: 96 % | DIASTOLIC BLOOD PRESSURE: 64 MMHG | BODY MASS INDEX: 21.94 KG/M2 | HEART RATE: 55 BPM | SYSTOLIC BLOOD PRESSURE: 114 MMHG | WEIGHT: 171 LBS

## 2025-07-16 DIAGNOSIS — I25.10 CORONARY ARTERY DISEASE INVOLVING NATIVE CORONARY ARTERY OF NATIVE HEART WITHOUT ANGINA PECTORIS: Primary | ICD-10-CM

## 2025-07-16 DIAGNOSIS — E78.2 MIXED HYPERLIPIDEMIA: ICD-10-CM

## 2025-07-16 DIAGNOSIS — I10 ESSENTIAL HYPERTENSION: ICD-10-CM

## 2025-07-16 DIAGNOSIS — I42.9 CARDIOMYOPATHY, UNSPECIFIED TYPE (HCC): ICD-10-CM

## 2025-07-16 DIAGNOSIS — I25.5 ISCHEMIC CARDIOMYOPATHY: ICD-10-CM

## 2025-07-16 PROCEDURE — 1123F ACP DISCUSS/DSCN MKR DOCD: CPT | Performed by: INTERNAL MEDICINE

## 2025-07-16 PROCEDURE — 3078F DIAST BP <80 MM HG: CPT | Performed by: INTERNAL MEDICINE

## 2025-07-16 PROCEDURE — 99215 OFFICE O/P EST HI 40 MIN: CPT | Performed by: INTERNAL MEDICINE

## 2025-07-16 PROCEDURE — G2211 COMPLEX E/M VISIT ADD ON: HCPCS | Performed by: INTERNAL MEDICINE

## 2025-07-16 PROCEDURE — 3074F SYST BP LT 130 MM HG: CPT | Performed by: INTERNAL MEDICINE

## 2025-07-16 RX ORDER — ATORVASTATIN CALCIUM 80 MG/1
80 TABLET, FILM COATED ORAL NIGHTLY
Qty: 30 TABLET | Refills: 3 | Status: SHIPPED | OUTPATIENT
Start: 2025-07-16

## 2025-07-16 RX ORDER — METOPROLOL SUCCINATE 25 MG/1
25 TABLET, EXTENDED RELEASE ORAL DAILY
Qty: 90 TABLET | Refills: 3 | Status: SHIPPED | OUTPATIENT
Start: 2025-07-16

## 2025-07-17 NOTE — PROGRESS NOTES
Cardiac[facility] Thor[facility]geons   Post-opClinic Note     7/17/2025 2:16 PM  Surgeon:  Srikanth     S/P :  cabg x 2 6/9/2025    Chief complaint : vein harvest site issues  Subjective:  Mr. Ovalle post operative follow up status post CABG x 2, R/L SVG graft and LIMA graft     Vital Signs: /68    Exam:   Physical Exam  Neuro intact  Heart RRR sternum stable incision c/d/I  Lungs clear  Abd soft active ntnd  Ext R thigh open harvest site with small open site with min dark venous drainage with min local erythema(on eliquis), L thigh tunnel hematoma resolving per pt, L distal leg harvest site hematoma 3 cm pete    Labs:   CBC:   Lab Results   Component Value Date/Time    WBC 3.5 07/07/2025 08:18 AM    WBC 4.5 06/27/2025 05:41 AM    WBC 6.3 06/25/2025 10:26 AM    HGB 8.7 07/07/2025 08:18 AM    HGB 7.7 06/27/2025 05:41 AM    HGB 8.8 06/25/2025 10:26 AM    HCT 26.3 07/07/2025 08:18 AM    HCT 23.4 06/27/2025 05:41 AM    HCT 26.7 06/25/2025 10:26 AM    MCV 94.3 07/07/2025 08:18 AM    MCV 94.6 06/27/2025 05:41 AM    MCV 94.1 06/25/2025 10:26 AM     07/07/2025 08:18 AM     06/27/2025 05:41 AM     06/25/2025 10:26 AM     BMP:   Lab Results   Component Value Date/Time     07/07/2025 08:18 AM     06/27/2025 05:41 AM     06/25/2025 10:26 AM    K 4.0 07/07/2025 08:18 AM    K 5.0 06/27/2025 05:41 AM    K 4.8 06/25/2025 10:26 AM    CL 96 07/07/2025 08:18 AM    CL 98 06/27/2025 05:41 AM    CL 95 06/25/2025 10:26 AM    CO2 28 07/07/2025 08:18 AM    CO2 26 06/27/2025 05:41 AM    CO2 21 06/25/2025 10:26 AM    PHOS 3.9 06/18/2025 07:27 AM    PHOS 3.3 06/13/2025 05:15 AM    PHOS 6.0 06/09/2025 04:05 AM    BUN 45 07/07/2025 08:18 AM    BUN 38 06/27/2025 05:41 AM    BUN 45 06/25/2025 10:26 AM    CREATININE 8.9 07/07/2025 08:18 AM    CREATININE 7.3 06/27/2025 05:41 AM    CREATININE 7.9 06/25/2025 10:26 AM     PT/INR:   Lab Results   Component Value Date/Time    PROTIME 16.1 07/07/2025 08:18

## 2025-07-18 ENCOUNTER — OFFICE VISIT (OUTPATIENT)
Age: 75
End: 2025-07-18

## 2025-07-18 VITALS
WEIGHT: 171 LBS | HEIGHT: 74 IN | TEMPERATURE: 97.6 F | SYSTOLIC BLOOD PRESSURE: 130 MMHG | DIASTOLIC BLOOD PRESSURE: 68 MMHG | BODY MASS INDEX: 21.94 KG/M2

## 2025-07-18 DIAGNOSIS — Z95.1 S/P CABG X 2: Primary | ICD-10-CM

## 2025-07-18 PROCEDURE — 99024 POSTOP FOLLOW-UP VISIT: CPT | Performed by: THORACIC SURGERY (CARDIOTHORACIC VASCULAR SURGERY)

## 2025-07-18 RX ORDER — CEPHALEXIN 500 MG/1
500 CAPSULE ORAL 2 TIMES DAILY
Qty: 14 CAPSULE | Refills: 0 | Status: SHIPPED | OUTPATIENT
Start: 2025-07-18 | End: 2025-07-25

## 2025-07-30 ENCOUNTER — APPOINTMENT (OUTPATIENT)
Dept: GENERAL RADIOLOGY | Age: 75
DRG: 871 | End: 2025-07-30
Payer: OTHER GOVERNMENT

## 2025-07-30 ENCOUNTER — HOSPITAL ENCOUNTER (INPATIENT)
Age: 75
LOS: 6 days | Discharge: HOME HEALTH | DRG: 871 | End: 2025-08-06
Attending: EMERGENCY MEDICINE | Admitting: HOSPITALIST
Payer: OTHER GOVERNMENT

## 2025-07-30 ENCOUNTER — APPOINTMENT (OUTPATIENT)
Dept: CT IMAGING | Age: 75
DRG: 871 | End: 2025-07-30
Payer: OTHER GOVERNMENT

## 2025-07-30 DIAGNOSIS — K80.50 CHOLEDOCHOLITHIASIS: ICD-10-CM

## 2025-07-30 DIAGNOSIS — R65.20 SEVERE SEPSIS (HCC): Primary | ICD-10-CM

## 2025-07-30 DIAGNOSIS — R79.89 ELEVATED LFTS: ICD-10-CM

## 2025-07-30 DIAGNOSIS — A41.9 SEVERE SEPSIS (HCC): Primary | ICD-10-CM

## 2025-07-30 PROCEDURE — 99285 EMERGENCY DEPT VISIT HI MDM: CPT

## 2025-07-30 PROCEDURE — 84484 ASSAY OF TROPONIN QUANT: CPT

## 2025-07-30 PROCEDURE — 82803 BLOOD GASES ANY COMBINATION: CPT

## 2025-07-30 PROCEDURE — 87186 SC STD MICRODIL/AGAR DIL: CPT

## 2025-07-30 PROCEDURE — 83690 ASSAY OF LIPASE: CPT

## 2025-07-30 PROCEDURE — 83605 ASSAY OF LACTIC ACID: CPT

## 2025-07-30 PROCEDURE — 87502 INFLUENZA DNA AMP PROBE: CPT

## 2025-07-30 PROCEDURE — 83880 ASSAY OF NATRIURETIC PEPTIDE: CPT

## 2025-07-30 PROCEDURE — 93005 ELECTROCARDIOGRAM TRACING: CPT | Performed by: EMERGENCY MEDICINE

## 2025-07-30 PROCEDURE — 71045 X-RAY EXAM CHEST 1 VIEW: CPT

## 2025-07-30 PROCEDURE — 80053 COMPREHEN METABOLIC PANEL: CPT

## 2025-07-30 PROCEDURE — 87040 BLOOD CULTURE FOR BACTERIA: CPT

## 2025-07-30 PROCEDURE — 87635 SARS-COV-2 COVID-19 AMP PRB: CPT

## 2025-07-30 PROCEDURE — 87150 DNA/RNA AMPLIFIED PROBE: CPT

## 2025-07-30 PROCEDURE — 85025 COMPLETE CBC W/AUTO DIFF WBC: CPT

## 2025-07-30 RX ORDER — ACETAMINOPHEN 500 MG
1000 TABLET ORAL ONCE
Status: COMPLETED | OUTPATIENT
Start: 2025-07-30 | End: 2025-07-31

## 2025-07-30 RX ORDER — 0.9 % SODIUM CHLORIDE 0.9 %
500 INTRAVENOUS SOLUTION INTRAVENOUS ONCE
Status: COMPLETED | OUTPATIENT
Start: 2025-07-30 | End: 2025-07-31

## 2025-07-30 ASSESSMENT — PAIN - FUNCTIONAL ASSESSMENT: PAIN_FUNCTIONAL_ASSESSMENT: NONE - DENIES PAIN

## 2025-07-31 ENCOUNTER — ANESTHESIA EVENT (OUTPATIENT)
Dept: ENDOSCOPY | Age: 75
End: 2025-07-31
Payer: OTHER GOVERNMENT

## 2025-07-31 ENCOUNTER — ANESTHESIA (OUTPATIENT)
Dept: ENDOSCOPY | Age: 75
End: 2025-07-31
Payer: OTHER GOVERNMENT

## 2025-07-31 ENCOUNTER — APPOINTMENT (OUTPATIENT)
Dept: GENERAL RADIOLOGY | Age: 75
DRG: 871 | End: 2025-07-31
Payer: OTHER GOVERNMENT

## 2025-07-31 ENCOUNTER — APPOINTMENT (OUTPATIENT)
Dept: CT IMAGING | Age: 75
DRG: 871 | End: 2025-07-31
Payer: OTHER GOVERNMENT

## 2025-07-31 PROBLEM — K81.0 ACUTE CHOLECYSTITIS: Status: ACTIVE | Noted: 2025-07-31

## 2025-07-31 PROBLEM — A41.9 SEVERE SEPSIS WITH ACUTE ORGAN DYSFUNCTION (HCC): Status: ACTIVE | Noted: 2025-07-31

## 2025-07-31 PROBLEM — R65.20 SEVERE SEPSIS WITH ACUTE ORGAN DYSFUNCTION (HCC): Status: ACTIVE | Noted: 2025-07-31

## 2025-07-31 LAB
ALBUMIN SERPL-MCNC: 3.4 G/DL (ref 3.4–5)
ALBUMIN SERPL-MCNC: 3.7 G/DL (ref 3.4–5)
ALBUMIN/GLOB SERPL: 1.2 {RATIO} (ref 1.1–2.2)
ALBUMIN/GLOB SERPL: 1.2 {RATIO} (ref 1.1–2.2)
ALP SERPL-CCNC: 117 U/L (ref 40–129)
ALP SERPL-CCNC: 132 U/L (ref 40–129)
ALT SERPL-CCNC: 166 U/L (ref 10–40)
ALT SERPL-CCNC: 167 U/L (ref 10–40)
ALT SERPL-CCNC: ABNORMAL U/L (ref 10–40)
ANION GAP SERPL CALCULATED.3IONS-SCNC: 15 MMOL/L (ref 3–16)
ANION GAP SERPL CALCULATED.3IONS-SCNC: 17 MMOL/L (ref 3–16)
AST SERPL-CCNC: 241 U/L (ref 15–37)
AST SERPL-CCNC: 348 U/L (ref 15–37)
BACTERIA URNS QL MICRO: ABNORMAL /HPF
BASE EXCESS BLDV CALC-SCNC: 6.1 MMOL/L
BASOPHILS # BLD: 0 K/UL (ref 0–0.2)
BASOPHILS NFR BLD: 0 %
BILIRUB SERPL-MCNC: 1.9 MG/DL (ref 0–1)
BILIRUB SERPL-MCNC: 2.6 MG/DL (ref 0–1)
BILIRUB UR QL STRIP.AUTO: NEGATIVE
BUN SERPL-MCNC: 34 MG/DL (ref 7–20)
BUN SERPL-MCNC: 41 MG/DL (ref 7–20)
CALCIUM SERPL-MCNC: 8.1 MG/DL (ref 8.3–10.6)
CALCIUM SERPL-MCNC: 9 MG/DL (ref 8.3–10.6)
CHLORIDE SERPL-SCNC: 95 MMOL/L (ref 99–110)
CHLORIDE SERPL-SCNC: 99 MMOL/L (ref 99–110)
CLARITY UR: CLEAR
CO2 BLDV-SCNC: 33 MMOL/L
CO2 SERPL-SCNC: 26 MMOL/L (ref 21–32)
CO2 SERPL-SCNC: 26 MMOL/L (ref 21–32)
COHGB MFR BLDV: 1.3 %
COLOR UR: YELLOW
CREAT SERPL-MCNC: 5.3 MG/DL (ref 0.8–1.3)
CREAT SERPL-MCNC: 5.6 MG/DL (ref 0.8–1.3)
DEPRECATED RDW RBC AUTO: 17.2 % (ref 12.4–15.4)
DEPRECATED RDW RBC AUTO: 17.3 % (ref 12.4–15.4)
EKG ATRIAL RATE: 86 BPM
EKG DIAGNOSIS: NORMAL
EKG P AXIS: 73 DEGREES
EKG P-R INTERVAL: 192 MS
EKG Q-T INTERVAL: 484 MS
EKG QRS DURATION: 154 MS
EKG QTC CALCULATION (BAZETT): 579 MS
EKG R AXIS: -66 DEGREES
EKG T AXIS: 71 DEGREES
EKG VENTRICULAR RATE: 86 BPM
EOSINOPHIL # BLD: 0 K/UL (ref 0–0.6)
EOSINOPHIL NFR BLD: 0 %
EPI CELLS #/AREA URNS AUTO: 1 /HPF (ref 0–5)
FLUAV + FLUBV AG NOSE IA.RAPID: NOT DETECTED
FLUAV + FLUBV AG NOSE IA.RAPID: NOT DETECTED
GFR SERPLBLD CREATININE-BSD FMLA CKD-EPI: 10 ML/MIN/{1.73_M2}
GFR SERPLBLD CREATININE-BSD FMLA CKD-EPI: 11 ML/MIN/{1.73_M2}
GLUCOSE BLD-MCNC: 104 MG/DL (ref 70–99)
GLUCOSE BLD-MCNC: 142 MG/DL (ref 70–99)
GLUCOSE BLD-MCNC: 173 MG/DL (ref 70–99)
GLUCOSE BLD-MCNC: 91 MG/DL (ref 70–99)
GLUCOSE SERPL-MCNC: 171 MG/DL (ref 70–99)
GLUCOSE SERPL-MCNC: 240 MG/DL (ref 70–99)
GLUCOSE UR STRIP.AUTO-MCNC: 250 MG/DL
HCO3 BLDV-SCNC: 32 MMOL/L (ref 23–29)
HCT VFR BLD AUTO: 26.8 % (ref 40.5–52.5)
HCT VFR BLD AUTO: 27.7 % (ref 40.5–52.5)
HGB BLD-MCNC: 9 G/DL (ref 13.5–17.5)
HGB BLD-MCNC: 9.4 G/DL (ref 13.5–17.5)
HGB UR QL STRIP.AUTO: ABNORMAL
HYALINE CASTS #/AREA URNS AUTO: 0 /LPF (ref 0–8)
KETONES UR STRIP.AUTO-MCNC: NEGATIVE MG/DL
LACTATE BLDV-SCNC: 1.8 MMOL/L (ref 0.4–2)
LACTATE BLDV-SCNC: 2.8 MMOL/L (ref 0.4–1.9)
LACTATE BLDV-SCNC: 3.5 MMOL/L (ref 0.4–1.9)
LEUKOCYTE ESTERASE UR QL STRIP.AUTO: ABNORMAL
LIPASE SERPL-CCNC: 37 U/L (ref 13–60)
LYMPHOCYTES # BLD: 0.4 K/UL (ref 1–5.1)
LYMPHOCYTES NFR BLD: 10 %
MCH RBC QN AUTO: 30.9 PG (ref 26–34)
MCH RBC QN AUTO: 31.2 PG (ref 26–34)
MCHC RBC AUTO-ENTMCNC: 33.4 G/DL (ref 31–36)
MCHC RBC AUTO-ENTMCNC: 33.9 G/DL (ref 31–36)
MCV RBC AUTO: 92.1 FL (ref 80–100)
MCV RBC AUTO: 92.4 FL (ref 80–100)
METAMYELOCYTES NFR BLD MANUAL: 3 %
METHGB MFR BLDV: 1.4 %
MONOCYTES # BLD: 0 K/UL (ref 0–1.3)
MONOCYTES NFR BLD: 1 %
NEUTROPHILS # BLD: 3.6 K/UL (ref 1.7–7.7)
NEUTROPHILS NFR BLD: 66 %
NEUTS BAND NFR BLD MANUAL: 20 % (ref 0–7)
NEUTS VAC BLD QL SMEAR: PRESENT
NITRITE UR QL STRIP.AUTO: NEGATIVE
NT-PROBNP SERPL-MCNC: ABNORMAL PG/ML (ref 0–449)
O2 THERAPY: ABNORMAL
PCO2 BLDV: 51.1 MMHG (ref 40–50)
PERFORMED ON: ABNORMAL
PERFORMED ON: NORMAL
PH BLDV: 7.4 [PH] (ref 7.35–7.45)
PH UR STRIP.AUTO: >=9 [PH] (ref 5–8)
PLATELET # BLD AUTO: 105 K/UL (ref 135–450)
PLATELET # BLD AUTO: 117 K/UL (ref 135–450)
PLATELET BLD QL SMEAR: ABNORMAL
PMV BLD AUTO: 9.5 FL (ref 5–10.5)
PMV BLD AUTO: 9.7 FL (ref 5–10.5)
PO2 BLDV: <30 MMHG
POTASSIUM SERPL-SCNC: 3.8 MMOL/L (ref 3.5–5.1)
POTASSIUM SERPL-SCNC: 3.8 MMOL/L (ref 3.5–5.1)
POTASSIUM SERPL-SCNC: ABNORMAL MMOL/L (ref 3.5–5.1)
PROT SERPL-MCNC: 6.3 G/DL (ref 6.4–8.2)
PROT SERPL-MCNC: 6.8 G/DL (ref 6.4–8.2)
PROT UR STRIP.AUTO-MCNC: 100 MG/DL
RBC # BLD AUTO: 2.9 M/UL (ref 4.2–5.9)
RBC # BLD AUTO: 3 M/UL (ref 4.2–5.9)
RBC CLUMPS #/AREA URNS AUTO: 236 /HPF (ref 0–4)
RBC MORPH BLD: ABNORMAL
REASON FOR REJECTION: NORMAL
REJECTED TEST: NORMAL
REPORT: NORMAL
SAO2 % BLDV: 22 %
SARS-COV-2 RDRP RESP QL NAA+PROBE: NOT DETECTED
SLIDE REVIEW: ABNORMAL
SODIUM SERPL-SCNC: 138 MMOL/L (ref 136–145)
SODIUM SERPL-SCNC: 140 MMOL/L (ref 136–145)
SP GR UR STRIP.AUTO: 1.01 (ref 1–1.03)
TROPONIN, HIGH SENSITIVITY: 102 NG/L (ref 0–22)
TROPONIN, HIGH SENSITIVITY: 113 NG/L (ref 0–22)
UA COMPLETE W REFLEX CULTURE PNL UR: ABNORMAL
UA DIPSTICK W REFLEX MICRO PNL UR: YES
URN SPEC COLLECT METH UR: ABNORMAL
UROBILINOGEN UR STRIP-ACNC: 1 E.U./DL
WBC # BLD AUTO: 4.1 K/UL (ref 4–11)
WBC # BLD AUTO: 9.3 K/UL (ref 4–11)
WBC #/AREA URNS AUTO: 3 /HPF (ref 0–5)

## 2025-07-31 PROCEDURE — 2500000003 HC RX 250 WO HCPCS: Performed by: HOSPITALIST

## 2025-07-31 PROCEDURE — 71045 X-RAY EXAM CHEST 1 VIEW: CPT

## 2025-07-31 PROCEDURE — 93010 ELECTROCARDIOGRAM REPORT: CPT | Performed by: INTERNAL MEDICINE

## 2025-07-31 PROCEDURE — 2580000003 HC RX 258: Performed by: NURSE PRACTITIONER

## 2025-07-31 PROCEDURE — 2709999900 HC NON-CHARGEABLE SUPPLY: Performed by: INTERNAL MEDICINE

## 2025-07-31 PROCEDURE — C1726 CATH, BAL DIL, NON-VASCULAR: HCPCS | Performed by: INTERNAL MEDICINE

## 2025-07-31 PROCEDURE — 6370000000 HC RX 637 (ALT 250 FOR IP): Performed by: HOSPITALIST

## 2025-07-31 PROCEDURE — 2500000003 HC RX 250 WO HCPCS: Performed by: ANESTHESIOLOGY

## 2025-07-31 PROCEDURE — 6370000000 HC RX 637 (ALT 250 FOR IP): Performed by: EMERGENCY MEDICINE

## 2025-07-31 PROCEDURE — 2580000003 HC RX 258: Performed by: HOSPITALIST

## 2025-07-31 PROCEDURE — 02HV33Z INSERTION OF INFUSION DEVICE INTO SUPERIOR VENA CAVA, PERCUTANEOUS APPROACH: ICD-10-PCS | Performed by: STUDENT IN AN ORGANIZED HEALTH CARE EDUCATION/TRAINING PROGRAM

## 2025-07-31 PROCEDURE — 80053 COMPREHEN METABOLIC PANEL: CPT

## 2025-07-31 PROCEDURE — 81001 URINALYSIS AUTO W/SCOPE: CPT

## 2025-07-31 PROCEDURE — 51798 US URINE CAPACITY MEASURE: CPT

## 2025-07-31 PROCEDURE — 99291 CRITICAL CARE FIRST HOUR: CPT | Performed by: INTERNAL MEDICINE

## 2025-07-31 PROCEDURE — 3700000000 HC ANESTHESIA ATTENDED CARE: Performed by: INTERNAL MEDICINE

## 2025-07-31 PROCEDURE — 6360000002 HC RX W HCPCS: Performed by: EMERGENCY MEDICINE

## 2025-07-31 PROCEDURE — 3609017100 HC EGD: Performed by: INTERNAL MEDICINE

## 2025-07-31 PROCEDURE — 84484 ASSAY OF TROPONIN QUANT: CPT

## 2025-07-31 PROCEDURE — 6370000000 HC RX 637 (ALT 250 FOR IP): Performed by: UROLOGY

## 2025-07-31 PROCEDURE — 3700000001 HC ADD 15 MINUTES (ANESTHESIA): Performed by: INTERNAL MEDICINE

## 2025-07-31 PROCEDURE — 83605 ASSAY OF LACTIC ACID: CPT

## 2025-07-31 PROCEDURE — 6360000004 HC RX CONTRAST MEDICATION: Performed by: EMERGENCY MEDICINE

## 2025-07-31 PROCEDURE — 85027 COMPLETE CBC AUTOMATED: CPT

## 2025-07-31 PROCEDURE — C1769 GUIDE WIRE: HCPCS | Performed by: INTERNAL MEDICINE

## 2025-07-31 PROCEDURE — 6360000002 HC RX W HCPCS: Performed by: HOSPITALIST

## 2025-07-31 PROCEDURE — 2000000000 HC ICU R&B

## 2025-07-31 PROCEDURE — 71260 CT THORAX DX C+: CPT

## 2025-07-31 PROCEDURE — 2580000003 HC RX 258: Performed by: EMERGENCY MEDICINE

## 2025-07-31 PROCEDURE — 94760 N-INVAS EAR/PLS OXIMETRY 1: CPT

## 2025-07-31 PROCEDURE — 6370000000 HC RX 637 (ALT 250 FOR IP): Performed by: FAMILY MEDICINE

## 2025-07-31 PROCEDURE — 96374 THER/PROPH/DIAG INJ IV PUSH: CPT

## 2025-07-31 PROCEDURE — 6360000002 HC RX W HCPCS: Performed by: NURSE PRACTITIONER

## 2025-07-31 PROCEDURE — 70450 CT HEAD/BRAIN W/O DYE: CPT

## 2025-07-31 PROCEDURE — 84132 ASSAY OF SERUM POTASSIUM: CPT

## 2025-07-31 PROCEDURE — 3609017700 HC EGD DILATION GASTRIC/DUODENAL STRICTURE: Performed by: INTERNAL MEDICINE

## 2025-07-31 PROCEDURE — 2580000003 HC RX 258: Performed by: NURSE ANESTHETIST, CERTIFIED REGISTERED

## 2025-07-31 PROCEDURE — APPNB15 APP NON BILLABLE TIME 0-15 MINS: Performed by: NURSE PRACTITIONER

## 2025-07-31 PROCEDURE — 0D758ZZ DILATION OF ESOPHAGUS, VIA NATURAL OR ARTIFICIAL OPENING ENDOSCOPIC: ICD-10-PCS | Performed by: INTERNAL MEDICINE

## 2025-07-31 PROCEDURE — 84460 ALANINE AMINO (ALT) (SGPT): CPT

## 2025-07-31 PROCEDURE — 87040 BLOOD CULTURE FOR BACTERIA: CPT

## 2025-07-31 PROCEDURE — 51701 INSERT BLADDER CATHETER: CPT

## 2025-07-31 RX ORDER — MIDODRINE HYDROCHLORIDE 5 MG/1
10 TABLET ORAL
Status: DISCONTINUED | OUTPATIENT
Start: 2025-07-31 | End: 2025-07-31

## 2025-07-31 RX ORDER — LIDOCAINE HYDROCHLORIDE 20 MG/ML
INJECTION, SOLUTION EPIDURAL; INFILTRATION; INTRACAUDAL; PERINEURAL
Status: DISCONTINUED | OUTPATIENT
Start: 2025-07-31 | End: 2025-07-31 | Stop reason: SDUPTHER

## 2025-07-31 RX ORDER — NOREPINEPHRINE BITARTRATE 0.03 MG/ML
INJECTION, SOLUTION INTRAVENOUS
Status: DISCONTINUED | OUTPATIENT
Start: 2025-07-31 | End: 2025-07-31 | Stop reason: SDUPTHER

## 2025-07-31 RX ORDER — PANTOPRAZOLE SODIUM 40 MG/1
40 TABLET, DELAYED RELEASE ORAL
Status: DISCONTINUED | OUTPATIENT
Start: 2025-07-31 | End: 2025-08-06 | Stop reason: HOSPADM

## 2025-07-31 RX ORDER — METOPROLOL TARTRATE 25 MG/1
12.5 TABLET, FILM COATED ORAL 2 TIMES DAILY
COMMUNITY

## 2025-07-31 RX ORDER — ONDANSETRON 2 MG/ML
4 INJECTION INTRAMUSCULAR; INTRAVENOUS ONCE
Status: COMPLETED | OUTPATIENT
Start: 2025-07-31 | End: 2025-07-31

## 2025-07-31 RX ORDER — SODIUM CHLORIDE 0.9 % (FLUSH) 0.9 %
5-40 SYRINGE (ML) INJECTION PRN
Status: DISCONTINUED | OUTPATIENT
Start: 2025-07-31 | End: 2025-08-02

## 2025-07-31 RX ORDER — LIDOCAINE HYDROCHLORIDE 20 MG/ML
JELLY TOPICAL PRN
Status: DISCONTINUED | OUTPATIENT
Start: 2025-07-31 | End: 2025-08-06 | Stop reason: HOSPADM

## 2025-07-31 RX ORDER — NOREPINEPHRINE BITARTRATE 0.03 MG/ML
1-100 INJECTION, SOLUTION INTRAVENOUS CONTINUOUS
Status: DISCONTINUED | OUTPATIENT
Start: 2025-07-31 | End: 2025-08-02

## 2025-07-31 RX ORDER — SODIUM CHLORIDE 9 MG/ML
INJECTION, SOLUTION INTRAVENOUS PRN
Status: DISCONTINUED | OUTPATIENT
Start: 2025-07-31 | End: 2025-08-02

## 2025-07-31 RX ORDER — SODIUM CHLORIDE 9 MG/ML
INJECTION, SOLUTION INTRAVENOUS PRN
Status: DISCONTINUED | OUTPATIENT
Start: 2025-07-31 | End: 2025-08-06 | Stop reason: HOSPADM

## 2025-07-31 RX ORDER — ROCURONIUM BROMIDE 10 MG/ML
INJECTION, SOLUTION INTRAVENOUS
Status: DISCONTINUED | OUTPATIENT
Start: 2025-07-31 | End: 2025-07-31 | Stop reason: SDUPTHER

## 2025-07-31 RX ORDER — 0.9 % SODIUM CHLORIDE 0.9 %
500 INTRAVENOUS SOLUTION INTRAVENOUS ONCE
Status: COMPLETED | OUTPATIENT
Start: 2025-07-31 | End: 2025-07-31

## 2025-07-31 RX ORDER — ATORVASTATIN CALCIUM 80 MG/1
80 TABLET, FILM COATED ORAL NIGHTLY
Status: DISCONTINUED | OUTPATIENT
Start: 2025-07-31 | End: 2025-08-06 | Stop reason: HOSPADM

## 2025-07-31 RX ORDER — TAMSULOSIN HYDROCHLORIDE 0.4 MG/1
0.4 CAPSULE ORAL DAILY
Status: DISCONTINUED | OUTPATIENT
Start: 2025-07-31 | End: 2025-08-06 | Stop reason: HOSPADM

## 2025-07-31 RX ORDER — AMIODARONE HYDROCHLORIDE 200 MG/1
200 TABLET ORAL DAILY
Status: DISCONTINUED | OUTPATIENT
Start: 2025-07-31 | End: 2025-08-06 | Stop reason: HOSPADM

## 2025-07-31 RX ORDER — IOPAMIDOL 755 MG/ML
75 INJECTION, SOLUTION INTRAVASCULAR
Status: COMPLETED | OUTPATIENT
Start: 2025-07-31 | End: 2025-07-31

## 2025-07-31 RX ORDER — SODIUM CHLORIDE 0.9 % (FLUSH) 0.9 %
5-40 SYRINGE (ML) INJECTION EVERY 12 HOURS SCHEDULED
Status: DISCONTINUED | OUTPATIENT
Start: 2025-07-31 | End: 2025-08-06 | Stop reason: HOSPADM

## 2025-07-31 RX ORDER — SODIUM CHLORIDE 9 MG/ML
INJECTION, SOLUTION INTRAVENOUS
Status: DISCONTINUED | OUTPATIENT
Start: 2025-07-31 | End: 2025-07-31 | Stop reason: SDUPTHER

## 2025-07-31 RX ORDER — SUCCINYLCHOLINE/SOD CL,ISO/PF 200MG/10ML
SYRINGE (ML) INTRAVENOUS
Status: DISCONTINUED | OUTPATIENT
Start: 2025-07-31 | End: 2025-07-31 | Stop reason: SDUPTHER

## 2025-07-31 RX ORDER — ONDANSETRON 2 MG/ML
4 INJECTION INTRAMUSCULAR; INTRAVENOUS EVERY 6 HOURS PRN
Status: DISCONTINUED | OUTPATIENT
Start: 2025-07-31 | End: 2025-07-31

## 2025-07-31 RX ORDER — LIDOCAINE 50 MG/G
1 PATCH TOPICAL DAILY PRN
COMMUNITY

## 2025-07-31 RX ORDER — METHADONE HYDROCHLORIDE 5 MG/1
70 TABLET ORAL DAILY
COMMUNITY

## 2025-07-31 RX ORDER — ONDANSETRON 4 MG/1
4 TABLET, ORALLY DISINTEGRATING ORAL EVERY 8 HOURS PRN
Status: DISCONTINUED | OUTPATIENT
Start: 2025-07-31 | End: 2025-07-31

## 2025-07-31 RX ORDER — INSULIN LISPRO 100 [IU]/ML
0-4 INJECTION, SOLUTION INTRAVENOUS; SUBCUTANEOUS EVERY 6 HOURS SCHEDULED
Status: DISCONTINUED | OUTPATIENT
Start: 2025-07-31 | End: 2025-08-02

## 2025-07-31 RX ORDER — POLYETHYLENE GLYCOL 3350 17 G/17G
17 POWDER, FOR SOLUTION ORAL DAILY PRN
Status: DISCONTINUED | OUTPATIENT
Start: 2025-07-31 | End: 2025-08-06 | Stop reason: HOSPADM

## 2025-07-31 RX ORDER — PROPOFOL 10 MG/ML
INJECTION, EMULSION INTRAVENOUS
Status: DISCONTINUED | OUTPATIENT
Start: 2025-07-31 | End: 2025-07-31 | Stop reason: SDUPTHER

## 2025-07-31 RX ORDER — ACETAMINOPHEN 325 MG/1
650 TABLET ORAL EVERY 6 HOURS PRN
Status: DISCONTINUED | OUTPATIENT
Start: 2025-07-31 | End: 2025-08-06 | Stop reason: HOSPADM

## 2025-07-31 RX ORDER — ACETAMINOPHEN 650 MG/1
650 SUPPOSITORY RECTAL EVERY 6 HOURS PRN
Status: DISCONTINUED | OUTPATIENT
Start: 2025-07-31 | End: 2025-08-06 | Stop reason: HOSPADM

## 2025-07-31 RX ORDER — MIDODRINE HYDROCHLORIDE 10 MG/1
10 TABLET ORAL
Status: DISCONTINUED | OUTPATIENT
Start: 2025-07-31 | End: 2025-07-31

## 2025-07-31 RX ORDER — HEPARIN SODIUM 5000 [USP'U]/ML
5000 INJECTION, SOLUTION INTRAVENOUS; SUBCUTANEOUS EVERY 8 HOURS SCHEDULED
Status: DISCONTINUED | OUTPATIENT
Start: 2025-07-31 | End: 2025-08-06 | Stop reason: HOSPADM

## 2025-07-31 RX ORDER — FENTANYL CITRATE 50 UG/ML
50 INJECTION, SOLUTION INTRAMUSCULAR; INTRAVENOUS ONCE
Status: COMPLETED | OUTPATIENT
Start: 2025-07-31 | End: 2025-07-31

## 2025-07-31 RX ORDER — SODIUM CHLORIDE 9 MG/ML
INJECTION, SOLUTION INTRAVENOUS CONTINUOUS
Status: DISCONTINUED | OUTPATIENT
Start: 2025-07-31 | End: 2025-07-31

## 2025-07-31 RX ORDER — SODIUM CHLORIDE 0.9 % (FLUSH) 0.9 %
5-40 SYRINGE (ML) INJECTION EVERY 12 HOURS SCHEDULED
Status: DISCONTINUED | OUTPATIENT
Start: 2025-07-31 | End: 2025-08-02

## 2025-07-31 RX ORDER — DEXTROSE MONOHYDRATE 100 MG/ML
INJECTION, SOLUTION INTRAVENOUS CONTINUOUS PRN
Status: DISCONTINUED | OUTPATIENT
Start: 2025-07-31 | End: 2025-08-06 | Stop reason: HOSPADM

## 2025-07-31 RX ORDER — MIDODRINE HYDROCHLORIDE 10 MG/1
10 TABLET ORAL
Status: DISCONTINUED | OUTPATIENT
Start: 2025-08-01 | End: 2025-08-06 | Stop reason: HOSPADM

## 2025-07-31 RX ORDER — METHADONE HYDROCHLORIDE 10 MG/1
70 TABLET ORAL DAILY
Refills: 0 | Status: DISCONTINUED | OUTPATIENT
Start: 2025-07-31 | End: 2025-08-06 | Stop reason: HOSPADM

## 2025-07-31 RX ORDER — GLUCAGON 1 MG/ML
1 KIT INJECTION PRN
Status: DISCONTINUED | OUTPATIENT
Start: 2025-07-31 | End: 2025-08-06 | Stop reason: HOSPADM

## 2025-07-31 RX ADMIN — SODIUM CHLORIDE, PRESERVATIVE FREE 10 ML: 5 INJECTION INTRAVENOUS at 20:26

## 2025-07-31 RX ADMIN — SODIUM CHLORIDE 500 ML: 9 INJECTION, SOLUTION INTRAVENOUS at 00:38

## 2025-07-31 RX ADMIN — ONDANSETRON 4 MG: 2 INJECTION, SOLUTION INTRAMUSCULAR; INTRAVENOUS at 00:40

## 2025-07-31 RX ADMIN — MIDODRINE HYDROCHLORIDE 10 MG: 10 TABLET ORAL at 04:33

## 2025-07-31 RX ADMIN — SODIUM CHLORIDE, PRESERVATIVE FREE 10 ML: 5 INJECTION INTRAVENOUS at 20:02

## 2025-07-31 RX ADMIN — HEPARIN SODIUM 5000 UNITS: 5000 INJECTION INTRAVENOUS; SUBCUTANEOUS at 20:36

## 2025-07-31 RX ADMIN — SODIUM CHLORIDE 500 ML: 9 INJECTION, SOLUTION INTRAVENOUS at 04:17

## 2025-07-31 RX ADMIN — LIDOCAINE HYDROCHLORIDE 100 MG: 20 INJECTION, SOLUTION EPIDURAL; INFILTRATION; INTRACAUDAL; PERINEURAL at 17:00

## 2025-07-31 RX ADMIN — METHADONE HYDROCHLORIDE 70 MG: 10 TABLET ORAL at 10:57

## 2025-07-31 RX ADMIN — FENTANYL CITRATE 50 MCG: 50 INJECTION INTRAMUSCULAR; INTRAVENOUS at 00:41

## 2025-07-31 RX ADMIN — SODIUM CHLORIDE: 9 INJECTION, SOLUTION INTRAVENOUS at 16:48

## 2025-07-31 RX ADMIN — NOREPINEPHRINE BITARTRATE 5 MCG/MIN: 32 SOLUTION INTRAVENOUS at 16:21

## 2025-07-31 RX ADMIN — IOPAMIDOL 75 ML: 755 INJECTION, SOLUTION INTRAVENOUS at 01:13

## 2025-07-31 RX ADMIN — TAMSULOSIN HYDROCHLORIDE 0.4 MG: 0.4 CAPSULE ORAL at 19:46

## 2025-07-31 RX ADMIN — NOREPINEPHRINE BITARTRATE 5 MCG/MIN: 0.03 INJECTION, SOLUTION INTRAVENOUS at 16:48

## 2025-07-31 RX ADMIN — PIPERACILLIN AND TAZOBACTAM 4500 MG: 4; .5 INJECTION, POWDER, LYOPHILIZED, FOR SOLUTION INTRAVENOUS at 03:14

## 2025-07-31 RX ADMIN — MIDODRINE HYDROCHLORIDE 10 MG: 10 TABLET ORAL at 09:27

## 2025-07-31 RX ADMIN — NOREPINEPHRINE BITARTRATE 5 MCG/MIN: 32 SOLUTION INTRAVENOUS at 04:37

## 2025-07-31 RX ADMIN — SODIUM CHLORIDE: 9 INJECTION, SOLUTION INTRAVENOUS at 03:52

## 2025-07-31 RX ADMIN — PANTOPRAZOLE SODIUM 40 MG: 40 TABLET, DELAYED RELEASE ORAL at 11:07

## 2025-07-31 RX ADMIN — Medication 100 MG: at 17:00

## 2025-07-31 RX ADMIN — ROCURONIUM BROMIDE 5 MG: 10 INJECTION, SOLUTION INTRAVENOUS at 17:00

## 2025-07-31 RX ADMIN — MEROPENEM 1000 MG: 1 INJECTION INTRAVENOUS at 13:48

## 2025-07-31 RX ADMIN — Medication 3 MG: at 19:46

## 2025-07-31 RX ADMIN — SODIUM CHLORIDE 500 ML: 9 INJECTION, SOLUTION INTRAVENOUS at 02:30

## 2025-07-31 RX ADMIN — ACETAMINOPHEN 1000 MG: 500 TABLET ORAL at 00:37

## 2025-07-31 RX ADMIN — SODIUM CHLORIDE, PRESERVATIVE FREE 10 ML: 5 INJECTION INTRAVENOUS at 09:27

## 2025-07-31 RX ADMIN — PIPERACILLIN AND TAZOBACTAM 3375 MG: 3; .375 INJECTION, POWDER, LYOPHILIZED, FOR SOLUTION INTRAVENOUS at 11:30

## 2025-07-31 RX ADMIN — PROPOFOL 100 MG: 10 INJECTION, EMULSION INTRAVENOUS at 17:00

## 2025-07-31 RX ADMIN — ROCURONIUM BROMIDE 45 MG: 10 INJECTION, SOLUTION INTRAVENOUS at 17:08

## 2025-07-31 RX ADMIN — ATORVASTATIN CALCIUM 80 MG: 80 TABLET, FILM COATED ORAL at 19:46

## 2025-07-31 ASSESSMENT — LIFESTYLE VARIABLES: SMOKING_STATUS: 0

## 2025-07-31 ASSESSMENT — PAIN SCALES - GENERAL
PAINLEVEL_OUTOF10: 0

## 2025-07-31 NOTE — ANESTHESIA PRE PROCEDURE
6/17/25  Yes Sierra Bruno PA-C   epoetin lonnie-epbx (RETACRIT) 98466 UNIT/ML SOLN injection Inject 1 mL into the skin Every Monday, Wednesday, and Friday 6/16/25  Yes Sierra Bruno PA-C   lidocaine (LIDODERM) 5 % Place 1 patch onto the skin daily as needed for Pain 12 hours on, 12 hours off.    Provider, MD Nikolas   naloxone HCl 8 MG/0.1ML LIQD nasal spray 1 spray by Nasal route as needed for Opioid Reversal    Provider, MD Nikolas       Current medications:    Current Facility-Administered Medications   Medication Dose Route Frequency Provider Last Rate Last Admin    sodium chloride flush 0.9 % injection 5-40 mL  5-40 mL IntraVENous 2 times per day Mike Mcghee MD   10 mL at 07/31/25 0927    sodium chloride flush 0.9 % injection 5-40 mL  5-40 mL IntraVENous PRN Mike Mcghee MD        0.9 % sodium chloride infusion   IntraVENous PRN Mike Mcghee MD        heparin (porcine) injection 5,000 Units  5,000 Units SubCUTAneous 3 times per day Mike Mcghee MD        polyethylene glycol (GLYCOLAX) packet 17 g  17 g Oral Daily PRN Mike Mcghee MD        acetaminophen (TYLENOL) tablet 650 mg  650 mg Oral Q6H PRN Mike Mcghee MD        Or    acetaminophen (TYLENOL) suppository 650 mg  650 mg Rectal Q6H PRN Mike Mcghee MD        melatonin tablet 3 mg  3 mg Oral Nightly Mike Mcghee MD        norepinephrine (LEVOPHED) 8 mg in sodium chloride 0.9 % 250 mL infusion  1-100 mcg/min IntraVENous Continuous Miek Mcghee MD 9.4 mL/hr at 07/31/25 1621 5 mcg/min at 07/31/25 1621    glucose chewable tablet 16 g  4 tablet Oral PRN Mike Mcghee MD        dextrose bolus 10% 125 mL  125 mL IntraVENous PRN Mike Mcghee MD        Or    dextrose bolus 10% 250 mL  250 mL IntraVENous PRN Mike Mcghee MD        glucagon injection 1 mg  1 mg SubCUTAneous PRN Mike Mcghee MD        dextrose 10 % infusion   IntraVENous Continuous PRN Mike Mcghee MD        insulin lispro

## 2025-08-01 ENCOUNTER — ANESTHESIA (OUTPATIENT)
Dept: ENDOSCOPY | Age: 75
End: 2025-08-01
Payer: OTHER GOVERNMENT

## 2025-08-01 ENCOUNTER — APPOINTMENT (OUTPATIENT)
Dept: GENERAL RADIOLOGY | Age: 75
DRG: 871 | End: 2025-08-01
Payer: OTHER GOVERNMENT

## 2025-08-01 LAB
ALBUMIN SERPL-MCNC: 3.2 G/DL (ref 3.4–5)
ANION GAP SERPL CALCULATED.3IONS-SCNC: 14 MMOL/L (ref 3–16)
ANISOCYTOSIS BLD QL SMEAR: ABNORMAL
BASOPHILS # BLD: 0.1 K/UL (ref 0–0.2)
BASOPHILS NFR BLD: 1 %
BUN SERPL-MCNC: 58 MG/DL (ref 7–20)
CALCIUM SERPL-MCNC: 8.2 MG/DL (ref 8.3–10.6)
CHLORIDE SERPL-SCNC: 98 MMOL/L (ref 99–110)
CO2 SERPL-SCNC: 25 MMOL/L (ref 21–32)
CREAT SERPL-MCNC: 6.6 MG/DL (ref 0.8–1.3)
DEPRECATED RDW RBC AUTO: 18.1 % (ref 12.4–15.4)
EOSINOPHIL # BLD: 0 K/UL (ref 0–0.6)
EOSINOPHIL NFR BLD: 0 %
EST. AVERAGE GLUCOSE BLD GHB EST-MCNC: 128.4 MG/DL
GFR SERPLBLD CREATININE-BSD FMLA CKD-EPI: 8 ML/MIN/{1.73_M2}
GLUCOSE BLD-MCNC: 256 MG/DL (ref 70–99)
GLUCOSE BLD-MCNC: 51 MG/DL (ref 70–99)
GLUCOSE BLD-MCNC: 98 MG/DL (ref 70–99)
GLUCOSE SERPL-MCNC: 118 MG/DL (ref 70–99)
HBA1C MFR BLD: 6.1 %
HBV SURFACE AB SERPL IA-ACNC: <3.5 MIU/ML
HBV SURFACE AG SERPL QL IA: NORMAL
HCT VFR BLD AUTO: 26.6 % (ref 40.5–52.5)
HGB BLD-MCNC: 8.8 G/DL (ref 13.5–17.5)
HYPOCHROMIA BLD QL SMEAR: ABNORMAL
LYMPHOCYTES # BLD: 1 K/UL (ref 1–5.1)
LYMPHOCYTES NFR BLD: 8 %
MAGNESIUM SERPL-MCNC: 1.8 MG/DL (ref 1.8–2.4)
MCH RBC QN AUTO: 30.4 PG (ref 26–34)
MCHC RBC AUTO-ENTMCNC: 33.1 G/DL (ref 31–36)
MCV RBC AUTO: 91.8 FL (ref 80–100)
MONOCYTES # BLD: 0.6 K/UL (ref 0–1.3)
MONOCYTES NFR BLD: 5 %
NEUTROPHILS # BLD: 9.8 K/UL (ref 1.7–7.7)
NEUTROPHILS NFR BLD: 71 %
NEUTS BAND NFR BLD MANUAL: 14 % (ref 0–7)
NEUTS VAC BLD QL SMEAR: PRESENT
OVALOCYTES BLD QL SMEAR: ABNORMAL
PERFORMED ON: ABNORMAL
PERFORMED ON: ABNORMAL
PERFORMED ON: NORMAL
PHOSPHATE SERPL-MCNC: 5.5 MG/DL (ref 2.5–4.9)
PLATELET # BLD AUTO: 132 K/UL (ref 135–450)
PLATELET BLD QL SMEAR: ABNORMAL
PMV BLD AUTO: 10.3 FL (ref 5–10.5)
POIKILOCYTOSIS BLD QL SMEAR: ABNORMAL
POTASSIUM SERPL-SCNC: 4.7 MMOL/L (ref 3.5–5.1)
RBC # BLD AUTO: 2.9 M/UL (ref 4.2–5.9)
SLIDE REVIEW: ABNORMAL
SODIUM SERPL-SCNC: 137 MMOL/L (ref 136–145)
VARIANT LYMPHS NFR BLD MANUAL: 1 % (ref 0–6)
WBC # BLD AUTO: 11.5 K/UL (ref 4–11)

## 2025-08-01 PROCEDURE — 6360000002 HC RX W HCPCS: Performed by: HOSPITALIST

## 2025-08-01 PROCEDURE — 3609014900 HC ERCP W/SPHINCTEROTOMY &/OR PAPILLOTOMY: Performed by: INTERNAL MEDICINE

## 2025-08-01 PROCEDURE — 83735 ASSAY OF MAGNESIUM: CPT

## 2025-08-01 PROCEDURE — 6370000000 HC RX 637 (ALT 250 FOR IP): Performed by: UROLOGY

## 2025-08-01 PROCEDURE — 3609015100 HC ERCP STENT PLACEMENT BILIARY/PANCREATIC DUCT: Performed by: INTERNAL MEDICINE

## 2025-08-01 PROCEDURE — 2500000003 HC RX 250 WO HCPCS: Performed by: HOSPITALIST

## 2025-08-01 PROCEDURE — 2580000003 HC RX 258: Performed by: NURSE PRACTITIONER

## 2025-08-01 PROCEDURE — 3609017700 HC EGD DILATION GASTRIC/DUODENAL STRICTURE: Performed by: INTERNAL MEDICINE

## 2025-08-01 PROCEDURE — 83036 HEMOGLOBIN GLYCOSYLATED A1C: CPT

## 2025-08-01 PROCEDURE — 86706 HEP B SURFACE ANTIBODY: CPT

## 2025-08-01 PROCEDURE — BF101ZZ FLUOROSCOPY OF BILE DUCTS USING LOW OSMOLAR CONTRAST: ICD-10-PCS | Performed by: INTERNAL MEDICINE

## 2025-08-01 PROCEDURE — 2709999900 HC NON-CHARGEABLE SUPPLY: Performed by: INTERNAL MEDICINE

## 2025-08-01 PROCEDURE — 80069 RENAL FUNCTION PANEL: CPT

## 2025-08-01 PROCEDURE — 3700000000 HC ANESTHESIA ATTENDED CARE: Performed by: INTERNAL MEDICINE

## 2025-08-01 PROCEDURE — 0T9B70Z DRAINAGE OF BLADDER WITH DRAINAGE DEVICE, VIA NATURAL OR ARTIFICIAL OPENING: ICD-10-PCS | Performed by: INTERNAL MEDICINE

## 2025-08-01 PROCEDURE — 6370000000 HC RX 637 (ALT 250 FOR IP): Performed by: FAMILY MEDICINE

## 2025-08-01 PROCEDURE — 2720000010 HC SURG SUPPLY STERILE: Performed by: INTERNAL MEDICINE

## 2025-08-01 PROCEDURE — 2500000003 HC RX 250 WO HCPCS

## 2025-08-01 PROCEDURE — 2580000003 HC RX 258: Performed by: HOSPITALIST

## 2025-08-01 PROCEDURE — 87340 HEPATITIS B SURFACE AG IA: CPT

## 2025-08-01 PROCEDURE — 85025 COMPLETE CBC W/AUTO DIFF WBC: CPT

## 2025-08-01 PROCEDURE — C1769 GUIDE WIRE: HCPCS | Performed by: INTERNAL MEDICINE

## 2025-08-01 PROCEDURE — 6370000000 HC RX 637 (ALT 250 FOR IP): Performed by: HOSPITALIST

## 2025-08-01 PROCEDURE — 74328 X-RAY BILE DUCT ENDOSCOPY: CPT

## 2025-08-01 PROCEDURE — 0F778DZ DILATION OF COMMON HEPATIC DUCT WITH INTRALUMINAL DEVICE, VIA NATURAL OR ARTIFICIAL OPENING ENDOSCOPIC: ICD-10-PCS | Performed by: INTERNAL MEDICINE

## 2025-08-01 PROCEDURE — 6360000002 HC RX W HCPCS: Performed by: NURSE PRACTITIONER

## 2025-08-01 PROCEDURE — 99291 CRITICAL CARE FIRST HOUR: CPT | Performed by: INTERNAL MEDICINE

## 2025-08-01 PROCEDURE — C1726 CATH, BAL DIL, NON-VASCULAR: HCPCS | Performed by: INTERNAL MEDICINE

## 2025-08-01 PROCEDURE — 2500000003 HC RX 250 WO HCPCS: Performed by: ANESTHESIOLOGY

## 2025-08-01 PROCEDURE — 6360000002 HC RX W HCPCS

## 2025-08-01 PROCEDURE — 90935 HEMODIALYSIS ONE EVALUATION: CPT

## 2025-08-01 PROCEDURE — 2000000000 HC ICU R&B

## 2025-08-01 PROCEDURE — 3700000001 HC ADD 15 MINUTES (ANESTHESIA): Performed by: INTERNAL MEDICINE

## 2025-08-01 PROCEDURE — 94761 N-INVAS EAR/PLS OXIMETRY MLT: CPT

## 2025-08-01 PROCEDURE — 3609015200 HC ERCP REMOVE CALCULI/DEBRIS BILIARY/PANCREAS DUCT: Performed by: INTERNAL MEDICINE

## 2025-08-01 PROCEDURE — C2617 STENT, NON-COR, TEM W/O DEL: HCPCS | Performed by: INTERNAL MEDICINE

## 2025-08-01 DEVICE — IMPLANTABLE DEVICE: Type: IMPLANTABLE DEVICE | Site: BILE DUCT | Status: FUNCTIONAL

## 2025-08-01 RX ORDER — FENTANYL CITRATE 50 UG/ML
25 INJECTION, SOLUTION INTRAMUSCULAR; INTRAVENOUS EVERY 5 MIN PRN
Status: DISCONTINUED | OUTPATIENT
Start: 2025-08-01 | End: 2025-08-02

## 2025-08-01 RX ORDER — LIDOCAINE HYDROCHLORIDE 20 MG/ML
INJECTION, SOLUTION EPIDURAL; INFILTRATION; INTRACAUDAL; PERINEURAL
Status: DISCONTINUED | OUTPATIENT
Start: 2025-08-01 | End: 2025-08-01 | Stop reason: SDUPTHER

## 2025-08-01 RX ORDER — ONDANSETRON 2 MG/ML
4 INJECTION INTRAMUSCULAR; INTRAVENOUS
Status: DISCONTINUED | OUTPATIENT
Start: 2025-08-01 | End: 2025-08-02

## 2025-08-01 RX ORDER — SODIUM CHLORIDE 0.9 % (FLUSH) 0.9 %
5-40 SYRINGE (ML) INJECTION EVERY 12 HOURS SCHEDULED
Status: DISCONTINUED | OUTPATIENT
Start: 2025-08-01 | End: 2025-08-02

## 2025-08-01 RX ORDER — PROPOFOL 10 MG/ML
INJECTION, EMULSION INTRAVENOUS
Status: DISCONTINUED | OUTPATIENT
Start: 2025-08-01 | End: 2025-08-01 | Stop reason: SDUPTHER

## 2025-08-01 RX ORDER — DEXAMETHASONE SODIUM PHOSPHATE 4 MG/ML
INJECTION, SOLUTION INTRA-ARTICULAR; INTRALESIONAL; INTRAMUSCULAR; INTRAVENOUS; SOFT TISSUE
Status: DISCONTINUED | OUTPATIENT
Start: 2025-08-01 | End: 2025-08-01 | Stop reason: SDUPTHER

## 2025-08-01 RX ORDER — ONDANSETRON 2 MG/ML
INJECTION INTRAMUSCULAR; INTRAVENOUS
Status: DISCONTINUED | OUTPATIENT
Start: 2025-08-01 | End: 2025-08-01 | Stop reason: SDUPTHER

## 2025-08-01 RX ORDER — SODIUM CHLORIDE 0.9 % (FLUSH) 0.9 %
5-40 SYRINGE (ML) INJECTION PRN
Status: DISCONTINUED | OUTPATIENT
Start: 2025-08-01 | End: 2025-08-06 | Stop reason: HOSPADM

## 2025-08-01 RX ORDER — ROCURONIUM BROMIDE 10 MG/ML
INJECTION, SOLUTION INTRAVENOUS
Status: DISCONTINUED | OUTPATIENT
Start: 2025-08-01 | End: 2025-08-01 | Stop reason: SDUPTHER

## 2025-08-01 RX ORDER — SODIUM CHLORIDE 9 MG/ML
INJECTION, SOLUTION INTRAVENOUS PRN
Status: DISCONTINUED | OUTPATIENT
Start: 2025-08-01 | End: 2025-08-02

## 2025-08-01 RX ADMIN — ATORVASTATIN CALCIUM 80 MG: 80 TABLET, FILM COATED ORAL at 20:13

## 2025-08-01 RX ADMIN — NOREPINEPHRINE BITARTRATE 10 MCG/MIN: 32 SOLUTION INTRAVENOUS at 09:43

## 2025-08-01 RX ADMIN — DEXAMETHASONE SODIUM PHOSPHATE 8 MG: 4 INJECTION, SOLUTION INTRAMUSCULAR; INTRAVENOUS at 13:38

## 2025-08-01 RX ADMIN — EPOETIN ALFA-EPBX 10000 UNITS: 10000 INJECTION, SOLUTION INTRAVENOUS; SUBCUTANEOUS at 18:31

## 2025-08-01 RX ADMIN — SODIUM CHLORIDE: 9 INJECTION, SOLUTION INTRAVENOUS at 13:21

## 2025-08-01 RX ADMIN — LIDOCAINE HYDROCHLORIDE 40 MG: 20 INJECTION, SOLUTION EPIDURAL; INFILTRATION; INTRACAUDAL; PERINEURAL at 13:26

## 2025-08-01 RX ADMIN — SUGAMMADEX 200 MG: 100 INJECTION, SOLUTION INTRAVENOUS at 14:09

## 2025-08-01 RX ADMIN — SODIUM CHLORIDE, PRESERVATIVE FREE 10 ML: 5 INJECTION INTRAVENOUS at 21:37

## 2025-08-01 RX ADMIN — HEPARIN SODIUM 5000 UNITS: 5000 INJECTION INTRAVENOUS; SUBCUTANEOUS at 21:37

## 2025-08-01 RX ADMIN — ROCURONIUM BROMIDE 40 MG: 10 SOLUTION INTRAVENOUS at 13:26

## 2025-08-01 RX ADMIN — PROPOFOL 80 MG: 10 INJECTION, EMULSION INTRAVENOUS at 13:26

## 2025-08-01 RX ADMIN — SODIUM CHLORIDE, PRESERVATIVE FREE 10 ML: 5 INJECTION INTRAVENOUS at 20:12

## 2025-08-01 RX ADMIN — INSULIN LISPRO 2 UNITS: 100 INJECTION, SOLUTION INTRAVENOUS; SUBCUTANEOUS at 18:33

## 2025-08-01 RX ADMIN — ONDANSETRON 4 MG: 2 INJECTION, SOLUTION INTRAMUSCULAR; INTRAVENOUS at 13:38

## 2025-08-01 RX ADMIN — Medication 3 MG: at 20:13

## 2025-08-01 RX ADMIN — TAMSULOSIN HYDROCHLORIDE 0.4 MG: 0.4 CAPSULE ORAL at 15:55

## 2025-08-01 RX ADMIN — DEXTROSE 125 ML: 10 SOLUTION INTRAVENOUS at 12:15

## 2025-08-01 RX ADMIN — MIDODRINE HYDROCHLORIDE 10 MG: 10 TABLET ORAL at 07:23

## 2025-08-01 RX ADMIN — NOREPINEPHRINE BITARTRATE 15 MCG/MIN: 32 SOLUTION INTRAVENOUS at 00:45

## 2025-08-01 RX ADMIN — METHADONE HYDROCHLORIDE 70 MG: 10 TABLET ORAL at 07:23

## 2025-08-01 RX ADMIN — MEROPENEM 500 MG: 500 INJECTION INTRAVENOUS at 15:58

## 2025-08-01 RX ADMIN — HEPARIN SODIUM 5000 UNITS: 5000 INJECTION INTRAVENOUS; SUBCUTANEOUS at 15:55

## 2025-08-01 RX ADMIN — SODIUM CHLORIDE, PRESERVATIVE FREE 10 ML: 5 INJECTION INTRAVENOUS at 20:02

## 2025-08-01 RX ADMIN — PANTOPRAZOLE SODIUM 40 MG: 40 TABLET, DELAYED RELEASE ORAL at 06:17

## 2025-08-01 RX ADMIN — SODIUM CHLORIDE: 9 INJECTION, SOLUTION INTRAVENOUS at 14:28

## 2025-08-01 ASSESSMENT — PAIN SCALES - GENERAL
PAINLEVEL_OUTOF10: 0

## 2025-08-01 NOTE — ANESTHESIA POSTPROCEDURE EVALUATION
[facility] Department of Anesthesiology  Post-Anesthesia Note       Name:  Colby Ovalle                                  Age:  75 y.o.  MRN:  [id number]     Last Vitals & Oxygen Saturation: /77   Pulse 67   Temp 98.1 °F (36.7 °C) (Oral)   Resp 18   Ht 1.88 m (6' 2\")   Wt 75.8 kg (167 lb 1.7 oz)   SpO2 97%   BMI 21.46 kg/m²   Patient Vitals for the past 4 hrs:   BP Temp Temp src Pulse Resp SpO2   08/01/25 1500 100/77 -- -- 67 18 97 %   08/01/25 1445 136/63 -- -- 71 17 99 %   08/01/25 1430 (!) 130/56 -- -- 79 17 (!) 89 %   08/01/25 1428 124/62 -- -- 81 17 100 %   08/01/25 1315 (!) 151/68 -- -- 93 18 (!) 89 %   08/01/25 1300 (!) 150/75 -- -- 87 19 95 %   08/01/25 1245 (!) 138/53 -- -- 78 17 98 %   08/01/25 1230 (!) 139/58 -- -- 75 16 91 %   08/01/25 1215 136/60 -- -- 75 18 92 %   08/01/25 1200 135/74 98.1 °F (36.7 °C) Oral 74 18 92 %   08/01/25 1145 (!) 126/52 -- -- 75 16 91 %   08/01/25 1130 123/71 -- -- 79 22 97 %       Level of consciousness:  Awake, alert to baseline    Respiratory: Respirations easy, no distress. Stable.    Cardiovascular: Hemodynamically stable.    Hydration: Adequate.    PONV: Adequately managed.    Post-op pain: Adequately controlled.    Post-op assessment: Tolerated anesthetic well without complication.    Complications:  None.  Patient transferred to ICU on propac and NC 02.  No issues.  Report given to ICU staff.    Petey Sanchez MD  August 1, 2025   3:16 PM

## 2025-08-01 NOTE — ANESTHESIA PRE PROCEDURE
mellitus without complication (HCC)      Past Surgical History:   Procedure Laterality Date    BACK SURGERY      CARDIAC PROCEDURE N/A 10/3/2024    Left heart cath / coronary angiography performed by Bindu Barboza DO at [facility] CARDIAC CATH LAB    CARDIAC PROCEDURE N/A 10/3/2024    Intravascular ultrasound performed by Bindu Barboza DO at [facility] CARDIAC CATH LAB    CARDIAC PROCEDURE N/A 10/3/2024    Atherectomy coronary performed by Bindu Barboza DO at [facility] CARDIAC CATH LAB    CARDIAC PROCEDURE N/A 6/2/2025    Left heart cath / coronary angiography performed by Modesto Pérez MD at [facility] CARDIAC CATH LAB    CARDIAC PROCEDURE N/A 6/2/2025    Intra-aortic balloon pump insertion performed by Modesto Pérez MD at [facility] CARDIAC CATH LAB    COLONOSCOPY      CORONARY ARTERY BYPASS GRAFT N/A 6/9/2025    CORONARY ARTERY BYPASS GRAFT X2 USING RIGHT SAPHENOUS VEIN AND LIMA; LEFT EVH SAPHENOUS VEIN; TAKEDOWN OF LEFT INTRAMAMMARY ARTERY; PLACEMENT OF TEMPORARY ATRIAL AND VENTRICLE PACING WIRES; CPB; SCAR performed by Ronaldo Duke MD at [facility] CVOR    CYSTOSCOPY      with stone removal    DIALYSIS FISTULA CREATION Left 3/1/2022    LEFT ARM STAGED CREATION OF A BRACHIO-BASILIC FISTULA AT THE ELBOW performed by Fabián Matt DO at [facility] OR    INVASIVE VASCULAR N/A 10/8/2024    Angioplasty fistula/dialysis circuit performed by Ab Paris MD at [facility] CARDIAC CATH LAB    PORT SURGERY      UPPER GASTROINTESTINAL ENDOSCOPY  7/31/2025    ESOPHAGOGASTRODUODENOSCOPY performed by Shen Vicente MD at [facility] ENDOSCOPY    UPPER GASTROINTESTINAL ENDOSCOPY  7/31/2025    ESOPHAGOGASTRODUODENOSCOPY DILATION BALLOON performed by Shen Vicente MD at [facility] ENDOSCOPY     Social History     Tobacco Use    Smoking status: Former     Current packs/day: 0.00     Average packs/day: 0.5 packs/day for 40.0 years (20.0 ttl pk-yrs)     Types: Cigarettes     Start date: 1978     Quit date: 2018     Years since quitting:

## 2025-08-02 PROBLEM — Z16.12 INFECTION DUE TO ESBL-PRODUCING ESCHERICHIA COLI: Status: ACTIVE | Noted: 2025-08-02

## 2025-08-02 PROBLEM — A49.8 INFECTION DUE TO ESBL-PRODUCING ESCHERICHIA COLI: Status: ACTIVE | Noted: 2025-08-02

## 2025-08-02 PROBLEM — R79.89 ELEVATED LFTS: Status: ACTIVE | Noted: 2025-08-02

## 2025-08-02 LAB
ALBUMIN SERPL-MCNC: 2.8 G/DL (ref 3.4–5)
ALP SERPL-CCNC: 97 U/L (ref 40–129)
ALT SERPL-CCNC: 85 U/L (ref 10–40)
ANION GAP SERPL CALCULATED.3IONS-SCNC: 10 MMOL/L (ref 3–16)
AST SERPL-CCNC: 76 U/L (ref 15–37)
BACTERIA BLD CULT ORG #2: ABNORMAL
BACTERIA BLD CULT: ABNORMAL
BACTERIA BLD CULT: ABNORMAL
BASOPHILS # BLD: 0 K/UL (ref 0–0.2)
BASOPHILS NFR BLD: 0.3 %
BILIRUB DIRECT SERPL-MCNC: 0.7 MG/DL (ref 0–0.3)
BILIRUB INDIRECT SERPL-MCNC: 0.2 MG/DL (ref 0–1)
BILIRUB SERPL-MCNC: 0.9 MG/DL (ref 0–1)
BUN SERPL-MCNC: 31 MG/DL (ref 7–20)
CALCIUM SERPL-MCNC: 8.6 MG/DL (ref 8.3–10.6)
CHLORIDE SERPL-SCNC: 100 MMOL/L (ref 99–110)
CO2 SERPL-SCNC: 27 MMOL/L (ref 21–32)
CREAT SERPL-MCNC: 4.4 MG/DL (ref 0.8–1.3)
DEPRECATED RDW RBC AUTO: 17.2 % (ref 12.4–15.4)
EOSINOPHIL # BLD: 0 K/UL (ref 0–0.6)
EOSINOPHIL NFR BLD: 0.1 %
GFR SERPLBLD CREATININE-BSD FMLA CKD-EPI: 13 ML/MIN/{1.73_M2}
GLUCOSE BLD-MCNC: 121 MG/DL (ref 70–99)
GLUCOSE BLD-MCNC: 143 MG/DL (ref 70–99)
GLUCOSE BLD-MCNC: 160 MG/DL (ref 70–99)
GLUCOSE BLD-MCNC: 165 MG/DL (ref 70–99)
GLUCOSE BLD-MCNC: 321 MG/DL (ref 70–99)
GLUCOSE SERPL-MCNC: 112 MG/DL (ref 70–99)
HCT VFR BLD AUTO: 23.5 % (ref 40.5–52.5)
HGB BLD-MCNC: 8.1 G/DL (ref 13.5–17.5)
LYMPHOCYTES # BLD: 0.3 K/UL (ref 1–5.1)
LYMPHOCYTES NFR BLD: 4.2 %
MAGNESIUM SERPL-MCNC: 1.91 MG/DL (ref 1.8–2.4)
MCH RBC QN AUTO: 31.1 PG (ref 26–34)
MCHC RBC AUTO-ENTMCNC: 34.4 G/DL (ref 31–36)
MCV RBC AUTO: 90.6 FL (ref 80–100)
MONOCYTES # BLD: 0.3 K/UL (ref 0–1.3)
MONOCYTES NFR BLD: 4.7 %
NEUTROPHILS # BLD: 6.4 K/UL (ref 1.7–7.7)
NEUTROPHILS NFR BLD: 90.7 %
ORGANISM: ABNORMAL
PERFORMED ON: ABNORMAL
PHOSPHATE SERPL-MCNC: 4.6 MG/DL (ref 2.5–4.9)
PLATELET # BLD AUTO: 101 K/UL (ref 135–450)
PMV BLD AUTO: 9.5 FL (ref 5–10.5)
POTASSIUM SERPL-SCNC: 3.9 MMOL/L (ref 3.5–5.1)
PROT SERPL-MCNC: 5.8 G/DL (ref 6.4–8.2)
RBC # BLD AUTO: 2.6 M/UL (ref 4.2–5.9)
SODIUM SERPL-SCNC: 137 MMOL/L (ref 136–145)
WBC # BLD AUTO: 7.1 K/UL (ref 4–11)

## 2025-08-02 PROCEDURE — 6360000002 HC RX W HCPCS: Performed by: NURSE PRACTITIONER

## 2025-08-02 PROCEDURE — 36592 COLLECT BLOOD FROM PICC: CPT

## 2025-08-02 PROCEDURE — 2500000003 HC RX 250 WO HCPCS: Performed by: ANESTHESIOLOGY

## 2025-08-02 PROCEDURE — 6370000000 HC RX 637 (ALT 250 FOR IP): Performed by: FAMILY MEDICINE

## 2025-08-02 PROCEDURE — 2500000003 HC RX 250 WO HCPCS: Performed by: INTERNAL MEDICINE

## 2025-08-02 PROCEDURE — 2500000003 HC RX 250 WO HCPCS: Performed by: HOSPITALIST

## 2025-08-02 PROCEDURE — 83735 ASSAY OF MAGNESIUM: CPT

## 2025-08-02 PROCEDURE — 36415 COLL VENOUS BLD VENIPUNCTURE: CPT

## 2025-08-02 PROCEDURE — 80069 RENAL FUNCTION PANEL: CPT

## 2025-08-02 PROCEDURE — 6370000000 HC RX 637 (ALT 250 FOR IP): Performed by: HOSPITALIST

## 2025-08-02 PROCEDURE — 6370000000 HC RX 637 (ALT 250 FOR IP): Performed by: UROLOGY

## 2025-08-02 PROCEDURE — 85025 COMPLETE CBC W/AUTO DIFF WBC: CPT

## 2025-08-02 PROCEDURE — 99233 SBSQ HOSP IP/OBS HIGH 50: CPT | Performed by: INTERNAL MEDICINE

## 2025-08-02 PROCEDURE — 1200000000 HC SEMI PRIVATE

## 2025-08-02 PROCEDURE — 3E043XZ INTRODUCTION OF VASOPRESSOR INTO CENTRAL VEIN, PERCUTANEOUS APPROACH: ICD-10-PCS | Performed by: STUDENT IN AN ORGANIZED HEALTH CARE EDUCATION/TRAINING PROGRAM

## 2025-08-02 PROCEDURE — 80076 HEPATIC FUNCTION PANEL: CPT

## 2025-08-02 PROCEDURE — 6360000002 HC RX W HCPCS: Performed by: HOSPITALIST

## 2025-08-02 PROCEDURE — 2580000003 HC RX 258: Performed by: NURSE PRACTITIONER

## 2025-08-02 PROCEDURE — 87040 BLOOD CULTURE FOR BACTERIA: CPT

## 2025-08-02 PROCEDURE — 94760 N-INVAS EAR/PLS OXIMETRY 1: CPT

## 2025-08-02 RX ORDER — MAGNESIUM SULFATE IN WATER 40 MG/ML
2000 INJECTION, SOLUTION INTRAVENOUS ONCE
Status: COMPLETED | OUTPATIENT
Start: 2025-08-02 | End: 2025-08-02

## 2025-08-02 RX ORDER — SODIUM CHLORIDE 9 MG/ML
INJECTION, SOLUTION INTRAVENOUS PRN
Status: DISCONTINUED | OUTPATIENT
Start: 2025-08-02 | End: 2025-08-06 | Stop reason: HOSPADM

## 2025-08-02 RX ORDER — SODIUM CHLORIDE 0.9 % (FLUSH) 0.9 %
5-40 SYRINGE (ML) INJECTION EVERY 12 HOURS SCHEDULED
Status: DISCONTINUED | OUTPATIENT
Start: 2025-08-02 | End: 2025-08-06 | Stop reason: HOSPADM

## 2025-08-02 RX ORDER — POTASSIUM CHLORIDE 29.8 MG/ML
20 INJECTION INTRAVENOUS ONCE
Status: COMPLETED | OUTPATIENT
Start: 2025-08-02 | End: 2025-08-02

## 2025-08-02 RX ORDER — SODIUM CHLORIDE 0.9 % (FLUSH) 0.9 %
5-40 SYRINGE (ML) INJECTION PRN
Status: DISCONTINUED | OUTPATIENT
Start: 2025-08-02 | End: 2025-08-06 | Stop reason: HOSPADM

## 2025-08-02 RX ORDER — INSULIN LISPRO 100 [IU]/ML
0-4 INJECTION, SOLUTION INTRAVENOUS; SUBCUTANEOUS
Status: DISCONTINUED | OUTPATIENT
Start: 2025-08-03 | End: 2025-08-06 | Stop reason: HOSPADM

## 2025-08-02 RX ORDER — LIDOCAINE HYDROCHLORIDE 10 MG/ML
50 INJECTION, SOLUTION EPIDURAL; INFILTRATION; INTRACAUDAL; PERINEURAL ONCE
Status: DISCONTINUED | OUTPATIENT
Start: 2025-08-02 | End: 2025-08-06 | Stop reason: HOSPADM

## 2025-08-02 RX ADMIN — METHADONE HYDROCHLORIDE 70 MG: 10 TABLET ORAL at 08:49

## 2025-08-02 RX ADMIN — POTASSIUM CHLORIDE 20 MEQ: 29.8 INJECTION, SOLUTION INTRAVENOUS at 08:57

## 2025-08-02 RX ADMIN — SODIUM CHLORIDE, PRESERVATIVE FREE 10 ML: 5 INJECTION INTRAVENOUS at 08:57

## 2025-08-02 RX ADMIN — HEPARIN SODIUM 5000 UNITS: 5000 INJECTION INTRAVENOUS; SUBCUTANEOUS at 06:25

## 2025-08-02 RX ADMIN — INSULIN LISPRO 3 UNITS: 100 INJECTION, SOLUTION INTRAVENOUS; SUBCUTANEOUS at 10:24

## 2025-08-02 RX ADMIN — Medication 3 MG: at 20:25

## 2025-08-02 RX ADMIN — TAMSULOSIN HYDROCHLORIDE 0.4 MG: 0.4 CAPSULE ORAL at 08:49

## 2025-08-02 RX ADMIN — HEPARIN SODIUM 5000 UNITS: 5000 INJECTION INTRAVENOUS; SUBCUTANEOUS at 21:18

## 2025-08-02 RX ADMIN — MAGNESIUM SULFATE HEPTAHYDRATE 2000 MG: 40 INJECTION, SOLUTION INTRAVENOUS at 08:50

## 2025-08-02 RX ADMIN — SODIUM CHLORIDE, PRESERVATIVE FREE 5 ML: 5 INJECTION INTRAVENOUS at 20:27

## 2025-08-02 RX ADMIN — MEROPENEM 500 MG: 500 INJECTION INTRAVENOUS at 15:13

## 2025-08-02 RX ADMIN — HEPARIN SODIUM 5000 UNITS: 5000 INJECTION INTRAVENOUS; SUBCUTANEOUS at 15:10

## 2025-08-02 RX ADMIN — AMIODARONE HYDROCHLORIDE 200 MG: 200 TABLET ORAL at 08:49

## 2025-08-02 RX ADMIN — ATORVASTATIN CALCIUM 80 MG: 80 TABLET, FILM COATED ORAL at 20:25

## 2025-08-02 RX ADMIN — PANTOPRAZOLE SODIUM 40 MG: 40 TABLET, DELAYED RELEASE ORAL at 06:25

## 2025-08-02 RX ADMIN — SODIUM CHLORIDE, PRESERVATIVE FREE 10 ML: 5 INJECTION INTRAVENOUS at 20:25

## 2025-08-02 ASSESSMENT — PAIN SCALES - GENERAL: PAINLEVEL_OUTOF10: 0

## 2025-08-02 NOTE — ANESTHESIA POSTPROCEDURE EVALUATION
Department of Anesthesiology  Postprocedure Note    Patient: Colby Ovalle  MRN: [id number]  YOB: 1950  Date of evaluation: 8/2/2025    Procedure Summary       Date: 07/31/25 Room / Location: [place] [address] / [facility]    Anesthesia Start: 1648 Anesthesia Stop: 1747    Procedures:       ESOPHAGOGASTRODUODENOSCOPY      ESOPHAGOGASTRODUODENOSCOPY DILATION BALLOON Diagnosis: Choledocholithiasis    Surgeons: Shen Vicente MD Responsible Provider: Pascual Faustin MD    Anesthesia Type: general ASA Status: 4            Anesthesia Type: No value filed.    [place] Phase I:      [place] Phase II:      Anesthesia Post Evaluation    Patient location during evaluation: PACU  Patient participation: complete - patient participated  Level of consciousness: awake and alert  Pain score: 0  Airway patency: patent  Nausea & Vomiting: no nausea and no vomiting  Cardiovascular status: blood pressure returned to baseline  Respiratory status: acceptable  Hydration status: euvolemic  Pain management: adequate    No notable events documented.

## 2025-08-03 LAB
ALBUMIN SERPL-MCNC: 3.1 G/DL (ref 3.4–5)
ALP SERPL-CCNC: 144 U/L (ref 40–129)
ALT SERPL-CCNC: 72 U/L (ref 10–40)
ANION GAP SERPL CALCULATED.3IONS-SCNC: 13 MMOL/L (ref 3–16)
ANISOCYTOSIS BLD QL SMEAR: ABNORMAL
AST SERPL-CCNC: 71 U/L (ref 15–37)
BASOPHILS # BLD: 0 K/UL (ref 0–0.2)
BASOPHILS NFR BLD: 0 %
BILIRUB DIRECT SERPL-MCNC: 0.5 MG/DL (ref 0–0.3)
BILIRUB INDIRECT SERPL-MCNC: 0.4 MG/DL (ref 0–1)
BILIRUB SERPL-MCNC: 0.9 MG/DL (ref 0–1)
BUN SERPL-MCNC: 39 MG/DL (ref 7–20)
CALCIUM SERPL-MCNC: 9 MG/DL (ref 8.3–10.6)
CHLORIDE SERPL-SCNC: 96 MMOL/L (ref 99–110)
CO2 SERPL-SCNC: 23 MMOL/L (ref 21–32)
CREAT SERPL-MCNC: 5.6 MG/DL (ref 0.8–1.3)
DEPRECATED RDW RBC AUTO: 17.3 % (ref 12.4–15.4)
EOSINOPHIL # BLD: 0 K/UL (ref 0–0.6)
EOSINOPHIL NFR BLD: 0 %
GFR SERPLBLD CREATININE-BSD FMLA CKD-EPI: 10 ML/MIN/{1.73_M2}
GLUCOSE BLD-MCNC: 103 MG/DL (ref 70–99)
GLUCOSE BLD-MCNC: 174 MG/DL (ref 70–99)
GLUCOSE BLD-MCNC: 193 MG/DL (ref 70–99)
GLUCOSE BLD-MCNC: 199 MG/DL (ref 70–99)
GLUCOSE SERPL-MCNC: 103 MG/DL (ref 70–99)
HCT VFR BLD AUTO: 27.2 % (ref 40.5–52.5)
HGB BLD-MCNC: 9.1 G/DL (ref 13.5–17.5)
LYMPHOCYTES # BLD: 0.2 K/UL (ref 1–5.1)
LYMPHOCYTES NFR BLD: 3 %
MAGNESIUM SERPL-MCNC: 2.44 MG/DL (ref 1.8–2.4)
MCH RBC QN AUTO: 30.4 PG (ref 26–34)
MCHC RBC AUTO-ENTMCNC: 33.5 G/DL (ref 31–36)
MCV RBC AUTO: 90.8 FL (ref 80–100)
MONOCYTES # BLD: 0.2 K/UL (ref 0–1.3)
MONOCYTES NFR BLD: 3 %
NEUTROPHILS # BLD: 5.3 K/UL (ref 1.7–7.7)
NEUTROPHILS NFR BLD: 93 %
NEUTS BAND NFR BLD MANUAL: 1 % (ref 0–7)
OVALOCYTES BLD QL SMEAR: ABNORMAL
PERFORMED ON: ABNORMAL
PHOSPHATE SERPL-MCNC: 3.5 MG/DL (ref 2.5–4.9)
PLATELET # BLD AUTO: 102 K/UL (ref 135–450)
PLATELET BLD QL SMEAR: ABNORMAL
PMV BLD AUTO: 9.2 FL (ref 5–10.5)
POIKILOCYTOSIS BLD QL SMEAR: ABNORMAL
POTASSIUM SERPL-SCNC: 3.9 MMOL/L (ref 3.5–5.1)
PROT SERPL-MCNC: 6.7 G/DL (ref 6.4–8.2)
RBC # BLD AUTO: 2.99 M/UL (ref 4.2–5.9)
SLIDE REVIEW: ABNORMAL
SODIUM SERPL-SCNC: 132 MMOL/L (ref 136–145)
WBC # BLD AUTO: 5.6 K/UL (ref 4–11)

## 2025-08-03 PROCEDURE — 6370000000 HC RX 637 (ALT 250 FOR IP): Performed by: HOSPITALIST

## 2025-08-03 PROCEDURE — 80076 HEPATIC FUNCTION PANEL: CPT

## 2025-08-03 PROCEDURE — 6360000002 HC RX W HCPCS: Performed by: NURSE PRACTITIONER

## 2025-08-03 PROCEDURE — 36415 COLL VENOUS BLD VENIPUNCTURE: CPT

## 2025-08-03 PROCEDURE — 6370000000 HC RX 637 (ALT 250 FOR IP): Performed by: UROLOGY

## 2025-08-03 PROCEDURE — 6370000000 HC RX 637 (ALT 250 FOR IP): Performed by: FAMILY MEDICINE

## 2025-08-03 PROCEDURE — 6360000002 HC RX W HCPCS: Performed by: HOSPITALIST

## 2025-08-03 PROCEDURE — 85025 COMPLETE CBC W/AUTO DIFF WBC: CPT

## 2025-08-03 PROCEDURE — 87040 BLOOD CULTURE FOR BACTERIA: CPT

## 2025-08-03 PROCEDURE — 80069 RENAL FUNCTION PANEL: CPT

## 2025-08-03 PROCEDURE — 2580000003 HC RX 258: Performed by: NURSE PRACTITIONER

## 2025-08-03 PROCEDURE — 1200000000 HC SEMI PRIVATE

## 2025-08-03 PROCEDURE — 2500000003 HC RX 250 WO HCPCS: Performed by: INTERNAL MEDICINE

## 2025-08-03 PROCEDURE — 6370000000 HC RX 637 (ALT 250 FOR IP): Performed by: STUDENT IN AN ORGANIZED HEALTH CARE EDUCATION/TRAINING PROGRAM

## 2025-08-03 PROCEDURE — 83735 ASSAY OF MAGNESIUM: CPT

## 2025-08-03 PROCEDURE — 2500000003 HC RX 250 WO HCPCS: Performed by: ANESTHESIOLOGY

## 2025-08-03 PROCEDURE — 94760 N-INVAS EAR/PLS OXIMETRY 1: CPT

## 2025-08-03 RX ADMIN — AMIODARONE HYDROCHLORIDE 200 MG: 200 TABLET ORAL at 09:01

## 2025-08-03 RX ADMIN — HEPARIN SODIUM 5000 UNITS: 5000 INJECTION INTRAVENOUS; SUBCUTANEOUS at 05:20

## 2025-08-03 RX ADMIN — SODIUM CHLORIDE, PRESERVATIVE FREE 10 ML: 5 INJECTION INTRAVENOUS at 09:13

## 2025-08-03 RX ADMIN — SODIUM CHLORIDE, PRESERVATIVE FREE 10 ML: 5 INJECTION INTRAVENOUS at 09:01

## 2025-08-03 RX ADMIN — SODIUM CHLORIDE, PRESERVATIVE FREE 10 ML: 5 INJECTION INTRAVENOUS at 21:10

## 2025-08-03 RX ADMIN — INSULIN LISPRO 1 UNITS: 100 INJECTION, SOLUTION INTRAVENOUS; SUBCUTANEOUS at 17:05

## 2025-08-03 RX ADMIN — HEPARIN SODIUM 5000 UNITS: 5000 INJECTION INTRAVENOUS; SUBCUTANEOUS at 21:10

## 2025-08-03 RX ADMIN — PANTOPRAZOLE SODIUM 40 MG: 40 TABLET, DELAYED RELEASE ORAL at 05:20

## 2025-08-03 RX ADMIN — ATORVASTATIN CALCIUM 80 MG: 80 TABLET, FILM COATED ORAL at 21:10

## 2025-08-03 RX ADMIN — Medication 3 MG: at 21:10

## 2025-08-03 RX ADMIN — HEPARIN SODIUM 5000 UNITS: 5000 INJECTION INTRAVENOUS; SUBCUTANEOUS at 12:32

## 2025-08-03 RX ADMIN — MEROPENEM 500 MG: 500 INJECTION INTRAVENOUS at 15:42

## 2025-08-03 RX ADMIN — METHADONE HYDROCHLORIDE 70 MG: 10 TABLET ORAL at 09:01

## 2025-08-03 RX ADMIN — TAMSULOSIN HYDROCHLORIDE 0.4 MG: 0.4 CAPSULE ORAL at 09:01

## 2025-08-03 RX ADMIN — INSULIN LISPRO 1 UNITS: 100 INJECTION, SOLUTION INTRAVENOUS; SUBCUTANEOUS at 12:32

## 2025-08-03 ASSESSMENT — PAIN SCALES - GENERAL: PAINLEVEL_OUTOF10: 0

## 2025-08-04 ENCOUNTER — TELEPHONE (OUTPATIENT)
Age: 75
End: 2025-08-04

## 2025-08-04 PROBLEM — Z98.890: Status: ACTIVE | Noted: 2025-08-04

## 2025-08-04 PROBLEM — K80.50 CHOLEDOCHOLITHIASIS: Status: ACTIVE | Noted: 2025-08-04

## 2025-08-04 PROBLEM — R50.9 FEVER AND CHILLS: Status: ACTIVE | Noted: 2025-08-04

## 2025-08-04 PROBLEM — R78.81 BACTEREMIA: Status: ACTIVE | Noted: 2025-08-04

## 2025-08-04 PROBLEM — A41.50 GRAM NEGATIVE SEPSIS (HCC): Status: ACTIVE | Noted: 2025-08-04

## 2025-08-04 LAB
ALBUMIN SERPL-MCNC: 3.4 G/DL (ref 3.4–5)
ALP SERPL-CCNC: 217 U/L (ref 40–129)
ALT SERPL-CCNC: 68 U/L (ref 10–40)
ANION GAP SERPL CALCULATED.3IONS-SCNC: 15 MMOL/L (ref 3–16)
AST SERPL-CCNC: 65 U/L (ref 15–37)
BILIRUB DIRECT SERPL-MCNC: 0.5 MG/DL (ref 0–0.3)
BILIRUB INDIRECT SERPL-MCNC: 0.4 MG/DL (ref 0–1)
BILIRUB SERPL-MCNC: 0.9 MG/DL (ref 0–1)
BUN SERPL-MCNC: 49 MG/DL (ref 7–20)
CALCIUM SERPL-MCNC: 9 MG/DL (ref 8.3–10.6)
CHLORIDE SERPL-SCNC: 97 MMOL/L (ref 99–110)
CO2 SERPL-SCNC: 21 MMOL/L (ref 21–32)
CREAT SERPL-MCNC: 6.6 MG/DL (ref 0.8–1.3)
GFR SERPLBLD CREATININE-BSD FMLA CKD-EPI: 8 ML/MIN/{1.73_M2}
GLUCOSE BLD-MCNC: 104 MG/DL (ref 70–99)
GLUCOSE BLD-MCNC: 108 MG/DL (ref 70–99)
GLUCOSE BLD-MCNC: 153 MG/DL (ref 70–99)
GLUCOSE BLD-MCNC: 185 MG/DL (ref 70–99)
GLUCOSE BLD-MCNC: 190 MG/DL (ref 70–99)
GLUCOSE BLD-MCNC: 218 MG/DL (ref 70–99)
GLUCOSE SERPL-MCNC: 188 MG/DL (ref 70–99)
PERFORMED ON: ABNORMAL
PHOSPHATE SERPL-MCNC: 3.6 MG/DL (ref 2.5–4.9)
POTASSIUM SERPL-SCNC: 4.5 MMOL/L (ref 3.5–5.1)
PROT SERPL-MCNC: 7.4 G/DL (ref 6.4–8.2)
SODIUM SERPL-SCNC: 133 MMOL/L (ref 136–145)

## 2025-08-04 PROCEDURE — 6360000002 HC RX W HCPCS: Performed by: HOSPITALIST

## 2025-08-04 PROCEDURE — 6360000002 HC RX W HCPCS: Performed by: NURSE PRACTITIONER

## 2025-08-04 PROCEDURE — 94760 N-INVAS EAR/PLS OXIMETRY 1: CPT

## 2025-08-04 PROCEDURE — 80069 RENAL FUNCTION PANEL: CPT

## 2025-08-04 PROCEDURE — 6370000000 HC RX 637 (ALT 250 FOR IP): Performed by: UROLOGY

## 2025-08-04 PROCEDURE — 6370000000 HC RX 637 (ALT 250 FOR IP): Performed by: FAMILY MEDICINE

## 2025-08-04 PROCEDURE — 2580000003 HC RX 258: Performed by: NURSE PRACTITIONER

## 2025-08-04 PROCEDURE — 6360000002 HC RX W HCPCS

## 2025-08-04 PROCEDURE — 97530 THERAPEUTIC ACTIVITIES: CPT | Performed by: PHYSICAL THERAPIST

## 2025-08-04 PROCEDURE — 97116 GAIT TRAINING THERAPY: CPT | Performed by: PHYSICAL THERAPIST

## 2025-08-04 PROCEDURE — 6370000000 HC RX 637 (ALT 250 FOR IP): Performed by: STUDENT IN AN ORGANIZED HEALTH CARE EDUCATION/TRAINING PROGRAM

## 2025-08-04 PROCEDURE — 97166 OT EVAL MOD COMPLEX 45 MIN: CPT

## 2025-08-04 PROCEDURE — APPSS15 APP SPLIT SHARED TIME 0-15 MINUTES: Performed by: PHYSICIAN ASSISTANT

## 2025-08-04 PROCEDURE — APPNB15 APP NON BILLABLE TIME 0-15 MINS: Performed by: PHYSICIAN ASSISTANT

## 2025-08-04 PROCEDURE — 90935 HEMODIALYSIS ONE EVALUATION: CPT

## 2025-08-04 PROCEDURE — 97530 THERAPEUTIC ACTIVITIES: CPT

## 2025-08-04 PROCEDURE — 97161 PT EVAL LOW COMPLEX 20 MIN: CPT | Performed by: PHYSICAL THERAPIST

## 2025-08-04 PROCEDURE — 97535 SELF CARE MNGMENT TRAINING: CPT

## 2025-08-04 PROCEDURE — 2500000003 HC RX 250 WO HCPCS: Performed by: INTERNAL MEDICINE

## 2025-08-04 PROCEDURE — 36415 COLL VENOUS BLD VENIPUNCTURE: CPT

## 2025-08-04 PROCEDURE — 1200000000 HC SEMI PRIVATE

## 2025-08-04 PROCEDURE — 99233 SBSQ HOSP IP/OBS HIGH 50: CPT | Performed by: INTERNAL MEDICINE

## 2025-08-04 PROCEDURE — 80076 HEPATIC FUNCTION PANEL: CPT

## 2025-08-04 PROCEDURE — 6370000000 HC RX 637 (ALT 250 FOR IP): Performed by: HOSPITALIST

## 2025-08-04 RX ADMIN — EPOETIN ALFA-EPBX 10000 UNITS: 10000 INJECTION, SOLUTION INTRAVENOUS; SUBCUTANEOUS at 18:08

## 2025-08-04 RX ADMIN — Medication 3 MG: at 20:42

## 2025-08-04 RX ADMIN — INSULIN LISPRO 1 UNITS: 100 INJECTION, SOLUTION INTRAVENOUS; SUBCUTANEOUS at 18:41

## 2025-08-04 RX ADMIN — HEPARIN SODIUM 5000 UNITS: 5000 INJECTION INTRAVENOUS; SUBCUTANEOUS at 04:42

## 2025-08-04 RX ADMIN — MEROPENEM 500 MG: 500 INJECTION INTRAVENOUS at 15:56

## 2025-08-04 RX ADMIN — ACETAMINOPHEN 650 MG: 325 TABLET ORAL at 20:42

## 2025-08-04 RX ADMIN — HEPARIN SODIUM 5000 UNITS: 5000 INJECTION INTRAVENOUS; SUBCUTANEOUS at 20:43

## 2025-08-04 RX ADMIN — MIDODRINE HYDROCHLORIDE 10 MG: 10 TABLET ORAL at 11:12

## 2025-08-04 RX ADMIN — ATORVASTATIN CALCIUM 80 MG: 80 TABLET, FILM COATED ORAL at 20:42

## 2025-08-04 RX ADMIN — INSULIN LISPRO 1 UNITS: 100 INJECTION, SOLUTION INTRAVENOUS; SUBCUTANEOUS at 09:57

## 2025-08-04 RX ADMIN — METHADONE HYDROCHLORIDE 70 MG: 10 TABLET ORAL at 09:03

## 2025-08-04 RX ADMIN — SODIUM CHLORIDE, PRESERVATIVE FREE 10 ML: 5 INJECTION INTRAVENOUS at 09:11

## 2025-08-04 RX ADMIN — AMIODARONE HYDROCHLORIDE 200 MG: 200 TABLET ORAL at 09:03

## 2025-08-04 RX ADMIN — TAMSULOSIN HYDROCHLORIDE 0.4 MG: 0.4 CAPSULE ORAL at 09:04

## 2025-08-04 ASSESSMENT — PAIN SCALES - GENERAL
PAINLEVEL_OUTOF10: 6
PAINLEVEL_OUTOF10: 1

## 2025-08-04 ASSESSMENT — PAIN DESCRIPTION - PAIN TYPE
TYPE: CHRONIC PAIN
TYPE: CHRONIC PAIN

## 2025-08-04 ASSESSMENT — PAIN DESCRIPTION - LOCATION
LOCATION: BACK
LOCATION: BACK

## 2025-08-04 ASSESSMENT — PAIN DESCRIPTION - ORIENTATION
ORIENTATION: LOWER;MID
ORIENTATION: MID;LOWER

## 2025-08-04 ASSESSMENT — PAIN DESCRIPTION - ONSET
ONSET: ON-GOING
ONSET: ON-GOING

## 2025-08-04 ASSESSMENT — PAIN DESCRIPTION - DESCRIPTORS
DESCRIPTORS: ACHING;SORE
DESCRIPTORS: ACHING;SORE

## 2025-08-04 ASSESSMENT — PAIN DESCRIPTION - FREQUENCY
FREQUENCY: INTERMITTENT
FREQUENCY: INTERMITTENT

## 2025-08-05 ENCOUNTER — APPOINTMENT (OUTPATIENT)
Dept: INTERVENTIONAL RADIOLOGY/VASCULAR | Age: 75
DRG: 871 | End: 2025-08-05
Payer: OTHER GOVERNMENT

## 2025-08-05 ENCOUNTER — APPOINTMENT (OUTPATIENT)
Dept: INTERVENTIONAL RADIOLOGY/VASCULAR | Age: 75
DRG: 871 | End: 2025-08-05
Attending: INTERNAL MEDICINE
Payer: OTHER GOVERNMENT

## 2025-08-05 LAB
ALBUMIN SERPL-MCNC: 3.1 G/DL (ref 3.4–5)
ANION GAP SERPL CALCULATED.3IONS-SCNC: 13 MMOL/L (ref 3–16)
BUN SERPL-MCNC: 32 MG/DL (ref 7–20)
CALCIUM SERPL-MCNC: 9 MG/DL (ref 8.3–10.6)
CHLORIDE SERPL-SCNC: 97 MMOL/L (ref 99–110)
CO2 SERPL-SCNC: 25 MMOL/L (ref 21–32)
CREAT SERPL-MCNC: 5.1 MG/DL (ref 0.8–1.3)
GFR SERPLBLD CREATININE-BSD FMLA CKD-EPI: 11 ML/MIN/{1.73_M2}
GLUCOSE BLD-MCNC: 141 MG/DL (ref 70–99)
GLUCOSE BLD-MCNC: 200 MG/DL (ref 70–99)
GLUCOSE BLD-MCNC: 212 MG/DL (ref 70–99)
GLUCOSE BLD-MCNC: 296 MG/DL (ref 70–99)
GLUCOSE SERPL-MCNC: 162 MG/DL (ref 70–99)
INR PPP: 1 (ref 0.86–1.14)
PERFORMED ON: ABNORMAL
PHOSPHATE SERPL-MCNC: 3.3 MG/DL (ref 2.5–4.9)
POTASSIUM SERPL-SCNC: 3.9 MMOL/L (ref 3.5–5.1)
PROTHROMBIN TIME: 13.5 SEC (ref 12.1–14.9)
SODIUM SERPL-SCNC: 135 MMOL/L (ref 136–145)

## 2025-08-05 PROCEDURE — 76937 US GUIDE VASCULAR ACCESS: CPT

## 2025-08-05 PROCEDURE — 36558 INSERT TUNNELED CV CATH: CPT

## 2025-08-05 PROCEDURE — APPNB15 APP NON BILLABLE TIME 0-15 MINS: Performed by: PHYSICIAN ASSISTANT

## 2025-08-05 PROCEDURE — 77001 FLUOROGUIDE FOR VEIN DEVICE: CPT

## 2025-08-05 PROCEDURE — 2700000000 HC OXYGEN THERAPY PER DAY

## 2025-08-05 PROCEDURE — 1200000000 HC SEMI PRIVATE

## 2025-08-05 PROCEDURE — 6360000002 HC RX W HCPCS: Performed by: STUDENT IN AN ORGANIZED HEALTH CARE EDUCATION/TRAINING PROGRAM

## 2025-08-05 PROCEDURE — 2709999900 IR TUNNELED CVC PLACE WO SQ PORT/PUMP > 5 YEARS

## 2025-08-05 PROCEDURE — 80069 RENAL FUNCTION PANEL: CPT

## 2025-08-05 PROCEDURE — 6370000000 HC RX 637 (ALT 250 FOR IP): Performed by: UROLOGY

## 2025-08-05 PROCEDURE — 99233 SBSQ HOSP IP/OBS HIGH 50: CPT | Performed by: INTERNAL MEDICINE

## 2025-08-05 PROCEDURE — 6370000000 HC RX 637 (ALT 250 FOR IP): Performed by: HOSPITALIST

## 2025-08-05 PROCEDURE — 6370000000 HC RX 637 (ALT 250 FOR IP): Performed by: FAMILY MEDICINE

## 2025-08-05 PROCEDURE — 2500000003 HC RX 250 WO HCPCS: Performed by: INTERNAL MEDICINE

## 2025-08-05 PROCEDURE — 2500000003 HC RX 250 WO HCPCS: Performed by: ANESTHESIOLOGY

## 2025-08-05 PROCEDURE — 97116 GAIT TRAINING THERAPY: CPT | Performed by: PHYSICAL THERAPIST

## 2025-08-05 PROCEDURE — 85610 PROTHROMBIN TIME: CPT

## 2025-08-05 PROCEDURE — 6370000000 HC RX 637 (ALT 250 FOR IP): Performed by: STUDENT IN AN ORGANIZED HEALTH CARE EDUCATION/TRAINING PROGRAM

## 2025-08-05 PROCEDURE — 94761 N-INVAS EAR/PLS OXIMETRY MLT: CPT

## 2025-08-05 PROCEDURE — 36415 COLL VENOUS BLD VENIPUNCTURE: CPT

## 2025-08-05 PROCEDURE — 2580000003 HC RX 258: Performed by: STUDENT IN AN ORGANIZED HEALTH CARE EDUCATION/TRAINING PROGRAM

## 2025-08-05 PROCEDURE — APPSS15 APP SPLIT SHARED TIME 0-15 MINUTES: Performed by: PHYSICIAN ASSISTANT

## 2025-08-05 PROCEDURE — 6360000002 HC RX W HCPCS: Performed by: RADIOLOGY

## 2025-08-05 PROCEDURE — 6360000002 HC RX W HCPCS: Performed by: HOSPITALIST

## 2025-08-05 RX ORDER — FENTANYL CITRATE 50 UG/ML
INJECTION, SOLUTION INTRAMUSCULAR; INTRAVENOUS PRN
Status: COMPLETED | OUTPATIENT
Start: 2025-08-05 | End: 2025-08-05

## 2025-08-05 RX ADMIN — FENTANYL CITRATE 100 MCG: 50 INJECTION INTRAMUSCULAR; INTRAVENOUS at 09:09

## 2025-08-05 RX ADMIN — INSULIN LISPRO 1 UNITS: 100 INJECTION, SOLUTION INTRAVENOUS; SUBCUTANEOUS at 20:29

## 2025-08-05 RX ADMIN — HEPARIN SODIUM 5000 UNITS: 5000 INJECTION INTRAVENOUS; SUBCUTANEOUS at 16:54

## 2025-08-05 RX ADMIN — SODIUM CHLORIDE, PRESERVATIVE FREE 10 ML: 5 INJECTION INTRAVENOUS at 20:34

## 2025-08-05 RX ADMIN — ATORVASTATIN CALCIUM 80 MG: 80 TABLET, FILM COATED ORAL at 20:28

## 2025-08-05 RX ADMIN — INSULIN LISPRO 2 UNITS: 100 INJECTION, SOLUTION INTRAVENOUS; SUBCUTANEOUS at 12:11

## 2025-08-05 RX ADMIN — INSULIN LISPRO 2 UNITS: 100 INJECTION, SOLUTION INTRAVENOUS; SUBCUTANEOUS at 16:54

## 2025-08-05 RX ADMIN — TAMSULOSIN HYDROCHLORIDE 0.4 MG: 0.4 CAPSULE ORAL at 09:35

## 2025-08-05 RX ADMIN — AMIODARONE HYDROCHLORIDE 200 MG: 200 TABLET ORAL at 09:35

## 2025-08-05 RX ADMIN — METHADONE HYDROCHLORIDE 70 MG: 10 TABLET ORAL at 09:35

## 2025-08-05 RX ADMIN — MEROPENEM 500 MG: 500 INJECTION, POWDER, FOR SOLUTION INTRAVENOUS at 11:34

## 2025-08-05 RX ADMIN — Medication 3 MG: at 20:28

## 2025-08-05 RX ADMIN — FENTANYL CITRATE 100 MCG: 50 INJECTION INTRAMUSCULAR; INTRAVENOUS at 09:04

## 2025-08-05 RX ADMIN — HEPARIN SODIUM 5000 UNITS: 5000 INJECTION INTRAVENOUS; SUBCUTANEOUS at 21:46

## 2025-08-05 RX ADMIN — PANTOPRAZOLE SODIUM 40 MG: 40 TABLET, DELAYED RELEASE ORAL at 09:35

## 2025-08-05 ASSESSMENT — PAIN DESCRIPTION - DESCRIPTORS
DESCRIPTORS: ACHING;SORE
DESCRIPTORS: ACHING;SORE

## 2025-08-05 ASSESSMENT — PAIN DESCRIPTION - PAIN TYPE
TYPE: CHRONIC PAIN
TYPE: CHRONIC PAIN

## 2025-08-05 ASSESSMENT — PAIN SCALES - GENERAL
PAINLEVEL_OUTOF10: 0
PAINLEVEL_OUTOF10: 7
PAINLEVEL_OUTOF10: 1
PAINLEVEL_OUTOF10: 7
PAINLEVEL_OUTOF10: 7

## 2025-08-05 ASSESSMENT — PAIN DESCRIPTION - FREQUENCY
FREQUENCY: INTERMITTENT
FREQUENCY: INTERMITTENT

## 2025-08-05 ASSESSMENT — PAIN DESCRIPTION - ORIENTATION
ORIENTATION: LOWER;MID
ORIENTATION: LOWER;MID

## 2025-08-05 ASSESSMENT — PAIN DESCRIPTION - LOCATION
LOCATION: BACK
LOCATION: BACK

## 2025-08-05 ASSESSMENT — PAIN DESCRIPTION - ONSET
ONSET: ON-GOING
ONSET: ON-GOING

## 2025-08-06 VITALS
OXYGEN SATURATION: 98 % | RESPIRATION RATE: 17 BRPM | DIASTOLIC BLOOD PRESSURE: 78 MMHG | WEIGHT: 176.37 LBS | SYSTOLIC BLOOD PRESSURE: 158 MMHG | HEIGHT: 74 IN | HEART RATE: 90 BPM | TEMPERATURE: 98 F | BODY MASS INDEX: 22.63 KG/M2

## 2025-08-06 LAB
ALBUMIN SERPL-MCNC: 3 G/DL (ref 3.4–5)
ANION GAP SERPL CALCULATED.3IONS-SCNC: 15 MMOL/L (ref 3–16)
BACTERIA BLD CULT: NORMAL
BASOPHILS # BLD: 0 K/UL (ref 0–0.2)
BASOPHILS NFR BLD: 0.4 %
BUN SERPL-MCNC: 41 MG/DL (ref 7–20)
CALCIUM SERPL-MCNC: 9.1 MG/DL (ref 8.3–10.6)
CHLORIDE SERPL-SCNC: 98 MMOL/L (ref 99–110)
CO2 SERPL-SCNC: 22 MMOL/L (ref 21–32)
CREAT SERPL-MCNC: 6.6 MG/DL (ref 0.8–1.3)
DEPRECATED RDW RBC AUTO: 17 % (ref 12.4–15.4)
EOSINOPHIL # BLD: 0.1 K/UL (ref 0–0.6)
EOSINOPHIL NFR BLD: 1.5 %
GFR SERPLBLD CREATININE-BSD FMLA CKD-EPI: 8 ML/MIN/{1.73_M2}
GLUCOSE BLD-MCNC: 146 MG/DL (ref 70–99)
GLUCOSE BLD-MCNC: 201 MG/DL (ref 70–99)
GLUCOSE BLD-MCNC: 226 MG/DL (ref 70–99)
GLUCOSE SERPL-MCNC: 163 MG/DL (ref 70–99)
HCT VFR BLD AUTO: 23.6 % (ref 40.5–52.5)
HGB BLD-MCNC: 8.1 G/DL (ref 13.5–17.5)
LYMPHOCYTES # BLD: 0.6 K/UL (ref 1–5.1)
LYMPHOCYTES NFR BLD: 13.3 %
MCH RBC QN AUTO: 30.7 PG (ref 26–34)
MCHC RBC AUTO-ENTMCNC: 34.5 G/DL (ref 31–36)
MCV RBC AUTO: 89 FL (ref 80–100)
MONOCYTES # BLD: 0.5 K/UL (ref 0–1.3)
MONOCYTES NFR BLD: 9.9 %
NEUTROPHILS # BLD: 3.5 K/UL (ref 1.7–7.7)
NEUTROPHILS NFR BLD: 74.9 %
PERFORMED ON: ABNORMAL
PHOSPHATE SERPL-MCNC: 4.1 MG/DL (ref 2.5–4.9)
PLATELET # BLD AUTO: 160 K/UL (ref 135–450)
PMV BLD AUTO: 9.2 FL (ref 5–10.5)
POTASSIUM SERPL-SCNC: 4.2 MMOL/L (ref 3.5–5.1)
RBC # BLD AUTO: 2.65 M/UL (ref 4.2–5.9)
SODIUM SERPL-SCNC: 135 MMOL/L (ref 136–145)
WBC # BLD AUTO: 4.7 K/UL (ref 4–11)

## 2025-08-06 PROCEDURE — 6360000002 HC RX W HCPCS

## 2025-08-06 PROCEDURE — 6370000000 HC RX 637 (ALT 250 FOR IP): Performed by: FAMILY MEDICINE

## 2025-08-06 PROCEDURE — 2500000003 HC RX 250 WO HCPCS: Performed by: ANESTHESIOLOGY

## 2025-08-06 PROCEDURE — 97530 THERAPEUTIC ACTIVITIES: CPT

## 2025-08-06 PROCEDURE — 6370000000 HC RX 637 (ALT 250 FOR IP): Performed by: STUDENT IN AN ORGANIZED HEALTH CARE EDUCATION/TRAINING PROGRAM

## 2025-08-06 PROCEDURE — 6360000002 HC RX W HCPCS: Performed by: HOSPITALIST

## 2025-08-06 PROCEDURE — 97535 SELF CARE MNGMENT TRAINING: CPT

## 2025-08-06 PROCEDURE — 2580000003 HC RX 258: Performed by: STUDENT IN AN ORGANIZED HEALTH CARE EDUCATION/TRAINING PROGRAM

## 2025-08-06 PROCEDURE — 90935 HEMODIALYSIS ONE EVALUATION: CPT

## 2025-08-06 PROCEDURE — 9990000010 HC NO CHARGE VISIT: Performed by: PHYSICAL THERAPIST

## 2025-08-06 PROCEDURE — 85025 COMPLETE CBC W/AUTO DIFF WBC: CPT

## 2025-08-06 PROCEDURE — 5A1D70Z PERFORMANCE OF URINARY FILTRATION, INTERMITTENT, LESS THAN 6 HOURS PER DAY: ICD-10-PCS | Performed by: STUDENT IN AN ORGANIZED HEALTH CARE EDUCATION/TRAINING PROGRAM

## 2025-08-06 PROCEDURE — 6370000000 HC RX 637 (ALT 250 FOR IP): Performed by: UROLOGY

## 2025-08-06 PROCEDURE — 6360000002 HC RX W HCPCS: Performed by: STUDENT IN AN ORGANIZED HEALTH CARE EDUCATION/TRAINING PROGRAM

## 2025-08-06 PROCEDURE — APPNB15 APP NON BILLABLE TIME 0-15 MINS: Performed by: PHYSICIAN ASSISTANT

## 2025-08-06 PROCEDURE — APPSS15 APP SPLIT SHARED TIME 0-15 MINUTES: Performed by: PHYSICIAN ASSISTANT

## 2025-08-06 PROCEDURE — 36415 COLL VENOUS BLD VENIPUNCTURE: CPT

## 2025-08-06 PROCEDURE — 80069 RENAL FUNCTION PANEL: CPT

## 2025-08-06 PROCEDURE — 2500000003 HC RX 250 WO HCPCS: Performed by: INTERNAL MEDICINE

## 2025-08-06 RX ADMIN — INSULIN LISPRO 1 UNITS: 100 INJECTION, SOLUTION INTRAVENOUS; SUBCUTANEOUS at 12:45

## 2025-08-06 RX ADMIN — EPOETIN ALFA-EPBX 10000 UNITS: 10000 INJECTION, SOLUTION INTRAVENOUS; SUBCUTANEOUS at 14:49

## 2025-08-06 RX ADMIN — SODIUM CHLORIDE, PRESERVATIVE FREE 10 ML: 5 INJECTION INTRAVENOUS at 09:19

## 2025-08-06 RX ADMIN — METHADONE HYDROCHLORIDE 70 MG: 10 TABLET ORAL at 08:27

## 2025-08-06 RX ADMIN — MEROPENEM 500 MG: 500 INJECTION, POWDER, FOR SOLUTION INTRAVENOUS at 15:38

## 2025-08-06 RX ADMIN — SODIUM CHLORIDE, PRESERVATIVE FREE 10 ML: 5 INJECTION INTRAVENOUS at 09:20

## 2025-08-06 RX ADMIN — TAMSULOSIN HYDROCHLORIDE 0.4 MG: 0.4 CAPSULE ORAL at 08:32

## 2025-08-06 RX ADMIN — AMIODARONE HYDROCHLORIDE 200 MG: 200 TABLET ORAL at 09:28

## 2025-08-06 RX ADMIN — HEPARIN SODIUM 5000 UNITS: 5000 INJECTION INTRAVENOUS; SUBCUTANEOUS at 05:48

## 2025-08-06 RX ADMIN — INSULIN LISPRO 1 UNITS: 100 INJECTION, SOLUTION INTRAVENOUS; SUBCUTANEOUS at 17:45

## 2025-08-06 RX ADMIN — PANTOPRAZOLE SODIUM 40 MG: 40 TABLET, DELAYED RELEASE ORAL at 05:48

## 2025-08-06 ASSESSMENT — PAIN SCALES - GENERAL: PAINLEVEL_OUTOF10: 7

## 2025-08-07 DIAGNOSIS — A49.8 INFECTION DUE TO ESBL-PRODUCING ESCHERICHIA COLI: Primary | ICD-10-CM

## 2025-08-07 DIAGNOSIS — Z16.12 INFECTION DUE TO ESBL-PRODUCING ESCHERICHIA COLI: Primary | ICD-10-CM

## 2025-08-07 LAB — BACTERIA BLD CULT ORG #2: NORMAL

## 2025-08-12 ENCOUNTER — HOSPITAL ENCOUNTER (OUTPATIENT)
Dept: INFUSION THERAPY | Age: 75
Setting detail: INFUSION SERIES
Discharge: HOME | End: 2025-08-12
Payer: OTHER GOVERNMENT

## 2025-08-12 VITALS
WEIGHT: 169 LBS | HEIGHT: 74 IN | TEMPERATURE: 97.9 F | DIASTOLIC BLOOD PRESSURE: 68 MMHG | BODY MASS INDEX: 21.69 KG/M2 | HEART RATE: 65 BPM | OXYGEN SATURATION: 97 % | RESPIRATION RATE: 16 BRPM | SYSTOLIC BLOOD PRESSURE: 136 MMHG

## 2025-08-12 DIAGNOSIS — K81.0 ACUTE CHOLECYSTITIS: Primary | ICD-10-CM

## 2025-08-12 DIAGNOSIS — Z16.12 INFECTION DUE TO ESBL-PRODUCING ESCHERICHIA COLI: ICD-10-CM

## 2025-08-12 DIAGNOSIS — A49.8 INFECTION DUE TO ESBL-PRODUCING ESCHERICHIA COLI: ICD-10-CM

## 2025-08-12 DIAGNOSIS — A41.9 SEVERE SEPSIS (HCC): ICD-10-CM

## 2025-08-12 DIAGNOSIS — R65.20 SEVERE SEPSIS (HCC): ICD-10-CM

## 2025-08-12 DIAGNOSIS — R78.81 BACTEREMIA: ICD-10-CM

## 2025-08-12 LAB
ALBUMIN SERPL-MCNC: 3.4 G/DL (ref 3.4–5)
ALBUMIN/GLOB SERPL: 0.9 {RATIO} (ref 1.1–2.2)
ALP SERPL-CCNC: 126 U/L (ref 40–129)
ALT SERPL-CCNC: 27 U/L (ref 10–40)
ANION GAP SERPL CALCULATED.3IONS-SCNC: 12 MMOL/L (ref 3–16)
AST SERPL-CCNC: 50 U/L (ref 15–37)
BASOPHILS # BLD: 0 K/UL (ref 0–0.2)
BASOPHILS NFR BLD: 0.7 %
BILIRUB SERPL-MCNC: 0.5 MG/DL (ref 0–1)
BUN SERPL-MCNC: 30 MG/DL (ref 7–20)
CALCIUM SERPL-MCNC: 8.7 MG/DL (ref 8.3–10.6)
CHLORIDE SERPL-SCNC: 98 MMOL/L (ref 99–110)
CO2 SERPL-SCNC: 30 MMOL/L (ref 21–32)
CREAT SERPL-MCNC: 4.9 MG/DL (ref 0.8–1.3)
DEPRECATED RDW RBC AUTO: 17.3 % (ref 12.4–15.4)
EOSINOPHIL # BLD: 0.1 K/UL (ref 0–0.6)
EOSINOPHIL NFR BLD: 1.2 %
GFR SERPLBLD CREATININE-BSD FMLA CKD-EPI: 12 ML/MIN/{1.73_M2}
GLUCOSE SERPL-MCNC: 209 MG/DL (ref 70–99)
HCT VFR BLD AUTO: 24.3 % (ref 40.5–52.5)
HGB BLD-MCNC: 8.1 G/DL (ref 13.5–17.5)
LYMPHOCYTES # BLD: 0.8 K/UL (ref 1–5.1)
LYMPHOCYTES NFR BLD: 14.8 %
MCH RBC QN AUTO: 29.5 PG (ref 26–34)
MCHC RBC AUTO-ENTMCNC: 33.3 G/DL (ref 31–36)
MCV RBC AUTO: 88.6 FL (ref 80–100)
MONOCYTES # BLD: 0.3 K/UL (ref 0–1.3)
MONOCYTES NFR BLD: 6.4 %
NEUTROPHILS # BLD: 4.1 K/UL (ref 1.7–7.7)
NEUTROPHILS NFR BLD: 76.9 %
PLATELET # BLD AUTO: 227 K/UL (ref 135–450)
PMV BLD AUTO: 9.1 FL (ref 5–10.5)
POTASSIUM SERPL-SCNC: 3.9 MMOL/L (ref 3.5–5.1)
PROT SERPL-MCNC: 7.1 G/DL (ref 6.4–8.2)
RBC # BLD AUTO: 2.75 M/UL (ref 4.2–5.9)
SODIUM SERPL-SCNC: 140 MMOL/L (ref 136–145)
WBC # BLD AUTO: 5.3 K/UL (ref 4–11)

## 2025-08-12 PROCEDURE — 2500000003 HC RX 250 WO HCPCS: Performed by: INTERNAL MEDICINE

## 2025-08-12 PROCEDURE — 99212 OFFICE O/P EST SF 10 MIN: CPT

## 2025-08-12 PROCEDURE — 36592 COLLECT BLOOD FROM PICC: CPT

## 2025-08-12 PROCEDURE — 85025 COMPLETE CBC W/AUTO DIFF WBC: CPT

## 2025-08-12 PROCEDURE — 80053 COMPREHEN METABOLIC PANEL: CPT

## 2025-08-12 RX ORDER — SODIUM CHLORIDE 0.9 % (FLUSH) 0.9 %
5-40 SYRINGE (ML) INJECTION PRN
Status: DISCONTINUED | OUTPATIENT
Start: 2025-08-12 | End: 2025-08-13 | Stop reason: HOSPADM

## 2025-08-12 RX ORDER — SODIUM CHLORIDE 0.9 % (FLUSH) 0.9 %
5-40 SYRINGE (ML) INJECTION PRN
OUTPATIENT
Start: 2025-08-19

## 2025-08-12 RX ADMIN — SODIUM CHLORIDE, PRESERVATIVE FREE 30 ML: 5 INJECTION INTRAVENOUS at 14:15

## 2025-08-15 ENCOUNTER — TELEPHONE (OUTPATIENT)
Dept: INFECTIOUS DISEASES | Age: 75
End: 2025-08-15

## 2025-08-15 DIAGNOSIS — A49.8 INFECTION DUE TO ESBL-PRODUCING ESCHERICHIA COLI: Primary | ICD-10-CM

## 2025-08-15 DIAGNOSIS — Z16.12 INFECTION DUE TO ESBL-PRODUCING ESCHERICHIA COLI: Primary | ICD-10-CM

## 2025-08-19 ENCOUNTER — TELEPHONE (OUTPATIENT)
Dept: CARDIOLOGY CLINIC | Age: 75
End: 2025-08-19

## 2025-08-19 ENCOUNTER — HOSPITAL ENCOUNTER (OUTPATIENT)
Dept: INFUSION THERAPY | Age: 75
Setting detail: INFUSION SERIES
End: 2025-08-19

## 2025-08-19 ENCOUNTER — HOSPITAL ENCOUNTER (OUTPATIENT)
Dept: INTERVENTIONAL RADIOLOGY/VASCULAR | Age: 75
Discharge: HOME | End: 2025-08-19

## 2025-08-19 DIAGNOSIS — Z16.12 INFECTION DUE TO ESBL-PRODUCING ESCHERICHIA COLI: ICD-10-CM

## 2025-08-19 DIAGNOSIS — A49.8 INFECTION DUE TO ESBL-PRODUCING ESCHERICHIA COLI: ICD-10-CM

## 2025-08-19 PROCEDURE — 2709999900 IR REMOVAL CVC WO PORT/PUMP

## 2025-08-25 ENCOUNTER — OFFICE VISIT (OUTPATIENT)
Dept: SURGERY | Age: 75
End: 2025-08-25

## 2025-08-25 ENCOUNTER — TELEPHONE (OUTPATIENT)
Dept: CARDIOTHORACIC SURGERY | Age: 75
End: 2025-08-25

## 2025-08-25 VITALS — WEIGHT: 169 LBS | HEIGHT: 74 IN | BODY MASS INDEX: 21.69 KG/M2

## 2025-08-25 DIAGNOSIS — K81.0 ACUTE CHOLECYSTITIS: Primary | ICD-10-CM

## 2025-08-25 PROCEDURE — NBSRV NON-BILLABLE SERVICE: Performed by: SURGERY

## 2025-08-26 ENCOUNTER — OFFICE VISIT (OUTPATIENT)
Age: 75
End: 2025-08-26

## 2025-08-26 VITALS
TEMPERATURE: 97.2 F | WEIGHT: 171.96 LBS | HEIGHT: 74 IN | DIASTOLIC BLOOD PRESSURE: 60 MMHG | OXYGEN SATURATION: 91 % | BODY MASS INDEX: 22.07 KG/M2 | SYSTOLIC BLOOD PRESSURE: 140 MMHG | HEART RATE: 62 BPM

## 2025-08-26 DIAGNOSIS — Z95.1 S/P CABG X 2: Primary | ICD-10-CM

## 2025-08-26 PROCEDURE — 99024 POSTOP FOLLOW-UP VISIT: CPT

## 2025-09-02 ENCOUNTER — HOSPITAL ENCOUNTER (OUTPATIENT)
Age: 75
Discharge: HOME | End: 2025-09-04
Attending: INTERNAL MEDICINE
Payer: OTHER GOVERNMENT

## 2025-09-02 VITALS
SYSTOLIC BLOOD PRESSURE: 138 MMHG | HEIGHT: 74 IN | DIASTOLIC BLOOD PRESSURE: 71 MMHG | WEIGHT: 171 LBS | BODY MASS INDEX: 21.94 KG/M2

## 2025-09-02 DIAGNOSIS — I42.9 CARDIOMYOPATHY, UNSPECIFIED TYPE (HCC): ICD-10-CM

## 2025-09-02 LAB
ECHO AO ROOT DIAM: 3.7 CM
ECHO AO ROOT INDEX: 1.82 CM/M2
ECHO AV AREA PEAK VELOCITY: 3.3 CM2
ECHO AV AREA VTI: 3 CM2
ECHO AV AREA/BSA PEAK VELOCITY: 1.6 CM2/M2
ECHO AV AREA/BSA VTI: 1.5 CM2/M2
ECHO AV MEAN GRADIENT: 3 MMHG
ECHO AV MEAN VELOCITY: 0.8 M/S
ECHO AV PEAK GRADIENT: 6 MMHG
ECHO AV PEAK VELOCITY: 1.2 M/S
ECHO AV VELOCITY RATIO: 0.83
ECHO AV VTI: 28 CM
ECHO BSA: 2.01 M2
ECHO EST RA PRESSURE: 3 MMHG
ECHO IVC EXP: 2 CM
ECHO LA AREA 2C: 17.9 CM2
ECHO LA AREA 4C: 21.5 CM2
ECHO LA MAJOR AXIS: 5.4 CM
ECHO LA MINOR AXIS: 5 CM
ECHO LA VOL BP: 61 ML (ref 18–58)
ECHO LA VOL MOD A2C: 52 ML (ref 18–58)
ECHO LA VOL MOD A4C: 68 ML (ref 18–58)
ECHO LA VOL/BSA BIPLANE: 30 ML/M2 (ref 16–34)
ECHO LA VOLUME INDEX MOD A2C: 26 ML/M2 (ref 16–34)
ECHO LA VOLUME INDEX MOD A4C: 33 ML/M2 (ref 16–34)
ECHO LV E' LATERAL VELOCITY: 10.3 CM/S
ECHO LV E' SEPTAL VELOCITY: 6.6 CM/S
ECHO LV EF PHYSICIAN: 58 %
ECHO LV FRACTIONAL SHORTENING: 29 % (ref 28–44)
ECHO LV INTERNAL DIMENSION DIASTOLE INDEX: 2.41 CM/M2
ECHO LV INTERNAL DIMENSION DIASTOLIC: 4.9 CM (ref 4.2–5.9)
ECHO LV INTERNAL DIMENSION SYSTOLIC INDEX: 1.72 CM/M2
ECHO LV INTERNAL DIMENSION SYSTOLIC: 3.5 CM
ECHO LV IVSD: 0.9 CM (ref 0.6–1)
ECHO LV MASS 2D: 153 G (ref 88–224)
ECHO LV MASS INDEX 2D: 75.3 G/M2 (ref 49–115)
ECHO LV POSTERIOR WALL DIASTOLIC: 0.9 CM (ref 0.6–1)
ECHO LV RELATIVE WALL THICKNESS RATIO: 0.37
ECHO LVOT AREA: 4.2 CM2
ECHO LVOT AV VTI INDEX: 0.76
ECHO LVOT DIAM: 2.3 CM
ECHO LVOT MEAN GRADIENT: 2 MMHG
ECHO LVOT PEAK GRADIENT: 4 MMHG
ECHO LVOT PEAK VELOCITY: 1 M/S
ECHO LVOT STROKE VOLUME INDEX: 43.4 ML/M2
ECHO LVOT SV: 88 ML
ECHO LVOT VTI: 21.2 CM
ECHO MV A VELOCITY: 0.65 M/S
ECHO MV AREA VTI: 2.8 CM2
ECHO MV E DECELERATION TIME (DT): 258 MS
ECHO MV E VELOCITY: 0.75 M/S
ECHO MV E/A RATIO: 1.15
ECHO MV E/E' LATERAL: 7.28
ECHO MV E/E' RATIO (AVERAGED): 9.32
ECHO MV E/E' SEPTAL: 11.36
ECHO MV LVOT VTI INDEX: 1.49
ECHO MV MAX VELOCITY: 0.8 M/S
ECHO MV MEAN GRADIENT: 1 MMHG
ECHO MV MEAN VELOCITY: 0.4 M/S
ECHO MV PEAK GRADIENT: 3 MMHG
ECHO MV VTI: 31.5 CM
ECHO PV MAX VELOCITY: 0.9 M/S
ECHO PV PEAK GRADIENT: 3 MMHG
ECHO RA AREA 4C: 14.6 CM2
ECHO RA END SYSTOLIC VOLUME APICAL 4 CHAMBER INDEX BSA: 18 ML/M2
ECHO RA VOLUME: 36 ML
ECHO RIGHT VENTRICULAR SYSTOLIC PRESSURE (RVSP): 28 MMHG
ECHO RV BASAL DIMENSION: 3.8 CM
ECHO RV FREE WALL PEAK S': 10.4 CM/S
ECHO RV MID DIMENSION: 2.4 CM
ECHO RV TAPSE: 2.1 CM (ref 1.7–?)
ECHO TV REGURGITANT MAX VELOCITY: 2.52 M/S
ECHO TV REGURGITANT PEAK GRADIENT: 23 MMHG

## 2025-09-02 PROCEDURE — 93306 TTE W/DOPPLER COMPLETE: CPT

## 2025-09-02 PROCEDURE — 93306 TTE W/DOPPLER COMPLETE: CPT | Performed by: INTERNAL MEDICINE

## (undated) DEVICE — DEVICE LOK BILI BX CAP RAP EXCHG DISPOSABLE

## (undated) DEVICE — SPONGE DRN W4XL4IN RAYON/POLYESTER 6 PLY NONWOVEN PRECUT 2 PER PK

## (undated) DEVICE — Device

## (undated) DEVICE — BAG BLD TRNSF 1 CPLR IV ST 600ML TERUFLEX

## (undated) DEVICE — SUTURE PROL SZ 6-0 L24IN NONABSORBABLE BLU L13MM C-1 3/8 8726H

## (undated) DEVICE — WAX SURG 2.5GM HEMSTAT BNE BEESWAX PARAFFIN ISO PALMITATE

## (undated) DEVICE — CATHETER THOR 32FR L23IN PVC 5 EYELET STR ATRAUM

## (undated) DEVICE — TRAY,CATHETER,SUCTION,14 FR,2 GLV,MINI: Brand: MEDLINE

## (undated) DEVICE — DEVICE DRIVE ROTAWIRE FLOPPY

## (undated) DEVICE — CATHETER GUID EXTRA BACKUP 3.5 0.070IN 6FR 100CM VISTA BRITE TIP

## (undated) DEVICE — PINNACLE INTRODUCER SHEATH: Brand: PINNACLE

## (undated) DEVICE — SYRINGE MED 10ML LUERLOCK TIP W/O SFTY DISP

## (undated) DEVICE — LIQUIBAND RAPID ADHESIVE 36/CS 0.8ML: Brand: MEDLINE

## (undated) DEVICE — SUTURE S STL SZ 6 L18IN NONABSORBABLE SIL L48MM CCS 1/2 CIR M654G

## (undated) DEVICE — SYRINGE INFL 60ML DISP ALLIANCE II

## (undated) DEVICE — CONNECTOR PERF W0.25XH3/8IN BASE Y SHP REDUC W/O LUERLOCK

## (undated) DEVICE — ARMADA 35 PTA CATHETER 5 MM X 60 MM X 80 CM / OVER-THE-WIRE: Brand: ARMADA

## (undated) DEVICE — COVER ENDOSCP INSTRUMENT DSTL CVR DISP (EA = 20 UNITS)

## (undated) DEVICE — CATHETER GUID TURNPIKE 135CM DIA2.9X2.9X1.6FR 0.014IN

## (undated) DEVICE — SUPPORT WRST AD W3.5XL9IN DIA14.5IN ART SFT ADJ HK AND LOOP

## (undated) DEVICE — SOLUTION IV IRRIG POUR BRL 0.9% SODIUM CHL 2F7124

## (undated) DEVICE — SUTURE VICRYL + SZ 2-0 L36IN ABSRB UD L36MM CT-1 1/2 CIR VCP945H

## (undated) DEVICE — BALLOON STONE RETRV 7-6FR CATH L200CM BLLN DIA12-15MM

## (undated) DEVICE — CONNECTOR PERF W3 8XH3 8XL3 8IN BASE EQL Y SHP W O LUERLOCK

## (undated) DEVICE — GUIDEWIRE VASC L260CM DIA0.035IN RAD 3MM J TIP L7CM PTFE

## (undated) DEVICE — 450 ML BOTTLE OF 0.05% CHLORHEXIDINE GLUCONATE IN 99.95% STERILE WATER FOR IRRIGATION, USP AND APPLICATOR.: Brand: IRRISEPT ANTIMICROBIAL WOUND LAVAGE

## (undated) DEVICE — LOOP VES W13MM THK09MM MINI RED SIL FLD REPELLENT

## (undated) DEVICE — SUTURE VICRYL + SZ 1-0 L36IN ABSRB UD CTX 1/2 CIR TAPR PNT VCP977H

## (undated) DEVICE — EXTENSION SET, 2 INJECTION SITES, MALE LUER LOCK ADAPTER WITH RETRACTABLE COLLAR: Brand: INTERLINK

## (undated) DEVICE — DRESSING CATHETER POS W BTRFLY TAPE PTCH ULT I SYS SORBAVIEW

## (undated) DEVICE — SUTURE MONOCRYL + SZ 3-0 L27IN ABSRB UD PS1 L24MM 3/8 CIR REV MCP936H

## (undated) DEVICE — SET ADMIN PRIMING 67ML L105IN NVENT 180UM FLTR 3 RLER CLMP

## (undated) DEVICE — CATHETER URETH 14FR L16IN RED RUB RND HLLW TIP W/ TWO EYE

## (undated) DEVICE — TRAY VEN ACCS DIA9FR 3 LUMN ADV HF DEV

## (undated) DEVICE — SUTURE VICRYL + SZ 3-0 L27IN ABSRB UD L26MM SH 1/2 CIR VCP416H

## (undated) DEVICE — CATHETER IV 14GA L3.25IN ORNG FLUORINATED ETHYLENE

## (undated) DEVICE — OPTIFOAM GENTLE LIQUITRAP, SACRUM, 7"X7": Brand: MEDLINE

## (undated) DEVICE — CORONARY IMAGING CATHETER: Brand: OPTICROSS™ 6 HD

## (undated) DEVICE — CATH BLLN ANGIO 4X15MM NC EUPHORIA RX

## (undated) DEVICE — STERNUM BLADE, OFFSET (31.7 X 0.64 X 6.3MM)

## (undated) DEVICE — SPONGE GZ L3FTXW2IN COT VAG XR DTECT 4 PLY MESH PK RL

## (undated) DEVICE — CATHETER 5FR CORDIS PIG 145DEG 110CM

## (undated) DEVICE — 12 FOOT DISPOSABLE EXTENSION CABLE WITH SAFE CONNECT / SCREW-DOWN

## (undated) DEVICE — GUIDE SURG TISS

## (undated) DEVICE — PRESSURE MONITORING SET: Brand: TRUWAVE, VAMP PLUS

## (undated) DEVICE — SUTURE PROL SZ 3-0 L36IN NONABSORBABLE BLU L26MM SH 1/2 CIR 8522H

## (undated) DEVICE — CATHETER IVL C2PLUS SHOCKWAVE 4.0MM X 12MM

## (undated) DEVICE — FOGARTY - HYDRAGRIP SURGICAL - CLAMP INSERTS: Brand: FOGARTY SOFTJAW

## (undated) DEVICE — SHEATH 6FR 11CM BRITETIP

## (undated) DEVICE — SUTURE PROL SZ 6-0 L30IN NONABSORBABLE BLU L11MM BV 3/8 CIR 8776H

## (undated) DEVICE — CATHETER,URETHRAL,REDRUBBER,STRL,12FR: Brand: MEDLINE INDUSTRIES, INC.

## (undated) DEVICE — BANDAGE ADH W4XL13 3 4IN POSTOP DSG PRIMAPORE

## (undated) DEVICE — 3 ML SYRINGE LUER-LOCK TIP: Brand: MONOJECT

## (undated) DEVICE — SET CATH 20GA L1.75IN RAD ART POLYUR RADPQ W/ INTEGR

## (undated) DEVICE — APPLIER CLP L9.38IN M LIG TI DISP STR RNG HNDL LIGACLP

## (undated) DEVICE — CATHETER THRMDIL 7.5FR L110CM PROXIMAL/DISTAL PRT L30CM STD

## (undated) DEVICE — CANNULA PERF L2IN BLNT TIP 3MM VES CLR RADPQ BODY FEM LUER D

## (undated) DEVICE — CONNECTOR PERF L5 IN OD1 2 IN SAT HCT ST FEATURING B CARE 5

## (undated) DEVICE — NEEDLE,HYPODERM,SAFETY, 27GX1.25: Brand: MEDLINE

## (undated) DEVICE — SUTURE PERMA-HAND SZ 4-0 L144IN NONABSORBABLE BLK LIGAPAK LA53G

## (undated) DEVICE — BLANKET THER PED W25XL33IN HYPER/HYPOTHERMIA DISP

## (undated) DEVICE — ORGANIZER TUBE 8 H W/ TWO SWVL MTL DRP CLP

## (undated) DEVICE — GOWN SURG L L45IN BLU SMS NONREINFORCED VELC AND TIE 3 LEV

## (undated) DEVICE — GLOVE SURG SZ 8 L11.77IN FNGR THK9.8MIL STRW LTX POLYMER

## (undated) DEVICE — CATH BLLN ANGIO 4.50X12MM NC EUPHORIA RX

## (undated) DEVICE — GAUZE,SPONGE,4"X4",8PLY,STRL,LF,10/TRAY: Brand: MEDLINE

## (undated) DEVICE — SPHINCTEROTOME ENDOSCP DST 3.9FR L20MM TIP L5MM GWIRE

## (undated) DEVICE — STOCKINETTE,IMPERVIOUS,12X48,TABS: Brand: MEDLINE

## (undated) DEVICE — SUTURE PERMAHAND SZ 4-0 L18IN NONABSORBABLE BLK SILK BRAID A183H

## (undated) DEVICE — 31" (79 CM) APPX 3.2 ML, EXT SET W/CLAMP, ROTATING LUER: Brand: ICU MEDICAL

## (undated) DEVICE — APPLICATOR  COTTON-TIPPED 6 IN WOOD STRL

## (undated) DEVICE — SYRINGE MED 30ML STD CLR PLAS LUERLOCK TIP N CTRL DISP

## (undated) DEVICE — SUTURE PERMA-HAND 0 L18IN NONABSORBABLE BLK CT-1 L36MM 1/2 C021D

## (undated) DEVICE — DILATOR ENDOSCP L240CM BLLN L5.5CM DIA12-13.5-15MM CATH

## (undated) DEVICE — SUTURE NONABSORBABLE MONOFILAMENT 5-0 C-1 1X24 IN PROLENE 8725H

## (undated) DEVICE — SYSTEM ENDOSCP VES HARV W/ TOOL CANN SEAL SHT PRT BLNT TIP

## (undated) DEVICE — ARMADA 35 PTA CATHETER 6 MM X 80 MM X 80 CM / OVER-THE-WIRE: Brand: ARMADA

## (undated) DEVICE — TOWEL,OR,DSP,ST,WHITE,DLX,XR,4/PK,20PK/C: Brand: MEDLINE

## (undated) DEVICE — NEEDLE HYPO 18GA L1.5IN THN WALL PIVOTING SHLD BVL ORIENTED

## (undated) DEVICE — CAUTERY: TIP CLEANER XR 100/CS: Brand: MEDICAL ACTION INDUSTRIES

## (undated) DEVICE — SUTURE MONOCRYL + SZ 4-0 L27IN ABSRB UD L19MM PS-2 3/8 CIR MCP426H

## (undated) DEVICE — GOWN,AURORA,NONREINF,RAGLAN,XXL,STERILE: Brand: MEDLINE

## (undated) DEVICE — SUTURE PERMAHAND SZ 0 L30IN NONABSORBABLE BLK L26MM SH 1/2 K834H

## (undated) DEVICE — SUTURE VICRYL + SZ 0 L27IN ABSRB UD CT-1 L36MM 1/2 CIR TAPR VCP260H

## (undated) DEVICE — CATHETER THOR 32FR L23IN PVC 6 EYELET STR ATRAUM

## (undated) DEVICE — BNDG,ELSTC,MATRIX,STRL,4"X5YD,LF,HOOK&LP: Brand: MEDLINE

## (undated) DEVICE — SUTURE PERMAHAND SZ 2-0 L30IN NONABSORBABLE BLK SILK W/O A305H

## (undated) DEVICE — ARMADA 35 PTA CATHETER 7 MM X 80 MM X 80 CM / OVER-THE-WIRE: Brand: ARMADA

## (undated) DEVICE — DRESSING GERM DIA1IN CNTR H DIA7MM BLU CHG ANTIMIC PROTCT

## (undated) DEVICE — DRAIN SURG SGL COLL PT TB FOR ATS BG OASIS

## (undated) DEVICE — TOURNIQUET SURG 12 FRX7.5 IN TWO TUBE 1 WIRE SNR SURE-SNARE

## (undated) DEVICE — CANNULA PERF AD 32 40FR OVERALL L15IN 05IN CONN SITE TWO STG

## (undated) DEVICE — RUNTHROUGH NS EXTRA FLOPPY PTCA GUIDEWIRE: Brand: RUNTHROUGH

## (undated) DEVICE — SUTURE PERMAHAND SZ 2-0 L12X18IN NONABSORBABLE BLK SILK A185H

## (undated) DEVICE — DOC GUIDE WIRE EXTENSION: Brand: DOC

## (undated) DEVICE — CABG: Brand: MEDLINE INDUSTRIES, INC.

## (undated) DEVICE — TUBING INSUFFLATION SMK EVAC HI FLO SET PNEUMOCLEAR

## (undated) DEVICE — LEAD PACE L475MM CHNL A OR V MYOCARDIAL STEROID ELUT SIL

## (undated) DEVICE — RESERVOIR AUTOTRANSFUSION 225/120 CC GS FILTERED XTRA

## (undated) DEVICE — KIT COMPL CK0289R4  BB9L99R8

## (undated) DEVICE — APPLIER CLP L9.375IN APER 2.1MM CLS L3.8MM 20 SM TI CLP

## (undated) DEVICE — LOOP VES W1.3MM THK0.9MM MINI WHT SIL FLD REPELLENT

## (undated) DEVICE — BITE BLOCK ENDOSCP AD 60 FR W/ ADJ STRP PLAS GRN BLOX

## (undated) DEVICE — ELECTRODE PT RET AD L9FT HI MOIST COND ADH HYDRGEL CORDED

## (undated) DEVICE — COVER US PRB W13XL244CM SURGICAL INTRAOPERATIVE W RUBBERBAND

## (undated) DEVICE — CATHETER DIAG AD 5FR L100CM COR GRY HYDRPHLC NYL IM W/O

## (undated) DEVICE — SUTURE VCRL UD BR CT 3-0 18IN CT1 J838D

## (undated) DEVICE — RADIFOCUS GLIDEWIRE: Brand: GLIDEWIRE

## (undated) DEVICE — CATHETER 5FR JL3.5 CORDIS 100CM

## (undated) DEVICE — BANDAGE COMPR COHESIVE 5 YDX4 IN SELF ADH TAN NS COBAN

## (undated) DEVICE — BLADE OPHTH KNF D3MM 15DEG CATRCT BLU MICRO-SHARP

## (undated) DEVICE — PRE-CONNECTED EXCHANGEABLE BURR CATHETER AND BURR ADVANCING DEVICE: Brand: ROTAPRO™

## (undated) DEVICE — SUTURE NONABSORBABLE MONOFILAMENT 7-0 BV-1 1X24 IN PROLENE 8702H

## (undated) DEVICE — KIT CATH 5.2FR 100CM 145DEG ANGIO VASC DIAG JUDKINS R 4

## (undated) DEVICE — CATHETER IABP 8FR 50CC FBROPT CONN W INSRT KT TWO STATLOK

## (undated) DEVICE — SUTURE ETHIBOND EXCEL SZ 2-0 L36IN NONABSORBABLE GRN SH-1 X763H

## (undated) DEVICE — ELECTRODE BLDE L4IN NONINSULATED EDGE

## (undated) DEVICE — CATHETER ANGIO 5FR L100CM GRY S STL NYL JR4 3 SEG BRAID L

## (undated) DEVICE — ORGANIZER MED DEV W76XL86CM PCH W25XL28CM FOR ENDOSCP TBL

## (undated) DEVICE — SPONGE GZ W4XL4IN COT 12 PLY TYP VII WVN C FLD DSGN